# Patient Record
Sex: FEMALE | Race: WHITE | NOT HISPANIC OR LATINO | Employment: PART TIME | URBAN - METROPOLITAN AREA
[De-identification: names, ages, dates, MRNs, and addresses within clinical notes are randomized per-mention and may not be internally consistent; named-entity substitution may affect disease eponyms.]

---

## 2017-01-09 ENCOUNTER — TRANSCRIBE ORDERS (OUTPATIENT)
Dept: LAB | Facility: HOSPITAL | Age: 47
End: 2017-01-09

## 2017-01-09 ENCOUNTER — APPOINTMENT (OUTPATIENT)
Dept: LAB | Facility: HOSPITAL | Age: 47
End: 2017-01-09
Attending: INTERNAL MEDICINE
Payer: COMMERCIAL

## 2017-01-09 DIAGNOSIS — I10 ESSENTIAL HYPERTENSION, MALIGNANT: ICD-10-CM

## 2017-01-09 DIAGNOSIS — E61.1 IRON DEFICIENCY: Primary | ICD-10-CM

## 2017-01-09 DIAGNOSIS — E61.1 IRON DEFICIENCY: ICD-10-CM

## 2017-01-09 LAB
ALBUMIN SERPL BCP-MCNC: 3.4 G/DL (ref 3.5–5)
ALP SERPL-CCNC: 65 U/L (ref 46–116)
ALT SERPL W P-5'-P-CCNC: 20 U/L (ref 12–78)
ANION GAP SERPL CALCULATED.3IONS-SCNC: 10 MMOL/L (ref 4–13)
AST SERPL W P-5'-P-CCNC: 13 U/L (ref 5–45)
BACTERIA UR QL AUTO: ABNORMAL /HPF
BASOPHILS # BLD AUTO: 0.03 THOUSANDS/ΜL (ref 0–0.1)
BASOPHILS NFR BLD AUTO: 0 % (ref 0–1)
BILIRUB SERPL-MCNC: 0.31 MG/DL (ref 0.2–1)
BILIRUB UR QL STRIP: NEGATIVE
BUN SERPL-MCNC: 10 MG/DL (ref 5–25)
CALCIUM SERPL-MCNC: 8.9 MG/DL (ref 8.3–10.1)
CHLORIDE SERPL-SCNC: 103 MMOL/L (ref 100–108)
CHOLEST SERPL-MCNC: 221 MG/DL (ref 50–200)
CLARITY UR: ABNORMAL
CO2 SERPL-SCNC: 26 MMOL/L (ref 21–32)
COLOR UR: YELLOW
CREAT SERPL-MCNC: 0.75 MG/DL (ref 0.6–1.3)
EOSINOPHIL # BLD AUTO: 0.11 THOUSAND/ΜL (ref 0–0.61)
EOSINOPHIL NFR BLD AUTO: 2 % (ref 0–6)
ERYTHROCYTE [DISTWIDTH] IN BLOOD BY AUTOMATED COUNT: 13.3 % (ref 11.6–15.1)
FERRITIN SERPL-MCNC: 17 NG/ML (ref 8–388)
GFR SERPL CREATININE-BSD FRML MDRD: >60 ML/MIN/1.73SQ M
GLUCOSE SERPL-MCNC: 108 MG/DL (ref 65–140)
GLUCOSE UR STRIP-MCNC: NEGATIVE MG/DL
HCT VFR BLD AUTO: 40.4 % (ref 34.8–46.1)
HDLC SERPL-MCNC: 60 MG/DL (ref 40–60)
HGB BLD-MCNC: 13.6 G/DL (ref 11.5–15.4)
HGB UR QL STRIP.AUTO: ABNORMAL
KETONES UR STRIP-MCNC: NEGATIVE MG/DL
LDLC SERPL CALC-MCNC: 143 MG/DL (ref 0–100)
LEUKOCYTE ESTERASE UR QL STRIP: NEGATIVE
LYMPHOCYTES # BLD AUTO: 1.71 THOUSANDS/ΜL (ref 0.6–4.47)
LYMPHOCYTES NFR BLD AUTO: 24 % (ref 14–44)
MCH RBC QN AUTO: 32 PG (ref 26.8–34.3)
MCHC RBC AUTO-ENTMCNC: 33.7 G/DL (ref 31.4–37.4)
MCV RBC AUTO: 95 FL (ref 82–98)
MONOCYTES # BLD AUTO: 0.5 THOUSAND/ΜL (ref 0.17–1.22)
MONOCYTES NFR BLD AUTO: 7 % (ref 4–12)
NEUTROPHILS # BLD AUTO: 4.7 THOUSANDS/ΜL (ref 1.85–7.62)
NEUTS SEG NFR BLD AUTO: 67 % (ref 43–75)
NITRITE UR QL STRIP: NEGATIVE
NON-SQ EPI CELLS URNS QL MICRO: ABNORMAL /HPF
NRBC BLD AUTO-RTO: 0 /100 WBCS
PH UR STRIP.AUTO: 5.5 [PH] (ref 4.5–8)
PLATELET # BLD AUTO: 270 THOUSANDS/UL (ref 149–390)
PMV BLD AUTO: 11.1 FL (ref 8.9–12.7)
POTASSIUM SERPL-SCNC: 3.6 MMOL/L (ref 3.5–5.3)
PROT SERPL-MCNC: 7.4 G/DL (ref 6.4–8.2)
PROT UR STRIP-MCNC: NEGATIVE MG/DL
RBC # BLD AUTO: 4.25 MILLION/UL (ref 3.81–5.12)
RBC #/AREA URNS AUTO: ABNORMAL /HPF
SODIUM SERPL-SCNC: 139 MMOL/L (ref 136–145)
SP GR UR STRIP.AUTO: 1.01 (ref 1–1.03)
T4 FREE SERPL-MCNC: 1.07 NG/DL (ref 0.76–1.46)
TRIGL SERPL-MCNC: 89 MG/DL
TSH SERPL DL<=0.05 MIU/L-ACNC: 3.74 UIU/ML (ref 0.36–3.74)
UROBILINOGEN UR QL STRIP.AUTO: 1 E.U./DL
WBC # BLD AUTO: 7.06 THOUSAND/UL (ref 4.31–10.16)
WBC #/AREA URNS AUTO: ABNORMAL /HPF

## 2017-01-09 PROCEDURE — 80061 LIPID PANEL: CPT

## 2017-01-09 PROCEDURE — 82728 ASSAY OF FERRITIN: CPT

## 2017-01-09 PROCEDURE — 81001 URINALYSIS AUTO W/SCOPE: CPT | Performed by: INTERNAL MEDICINE

## 2017-01-09 PROCEDURE — 85025 COMPLETE CBC W/AUTO DIFF WBC: CPT

## 2017-01-09 PROCEDURE — 84439 ASSAY OF FREE THYROXINE: CPT

## 2017-01-09 PROCEDURE — 36415 COLL VENOUS BLD VENIPUNCTURE: CPT

## 2017-01-09 PROCEDURE — 84443 ASSAY THYROID STIM HORMONE: CPT

## 2017-01-09 PROCEDURE — 80053 COMPREHEN METABOLIC PANEL: CPT

## 2017-04-17 ENCOUNTER — TRANSCRIBE ORDERS (OUTPATIENT)
Dept: LAB | Facility: CLINIC | Age: 47
End: 2017-04-17

## 2017-04-17 ENCOUNTER — APPOINTMENT (OUTPATIENT)
Dept: LAB | Facility: CLINIC | Age: 47
End: 2017-04-17
Payer: COMMERCIAL

## 2017-04-17 DIAGNOSIS — I15.8: Primary | ICD-10-CM

## 2017-04-17 DIAGNOSIS — I15.8: ICD-10-CM

## 2017-04-17 LAB
ALBUMIN SERPL BCP-MCNC: 3.3 G/DL (ref 3.5–5)
ALP SERPL-CCNC: 62 U/L (ref 46–116)
ALT SERPL W P-5'-P-CCNC: 20 U/L (ref 12–78)
ANION GAP SERPL CALCULATED.3IONS-SCNC: 7 MMOL/L (ref 4–13)
AST SERPL W P-5'-P-CCNC: 9 U/L (ref 5–45)
BASOPHILS # BLD AUTO: 0.03 THOUSANDS/ΜL (ref 0–0.1)
BASOPHILS NFR BLD AUTO: 1 % (ref 0–1)
BILIRUB SERPL-MCNC: 0.32 MG/DL (ref 0.2–1)
BUN SERPL-MCNC: 12 MG/DL (ref 5–25)
CALCIUM SERPL-MCNC: 8.8 MG/DL (ref 8.3–10.1)
CHLORIDE SERPL-SCNC: 104 MMOL/L (ref 100–108)
CO2 SERPL-SCNC: 27 MMOL/L (ref 21–32)
CREAT SERPL-MCNC: 0.77 MG/DL (ref 0.6–1.3)
EOSINOPHIL # BLD AUTO: 0.14 THOUSAND/ΜL (ref 0–0.61)
EOSINOPHIL NFR BLD AUTO: 2 % (ref 0–6)
ERYTHROCYTE [DISTWIDTH] IN BLOOD BY AUTOMATED COUNT: 13 % (ref 11.6–15.1)
GFR SERPL CREATININE-BSD FRML MDRD: >60 ML/MIN/1.73SQ M
GLUCOSE SERPL-MCNC: 130 MG/DL (ref 65–140)
HCT VFR BLD AUTO: 39.2 % (ref 34.8–46.1)
HGB BLD-MCNC: 12.9 G/DL (ref 11.5–15.4)
LYMPHOCYTES # BLD AUTO: 1.4 THOUSANDS/ΜL (ref 0.6–4.47)
LYMPHOCYTES NFR BLD AUTO: 23 % (ref 14–44)
MAGNESIUM SERPL-MCNC: 1.9 MG/DL (ref 1.6–2.6)
MCH RBC QN AUTO: 31.1 PG (ref 26.8–34.3)
MCHC RBC AUTO-ENTMCNC: 32.9 G/DL (ref 31.4–37.4)
MCV RBC AUTO: 95 FL (ref 82–98)
MONOCYTES # BLD AUTO: 0.45 THOUSAND/ΜL (ref 0.17–1.22)
MONOCYTES NFR BLD AUTO: 7 % (ref 4–12)
NEUTROPHILS # BLD AUTO: 4.18 THOUSANDS/ΜL (ref 1.85–7.62)
NEUTS SEG NFR BLD AUTO: 67 % (ref 43–75)
NRBC BLD AUTO-RTO: 0 /100 WBCS
PHOSPHATE SERPL-MCNC: 2.7 MG/DL (ref 2.7–4.5)
PLATELET # BLD AUTO: 280 THOUSANDS/UL (ref 149–390)
PMV BLD AUTO: 10.8 FL (ref 8.9–12.7)
POTASSIUM SERPL-SCNC: 3.3 MMOL/L (ref 3.5–5.3)
PROT SERPL-MCNC: 7.2 G/DL (ref 6.4–8.2)
RBC # BLD AUTO: 4.15 MILLION/UL (ref 3.81–5.12)
SODIUM SERPL-SCNC: 138 MMOL/L (ref 136–145)
URATE SERPL-MCNC: 5 MG/DL (ref 2–6.8)
WBC # BLD AUTO: 6.22 THOUSAND/UL (ref 4.31–10.16)

## 2017-04-17 PROCEDURE — 84550 ASSAY OF BLOOD/URIC ACID: CPT

## 2017-04-17 PROCEDURE — 85025 COMPLETE CBC W/AUTO DIFF WBC: CPT

## 2017-04-17 PROCEDURE — 83735 ASSAY OF MAGNESIUM: CPT

## 2017-04-17 PROCEDURE — 84100 ASSAY OF PHOSPHORUS: CPT

## 2017-04-17 PROCEDURE — 80053 COMPREHEN METABOLIC PANEL: CPT

## 2017-04-17 PROCEDURE — 36415 COLL VENOUS BLD VENIPUNCTURE: CPT

## 2017-08-04 ENCOUNTER — TRANSCRIBE ORDERS (OUTPATIENT)
Dept: LAB | Facility: HOSPITAL | Age: 47
End: 2017-08-04

## 2017-08-04 ENCOUNTER — APPOINTMENT (OUTPATIENT)
Dept: LAB | Facility: HOSPITAL | Age: 47
End: 2017-08-04
Payer: COMMERCIAL

## 2017-08-04 DIAGNOSIS — Z00.8 HEALTH EXAMINATION IN POPULATION SURVEYS: Primary | ICD-10-CM

## 2017-08-04 DIAGNOSIS — Z00.8 HEALTH EXAMINATION IN POPULATION SURVEYS: ICD-10-CM

## 2017-08-04 LAB
CHOLEST SERPL-MCNC: 202 MG/DL (ref 50–200)
EST. AVERAGE GLUCOSE BLD GHB EST-MCNC: 117 MG/DL
HBA1C MFR BLD: 5.7 % (ref 4.2–6.3)
HDLC SERPL-MCNC: 56 MG/DL (ref 40–60)
LDLC SERPL CALC-MCNC: 131 MG/DL (ref 0–100)
TRIGL SERPL-MCNC: 73 MG/DL

## 2017-08-04 PROCEDURE — 36415 COLL VENOUS BLD VENIPUNCTURE: CPT

## 2017-08-04 PROCEDURE — 83036 HEMOGLOBIN GLYCOSYLATED A1C: CPT

## 2017-08-04 PROCEDURE — 80061 LIPID PANEL: CPT

## 2017-08-08 ENCOUNTER — LAB REQUISITION (OUTPATIENT)
Dept: LAB | Facility: HOSPITAL | Age: 47
End: 2017-08-08
Payer: COMMERCIAL

## 2017-08-08 ENCOUNTER — ALLSCRIPTS OFFICE VISIT (OUTPATIENT)
Dept: OTHER | Facility: OTHER | Age: 47
End: 2017-08-08

## 2017-08-08 DIAGNOSIS — Z12.31 ENCOUNTER FOR SCREENING MAMMOGRAM FOR MALIGNANT NEOPLASM OF BREAST: ICD-10-CM

## 2017-08-08 DIAGNOSIS — Z01.419 ENCOUNTER FOR GYNECOLOGICAL EXAMINATION WITHOUT ABNORMAL FINDING: ICD-10-CM

## 2017-08-08 PROCEDURE — G0145 SCR C/V CYTO,THINLAYER,RESCR: HCPCS | Performed by: OBSTETRICS & GYNECOLOGY

## 2017-08-08 PROCEDURE — 87624 HPV HI-RISK TYP POOLED RSLT: CPT | Performed by: OBSTETRICS & GYNECOLOGY

## 2017-08-14 LAB — HPV RRNA GENITAL QL NAA+PROBE: NORMAL

## 2017-08-15 LAB
LAB AP GYN PRIMARY INTERPRETATION: NORMAL
LAB AP LMP: NORMAL
Lab: NORMAL

## 2018-01-14 VITALS
WEIGHT: 161 LBS | SYSTOLIC BLOOD PRESSURE: 142 MMHG | HEIGHT: 63 IN | DIASTOLIC BLOOD PRESSURE: 84 MMHG | BODY MASS INDEX: 28.53 KG/M2

## 2018-01-18 NOTE — PROGRESS NOTES
Chief Complaint    1  Ear Pain  c/o left ear pain ,radiates in left side of face  recent sinus/cold      History of Present Illness  HPI: Last week patient had upper extremity infection symptoms of congestion, cough, runny nose  Patient's been using over-the-counter medications and Flonase with some relief  He should is feeling better, but has more left ear pain and pressure and has some mild congestion  She denies any nasal discharge sputum  Patient did have low-grade fevers last week, but none this week  Hospital Based Practices Required Assessment:   Pain Assessment   the patient states they have pain  (on a scale of 0 to 10, the patient rates the pain at 5 )   Abuse And Domestic Violence Screen    Yes, the patient is safe at home  The patient states no one is hurting them  Depression And Suicide Screen  No, the patient has not had thoughts of hurting themself  No, the patient has not felt depressed in the past 7 days  Referral made to     [facility]  Ear Pain: ROSEMARY Industrcordelia 80 presents with complaints of ear pain  Associated symptoms include ear pressure and nasal congestion, but no otalgia, no ear plugging, no ear drainage, no decreased hearing, no auricular pain, no ear sores, no ear pruritus, no fever, no cough, no sore throat, no swollen glands, no facial pain, no dental pain, no headache, no tinnitus, no vertigo, no loss of balance, no nausea and no temporomandibular joint pain  Review of Systems    Constitutional: as noted in HPI    ENT: as noted in HPI  Respiratory: as noted in HPI  Active Problems    1  Left knee pain (719 46) (M25 562)    Current Meds   1  Advil CAPS Recorded   2  Fioricet -40 MG Oral Capsule Recorded   3  HydroCHLOROthiazide 25 MG Oral Tablet Recorded   4  Inderal LA 80 MG Oral Capsule Extended Release 24 Hour Recorded    Allergies    1  Amoxil   2   Ceclor    Vitals  Signs    Systolic: 162  Diastolic: 88  Heart Rate: 69  Respiration: 18  Temperature: 98 1 F, Temporal  Pain Scale: 5  LMP: [id number]  O2 Saturation: 98  Height: 5 ft 3 in  Weight: 159 lb   BMI Calculated: 28 17  BSA Calculated: 1 75    Physical Exam    Constitutional   General appearance: No acute distress, well appearing and well nourished  Eyes   Conjunctiva and lids: No swelling, erythema or discharge  Pupils and irises: Equal, round and reactive to light  Ears, Nose, Mouth, and Throat   External inspection of ears and nose: Normal   Mild bilateral nasal erythema with no congestion, no discharge  Otoscopic examination: Tympanic membranes translucent with normal light reflex  Canals patent without erythema  Clear fluid bilaterally in the middle ear  Mild bilateral tonsillar erythema with no exudate  No tonsillar soft tissue swelling  Lymphatic   Palpation of lymph nodes in neck: Abnormal   bilateral anterior cervical node enlargement, but no posterior cervical node enlargement, no submandibular node enlargement and no supraclavicular node enlargement  Psychiatric   Orientation to person, place, and time: Normal     Mood and affect: Normal        Assessment    1  URTI (acute upper respiratory infection) (465 9) (J06 9)    Plan  URTI (acute upper respiratory infection)    · Drink plenty of fluids ; Status:Complete;   Done: 25VSS0669   · Resume activity to your tolerance ; Status:Complete;   Done: 22BFF5090   · Use a cool mist humidifier in the room ; Status:Complete;   Done: [id number]    Discussion/Summary    Continue Flonase  Follow-up with your primary care physician if symptoms are worsening or no improvement the next 5-7 days        Signatures   Electronically signed by : PUJA Gonsales; Nov 4 2016  3:31PM EST                       (Author)    Electronically signed by : Bebe Lindsay DO; Nov 4 2016  4:53PM EST                       (Co-author)

## 2018-01-30 ENCOUNTER — OFFICE VISIT (OUTPATIENT)
Dept: URGENT CARE | Age: 48
End: 2018-01-30
Payer: COMMERCIAL

## 2018-01-30 VITALS
DIASTOLIC BLOOD PRESSURE: 100 MMHG | HEIGHT: 63 IN | BODY MASS INDEX: 28.7 KG/M2 | RESPIRATION RATE: 20 BRPM | HEART RATE: 87 BPM | OXYGEN SATURATION: 97 % | WEIGHT: 162 LBS | SYSTOLIC BLOOD PRESSURE: 142 MMHG | TEMPERATURE: 98.7 F

## 2018-01-30 DIAGNOSIS — J02.9 SORE THROAT: ICD-10-CM

## 2018-01-30 DIAGNOSIS — J06.9 UPPER RESPIRATORY TRACT INFECTION, UNSPECIFIED TYPE: Primary | ICD-10-CM

## 2018-01-30 LAB — S PYO AG THROAT QL: NEGATIVE

## 2018-01-30 PROCEDURE — S9088 SERVICES PROVIDED IN URGENT: HCPCS | Performed by: FAMILY MEDICINE

## 2018-01-30 PROCEDURE — 99213 OFFICE O/P EST LOW 20 MIN: CPT | Performed by: FAMILY MEDICINE

## 2018-01-30 PROCEDURE — 87430 STREP A AG IA: CPT | Performed by: FAMILY MEDICINE

## 2018-01-30 RX ORDER — BUTALBITAL, ACETAMINOPHEN AND CAFFEINE 300; 40; 50 MG/1; MG/1; MG/1
CAPSULE ORAL
COMMUNITY
End: 2021-07-14 | Stop reason: SDUPTHER

## 2018-01-30 RX ORDER — HYDROCHLOROTHIAZIDE 25 MG/1
TABLET ORAL
COMMUNITY
End: 2021-07-14 | Stop reason: SDUPTHER

## 2018-01-30 RX ORDER — PROPRANOLOL HYDROCHLORIDE 80 MG/1
CAPSULE, EXTENDED RELEASE ORAL
COMMUNITY
End: 2021-07-14 | Stop reason: SDUPTHER

## 2018-01-30 RX ORDER — AZITHROMYCIN 250 MG/1
TABLET, FILM COATED ORAL
Qty: 6 TABLET | Refills: 0 | Status: SHIPPED | OUTPATIENT
Start: 2018-01-30 | End: 2018-02-03

## 2018-01-30 RX ORDER — OMEGA-3 FATTY ACIDS/FISH OIL 300-1000MG
CAPSULE ORAL AS NEEDED
COMMUNITY

## 2018-01-31 NOTE — PROGRESS NOTES
Assessment/Plan:      Diagnoses and all orders for this visit:    Sore throat  -     POCT rapid strepA    Other orders  -     Ibuprofen (ADVIL) 200 MG CAPS; Take by mouth  -     Butalbital-APAP-Caffeine (FIORICET) -40 MG CAPS; Take by mouth  -     hydrochlorothiazide (HYDRODIURIL) 25 mg tablet; Take by mouth  -     propranolol (INDERAL LA) 80 mg 24 hr capsule; Take by mouth          Subjective:     Patient ID: Odilia Vides is a 50 y o  female  Patient is here for evaluation nasal congestion runny nose with thick yellow sputum  Patient is also having a cough and sore throat  She did have a fever last 2 days  She denies any ear pain, dizziness, shortness of breath  Review of Systems   Constitutional: Positive for fever  Negative for chills  HENT: Positive for congestion, postnasal drip, rhinorrhea and sore throat  Negative for sinus pressure  Eyes: Negative  Respiratory: Positive for cough  Cardiovascular: Negative  Objective:     Physical Exam   Constitutional: She appears well-developed and well-nourished  HENT:   Head: Normocephalic and atraumatic  Right Ear: External ear normal    Left Ear: External ear normal    Bilateral tonsillar erythema with no soft tissue swelling  No exudate  Bilateral nasal congestion and erythema with no discharge  Eyes: Conjunctivae and EOM are normal  Pupils are equal, round, and reactive to light  Right eye exhibits no discharge  Left eye exhibits no discharge  Neck: Normal range of motion  Neck supple  Cardiovascular: Normal rate, regular rhythm and normal heart sounds  Pulmonary/Chest: Effort normal and breath sounds normal    Lymphadenopathy:     She has cervical adenopathy  Nursing note and vitals reviewed

## 2018-03-26 ENCOUNTER — TELEPHONE (OUTPATIENT)
Dept: OBGYN CLINIC | Facility: CLINIC | Age: 48
End: 2018-03-26

## 2018-03-26 DIAGNOSIS — B37.3 YEAST INFECTION OF THE VAGINA: Primary | ICD-10-CM

## 2018-03-26 RX ORDER — FLUCONAZOLE 150 MG/1
150 TABLET ORAL ONCE
Qty: 1 TABLET | Refills: 1 | Status: SHIPPED | OUTPATIENT
Start: 2018-03-26 | End: 2018-03-26

## 2018-03-26 NOTE — TELEPHONE ENCOUNTER
Pt complaining of symptoms of yeast infection, tried monistat last month, would like script for diflucan    [facility] [address]

## 2018-03-26 NOTE — TELEPHONE ENCOUNTER
Pt having sx of yeast inf - had prev used monistat 1 last month - prefers to take diflucan (prev effective) - has had recent increased frequency with increased sweating in vag area (3 times past 6 months) - will be having screening lab testing thru pcp    [facility] ([facility])

## 2018-03-27 ENCOUNTER — TELEPHONE (OUTPATIENT)
Dept: OBGYN CLINIC | Facility: CLINIC | Age: 48
End: 2018-03-27

## 2018-04-23 ENCOUNTER — TRANSCRIBE ORDERS (OUTPATIENT)
Dept: LAB | Facility: CLINIC | Age: 48
End: 2018-04-23

## 2018-04-23 ENCOUNTER — APPOINTMENT (OUTPATIENT)
Dept: LAB | Facility: CLINIC | Age: 48
End: 2018-04-23
Payer: COMMERCIAL

## 2018-04-23 DIAGNOSIS — R35.0 URINARY FREQUENCY: ICD-10-CM

## 2018-04-23 DIAGNOSIS — D64.9 ANEMIA, UNSPECIFIED TYPE: ICD-10-CM

## 2018-04-23 DIAGNOSIS — E78.2 MIXED HYPERLIPIDEMIA: ICD-10-CM

## 2018-04-23 DIAGNOSIS — I10 ESSENTIAL HYPERTENSION, MALIGNANT: Primary | ICD-10-CM

## 2018-04-23 DIAGNOSIS — E03.9 ACQUIRED HYPOTHYROIDISM: ICD-10-CM

## 2018-04-23 DIAGNOSIS — I10 ESSENTIAL HYPERTENSION, MALIGNANT: ICD-10-CM

## 2018-04-23 LAB
ALBUMIN SERPL BCP-MCNC: 3.5 G/DL (ref 3.5–5)
ALP SERPL-CCNC: 57 U/L (ref 46–116)
ALT SERPL W P-5'-P-CCNC: 22 U/L (ref 12–78)
ANION GAP SERPL CALCULATED.3IONS-SCNC: 5 MMOL/L (ref 4–13)
AST SERPL W P-5'-P-CCNC: 17 U/L (ref 5–45)
BACTERIA UR QL AUTO: ABNORMAL /HPF
BASOPHILS # BLD AUTO: 0.04 THOUSANDS/ΜL (ref 0–0.1)
BASOPHILS NFR BLD AUTO: 1 % (ref 0–1)
BILIRUB SERPL-MCNC: 0.34 MG/DL (ref 0.2–1)
BILIRUB UR QL STRIP: NEGATIVE
BUN SERPL-MCNC: 12 MG/DL (ref 5–25)
CALCIUM SERPL-MCNC: 8.8 MG/DL (ref 8.3–10.1)
CHLORIDE SERPL-SCNC: 101 MMOL/L (ref 100–108)
CHOLEST SERPL-MCNC: 187 MG/DL (ref 50–200)
CLARITY UR: ABNORMAL
CO2 SERPL-SCNC: 28 MMOL/L (ref 21–32)
COLOR UR: YELLOW
CREAT SERPL-MCNC: 0.67 MG/DL (ref 0.6–1.3)
EOSINOPHIL # BLD AUTO: 0.2 THOUSAND/ΜL (ref 0–0.61)
EOSINOPHIL NFR BLD AUTO: 3 % (ref 0–6)
ERYTHROCYTE [DISTWIDTH] IN BLOOD BY AUTOMATED COUNT: 13.4 % (ref 11.6–15.1)
GFR SERPL CREATININE-BSD FRML MDRD: 104 ML/MIN/1.73SQ M
GLUCOSE P FAST SERPL-MCNC: 119 MG/DL (ref 65–99)
GLUCOSE UR STRIP-MCNC: NEGATIVE MG/DL
HCT VFR BLD AUTO: 39.4 % (ref 34.8–46.1)
HDLC SERPL-MCNC: 56 MG/DL (ref 40–60)
HGB BLD-MCNC: 13.2 G/DL (ref 11.5–15.4)
HGB UR QL STRIP.AUTO: ABNORMAL
HYALINE CASTS #/AREA URNS LPF: ABNORMAL /LPF
KETONES UR STRIP-MCNC: NEGATIVE MG/DL
LDLC SERPL CALC-MCNC: 113 MG/DL (ref 0–100)
LDLC SERPL DIRECT ASSAY-MCNC: 116 MG/DL (ref 0–100)
LEUKOCYTE ESTERASE UR QL STRIP: NEGATIVE
LYMPHOCYTES # BLD AUTO: 1.89 THOUSANDS/ΜL (ref 0.6–4.47)
LYMPHOCYTES NFR BLD AUTO: 27 % (ref 14–44)
MCH RBC QN AUTO: 31.9 PG (ref 26.8–34.3)
MCHC RBC AUTO-ENTMCNC: 33.5 G/DL (ref 31.4–37.4)
MCV RBC AUTO: 95 FL (ref 82–98)
MONOCYTES # BLD AUTO: 0.57 THOUSAND/ΜL (ref 0.17–1.22)
MONOCYTES NFR BLD AUTO: 8 % (ref 4–12)
NEUTROPHILS # BLD AUTO: 4.22 THOUSANDS/ΜL (ref 1.85–7.62)
NEUTS SEG NFR BLD AUTO: 61 % (ref 43–75)
NITRITE UR QL STRIP: NEGATIVE
NON-SQ EPI CELLS URNS QL MICRO: ABNORMAL /HPF
NONHDLC SERPL-MCNC: 131 MG/DL
NRBC BLD AUTO-RTO: 0 /100 WBCS
PH UR STRIP.AUTO: 6 [PH] (ref 4.5–8)
PLATELET # BLD AUTO: 288 THOUSANDS/UL (ref 149–390)
PMV BLD AUTO: 10.9 FL (ref 8.9–12.7)
POTASSIUM SERPL-SCNC: 3.5 MMOL/L (ref 3.5–5.3)
PROT SERPL-MCNC: 7.6 G/DL (ref 6.4–8.2)
PROT UR STRIP-MCNC: ABNORMAL MG/DL
RBC # BLD AUTO: 4.14 MILLION/UL (ref 3.81–5.12)
RBC #/AREA URNS AUTO: ABNORMAL /HPF
SODIUM SERPL-SCNC: 134 MMOL/L (ref 136–145)
SP GR UR STRIP.AUTO: 1.02 (ref 1–1.03)
T4 FREE SERPL-MCNC: 1.09 NG/DL (ref 0.76–1.46)
TRIGL SERPL-MCNC: 92 MG/DL
TSH SERPL DL<=0.05 MIU/L-ACNC: 3.28 UIU/ML (ref 0.36–3.74)
UROBILINOGEN UR QL STRIP.AUTO: 0.2 E.U./DL
WBC # BLD AUTO: 6.94 THOUSAND/UL (ref 4.31–10.16)
WBC #/AREA URNS AUTO: ABNORMAL /HPF

## 2018-04-23 PROCEDURE — 80053 COMPREHEN METABOLIC PANEL: CPT

## 2018-04-23 PROCEDURE — 83721 ASSAY OF BLOOD LIPOPROTEIN: CPT

## 2018-04-23 PROCEDURE — 80061 LIPID PANEL: CPT

## 2018-04-23 PROCEDURE — 85025 COMPLETE CBC W/AUTO DIFF WBC: CPT

## 2018-04-23 PROCEDURE — 84443 ASSAY THYROID STIM HORMONE: CPT

## 2018-04-23 PROCEDURE — 81001 URINALYSIS AUTO W/SCOPE: CPT

## 2018-04-23 PROCEDURE — 84439 ASSAY OF FREE THYROXINE: CPT

## 2018-04-23 PROCEDURE — 83036 HEMOGLOBIN GLYCOSYLATED A1C: CPT

## 2018-04-23 PROCEDURE — 36415 COLL VENOUS BLD VENIPUNCTURE: CPT

## 2018-04-24 LAB
EST. AVERAGE GLUCOSE BLD GHB EST-MCNC: 108 MG/DL
HBA1C MFR BLD: 5.4 % (ref 4.2–6.3)

## 2018-04-30 ENCOUNTER — OFFICE VISIT (OUTPATIENT)
Dept: URGENT CARE | Age: 48
End: 2018-04-30
Payer: COMMERCIAL

## 2018-04-30 VITALS
HEART RATE: 81 BPM | OXYGEN SATURATION: 98 % | BODY MASS INDEX: 28.7 KG/M2 | WEIGHT: 162 LBS | SYSTOLIC BLOOD PRESSURE: 146 MMHG | TEMPERATURE: 99.7 F | HEIGHT: 63 IN | DIASTOLIC BLOOD PRESSURE: 79 MMHG | RESPIRATION RATE: 18 BRPM

## 2018-04-30 DIAGNOSIS — J06.9 ACUTE URI: Primary | ICD-10-CM

## 2018-04-30 PROCEDURE — S9088 SERVICES PROVIDED IN URGENT: HCPCS | Performed by: FAMILY MEDICINE

## 2018-04-30 PROCEDURE — 99213 OFFICE O/P EST LOW 20 MIN: CPT | Performed by: FAMILY MEDICINE

## 2018-04-30 RX ORDER — FLUTICASONE PROPIONATE 50 MCG
1 SPRAY, SUSPENSION (ML) NASAL DAILY
Qty: 16 G | Refills: 0 | Status: SHIPPED | OUTPATIENT
Start: 2018-04-30

## 2018-04-30 RX ORDER — GUAIFENESIN 600 MG
1200 TABLET, EXTENDED RELEASE 12 HR ORAL EVERY 12 HOURS SCHEDULED
Qty: 8 TABLET | Refills: 0 | Status: SHIPPED | OUTPATIENT
Start: 2018-04-30 | End: 2021-07-26 | Stop reason: ALTCHOICE

## 2018-04-30 RX ORDER — AZITHROMYCIN 250 MG/1
TABLET, FILM COATED ORAL
Qty: 6 TABLET | Refills: 0 | Status: SHIPPED | OUTPATIENT
Start: 2018-04-30 | End: 2018-05-04

## 2018-04-30 NOTE — LETTER
April 30, 2018     Patient: Cecilia Corbin   YOB: 1970   Date of Visit: 4/30/2018       To Whom It May Concern: It is my medical opinion that Cecilia Corbin may return to work on 5/2/2018  If you have any questions or concerns, please don't hesitate to call           Sincerely,        Jacklyn Yanes PA-C    CC: No Recipients

## 2018-04-30 NOTE — PATIENT INSTRUCTIONS
Take antibiotics, flonase, and mucinex as directed  Drink plenty of fluids  Follow up with family practice this week  Go to ER if new or worsening symptoms occur  Acute Bronchitis   WHAT YOU NEED TO KNOW:   Acute bronchitis is swelling and irritation in the air passages of your lungs  This irritation may cause you to cough or have other breathing problems  Acute bronchitis often starts because of another illness, such as a cold or the flu  The illness spreads from your nose and throat to your windpipe and airways  Bronchitis is often called a chest cold  Acute bronchitis lasts about 3 to 6 weeks and is usually not a serious illness  Your cough can last for several weeks  DISCHARGE INSTRUCTIONS:   Return to the emergency department if:   · You cough up blood  · Your lips or fingernails turn blue  · You feel like you are not getting enough air when you breathe  Contact your healthcare provider if:   · You have a fever  · Your breathing problems do not go away or get worse  · Your cough does not get better within 4 weeks  · You have questions or concerns about your condition or care  Self-care:   · Get more rest   Rest helps your body to heal  Slowly start to do more each day  Rest when you feel it is needed  · Avoid irritants in the air  Avoid chemicals, fumes, and dust  Wear a face mask if you must work around dust or fumes  Stay inside on days when air pollution levels are high  If you have allergies, stay inside when pollen counts are high  Do not use aerosol products, such as spray-on deodorant, bug spray, and hair spray  · Do not smoke or be around others who smoke  Nicotine and other chemicals in cigarettes and cigars damages the cilia that move mucus out of your lungs  Ask your healthcare provider for information if you currently smoke and need help to quit  E-cigarettes or smokeless tobacco still contain nicotine  Talk to your healthcare provider before you use these products  · Drink liquids as directed  Liquids help keep your air passages moist and help you cough up mucus  You may need to drink more liquids when you have acute bronchitis  Ask how much liquid to drink each day and which liquids are best for you  · Use a humidifier or vaporizer  Use a cool mist humidifier or a vaporizer to increase air moisture in your home  This may make it easier for you to breathe and help decrease your cough  Decrease risk for acute bronchitis:   · Get the vaccinations you need  Ask your healthcare provider if you should get vaccinated against the flu or pneumonia  · Prevent the spread of germs  You can decrease your risk of acute bronchitis and other illnesses by doing the following:     [facility] your hands often with soap and water  Carry germ-killing hand lotion or gel with you  You can use the lotion or gel to clean your hands when soap and water are not available  ¨ Do not touch your eyes, nose, or mouth unless you have washed your hands first     ¨ Always cover your mouth when you cough to prevent the spread of germs  It is best to cough into a tissue or your shirt sleeve instead of into your hand  Ask those around you cover their mouths when they cough  ¨ Try to avoid people who have a cold or the flu  If you are sick, stay away from others as much as possible  Medicines: Your healthcare provider may  give you any of the following:  · Ibuprofen or acetaminophen  are medicines that help lower your fever  They are available without a doctor's order  Ask your healthcare provider which medicine is right for you  Ask how much to take and how often to take it  Follow directions  These medicines can cause stomach bleeding if not taken correctly  Ibuprofen can cause kidney damage  Do not take ibuprofen if you have kidney disease, an ulcer, or allergies to aspirin  Acetaminophen can cause liver damage  Do not take more than 4,000 milligrams in 24 hours       · Decongestants  help loosen mucus in your lungs and make it easier to cough up  This can help you breathe easier  · Cough suppressants  decrease your urge to cough  If your cough produces mucus, do not take a cough suppressant unless your healthcare provider tells you to  Your healthcare provider may suggest that you take a cough suppressant at night so you can rest     · Inhalers  may be given  Your healthcare provider may give you one or more inhalers to help you breathe easier and cough less  An inhaler gives your medicine to open your airways  Ask your healthcare provider to show you how to use your inhaler correctly  · Take your medicine as directed  Contact your healthcare provider if you think your medicine is not helping or if you have side effects  Tell him of her if you are allergic to any medicine  Keep a list of the medicines, vitamins, and herbs you take  Include the amounts, and when and why you take them  Bring the list or the pill bottles to follow-up visits  Carry your medicine list with you in case of an emergency  Follow up with your healthcare provider as directed:  Write down questions you have so you will remember to ask them during your follow-up visits  © 2017 [employer]  Information is for End User's use only and may not be sold, redistributed or otherwise used for commercial purposes  All illustrations and images included in CareNotes® are the copyrighted property of [employer]  or Hussain Urbano  The above information is an  only  It is not intended as medical advice for individual conditions or treatments  Talk to your doctor, nurse or pharmacist before following any medical regimen to see if it is safe and effective for you

## 2018-04-30 NOTE — PROGRESS NOTES
[facility]        NAME: Mihaela Rogers is a 50 y o  female  : 1970    MRN: [id number]  DATE: 2018  TIME: 4:16 PM    Assessment and Plan   Acute URI [J06 9]  1  Acute URI  azithromycin (ZITHROMAX) 250 mg tablet    fluticasone (FLONASE) 50 mcg/act nasal spray    guaiFENesin (MUCINEX) 600 mg 12 hr tablet         Patient Instructions       Take antibiotics, flonase, and mucinex as directed  Drink plenty of fluids  Follow up with family practice this week  Go to ER if new or worsening symptoms occur  Chief Complaint     Chief Complaint   Patient presents with    Cold Like Symptoms     Since Thurs fever and body aches-came on quickly  Now congested and coughing         History of Present Illness       Patient presents with 5 days of sinus pressure, PND, rhinorrhea, yellow productive cough, myalgias, and intermittent fevers  Pt states that she has only been taking motrin for fever relief  She took claritin today with no relief  Pt is eating and drinking normally  No SOB  No NVD  No abdominal pain  No dizziness  Review of Systems   Review of Systems   Constitutional: Positive for chills and fever  Negative for diaphoresis and fatigue  HENT: Positive for congestion, postnasal drip, rhinorrhea, sinus pain and sinus pressure  Negative for ear pain, sore throat and voice change  Eyes: Negative  Respiratory: Positive for cough  Negative for chest tightness, shortness of breath and wheezing  Cardiovascular: Negative for chest pain and palpitations  Gastrointestinal: Negative for abdominal pain, diarrhea, nausea and vomiting  Endocrine: Negative  Genitourinary: Negative for dysuria  Musculoskeletal: Negative for back pain, myalgias and neck pain  Skin: Negative for pallor and rash  Allergic/Immunologic: Negative  Neurological: Negative for dizziness, syncope and headaches  Hematological: Negative  Psychiatric/Behavioral: Negative            Current Medications       Current Outpatient Prescriptions:     Butalbital-APAP-Caffeine (FIORICET) -40 MG CAPS, Take by mouth, Disp: , Rfl:     hydrochlorothiazide (HYDRODIURIL) 25 mg tablet, Take by mouth, Disp: , Rfl:     Ibuprofen (ADVIL) 200 MG CAPS, Take by mouth, Disp: , Rfl:     propranolol (INDERAL LA) 80 mg 24 hr capsule, Take by mouth, Disp: , Rfl:     azithromycin (ZITHROMAX) 250 mg tablet, Take 2 tablets today then 1 tablet daily x 4 days, Disp: 6 tablet, Rfl: 0    fluticasone (FLONASE) 50 mcg/act nasal spray, 1 spray into each nostril daily, Disp: 16 g, Rfl: 0    guaiFENesin (MUCINEX) 600 mg 12 hr tablet, Take 2 tablets (1,200 mg total) by mouth every 12 (twelve) hours, Disp: 8 tablet, Rfl: 0    Current Allergies     Allergies as of 04/30/2018 - Reviewed 04/30/2018   Allergen Reaction Noted    Amoxicillin  08/26/2015    Cefaclor  08/26/2015            The following portions of the patient's history were reviewed and updated as appropriate: allergies, current medications, past family history, past medical history, past social history, past surgical history and problem list      Past Medical History:   Diagnosis Date    Hypertension        History reviewed  No pertinent surgical history  Family History   Problem Relation Age of Onset    No Known Problems Mother     Heart disease Father          Medications have been verified  Objective   /79   Pulse 81   Temp 99 7 °F (37 6 °C) (Temporal)   Resp 18   Ht 5' 3" (1 6 m)   Wt 73 5 kg (162 lb)   SpO2 98%   BMI 28 70 kg/m²        Physical Exam     Physical Exam   Constitutional: She is oriented to person, place, and time  She appears well-developed and well-nourished  No distress  HENT:   Head: Normocephalic and atraumatic  Right Ear: External ear normal    Left Ear: External ear normal    Mouth/Throat: No oropharyngeal exudate  PND, erythematous posterior pharynx, no exudates, clear nasal dc b/l with swollen turbinates  Eyes: Conjunctivae are normal  Right eye exhibits no discharge  Left eye exhibits no discharge  Neck: Normal range of motion  Neck supple  Cardiovascular: Normal rate, regular rhythm, normal heart sounds and intact distal pulses  Pulmonary/Chest: Effort normal and breath sounds normal  No respiratory distress  She has no wheezes  She has no rales  Lymphadenopathy:     She has no cervical adenopathy  Neurological: She is alert and oriented to person, place, and time  Skin: Skin is warm  No rash noted  She is not diaphoretic  Nursing note and vitals reviewed  Due to length of symptoms I will start pt on abx  Pt agrees to f/u with PCP if symptoms do not improve

## 2018-12-10 ENCOUNTER — ANNUAL EXAM (OUTPATIENT)
Dept: OBGYN CLINIC | Facility: CLINIC | Age: 48
End: 2018-12-10
Payer: COMMERCIAL

## 2018-12-10 VITALS
SYSTOLIC BLOOD PRESSURE: 142 MMHG | HEIGHT: 63 IN | WEIGHT: 166.8 LBS | BODY MASS INDEX: 29.55 KG/M2 | DIASTOLIC BLOOD PRESSURE: 90 MMHG

## 2018-12-10 DIAGNOSIS — Z01.419 ENCOUNTER FOR ANNUAL ROUTINE GYNECOLOGICAL EXAMINATION: Primary | ICD-10-CM

## 2018-12-10 DIAGNOSIS — Z12.39 BREAST CANCER SCREENING: ICD-10-CM

## 2018-12-10 PROCEDURE — 99396 PREV VISIT EST AGE 40-64: CPT | Performed by: OBSTETRICS & GYNECOLOGY

## 2018-12-10 PROCEDURE — 87624 HPV HI-RISK TYP POOLED RSLT: CPT | Performed by: OBSTETRICS & GYNECOLOGY

## 2018-12-10 PROCEDURE — G0145 SCR C/V CYTO,THINLAYER,RESCR: HCPCS | Performed by: OBSTETRICS & GYNECOLOGY

## 2018-12-10 NOTE — PROGRESS NOTES
Assessment/Plan:    Pap done as well as annual   Encouraged self-breast examination as well as calcium supplementation  Recommend baseline mammogram   Discussed colon cancer screening, baseline colonoscopy  She continues to follow-up with her family physician and her nephrologist on an annual basis  Reviewed madelin menopausal symptoms  She will return to office in 1 year or p r n  No problem-specific Assessment & Plan notes found for this encounter  Diagnoses and all orders for this visit:    Encounter for annual routine gynecological examination    Breast cancer screening  -     Mammo screening bilateral w 3d & cad; Future          Subjective:      Patient ID: Jose Daniel Carrion is a 46 y o  female  HPI     This is a 49-year-old female  ( x2) presents for her annual gyn exam   She offers no complaints today  She states her menstrual cycles are very regular every 25-28 days lasting 3-5 days with no breakthrough bleeding  She denies any changes in bowel or bladder function  She has been in a monogamous relationship with her  for over 21 years  Her method of contraception is vasectomy  She does see a neurologist on an annual basis as 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13  She has been on a diuretic since that time  She has never had a screening colonoscopy or mammogram   She does state that her 66-year-old daughter is pregnant unexpectedly with a due date of 2019  She will stay with her until delivery and then she plans out [place] with her boyfriend  The following portions of the patient's history were reviewed and updated as appropriate: allergies, current medications, past family history, past medical history, past social history, past surgical history and problem list     Review of Systems   Constitutional: Negative for fatigue, fever and unexpected weight change     Respiratory: Negative for cough, chest tightness, shortness of breath and wheezing  Cardiovascular: Negative  Negative for chest pain and palpitations  Gastrointestinal: Negative  Negative for abdominal distention, abdominal pain, blood in stool, constipation, diarrhea, nausea and vomiting  Genitourinary: Negative  Negative for difficulty urinating, dyspareunia, dysuria, flank pain, frequency, genital sores, hematuria, pelvic pain, urgency, vaginal bleeding, vaginal discharge and vaginal pain  Skin: Negative for rash  Objective:      /90   Ht 5' 3" (1 6 m)   Wt 75 7 kg (166 lb 12 8 oz)   LMP 11/19/2018 (Exact Date)   Breastfeeding? No   BMI 29 55 kg/m²          Physical Exam   Constitutional: She appears well-developed and well-nourished  Cardiovascular: Normal rate and regular rhythm  Pulmonary/Chest: Effort normal and breath sounds normal  Right breast exhibits no inverted nipple, no mass, no nipple discharge, no skin change and no tenderness  Left breast exhibits no inverted nipple, no mass, no nipple discharge, no skin change and no tenderness  Abdominal: Soft  Bowel sounds are normal  She exhibits no distension  There is no tenderness  There is no rebound and no guarding  Genitourinary: Vagina normal and uterus normal  There is no lesion on the right labia  There is no lesion on the left labia  Cervix exhibits no discharge and no friability  Right adnexum displays no mass, no tenderness and no fullness  Left adnexum displays no mass, no tenderness and no fullness  No vaginal discharge found

## 2018-12-11 LAB
HPV HR 12 DNA CVX QL NAA+PROBE: NEGATIVE
HPV16 DNA CVX QL NAA+PROBE: NEGATIVE
HPV18 DNA CVX QL NAA+PROBE: NEGATIVE

## 2018-12-13 LAB
LAB AP GYN PRIMARY INTERPRETATION: NORMAL
LAB AP LMP: NORMAL
Lab: NORMAL

## 2019-08-28 ENCOUNTER — TRANSCRIBE ORDERS (OUTPATIENT)
Dept: ADMINISTRATIVE | Age: 49
End: 2019-08-28

## 2019-08-28 ENCOUNTER — APPOINTMENT (OUTPATIENT)
Dept: LAB | Age: 49
End: 2019-08-28

## 2019-08-28 DIAGNOSIS — Z01.84 IMMUNITY STATUS TESTING: Primary | ICD-10-CM

## 2019-08-28 DIAGNOSIS — Z01.84 IMMUNITY STATUS TESTING: ICD-10-CM

## 2019-08-28 PROCEDURE — 36415 COLL VENOUS BLD VENIPUNCTURE: CPT

## 2019-08-28 PROCEDURE — 86787 VARICELLA-ZOSTER ANTIBODY: CPT

## 2019-08-28 PROCEDURE — 86765 RUBEOLA ANTIBODY: CPT

## 2019-08-29 LAB
MEV IGG SER QL: NORMAL
VZV IGG SER IA-ACNC: NORMAL

## 2019-09-19 ENCOUNTER — APPOINTMENT (OUTPATIENT)
Dept: LAB | Facility: CLINIC | Age: 49
End: 2019-09-19
Payer: COMMERCIAL

## 2019-09-19 ENCOUNTER — TRANSCRIBE ORDERS (OUTPATIENT)
Dept: LAB | Facility: CLINIC | Age: 49
End: 2019-09-19

## 2019-09-19 DIAGNOSIS — R35.0 URINARY FREQUENCY: ICD-10-CM

## 2019-09-19 DIAGNOSIS — I51.9 MYXEDEMA HEART DISEASE: ICD-10-CM

## 2019-09-19 DIAGNOSIS — I10 ESSENTIAL HYPERTENSION, MALIGNANT: Primary | ICD-10-CM

## 2019-09-19 DIAGNOSIS — I10 ESSENTIAL HYPERTENSION, MALIGNANT: ICD-10-CM

## 2019-09-19 DIAGNOSIS — D64.9 ANEMIA, UNSPECIFIED TYPE: ICD-10-CM

## 2019-09-19 DIAGNOSIS — E03.9 MYXEDEMA HEART DISEASE: ICD-10-CM

## 2019-09-19 DIAGNOSIS — E78.2 MIXED HYPERLIPIDEMIA: ICD-10-CM

## 2019-09-19 LAB
ALBUMIN SERPL BCP-MCNC: 3.6 G/DL (ref 3.5–5)
ALP SERPL-CCNC: 62 U/L (ref 46–116)
ALT SERPL W P-5'-P-CCNC: 27 U/L (ref 12–78)
AMORPH URATE CRY URNS QL MICRO: ABNORMAL /HPF
ANION GAP SERPL CALCULATED.3IONS-SCNC: 7 MMOL/L (ref 4–13)
AST SERPL W P-5'-P-CCNC: 17 U/L (ref 5–45)
BACTERIA UR QL AUTO: ABNORMAL /HPF
BASOPHILS # BLD AUTO: 0.05 THOUSANDS/ΜL (ref 0–0.1)
BASOPHILS NFR BLD AUTO: 1 % (ref 0–1)
BILIRUB SERPL-MCNC: 0.46 MG/DL (ref 0.2–1)
BILIRUB UR QL STRIP: NEGATIVE
BUN SERPL-MCNC: 12 MG/DL (ref 5–25)
CALCIUM SERPL-MCNC: 9.2 MG/DL (ref 8.3–10.1)
CHLORIDE SERPL-SCNC: 102 MMOL/L (ref 100–108)
CHOLEST SERPL-MCNC: 196 MG/DL (ref 50–200)
CLARITY UR: ABNORMAL
CO2 SERPL-SCNC: 27 MMOL/L (ref 21–32)
COLOR UR: ABNORMAL
CREAT SERPL-MCNC: 0.8 MG/DL (ref 0.6–1.3)
EOSINOPHIL # BLD AUTO: 0.07 THOUSAND/ΜL (ref 0–0.61)
EOSINOPHIL NFR BLD AUTO: 1 % (ref 0–6)
ERYTHROCYTE [DISTWIDTH] IN BLOOD BY AUTOMATED COUNT: 13.2 % (ref 11.6–15.1)
GFR SERPL CREATININE-BSD FRML MDRD: 86 ML/MIN/1.73SQ M
GLUCOSE P FAST SERPL-MCNC: 115 MG/DL (ref 65–99)
GLUCOSE UR STRIP-MCNC: NEGATIVE MG/DL
HCT VFR BLD AUTO: 39.7 % (ref 34.8–46.1)
HDLC SERPL-MCNC: 52 MG/DL (ref 40–60)
HGB BLD-MCNC: 13 G/DL (ref 11.5–15.4)
HGB UR QL STRIP.AUTO: ABNORMAL
IMM GRANULOCYTES # BLD AUTO: 0.02 THOUSAND/UL (ref 0–0.2)
IMM GRANULOCYTES NFR BLD AUTO: 0 % (ref 0–2)
KETONES UR STRIP-MCNC: ABNORMAL MG/DL
LDLC SERPL CALC-MCNC: 125 MG/DL (ref 0–100)
LEUKOCYTE ESTERASE UR QL STRIP: NEGATIVE
LYMPHOCYTES # BLD AUTO: 1.34 THOUSANDS/ΜL (ref 0.6–4.47)
LYMPHOCYTES NFR BLD AUTO: 22 % (ref 14–44)
MCH RBC QN AUTO: 31.7 PG (ref 26.8–34.3)
MCHC RBC AUTO-ENTMCNC: 32.7 G/DL (ref 31.4–37.4)
MCV RBC AUTO: 97 FL (ref 82–98)
MONOCYTES # BLD AUTO: 0.59 THOUSAND/ΜL (ref 0.17–1.22)
MONOCYTES NFR BLD AUTO: 10 % (ref 4–12)
NEUTROPHILS # BLD AUTO: 3.98 THOUSANDS/ΜL (ref 1.85–7.62)
NEUTS SEG NFR BLD AUTO: 66 % (ref 43–75)
NITRITE UR QL STRIP: NEGATIVE
NON-SQ EPI CELLS URNS QL MICRO: ABNORMAL /HPF
NRBC BLD AUTO-RTO: 0 /100 WBCS
PH UR STRIP.AUTO: 6 [PH]
PLATELET # BLD AUTO: 249 THOUSANDS/UL (ref 149–390)
PMV BLD AUTO: 10.5 FL (ref 8.9–12.7)
POTASSIUM SERPL-SCNC: 3.1 MMOL/L (ref 3.5–5.3)
PROT SERPL-MCNC: 7.8 G/DL (ref 6.4–8.2)
PROT UR STRIP-MCNC: NEGATIVE MG/DL
RBC # BLD AUTO: 4.1 MILLION/UL (ref 3.81–5.12)
RBC #/AREA URNS AUTO: ABNORMAL /HPF
SODIUM SERPL-SCNC: 136 MMOL/L (ref 136–145)
SP GR UR STRIP.AUTO: 1.03 (ref 1–1.03)
TRIGL SERPL-MCNC: 94 MG/DL
TSH SERPL DL<=0.05 MIU/L-ACNC: 2.94 UIU/ML (ref 0.36–3.74)
UROBILINOGEN UR QL STRIP.AUTO: 0.2 E.U./DL
WBC # BLD AUTO: 6.05 THOUSAND/UL (ref 4.31–10.16)
WBC #/AREA URNS AUTO: ABNORMAL /HPF

## 2019-09-19 PROCEDURE — 80061 LIPID PANEL: CPT

## 2019-09-19 PROCEDURE — 36415 COLL VENOUS BLD VENIPUNCTURE: CPT

## 2019-09-19 PROCEDURE — 81001 URINALYSIS AUTO W/SCOPE: CPT

## 2019-09-19 PROCEDURE — 84443 ASSAY THYROID STIM HORMONE: CPT

## 2019-09-19 PROCEDURE — 85025 COMPLETE CBC W/AUTO DIFF WBC: CPT

## 2019-09-19 PROCEDURE — 80053 COMPREHEN METABOLIC PANEL: CPT

## 2020-10-22 ENCOUNTER — LAB (OUTPATIENT)
Dept: LAB | Facility: CLINIC | Age: 50
End: 2020-10-22
Payer: COMMERCIAL

## 2020-10-22 ENCOUNTER — TRANSCRIBE ORDERS (OUTPATIENT)
Dept: LAB | Facility: CLINIC | Age: 50
End: 2020-10-22

## 2020-10-22 DIAGNOSIS — E78.5 HYPERLIPIDEMIA, UNSPECIFIED HYPERLIPIDEMIA TYPE: ICD-10-CM

## 2020-10-22 DIAGNOSIS — I10 ESSENTIAL HYPERTENSION, MALIGNANT: ICD-10-CM

## 2020-10-22 DIAGNOSIS — I51.9 MYXEDEMA HEART DISEASE: ICD-10-CM

## 2020-10-22 DIAGNOSIS — R35.0 URINARY FREQUENCY: ICD-10-CM

## 2020-10-22 DIAGNOSIS — D64.9 ANEMIA, UNSPECIFIED TYPE: Primary | ICD-10-CM

## 2020-10-22 DIAGNOSIS — E03.9 MYXEDEMA HEART DISEASE: ICD-10-CM

## 2020-10-22 DIAGNOSIS — E11.69 TYPE 2 DIABETES MELLITUS WITH OTHER SPECIFIED COMPLICATION, UNSPECIFIED WHETHER LONG TERM INSULIN USE (HCC): ICD-10-CM

## 2020-10-22 DIAGNOSIS — D64.9 ANEMIA, UNSPECIFIED TYPE: ICD-10-CM

## 2020-10-22 LAB
ALBUMIN SERPL BCP-MCNC: 3.5 G/DL (ref 3.5–5)
ALP SERPL-CCNC: 63 U/L (ref 46–116)
ALT SERPL W P-5'-P-CCNC: 26 U/L (ref 12–78)
ANION GAP SERPL CALCULATED.3IONS-SCNC: 7 MMOL/L (ref 4–13)
AST SERPL W P-5'-P-CCNC: 15 U/L (ref 5–45)
BACTERIA UR QL AUTO: ABNORMAL /HPF
BASOPHILS # BLD AUTO: 0.03 THOUSANDS/ΜL (ref 0–0.1)
BASOPHILS NFR BLD AUTO: 1 % (ref 0–1)
BILIRUB SERPL-MCNC: 0.43 MG/DL (ref 0.2–1)
BILIRUB UR QL STRIP: NEGATIVE
BUN SERPL-MCNC: 11 MG/DL (ref 5–25)
CALCIUM SERPL-MCNC: 9.3 MG/DL (ref 8.3–10.1)
CHLORIDE SERPL-SCNC: 107 MMOL/L (ref 100–108)
CHOLEST SERPL-MCNC: 191 MG/DL (ref 50–200)
CLARITY UR: ABNORMAL
CO2 SERPL-SCNC: 25 MMOL/L (ref 21–32)
COLOR UR: ABNORMAL
CREAT SERPL-MCNC: 0.83 MG/DL (ref 0.6–1.3)
EOSINOPHIL # BLD AUTO: 0.11 THOUSAND/ΜL (ref 0–0.61)
EOSINOPHIL NFR BLD AUTO: 2 % (ref 0–6)
ERYTHROCYTE [DISTWIDTH] IN BLOOD BY AUTOMATED COUNT: 13.4 % (ref 11.6–15.1)
EST. AVERAGE GLUCOSE BLD GHB EST-MCNC: 111 MG/DL
GFR SERPL CREATININE-BSD FRML MDRD: 81 ML/MIN/1.73SQ M
GLUCOSE P FAST SERPL-MCNC: 105 MG/DL (ref 65–99)
GLUCOSE UR STRIP-MCNC: NEGATIVE MG/DL
HBA1C MFR BLD: 5.5 %
HCT VFR BLD AUTO: 41.1 % (ref 34.8–46.1)
HDLC SERPL-MCNC: 53 MG/DL
HGB BLD-MCNC: 12.8 G/DL (ref 11.5–15.4)
HGB UR QL STRIP.AUTO: ABNORMAL
IMM GRANULOCYTES # BLD AUTO: 0.02 THOUSAND/UL (ref 0–0.2)
IMM GRANULOCYTES NFR BLD AUTO: 0 % (ref 0–2)
KETONES UR STRIP-MCNC: NEGATIVE MG/DL
LDLC SERPL CALC-MCNC: 121 MG/DL (ref 0–100)
LEUKOCYTE ESTERASE UR QL STRIP: ABNORMAL
LYMPHOCYTES # BLD AUTO: 1.39 THOUSANDS/ΜL (ref 0.6–4.47)
LYMPHOCYTES NFR BLD AUTO: 21 % (ref 14–44)
MCH RBC QN AUTO: 30.8 PG (ref 26.8–34.3)
MCHC RBC AUTO-ENTMCNC: 31.1 G/DL (ref 31.4–37.4)
MCV RBC AUTO: 99 FL (ref 82–98)
MONOCYTES # BLD AUTO: 0.57 THOUSAND/ΜL (ref 0.17–1.22)
MONOCYTES NFR BLD AUTO: 9 % (ref 4–12)
NEUTROPHILS # BLD AUTO: 4.51 THOUSANDS/ΜL (ref 1.85–7.62)
NEUTS SEG NFR BLD AUTO: 67 % (ref 43–75)
NITRITE UR QL STRIP: NEGATIVE
NON-SQ EPI CELLS URNS QL MICRO: ABNORMAL /HPF
NONHDLC SERPL-MCNC: 138 MG/DL
NRBC BLD AUTO-RTO: 0 /100 WBCS
PH UR STRIP.AUTO: 6 [PH]
PLATELET # BLD AUTO: 254 THOUSANDS/UL (ref 149–390)
PMV BLD AUTO: 11.3 FL (ref 8.9–12.7)
POTASSIUM SERPL-SCNC: 3.8 MMOL/L (ref 3.5–5.3)
PROT SERPL-MCNC: 7.6 G/DL (ref 6.4–8.2)
PROT UR STRIP-MCNC: ABNORMAL MG/DL
RBC # BLD AUTO: 4.16 MILLION/UL (ref 3.81–5.12)
RBC #/AREA URNS AUTO: ABNORMAL /HPF
SODIUM SERPL-SCNC: 139 MMOL/L (ref 136–145)
SP GR UR STRIP.AUTO: 1.02 (ref 1–1.03)
T4 FREE SERPL-MCNC: 0.98 NG/DL (ref 0.76–1.46)
TRIGL SERPL-MCNC: 84 MG/DL
TSH SERPL DL<=0.05 MIU/L-ACNC: 2.64 UIU/ML (ref 0.36–3.74)
UROBILINOGEN UR QL STRIP.AUTO: 0.2 E.U./DL
WBC # BLD AUTO: 6.63 THOUSAND/UL (ref 4.31–10.16)
WBC #/AREA URNS AUTO: ABNORMAL /HPF

## 2020-10-22 PROCEDURE — 80053 COMPREHEN METABOLIC PANEL: CPT

## 2020-10-22 PROCEDURE — 80061 LIPID PANEL: CPT

## 2020-10-22 PROCEDURE — 84443 ASSAY THYROID STIM HORMONE: CPT

## 2020-10-22 PROCEDURE — 84439 ASSAY OF FREE THYROXINE: CPT

## 2020-10-22 PROCEDURE — 83036 HEMOGLOBIN GLYCOSYLATED A1C: CPT

## 2020-10-22 PROCEDURE — 36415 COLL VENOUS BLD VENIPUNCTURE: CPT

## 2020-10-22 PROCEDURE — 85025 COMPLETE CBC W/AUTO DIFF WBC: CPT

## 2020-10-22 PROCEDURE — 81001 URINALYSIS AUTO W/SCOPE: CPT

## 2020-10-28 ENCOUNTER — TELEPHONE (OUTPATIENT)
Dept: UROLOGY | Facility: MEDICAL CENTER | Age: 50
End: 2020-10-28

## 2020-12-28 ENCOUNTER — TELEPHONE (OUTPATIENT)
Dept: OBGYN CLINIC | Facility: CLINIC | Age: 50
End: 2020-12-28

## 2020-12-30 ENCOUNTER — IMMUNIZATIONS (OUTPATIENT)
Dept: FAMILY MEDICINE CLINIC | Facility: HOSPITAL | Age: 50
End: 2020-12-30
Payer: COMMERCIAL

## 2020-12-30 DIAGNOSIS — Z23 ENCOUNTER FOR IMMUNIZATION: ICD-10-CM

## 2020-12-30 PROCEDURE — 91301 SARS-COV-2 / COVID-19 MRNA VACCINE (MODERNA) 100 MCG: CPT

## 2020-12-30 PROCEDURE — 0011A SARS-COV-2 / COVID-19 MRNA VACCINE (MODERNA) 100 MCG: CPT

## 2021-01-27 ENCOUNTER — IMMUNIZATIONS (OUTPATIENT)
Dept: FAMILY MEDICINE CLINIC | Facility: HOSPITAL | Age: 51
End: 2021-01-27

## 2021-01-27 DIAGNOSIS — Z23 ENCOUNTER FOR IMMUNIZATION: Primary | ICD-10-CM

## 2021-01-27 PROCEDURE — 0012A SARS-COV-2 / COVID-19 MRNA VACCINE (MODERNA) 100 MCG: CPT

## 2021-01-27 PROCEDURE — 91301 SARS-COV-2 / COVID-19 MRNA VACCINE (MODERNA) 100 MCG: CPT

## 2021-05-04 ENCOUNTER — ANNUAL EXAM (OUTPATIENT)
Dept: OBGYN CLINIC | Facility: CLINIC | Age: 51
End: 2021-05-04
Payer: COMMERCIAL

## 2021-05-04 VITALS
WEIGHT: 173 LBS | SYSTOLIC BLOOD PRESSURE: 122 MMHG | BODY MASS INDEX: 31.83 KG/M2 | HEIGHT: 62 IN | DIASTOLIC BLOOD PRESSURE: 76 MMHG

## 2021-05-04 DIAGNOSIS — Z12.39 ENCOUNTER FOR SCREENING FOR MALIGNANT NEOPLASM OF BREAST, UNSPECIFIED SCREENING MODALITY: ICD-10-CM

## 2021-05-04 DIAGNOSIS — Z01.419 ENCOUNTER FOR ANNUAL ROUTINE GYNECOLOGICAL EXAMINATION: Primary | ICD-10-CM

## 2021-05-04 DIAGNOSIS — Z12.11 COLON CANCER SCREENING: ICD-10-CM

## 2021-05-04 PROCEDURE — G0145 SCR C/V CYTO,THINLAYER,RESCR: HCPCS | Performed by: OBSTETRICS & GYNECOLOGY

## 2021-05-04 PROCEDURE — 87624 HPV HI-RISK TYP POOLED RSLT: CPT | Performed by: OBSTETRICS & GYNECOLOGY

## 2021-05-04 PROCEDURE — 99396 PREV VISIT EST AGE 40-64: CPT | Performed by: OBSTETRICS & GYNECOLOGY

## 2021-05-04 RX ORDER — MULTIVITAMIN
1 TABLET ORAL DAILY
COMMUNITY

## 2021-05-04 NOTE — PROGRESS NOTES
Assessment/Plan:    Pap smear done as well as annual   Encouraged self-breast examination as well as calcium supplementation  Continue annual mammogram   Recommend screening colonoscopy  Reviewed madelin menopausal symptoms  She will keep a menstrual diary, call if less than 21 day cycle  Names provided for primary care physicians  Return to office in 1 year or p r n  No problem-specific Assessment & Plan notes found for this encounter  Diagnoses and all orders for this visit:    Encounter for annual routine gynecological examination    Encounter for screening for malignant neoplasm of breast, unspecified screening modality  -     Mammo screening bilateral w 3d & cad; Future    Colon cancer screening  -     Ambulatory referral to Gastroenterology; Future    Other orders  -     Multiple Vitamin (multivitamin) tablet; Take 1 tablet by mouth daily          Subjective:      Patient ID: Liborio David is a 44 y o  female  HPI       This is a pleasant 64-year-old female  ( x2, age 23, 23 ) presents for annual gyn exam   Last gyn exam was over 2 and half years ago  Her menstrual cycles are approximately every 21-25 days lasting 4-7 days with no breakthrough bleeding  She does get hot flashes and night sweats  She has been in a monogamous relationship with her  for over 25 years  Method of contraception has been vasectomy  She has been under a lot of stress last couple years  Her daughter and 3year-old son has been living with them since delivery  Father the baby is supportive and will see him on weekends  She does feel that she has neglected her care with screening tests including colonoscopy, mammogram, primary care physician  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13  She is not seen her specialist in a couple years  Patient has never had a screening colonoscopy      The following portions of the patient's history were reviewed and updated as appropriate: allergies, current medications, past family history, past medical history, past social history, past surgical history and problem list     Review of Systems   Constitutional: Negative for fatigue, fever and unexpected weight change  Respiratory: Negative for cough, chest tightness, shortness of breath and wheezing  Cardiovascular: Negative  Negative for chest pain and palpitations  Gastrointestinal: Negative  Negative for abdominal distention, abdominal pain, blood in stool, constipation, diarrhea, nausea and vomiting  Genitourinary: Negative  Negative for difficulty urinating, dyspareunia, dysuria, flank pain, frequency, genital sores, hematuria, pelvic pain, urgency, vaginal bleeding, vaginal discharge and vaginal pain  Skin: Negative for rash  Objective:      /76   Ht 5' 2" (1 575 m)   Wt 78 5 kg (173 lb)   LMP 04/21/2021   BMI 31 64 kg/m²          Physical Exam  Constitutional:       Appearance: Normal appearance  She is well-developed  Cardiovascular:      Rate and Rhythm: Normal rate and regular rhythm  Pulmonary:      Effort: Pulmonary effort is normal       Breath sounds: Normal breath sounds  Chest:      Breasts:         Right: No inverted nipple, mass, nipple discharge, skin change or tenderness  Left: No inverted nipple, mass, nipple discharge, skin change or tenderness  Abdominal:      General: Bowel sounds are normal  There is no distension  Palpations: Abdomen is soft  Tenderness: There is no abdominal tenderness  There is no guarding or rebound  Genitourinary:     Labia:         Right: No rash, tenderness or lesion  Left: No rash, tenderness or lesion  Vagina: Normal  No signs of injury  No vaginal discharge, tenderness or lesions  Cervix: No cervical motion tenderness, discharge, friability, lesion, erythema or cervical bleeding  Uterus: Not enlarged and not tender  Adnexa:         Right: No mass, tenderness or fullness  Left: No mass, tenderness or fullness  Comments: External genitalia is within normal limits  The vagina is well estrogenized  Cervix is parous  She does have mild prolapse  Rectovaginal exam is confirmatory  Skin:     General: Skin is warm and dry  Neurological:      Mental Status: She is alert and oriented to person, place, and time

## 2021-05-12 LAB
LAB AP GYN PRIMARY INTERPRETATION: NORMAL
LAB AP LMP: NORMAL
Lab: NORMAL

## 2021-06-24 ENCOUNTER — OFFICE VISIT (OUTPATIENT)
Dept: NEPHROLOGY | Facility: CLINIC | Age: 51
End: 2021-06-24
Payer: COMMERCIAL

## 2021-06-24 VITALS
WEIGHT: 172.2 LBS | DIASTOLIC BLOOD PRESSURE: 78 MMHG | RESPIRATION RATE: 18 BRPM | HEIGHT: 62 IN | HEART RATE: 80 BPM | SYSTOLIC BLOOD PRESSURE: 138 MMHG | BODY MASS INDEX: 31.69 KG/M2

## 2021-06-24 DIAGNOSIS — I15.1 PSEUDOHYPERALDOSTERONISM: Primary | ICD-10-CM

## 2021-06-24 DIAGNOSIS — N25.89 PSEUDOHYPOALDOSTERONISM: ICD-10-CM

## 2021-06-24 LAB
SL AMB  POCT GLUCOSE, UA: NEGATIVE
SL AMB LEUKOCYTE ESTERASE,UA: NEGATIVE
SL AMB POCT BILIRUBIN,UA: NEGATIVE
SL AMB POCT BLOOD,UA: NEGATIVE
SL AMB POCT CLARITY,UA: CLEAR
SL AMB POCT COLOR,UA: YELLOW
SL AMB POCT KETONES,UA: NEGATIVE
SL AMB POCT NITRITE,UA: NEGATIVE
SL AMB POCT PH,UA: 5
SL AMB POCT SPECIFIC GRAVITY,UA: 1
SL AMB POCT URINE PROTEIN: NEGATIVE
SL AMB POCT UROBILINOGEN: 0.2

## 2021-06-24 PROCEDURE — 99244 OFF/OP CNSLTJ NEW/EST MOD 40: CPT | Performed by: INTERNAL MEDICINE

## 2021-06-24 PROCEDURE — 81002 URINALYSIS NONAUTO W/O SCOPE: CPT | Performed by: INTERNAL MEDICINE

## 2021-06-24 RX ORDER — FEXOFENADINE HYDROCHLORIDE 60 MG/1
60 TABLET, FILM COATED ORAL AS NEEDED
COMMUNITY

## 2021-06-24 NOTE — PATIENT INSTRUCTIONS
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

## 2021-06-24 NOTE — PROGRESS NOTES
Consultation - Nephrology   Renae Coates 44 y o  female MRN: [id number]  Unit/Bed#:  Encounter: [id number]      Assessment/Plan     Assessment / Plan:    1  Pseudo hypoaldosteronism    This is my 1st visit with the patient she states she had a history of this diagnosed at a young age when she presented with hypertension and hyperkalemia  She is here to establish care is her nephrologist was no longer seeing her  She states that her blood pressures been well controlled on her propranolol on HCTZ  Her last electrolytes in October of last year showed normal potassium and normal bicarbonate with normal renal function  At this point it is a genetic condition but it seems to have resolved itself clinically  I am just going to repeat a BMP I have asked her to monitor blood pressure and if it would be significantly elevated to give me a call we can adjust medications  BMP and I will contact with results  Monitor blood pressure  No changes in medications      History of Present Illness   Physician Requesting Consult: No att  providers found  Reason for Consult / Principal Problem:  Pseudo hypoaldosteronism  Hx and PE limited by:   HPI: Renae Coates is a 44y o  year old female who presents for her 1st visit with me  Apparently she developed hypertension at a young age and was followed by pediatric nephrologist and then a local nephrologist and 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  She is here to establish care with Nephrology monitor blood pressure and electrolytes  History obtained from chart review and the patient  Consults    Review of Systems   Constitutional: Negative for appetite change, chills, fatigue and fever  HENT: Negative  Eyes: Negative  Respiratory: Negative  Negative for cough, chest tightness, shortness of breath and wheezing  Cardiovascular: Negative  Negative for chest pain, palpitations and leg swelling  Gastrointestinal: Negative  Negative for abdominal distention, abdominal pain, diarrhea, nausea and vomiting  Genitourinary: Negative for difficulty urinating, dysuria, flank pain and hematuria  Neurological: Negative for dizziness, syncope, light-headedness and headaches  Psychiatric/Behavioral: Negative for agitation, behavioral problems, confusion and decreased concentration  Historical Information   Patient Active Problem List   Diagnosis    Upper respiratory tract infection    Pseudohypoaldosteronism     Past Medical History:   Diagnosis Date    Hypertension      History reviewed  No pertinent surgical history  Social History   Social History     Substance and Sexual Activity   Alcohol Use Yes    Comment: social   No history of diabetes, stroke, lung disease, liver disease, kidney disease, or heart disease  Social History     Substance and Sexual Activity   Drug Use No     Social History     Tobacco Use   Smoking Status Never Smoker   Smokeless Tobacco Never Used     Family History   Problem Relation Age of Onset    No Known Problems Mother     Heart disease Father        Meds/Allergies   current meds:   No current facility-administered medications for this visit  Allergies   Allergen Reactions    Amoxicillin     Cefaclor        Objective   [unfilled]  Body mass index is 31 5 kg/m²  Invasive Devices:        PHYSICAL EXAM:  /78 (BP Location: Right arm, Patient Position: Sitting, Cuff Size: Standard)   Pulse 80   Resp 18   Ht 5' 2" (1 575 m)   Wt 78 1 kg (172 lb 3 2 oz)   BMI 31 50 kg/m²     Physical Exam  Constitutional:       General: She is not in acute distress  Appearance: Normal appearance  She is not ill-appearing, toxic-appearing or diaphoretic  HENT:      Head: Normocephalic and atraumatic  Nose:      Comments: Wearing mask     Mouth/Throat:      Comments: Wearing mask  Eyes:      General: No scleral icterus  Extraocular Movements: Extraocular movements intact     Cardiovascular: Rate and Rhythm: Normal rate and regular rhythm  Heart sounds: No friction rub  No gallop  Comments: No edema  Pulmonary:      Effort: Pulmonary effort is normal  No respiratory distress  Breath sounds: Normal breath sounds  No wheezing, rhonchi or rales  Abdominal:      General: Bowel sounds are normal  There is no distension  Palpations: Abdomen is soft  Tenderness: There is no abdominal tenderness  There is no rebound  Musculoskeletal:      Cervical back: Normal range of motion and neck supple  Neurological:      General: No focal deficit present  Mental Status: She is alert and oriented to person, place, and time  Mental status is at baseline  Psychiatric:         Mood and Affect: Mood normal          Behavior: Behavior normal          Thought Content:  Thought content normal          Judgment: Judgment normal            Current Weight: Weight - Scale: 78 1 kg (172 lb 3 2 oz)  First Weight: Weight - Scale: 78 1 kg (172 lb 3 2 oz)    Lab Results:              Invalid input(s): LABGLOM        Invalid input(s): LABALBU

## 2021-07-14 DIAGNOSIS — I10 HYPERTENSION, ESSENTIAL: ICD-10-CM

## 2021-07-14 DIAGNOSIS — G43.909 MIGRAINE WITHOUT STATUS MIGRAINOSUS, NOT INTRACTABLE, UNSPECIFIED MIGRAINE TYPE: Primary | ICD-10-CM

## 2021-07-14 NOTE — TELEPHONE ENCOUNTER
LOV 10/27/2020  NOV 07/26/2021    AGREED TO DO 30 DAYS SO SHE DOES NOT RUN OUT OF MEDS UNTIL NEXT OV WHICH SHE JUST MADE TODAY

## 2021-07-15 RX ORDER — PROPRANOLOL HYDROCHLORIDE 80 MG/1
80 CAPSULE, EXTENDED RELEASE ORAL DAILY
Qty: 30 CAPSULE | Refills: 0 | Status: SHIPPED | OUTPATIENT
Start: 2021-07-15 | End: 2021-07-26 | Stop reason: SDUPTHER

## 2021-07-15 RX ORDER — BUTALBITAL, ACETAMINOPHEN AND CAFFEINE 300; 40; 50 MG/1; MG/1; MG/1
CAPSULE ORAL
Qty: 30 CAPSULE | Refills: 0 | Status: SHIPPED | OUTPATIENT
Start: 2021-07-15 | End: 2021-11-10 | Stop reason: SDUPTHER

## 2021-07-15 RX ORDER — HYDROCHLOROTHIAZIDE 25 MG/1
25 TABLET ORAL DAILY
Qty: 30 TABLET | Refills: 0 | Status: SHIPPED | OUTPATIENT
Start: 2021-07-15 | End: 2021-07-26 | Stop reason: SDUPTHER

## 2021-07-26 ENCOUNTER — TELEMEDICINE (OUTPATIENT)
Dept: INTERNAL MEDICINE CLINIC | Facility: CLINIC | Age: 51
End: 2021-07-26
Payer: COMMERCIAL

## 2021-07-26 VITALS
HEART RATE: 79 BPM | SYSTOLIC BLOOD PRESSURE: 129 MMHG | WEIGHT: 172 LBS | HEIGHT: 62 IN | BODY MASS INDEX: 31.65 KG/M2 | TEMPERATURE: 99.3 F | DIASTOLIC BLOOD PRESSURE: 77 MMHG

## 2021-07-26 DIAGNOSIS — E55.9 VITAMIN D DEFICIENCY: ICD-10-CM

## 2021-07-26 DIAGNOSIS — I10 HYPERTENSION, ESSENTIAL: ICD-10-CM

## 2021-07-26 DIAGNOSIS — R73.01 IMPAIRED FASTING BLOOD SUGAR: ICD-10-CM

## 2021-07-26 DIAGNOSIS — R09.81 CONGESTION OF NASAL SINUS: Primary | ICD-10-CM

## 2021-07-26 DIAGNOSIS — E78.2 MIXED HYPERLIPIDEMIA: ICD-10-CM

## 2021-07-26 DIAGNOSIS — I10 ESSENTIAL HYPERTENSION: ICD-10-CM

## 2021-07-26 DIAGNOSIS — R53.83 FATIGUE, UNSPECIFIED TYPE: ICD-10-CM

## 2021-07-26 PROBLEM — G43.009 MIGRAINE WITHOUT AURA AND WITHOUT STATUS MIGRAINOSUS, NOT INTRACTABLE: Status: ACTIVE | Noted: 2021-07-26

## 2021-07-26 PROCEDURE — 87635 SARS-COV-2 COVID-19 AMP PRB: CPT | Performed by: NURSE PRACTITIONER

## 2021-07-26 PROCEDURE — 99443 PR PHYS/QHP TELEPHONE EVALUATION 21-30 MIN: CPT | Performed by: NURSE PRACTITIONER

## 2021-07-26 RX ORDER — AZITHROMYCIN 250 MG/1
TABLET, FILM COATED ORAL
Qty: 6 TABLET | Refills: 0 | Status: SHIPPED | OUTPATIENT
Start: 2021-07-26 | End: 2021-07-31

## 2021-07-26 RX ORDER — HYDROCHLOROTHIAZIDE 25 MG/1
25 TABLET ORAL DAILY
Qty: 90 TABLET | Refills: 0 | Status: SHIPPED | OUTPATIENT
Start: 2021-07-26 | End: 2021-11-10 | Stop reason: SDUPTHER

## 2021-07-26 RX ORDER — PROPRANOLOL HYDROCHLORIDE 80 MG/1
80 CAPSULE, EXTENDED RELEASE ORAL DAILY
Qty: 90 CAPSULE | Refills: 0 | Status: SHIPPED | OUTPATIENT
Start: 2021-07-26 | End: 2021-11-10 | Stop reason: SDUPTHER

## 2021-07-26 NOTE — PROGRESS NOTES
Virtual Brief Visit    Verification of patient location:    Patient is located in the following state in which I hold an active license [place]      Assessment/Plan:    Problem List Items Addressed This Visit        Cardiovascular and Mediastinum    Essential hypertension     Keeps track of BP at home  Continue current medications  Overdue for labs will get within the next week         Relevant Medications    hydrochlorothiazide (HYDRODIURIL) 25 mg tablet    propranolol (Inderal LA) 80 mg 24 hr capsule      Other Visit Diagnoses     Congestion of nasal sinus    -  Primary    Relevant Medications    azithromycin (Zithromax) 250 mg tablet    Other Relevant Orders    Novel Coronavirus (COVID-19), PCR [facility] Collected at [facility] or [facility]    Hypertension, essential        Relevant Medications    hydrochlorothiazide (HYDRODIURIL) 25 mg tablet    propranolol (Inderal LA) 80 mg 24 hr capsule    Other Relevant Orders    Comprehensive metabolic panel    Urinalysis with microscopic    Fatigue, unspecified type        Relevant Orders    CBC and differential    TSH, 3rd generation    Vitamin D deficiency        Relevant Orders    Vitamin D 25 hydroxy    Impaired fasting blood sugar        Relevant Orders    Hemoglobin A1C    Mixed hyperlipidemia        Relevant Orders    Lipid Panel with Direct LDL reflex                Reason for visit is   Chief Complaint   Patient presents with    Follow-up     overdue fu & med refill    Cold Like Symptoms     called off of work low grade fever, nasal congestion since Sat        Encounter provider ROBERTO Martinez    Provider located at [place] [address]  [address] [place] [id number]    Recent Visits  No visits were found meeting these conditions    Showing recent visits within past 7 days and meeting all other requirements  Today's Visits  Date Type Provider Dept   07/26/21 Telemedicine Savi Nava [address] today's visits and meeting all other requirements  Future Appointments  No visits were found meeting these conditions  Showing future appointments within next 150 days and meeting all other requirements       After connecting through phone and patient was informed that this is not a secure, HIPAA-compliant platform  She agrees to proceed  , the patient was identified by name and date of birth  Walker Roberts was informed that this is a telemedicine visit and that the visit is being conducted through phone and patient was informed that this is not a secure, HIPAA-compliant platform  She agrees to proceed  My office door was closed  No one else was in the room  She acknowledged consent and understanding of privacy and security of the platform  The patient has agreed to participate and understands she can discontinue the visit at any time  Patient is aware this is a billable service  Edel Pinto is a 44 y o  female was scheduled for a regular physical however developed upper respiratory symptoms over the weekend that have gotten progressively worse  She has fever, chills, nasal congestion  She did receive the covid vaccine however 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  She has clear nasal secretions however her cough is yielding yellow sputum  She is overdue for labs, she was unable to go this weekend because she was sick  Labs were placed in the computer and she was instructed to get them when she recovered so we could review same  She states that she keeps track of her BP at home and these have remained controlled  She is overdue for colonoscopy and mammogram/gyn  She has mammogram scheduled      She has a h/o  Migraines however these have remained stable      HPI     Past Medical History:   Diagnosis Date    Hematuria     Hyperkalemia     Hypertension     Proteinuria        History reviewed  No pertinent surgical history  Current Outpatient Medications   Medication Sig Dispense Refill    Butalbital-APAP-Caffeine (Fioricet) -40 MG CAPS 1 CAPSULE AT ONSET OF HEADACHE AND THEN MAY REPEAT AFTER 30 MINS IF NO IMPROVEMENT, NO MORE THAN 2 CAPSULES A DAY 30 capsule 0    fexofenadine (ALLEGRA) 60 MG tablet Take 60 mg by mouth as needed      fluticasone (FLONASE) 50 mcg/act nasal spray 1 spray into each nostril daily (Patient taking differently: 1 spray into each nostril daily as needed ) 16 g 0    hydrochlorothiazide (HYDRODIURIL) 25 mg tablet Take 1 tablet (25 mg total) by mouth daily 90 tablet 0    Ibuprofen (ADVIL) 200 MG CAPS Take by mouth as needed       Multiple Vitamin (multivitamin) tablet Take 1 tablet by mouth daily      Probiotic Product (PROBIOTIC DAILY PO) Take by mouth daily      propranolol (Inderal LA) 80 mg 24 hr capsule Take 1 capsule (80 mg total) by mouth daily 90 capsule 0    azithromycin (Zithromax) 250 mg tablet Take 2 tablets (500 mg total) by mouth daily for 1 day, THEN 1 tablet (250 mg total) daily for 4 days  6 tablet 0     No current facility-administered medications for this visit  Allergies   Allergen Reactions    Amoxicillin     Cefaclor        Review of Systems    Vitals:    07/26/21 1444   BP: 129/77   BP Location: Left arm   Patient Position: Sitting   Cuff Size: Adult   Pulse: 79   Temp: 99 3 °F (37 4 °C)   TempSrc: Oral   Weight: 78 kg (172 lb)   Height: 5' 2" (1 575 m)         I spent 30 minutes directly with the patient during this visit    VIRTUAL VISIT DISCLAIMER      Michael Eduardo verbally agrees to participate in [employer]   Pt is aware that [employer] could be limited without vital signs or the ability to perform a full hands-on physical exam  Nunu Landa understands she or the provider may request at any time to terminate the video visit and request the patient to seek care or treatment in person

## 2021-07-26 NOTE — ASSESSMENT & PLAN NOTE
Check for COVID  Low grade fever/chills  Congestion/headache  Works in healthcare  Requesting testing prior to return to work  zpack take as directed

## 2021-07-26 NOTE — ASSESSMENT & PLAN NOTE
Keeps track of BP at home  Continue current medications  Overdue for labs will get within the next week

## 2021-07-27 ENCOUNTER — TELEPHONE (OUTPATIENT)
Dept: OTHER | Facility: HOSPITAL | Age: 51
End: 2021-07-27

## 2021-07-27 PROBLEM — U07.1 COVID-19: Status: ACTIVE | Noted: 2021-07-27

## 2021-07-27 NOTE — TELEPHONE ENCOUNTER
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

## 2021-07-27 NOTE — ASSESSMENT & PLAN NOTE
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

## 2021-08-03 ENCOUNTER — TELEMEDICINE (OUTPATIENT)
Dept: INTERNAL MEDICINE CLINIC | Age: 51
End: 2021-08-03
Payer: COMMERCIAL

## 2021-08-03 VITALS
SYSTOLIC BLOOD PRESSURE: 125 MMHG | HEIGHT: 62 IN | DIASTOLIC BLOOD PRESSURE: 76 MMHG | BODY MASS INDEX: 31.28 KG/M2 | TEMPERATURE: 98 F | HEART RATE: 74 BPM | WEIGHT: 170 LBS

## 2021-08-03 DIAGNOSIS — Z12.11 ENCOUNTER FOR SCREENING FOR MALIGNANT NEOPLASM OF COLON: ICD-10-CM

## 2021-08-03 DIAGNOSIS — U07.1 COVID-19 VIRUS INFECTION: ICD-10-CM

## 2021-08-03 DIAGNOSIS — Z12.11 ENCOUNTER FOR SCREENING COLONOSCOPY: Primary | ICD-10-CM

## 2021-08-03 PROCEDURE — 99212 OFFICE O/P EST SF 10 MIN: CPT | Performed by: INTERNAL MEDICINE

## 2021-08-03 NOTE — PROGRESS NOTES
COVID-19 Outpatient Progress Note    Assessment/Plan:    COVID-19 infection   - symptoms are improving  - patient developed her symptoms about 9 days ago, she has improved greatly and has been off analgesics for over 24 hours  -per [address] guidelines, she will be cleared to discontinue isolation and may return to work by tomorrow  - we will give her a return to work note as requested   - she was counseled to continue with her multivitamins  -she should call the office if she develops any more symptoms and go to the emergency department if she develops worsening shortness of breath, chest pain, persistent fever of 102 and an inability to tolerate oral intake  Colon cancer screen  -will refer patient to Gastroenterology for colonoscopy    Problem List Items Addressed This Visit        Other    COVID-19 virus infection    BMI 31 0-31 9,adult      Other Visit Diagnoses     Encounter for screening colonoscopy    -  Primary    Relevant Orders    Ambulatory referral for colonoscopy    Encounter for screening for malignant neoplasm of colon        Relevant Orders    Ambulatory referral for colonoscopy         BMI Counseling: Body mass index is 31 09 kg/m²  The BMI is above normal  Nutrition recommendations include limiting drinks that contain sugar and reducing intake of cholesterol  Exercise recommendations include moderate physical activity 150 minutes/week  No pharmacotherapy was ordered  Patient referred to PCP due to patient being overweight  Disposition:      Patient was diagnosed on July 26th, 2021 and symptoms started about 9 days ago  By tomorrow she would have completed 10 days  Her symptoms were mild and have improved and she has been free of fever for more than 24 hours off analgesics  She may discontinue isolation tomorrow and may return to work  I have spent 15 minutes directly with the patient   Greater than 50% of this time was spent in counseling/coordination of care regarding: diagnostic results, prognosis and instructions for management  Was diagnosed with covid 7/26/21  Symptoms started with low grade temp on 7/25/21  Verification of patient location:    Patient is located in the following state in which I hold an active license [place]    Encounter provider Sp Bryant DO    Provider located at [phone]    Recent Visits  No visits were found meeting these conditions  Showing recent visits within past 7 days and meeting all other requirements  Today's Visits  Date Type Provider Dept   08/03/21 Telemedicine Sp Bryant DO [facility]   Showing today's visits and meeting all other requirements  Future Appointments  No visits were found meeting these conditions  Showing future appointments within next 150 days and meeting all other requirements     This virtual check-in was done via [employer] and patient was informed that this is a secure, HIPAA-compliant platform  She agrees to proceed  Patient agrees to participate in a virtual check in via telephone or video visit instead of presenting to the office to address urgent/immediate medical needs  Patient is aware this is a billable service  After connecting through [facility], the patient was identified by name and date of birth  Michael Eduardo was informed that this was a telemedicine visit and that the exam was being conducted confidentially over secure lines  My office door was closed  No one else was in the room  Michael Eduardo acknowledged consent and understanding of privacy and security of the telemedicine visit  I informed the patient that I have reviewed her record in Epic and presented the opportunity for her to ask any questions regarding the visit today  The patient agreed to participate      Subjective:   Michael Eduardo is a 44 y o  female who has been screened for COVID-19  Symptom change since last report: improving  Patient's symptoms include nasal congestion (started on 7/24/21 and fever started the next day, still mildly, lost her sense of smell but it is improving), cough (mild and 2/2 to postnasal drip, with yellow phlegm, mostly in the am ) and headache (on and off but improved from last week)  Patient denies fever (resolved), chills, fatigue, rhinorrhea, sore throat, shortness of breath, chest tightness, abdominal pain, nausea, vomiting, diarrhea and myalgias  Date of symptom onset: 7/25/2021  Date of positive COVID-19 PCR: 7/26/2021    Patient presents for follow-up visit regarding her COVID-19 infection  She was diagnosed with COVID-19 infection on July 26th, 2021 and states that her symptoms started the day before, on July 25th, 2021  Her symptoms have been improving since then and she would like  a return to work note  She states that she still has some nasal congestion, but her sense of smell is improving  She still has occasional headaches on and off as well as a mild intermittent cough, productive of yellow phlegm and usually in the mornings, secondary to her postnasal drip  Her fever has resolved  Of note, she states that hurts children had similar symptoms but were much sicker  Of note, patient took the [facility] vaccine and finished her 2nd dose in January, about 7 months ago  Lab Results   Component Value Date    SARSCOV2 Positive (A) 07/26/2021     Past Medical History:   Diagnosis Date    Hematuria     Hyperkalemia     Hypertension     Proteinuria      History reviewed  No pertinent surgical history    Current Outpatient Medications   Medication Sig Dispense Refill    Butalbital-APAP-Caffeine (Fioricet) -40 MG CAPS 1 CAPSULE AT ONSET OF HEADACHE AND THEN MAY REPEAT AFTER 30 MINS IF NO IMPROVEMENT, NO MORE THAN 2 CAPSULES A DAY 30 capsule 0    fexofenadine (ALLEGRA) 60 MG tablet Take 60 mg by mouth as needed      fluticasone (FLONASE) 50 mcg/act nasal spray 1 spray into each nostril daily (Patient taking differently: 1 spray into each nostril daily as needed ) 16 g 0    hydrochlorothiazide (HYDRODIURIL) 25 mg tablet Take 1 tablet (25 mg total) by mouth daily 90 tablet 0    Ibuprofen (ADVIL) 200 MG CAPS Take by mouth as needed       Multiple Vitamin (multivitamin) tablet Take 1 tablet by mouth daily      Probiotic Product (PROBIOTIC DAILY PO) Take by mouth daily      propranolol (Inderal LA) 80 mg 24 hr capsule Take 1 capsule (80 mg total) by mouth daily 90 capsule 0     No current facility-administered medications for this visit  Allergies   Allergen Reactions    Amoxicillin     Cefaclor        Review of Systems   Constitutional: Negative for activity change, chills, fatigue, fever (resolved) and unexpected weight change  HENT: Positive for congestion (started on 7/24/21 and fever started the next day, still mildly, lost her sense of smell but it is improving), postnasal drip (has a hx of seasonal allergies, uses allegra and flonase prn) and sinus pressure  Negative for ear pain, rhinorrhea and sore throat  Eyes: Negative for pain  Respiratory: Positive for cough (mild and 2/2 to postnasal drip, with yellow phlegm, mostly in the am )  Negative for choking, chest tightness, shortness of breath and wheezing  Cardiovascular: Negative for chest pain, palpitations and leg swelling  Gastrointestinal: Negative for abdominal pain, constipation, diarrhea, nausea and vomiting  Genitourinary: Negative for dysuria and hematuria  Musculoskeletal: Negative for arthralgias, back pain, gait problem, joint swelling, myalgias and neck stiffness  Skin: Negative for pallor and rash  Neurological: Positive for headaches (on and off but improved from last week)  Negative for dizziness (feels foggy), tremors, seizures, syncope and light-headedness  Hematological: Negative for adenopathy     Psychiatric/Behavioral: Negative for behavioral problems  Objective:    Vitals:    08/03/21 0951   BP: 125/76   BP Location: Left arm   Patient Position: Sitting   Cuff Size: Standard   Pulse: 74   Temp: 98 °F (36 7 °C)   TempSrc: Oral   Weight: 77 1 kg (170 lb)   Height: 5' 2" (1 575 m)       Physical Exam  Vitals and nursing note reviewed  Constitutional:       General: She is not in acute distress  Appearance: She is well-developed  HENT:      Head: Normocephalic and atraumatic  Eyes:      General: No scleral icterus  Right eye: No discharge  Left eye: No discharge  Conjunctiva/sclera: Conjunctivae normal       Pupils: Pupils are equal, round, and reactive to light  Neck:      Thyroid: No thyromegaly  Vascular: No JVD  Pulmonary:      Effort: Pulmonary effort is normal  No respiratory distress ( no conversational dyspnea)  Abdominal:      Tenderness: There is no abdominal tenderness  Musculoskeletal:      Cervical back: Neck supple  Neurological:      Mental Status: She is alert and oriented to person, place, and time  Motor: No abnormal muscle tone  Deep Tendon Reflexes: Reflexes are normal and symmetric  Psychiatric:         Behavior: Behavior normal          VIRTUAL VISIT DISCLAIMER    Terrell Woods verbally agrees to participate in [employer]  Pt is aware that [employer] could be limited without vital signs or the ability to perform a full hands-on physical exam  Nunu Macias understands she or the provider may request at any time to terminate the video visit and request the patient to seek care or treatment in person

## 2021-08-03 NOTE — LETTER
August 3, 2021     Patient: Hadley Samson   YOB: 1970   Date of Visit: 8/3/2021       To Whom it May Concern:    Hadley Samson is under my professional care  She was seen in my office on 8/3/2021  She may return to work on  August 4th, 2021  If you have any questions or concerns, please don't hesitate to call           Sincerely,          Elvie Ordoñez DO        CC: No Recipients

## 2021-08-05 ENCOUNTER — TELEPHONE (OUTPATIENT)
Dept: INTERNAL MEDICINE CLINIC | Facility: CLINIC | Age: 51
End: 2021-08-05

## 2021-08-05 ENCOUNTER — TELEPHONE (OUTPATIENT)
Dept: INTERNAL MEDICINE CLINIC | Age: 51
End: 2021-08-05

## 2021-08-05 DIAGNOSIS — U07.1 COVID-19 VIRUS INFECTION: Primary | ICD-10-CM

## 2021-08-05 DIAGNOSIS — J06.9 UPPER RESPIRATORY TRACT INFECTION, UNSPECIFIED TYPE: ICD-10-CM

## 2021-08-05 NOTE — LETTER
August 5, 2021     Patient: Gavin Garcia   YOB: 1970   Date of Visit: 8/5/2021       To Whom it May Concern:    Gavin Garcia is under my professional care  She was seen in my office via telemedicine visit on  August 3rd, 2021  She may return to work on  August 9th, 2021  If you have any questions or concerns, please don't hesitate to call           Sincerely,          Elvie Ordoñez DO        CC: No Recipients

## 2021-08-05 NOTE — TELEPHONE ENCOUNTER
I called and discussed with patient  Her symptoms are now worse and she is due to return to work tomorrow  I counseled her to stay home over this weekend and see if she feels better  I will give her a new return to work note for Monday, August 9th, 2021  I counseled her that she may come into the office to be evaluated if her symptoms continue to worsen  In the meantime however, I encouraged her to take Tylenol for aches and pains( rather than Motrin , since Tylenol is better for COVID infection), Flonase nasal spray and to continue with  the  azithromycin that she has already started taking  She was encouraged to call the office if she does not feel better by Monday

## 2021-08-05 NOTE — TELEPHONE ENCOUNTER
Patient is calling to state that she is starting with the sinus infection now since yesterday and started taking the Zpak that Susana Lozano gave her last week but told her not to take it due to her being Postitive for the [place]  Flonase twice a day     Patient is also taking motrin for the sinus pain  Is there anything else we can give her or have her take OTC for this problem      Please call her back mobile phone      Pharmacy is [facility] in [facility]

## 2021-11-10 ENCOUNTER — TELEPHONE (OUTPATIENT)
Dept: NEPHROLOGY | Facility: CLINIC | Age: 51
End: 2021-11-10

## 2021-11-10 ENCOUNTER — APPOINTMENT (OUTPATIENT)
Dept: LAB | Facility: CLINIC | Age: 51
End: 2021-11-10
Payer: COMMERCIAL

## 2021-11-10 DIAGNOSIS — E78.2 MIXED HYPERLIPIDEMIA: ICD-10-CM

## 2021-11-10 DIAGNOSIS — R53.83 FATIGUE, UNSPECIFIED TYPE: ICD-10-CM

## 2021-11-10 DIAGNOSIS — I15.1 PSEUDOHYPERALDOSTERONISM: ICD-10-CM

## 2021-11-10 DIAGNOSIS — I10 HYPERTENSION, ESSENTIAL: ICD-10-CM

## 2021-11-10 DIAGNOSIS — G43.909 MIGRAINE WITHOUT STATUS MIGRAINOSUS, NOT INTRACTABLE, UNSPECIFIED MIGRAINE TYPE: ICD-10-CM

## 2021-11-10 DIAGNOSIS — R73.01 IMPAIRED FASTING BLOOD SUGAR: ICD-10-CM

## 2021-11-10 DIAGNOSIS — E55.9 VITAMIN D DEFICIENCY: ICD-10-CM

## 2021-11-10 LAB
25(OH)D3 SERPL-MCNC: 25.9 NG/ML (ref 30–100)
ALBUMIN SERPL BCP-MCNC: 3.4 G/DL (ref 3.5–5)
ALP SERPL-CCNC: 64 U/L (ref 46–116)
ALT SERPL W P-5'-P-CCNC: 35 U/L (ref 12–78)
ANION GAP SERPL CALCULATED.3IONS-SCNC: 8 MMOL/L (ref 4–13)
AST SERPL W P-5'-P-CCNC: 21 U/L (ref 5–45)
BASOPHILS # BLD AUTO: 0.05 THOUSANDS/ΜL (ref 0–0.1)
BASOPHILS NFR BLD AUTO: 1 % (ref 0–1)
BILIRUB SERPL-MCNC: 0.48 MG/DL (ref 0.2–1)
BUN SERPL-MCNC: 11 MG/DL (ref 5–25)
CALCIUM ALBUM COR SERPL-MCNC: 9.9 MG/DL (ref 8.3–10.1)
CALCIUM SERPL-MCNC: 9.4 MG/DL (ref 8.3–10.1)
CHLORIDE SERPL-SCNC: 103 MMOL/L (ref 100–108)
CHOLEST SERPL-MCNC: 186 MG/DL (ref 50–200)
CO2 SERPL-SCNC: 23 MMOL/L (ref 21–32)
CREAT SERPL-MCNC: 0.75 MG/DL (ref 0.6–1.3)
EOSINOPHIL # BLD AUTO: 0.11 THOUSAND/ΜL (ref 0–0.61)
EOSINOPHIL NFR BLD AUTO: 2 % (ref 0–6)
ERYTHROCYTE [DISTWIDTH] IN BLOOD BY AUTOMATED COUNT: 13 % (ref 11.6–15.1)
EST. AVERAGE GLUCOSE BLD GHB EST-MCNC: 114 MG/DL
GFR SERPL CREATININE-BSD FRML MDRD: 91 ML/MIN/1.73SQ M
GLUCOSE P FAST SERPL-MCNC: 117 MG/DL (ref 65–99)
HBA1C MFR BLD: 5.6 %
HCT VFR BLD AUTO: 39.7 % (ref 34.8–46.1)
HDLC SERPL-MCNC: 49 MG/DL
HGB BLD-MCNC: 13.2 G/DL (ref 11.5–15.4)
IMM GRANULOCYTES # BLD AUTO: 0.01 THOUSAND/UL (ref 0–0.2)
IMM GRANULOCYTES NFR BLD AUTO: 0 % (ref 0–2)
LDLC SERPL CALC-MCNC: 113 MG/DL (ref 0–100)
LYMPHOCYTES # BLD AUTO: 1.41 THOUSANDS/ΜL (ref 0.6–4.47)
LYMPHOCYTES NFR BLD AUTO: 21 % (ref 14–44)
MCH RBC QN AUTO: 31.4 PG (ref 26.8–34.3)
MCHC RBC AUTO-ENTMCNC: 33.2 G/DL (ref 31.4–37.4)
MCV RBC AUTO: 94 FL (ref 82–98)
MONOCYTES # BLD AUTO: 0.58 THOUSAND/ΜL (ref 0.17–1.22)
MONOCYTES NFR BLD AUTO: 9 % (ref 4–12)
NEUTROPHILS # BLD AUTO: 4.64 THOUSANDS/ΜL (ref 1.85–7.62)
NEUTS SEG NFR BLD AUTO: 67 % (ref 43–75)
NRBC BLD AUTO-RTO: 0 /100 WBCS
PLATELET # BLD AUTO: 294 THOUSANDS/UL (ref 149–390)
PMV BLD AUTO: 10.2 FL (ref 8.9–12.7)
POTASSIUM SERPL-SCNC: 3.3 MMOL/L (ref 3.5–5.3)
PROT SERPL-MCNC: 7.6 G/DL (ref 6.4–8.2)
RBC # BLD AUTO: 4.21 MILLION/UL (ref 3.81–5.12)
SODIUM SERPL-SCNC: 134 MMOL/L (ref 136–145)
TRIGL SERPL-MCNC: 122 MG/DL
TSH SERPL DL<=0.05 MIU/L-ACNC: 2.95 UIU/ML (ref 0.36–3.74)
WBC # BLD AUTO: 6.8 THOUSAND/UL (ref 4.31–10.16)

## 2021-11-10 PROCEDURE — 80053 COMPREHEN METABOLIC PANEL: CPT

## 2021-11-10 PROCEDURE — 36415 COLL VENOUS BLD VENIPUNCTURE: CPT

## 2021-11-10 PROCEDURE — 85025 COMPLETE CBC W/AUTO DIFF WBC: CPT

## 2021-11-10 PROCEDURE — 84443 ASSAY THYROID STIM HORMONE: CPT

## 2021-11-10 PROCEDURE — 83036 HEMOGLOBIN GLYCOSYLATED A1C: CPT

## 2021-11-10 PROCEDURE — 82306 VITAMIN D 25 HYDROXY: CPT

## 2021-11-10 PROCEDURE — 80061 LIPID PANEL: CPT

## 2021-11-11 ENCOUNTER — OFFICE VISIT (OUTPATIENT)
Dept: INTERNAL MEDICINE CLINIC | Facility: CLINIC | Age: 51
End: 2021-11-11
Payer: COMMERCIAL

## 2021-11-11 VITALS
TEMPERATURE: 97.7 F | HEIGHT: 62 IN | DIASTOLIC BLOOD PRESSURE: 76 MMHG | HEART RATE: 80 BPM | OXYGEN SATURATION: 98 % | SYSTOLIC BLOOD PRESSURE: 132 MMHG | WEIGHT: 171.8 LBS | BODY MASS INDEX: 31.62 KG/M2

## 2021-11-11 DIAGNOSIS — E78.2 MIXED HYPERLIPIDEMIA: ICD-10-CM

## 2021-11-11 DIAGNOSIS — R73.01 IMPAIRED FASTING GLUCOSE: ICD-10-CM

## 2021-11-11 DIAGNOSIS — I10 ESSENTIAL HYPERTENSION: ICD-10-CM

## 2021-11-11 DIAGNOSIS — J06.9 ACUTE URI: ICD-10-CM

## 2021-11-11 DIAGNOSIS — E03.4 HYPOTHYROIDISM DUE TO ACQUIRED ATROPHY OF THYROID: ICD-10-CM

## 2021-11-11 DIAGNOSIS — Z12.12 ENCOUNTER FOR COLORECTAL CANCER SCREENING: Primary | ICD-10-CM

## 2021-11-11 DIAGNOSIS — E55.9 VITAMIN D DEFICIENCY: ICD-10-CM

## 2021-11-11 DIAGNOSIS — Z12.11 ENCOUNTER FOR COLORECTAL CANCER SCREENING: Primary | ICD-10-CM

## 2021-11-11 DIAGNOSIS — Z11.59 NEED FOR HEPATITIS C SCREENING TEST: ICD-10-CM

## 2021-11-11 PROBLEM — U07.1 COVID-19 VIRUS INFECTION: Status: RESOLVED | Noted: 2021-07-27 | Resolved: 2021-11-11

## 2021-11-11 PROBLEM — E87.6 HYPOKALEMIA: Status: ACTIVE | Noted: 2021-11-11

## 2021-11-11 PROCEDURE — 99214 OFFICE O/P EST MOD 30 MIN: CPT | Performed by: NURSE PRACTITIONER

## 2021-11-11 RX ORDER — HYDROCHLOROTHIAZIDE 25 MG/1
25 TABLET ORAL DAILY
Qty: 90 TABLET | Refills: 1 | Status: SHIPPED | OUTPATIENT
Start: 2021-11-11 | End: 2022-05-02 | Stop reason: SDUPTHER

## 2021-11-11 RX ORDER — BUTALBITAL, ACETAMINOPHEN AND CAFFEINE 300; 40; 50 MG/1; MG/1; MG/1
CAPSULE ORAL
Qty: 30 CAPSULE | Refills: 0 | Status: SHIPPED | OUTPATIENT
Start: 2021-11-11 | End: 2022-02-17 | Stop reason: SDUPTHER

## 2021-11-11 RX ORDER — PROPRANOLOL HYDROCHLORIDE 80 MG/1
80 CAPSULE, EXTENDED RELEASE ORAL DAILY
Qty: 90 CAPSULE | Refills: 1 | Status: SHIPPED | OUTPATIENT
Start: 2021-11-11 | End: 2022-05-02 | Stop reason: SDUPTHER

## 2022-01-26 ENCOUNTER — IMMUNIZATIONS (OUTPATIENT)
Dept: FAMILY MEDICINE CLINIC | Facility: HOSPITAL | Age: 52
End: 2022-01-26

## 2022-01-26 DIAGNOSIS — Z23 ENCOUNTER FOR IMMUNIZATION: Primary | ICD-10-CM

## 2022-01-26 PROCEDURE — 91306 COVID-19 MODERNA VACC 0.25 ML BOOSTER: CPT

## 2022-01-26 PROCEDURE — 0064A COVID-19 MODERNA VACC 0.25 ML BOOSTER: CPT

## 2022-02-17 DIAGNOSIS — G43.909 MIGRAINE WITHOUT STATUS MIGRAINOSUS, NOT INTRACTABLE, UNSPECIFIED MIGRAINE TYPE: ICD-10-CM

## 2022-02-17 RX ORDER — BUTALBITAL, ACETAMINOPHEN AND CAFFEINE 300; 40; 50 MG/1; MG/1; MG/1
CAPSULE ORAL
Qty: 30 CAPSULE | Refills: 0 | Status: SHIPPED | OUTPATIENT
Start: 2022-02-17 | End: 2022-05-02 | Stop reason: SDUPTHER

## 2022-04-02 ENCOUNTER — OFFICE VISIT (OUTPATIENT)
Dept: URGENT CARE | Age: 52
End: 2022-04-02
Payer: COMMERCIAL

## 2022-04-02 VITALS — RESPIRATION RATE: 18 BRPM | HEART RATE: 110 BPM | OXYGEN SATURATION: 97 % | TEMPERATURE: 97.2 F

## 2022-04-02 DIAGNOSIS — J01.90 ACUTE BACTERIAL SINUSITIS: Primary | ICD-10-CM

## 2022-04-02 DIAGNOSIS — B96.89 ACUTE BACTERIAL SINUSITIS: Primary | ICD-10-CM

## 2022-04-02 PROCEDURE — G0382 LEV 3 HOSP TYPE B ED VISIT: HCPCS | Performed by: STUDENT IN AN ORGANIZED HEALTH CARE EDUCATION/TRAINING PROGRAM

## 2022-04-02 PROCEDURE — S9083 URGENT CARE CENTER GLOBAL: HCPCS | Performed by: STUDENT IN AN ORGANIZED HEALTH CARE EDUCATION/TRAINING PROGRAM

## 2022-04-02 RX ORDER — AZITHROMYCIN 250 MG/1
TABLET, FILM COATED ORAL
Qty: 6 TABLET | Refills: 0 | Status: SHIPPED | OUTPATIENT
Start: 2022-04-02 | End: 2022-04-06

## 2022-04-02 NOTE — LETTER
April 2, 2022     Patient: Dunia Santos   YOB: 1970   Date of Visit: 4/2/2022       To Whom It May Concern: It is my medical opinion that Dunia Santos be excused from work on 04/02/2022 and 04/04/2022    If you have any questions or concerns, please don't hesitate to call           Sincerely,        Mary Mota MD    CC: No Recipients

## 2022-04-02 NOTE — PROGRESS NOTES
[facility]        NAME: Campbell Moise is a 46 y o  female  : 1970    MRN: [id number]  DATE: 2022  TIME: 12:09 PM    Assessment and Plan   Acute bacterial sinusitis [J01 90, B96 89]  1  Acute bacterial sinusitis  azithromycin (ZITHROMAX) 250 mg tablet         Patient Instructions       Follow up with PCP in 3-5 days  Proceed to  ER if symptoms worsen  Chief Complaint     Chief Complaint   Patient presents with    Headache     sinus pressure , x last      Sore Throat    Fatigue         History of Present Illness       HPI   Patient presents complaining of increased sinus pain pressure congestion sore throat fatigue and body aches ongoing for the past week or so  Patient does not have any fevers or chills, but she states that she does have sick contacts      Review of Systems   Review of Systems  Per hpi     Current Medications       Current Outpatient Medications:     azithromycin (ZITHROMAX) 250 mg tablet, Take 2 tablets today then 1 tablet daily x 4 days, Disp: 6 tablet, Rfl: 0    Butalbital-APAP-Caffeine (Fioricet) -40 MG CAPS, 1 CAPSULE AT ONSET OF HEADACHE AND THEN MAY REPEAT AFTER 30 MINS IF NO IMPROVEMENT, NO MORE THAN 2 CAPSULES A DAY, Disp: 30 capsule, Rfl: 0    fexofenadine (ALLEGRA) 60 MG tablet, Take 60 mg by mouth as needed, Disp: , Rfl:     fluticasone (FLONASE) 50 mcg/act nasal spray, 1 spray into each nostril daily (Patient taking differently: 1 spray into each nostril daily as needed ), Disp: 16 g, Rfl: 0    hydrochlorothiazide (HYDRODIURIL) 25 mg tablet, Take 1 tablet (25 mg total) by mouth daily, Disp: 90 tablet, Rfl: 1    Ibuprofen (ADVIL) 200 MG CAPS, Take by mouth as needed , Disp: , Rfl:     Multiple Vitamin (multivitamin) tablet, Take 1 tablet by mouth daily, Disp: , Rfl:     Probiotic Product (PROBIOTIC DAILY PO), Take by mouth daily, Disp: , Rfl:     propranolol (Inderal LA) 80 mg 24 hr capsule, Take 1 capsule (80 mg total) by mouth daily, Disp: 90 capsule, Rfl: 1    Current Allergies     Allergies as of 04/02/2022 - Reviewed 04/02/2022   Allergen Reaction Noted    Amoxicillin  08/26/2015    Cefaclor  08/26/2015            The following portions of the patient's history were reviewed and updated as appropriate: allergies, current medications, past family history, past medical history, past social history, past surgical history and problem list      Past Medical History:   Diagnosis Date    COVID-19 virus infection 7/27/2021    Hematuria     Hyperkalemia     Hypertension     Proteinuria        No past surgical history on file  Family History   Problem Relation Age of Onset    No Known Problems Mother     Heart disease Father          Medications have been verified  Objective   Pulse (!) 110   Temp (!) 97 2 °F (36 2 °C)   Resp 18   SpO2 97%   No LMP recorded  Physical Exam     Physical Exam  Constitutional:       General: She is not in acute distress  Appearance: Normal appearance  HENT:      Head: Normocephalic  Nose: No congestion or rhinorrhea  Mouth/Throat:      Mouth: Mucous membranes are moist       Pharynx: Posterior oropharyngeal erythema present  No oropharyngeal exudate  Comments: Max sinus tender b/l  Eyes:      General:         Right eye: No discharge  Left eye: No discharge  Conjunctiva/sclera: Conjunctivae normal    Cardiovascular:      Rate and Rhythm: Normal rate and regular rhythm  Pulses: Normal pulses  Pulmonary:      Effort: Pulmonary effort is normal  No respiratory distress  Abdominal:      General: Abdomen is flat  There is no distension  Palpations: Abdomen is soft  Tenderness: There is no abdominal tenderness  Musculoskeletal:      Cervical back: Neck supple  Skin:     General: Skin is warm  Capillary Refill: Capillary refill takes less than 2 seconds     Neurological:      Mental Status: She is alert and oriented to person, place, and time

## 2022-05-02 DIAGNOSIS — G43.909 MIGRAINE WITHOUT STATUS MIGRAINOSUS, NOT INTRACTABLE, UNSPECIFIED MIGRAINE TYPE: ICD-10-CM

## 2022-05-02 DIAGNOSIS — I10 HYPERTENSION, ESSENTIAL: ICD-10-CM

## 2022-05-02 RX ORDER — PROPRANOLOL HYDROCHLORIDE 80 MG/1
80 CAPSULE, EXTENDED RELEASE ORAL DAILY
Qty: 90 CAPSULE | Refills: 1 | Status: SHIPPED | OUTPATIENT
Start: 2022-05-02 | End: 2022-07-31

## 2022-05-02 RX ORDER — HYDROCHLOROTHIAZIDE 25 MG/1
25 TABLET ORAL DAILY
Qty: 90 TABLET | Refills: 1 | Status: SHIPPED | OUTPATIENT
Start: 2022-05-02 | End: 2022-07-31

## 2022-05-04 ENCOUNTER — TELEPHONE (OUTPATIENT)
Dept: NEPHROLOGY | Facility: CLINIC | Age: 52
End: 2022-05-04

## 2022-05-06 NOTE — TELEPHONE ENCOUNTER
Providers, please address since Dr Zuleima Mandel is not in the office      NOV 11/11/2022  LAST ASSESSED 11/10/2021

## 2022-05-09 RX ORDER — BUTALBITAL, ACETAMINOPHEN AND CAFFEINE 300; 40; 50 MG/1; MG/1; MG/1
CAPSULE ORAL
Qty: 30 CAPSULE | Refills: 0 | Status: SHIPPED | OUTPATIENT
Start: 2022-05-09

## 2022-05-23 ENCOUNTER — TELEPHONE (OUTPATIENT)
Dept: NEPHROLOGY | Facility: CLINIC | Age: 52
End: 2022-05-23

## 2022-05-23 NOTE — TELEPHONE ENCOUNTER
Spoke with Patient and schedule appointment for 08/31 with Dr Lauren Meade in the [facility] location

## 2022-08-24 ENCOUNTER — TELEPHONE (OUTPATIENT)
Dept: NEPHROLOGY | Facility: CLINIC | Age: 52
End: 2022-08-24

## 2022-08-24 DIAGNOSIS — I10 ESSENTIAL HYPERTENSION: Primary | ICD-10-CM

## 2022-08-24 DIAGNOSIS — E87.6 HYPOKALEMIA: ICD-10-CM

## 2022-08-24 NOTE — TELEPHONE ENCOUNTER
----- Message from Gloria Park MD sent at 8/23/2022  7:46 PM EDT -----  BMP  ----- Message -----  From: Sharad Howard  Sent: 8/23/2022   4:18 PM EDT  To: Gloria Park MD    Any labs prior to 8/31 appt?

## 2022-09-05 DIAGNOSIS — G43.909 MIGRAINE WITHOUT STATUS MIGRAINOSUS, NOT INTRACTABLE, UNSPECIFIED MIGRAINE TYPE: ICD-10-CM

## 2022-09-05 RX ORDER — BUTALBITAL, ACETAMINOPHEN AND CAFFEINE 300; 40; 50 MG/1; MG/1; MG/1
CAPSULE ORAL
Qty: 30 CAPSULE | Refills: 0 | Status: CANCELLED | OUTPATIENT
Start: 2022-09-05

## 2022-09-06 DIAGNOSIS — G43.909 MIGRAINE WITHOUT STATUS MIGRAINOSUS, NOT INTRACTABLE, UNSPECIFIED MIGRAINE TYPE: ICD-10-CM

## 2022-09-06 RX ORDER — BUTALBITAL, ACETAMINOPHEN AND CAFFEINE 300; 40; 50 MG/1; MG/1; MG/1
CAPSULE ORAL
Qty: 30 CAPSULE | Refills: 0 | Status: CANCELLED | OUTPATIENT
Start: 2022-09-06

## 2022-09-07 ENCOUNTER — PATIENT MESSAGE (OUTPATIENT)
Dept: INTERNAL MEDICINE CLINIC | Age: 52
End: 2022-09-07

## 2022-09-07 DIAGNOSIS — G43.909 MIGRAINE WITHOUT STATUS MIGRAINOSUS, NOT INTRACTABLE, UNSPECIFIED MIGRAINE TYPE: ICD-10-CM

## 2022-09-08 RX ORDER — BUTALBITAL, ACETAMINOPHEN AND CAFFEINE 300; 40; 50 MG/1; MG/1; MG/1
CAPSULE ORAL
Qty: 30 CAPSULE | Refills: 0 | Status: SHIPPED | OUTPATIENT
Start: 2022-09-08

## 2022-10-14 DIAGNOSIS — I10 HYPERTENSION, ESSENTIAL: ICD-10-CM

## 2022-10-14 RX ORDER — PROPRANOLOL HYDROCHLORIDE 80 MG/1
80 CAPSULE, EXTENDED RELEASE ORAL DAILY
Qty: 90 CAPSULE | Refills: 1 | Status: SHIPPED | OUTPATIENT
Start: 2022-10-14 | End: 2023-01-12

## 2022-10-14 RX ORDER — HYDROCHLOROTHIAZIDE 25 MG/1
25 TABLET ORAL DAILY
Qty: 90 TABLET | Refills: 1 | Status: SHIPPED | OUTPATIENT
Start: 2022-10-14 | End: 2023-01-12

## 2022-11-08 ENCOUNTER — APPOINTMENT (OUTPATIENT)
Dept: LAB | Facility: CLINIC | Age: 52
End: 2022-11-08

## 2022-11-08 DIAGNOSIS — E78.2 MIXED HYPERLIPIDEMIA: ICD-10-CM

## 2022-11-08 DIAGNOSIS — E55.9 VITAMIN D DEFICIENCY: ICD-10-CM

## 2022-11-08 DIAGNOSIS — Z11.59 NEED FOR HEPATITIS C SCREENING TEST: ICD-10-CM

## 2022-11-08 DIAGNOSIS — R73.01 IMPAIRED FASTING GLUCOSE: ICD-10-CM

## 2022-11-08 DIAGNOSIS — E03.4 HYPOTHYROIDISM DUE TO ACQUIRED ATROPHY OF THYROID: ICD-10-CM

## 2022-11-08 DIAGNOSIS — I10 ESSENTIAL HYPERTENSION: ICD-10-CM

## 2022-11-08 DIAGNOSIS — E87.6 HYPOKALEMIA: ICD-10-CM

## 2022-11-08 LAB
25(OH)D3 SERPL-MCNC: 29.6 NG/ML (ref 30–100)
ALBUMIN SERPL BCP-MCNC: 3.3 G/DL (ref 3.5–5)
ALP SERPL-CCNC: 56 U/L (ref 46–116)
ALT SERPL W P-5'-P-CCNC: 33 U/L (ref 12–78)
AMORPH URATE CRY URNS QL MICRO: ABNORMAL
ANION GAP SERPL CALCULATED.3IONS-SCNC: 8 MMOL/L (ref 4–13)
AST SERPL W P-5'-P-CCNC: 23 U/L (ref 5–45)
BACTERIA UR QL AUTO: ABNORMAL /HPF
BASOPHILS # BLD AUTO: 0.05 THOUSANDS/ÂΜL (ref 0–0.1)
BASOPHILS NFR BLD AUTO: 1 % (ref 0–1)
BILIRUB SERPL-MCNC: 0.43 MG/DL (ref 0.2–1)
BILIRUB UR QL STRIP: NEGATIVE
BUN SERPL-MCNC: 10 MG/DL (ref 5–25)
CALCIUM ALBUM COR SERPL-MCNC: 9.6 MG/DL (ref 8.3–10.1)
CALCIUM SERPL-MCNC: 9 MG/DL (ref 8.3–10.1)
CHLORIDE SERPL-SCNC: 102 MMOL/L (ref 96–108)
CHOLEST SERPL-MCNC: 201 MG/DL
CLARITY UR: ABNORMAL
CO2 SERPL-SCNC: 25 MMOL/L (ref 21–32)
COLOR UR: COLORLESS
CREAT SERPL-MCNC: 0.75 MG/DL (ref 0.6–1.3)
EOSINOPHIL # BLD AUTO: 0.2 THOUSAND/ÂΜL (ref 0–0.61)
EOSINOPHIL NFR BLD AUTO: 3 % (ref 0–6)
ERYTHROCYTE [DISTWIDTH] IN BLOOD BY AUTOMATED COUNT: 13.2 % (ref 11.6–15.1)
EST. AVERAGE GLUCOSE BLD GHB EST-MCNC: 123 MG/DL
GFR SERPL CREATININE-BSD FRML MDRD: 90 ML/MIN/1.73SQ M
GLUCOSE P FAST SERPL-MCNC: 122 MG/DL (ref 65–99)
GLUCOSE UR STRIP-MCNC: NEGATIVE MG/DL
HBA1C MFR BLD: 5.9 %
HCT VFR BLD AUTO: 39.9 % (ref 34.8–46.1)
HCV AB SER QL: NORMAL
HDLC SERPL-MCNC: 51 MG/DL
HGB BLD-MCNC: 12.7 G/DL (ref 11.5–15.4)
HGB UR QL STRIP.AUTO: ABNORMAL
IMM GRANULOCYTES # BLD AUTO: 0.03 THOUSAND/UL (ref 0–0.2)
IMM GRANULOCYTES NFR BLD AUTO: 0 % (ref 0–2)
KETONES UR STRIP-MCNC: NEGATIVE MG/DL
LDLC SERPL CALC-MCNC: 130 MG/DL (ref 0–100)
LEUKOCYTE ESTERASE UR QL STRIP: NEGATIVE
LYMPHOCYTES # BLD AUTO: 1.62 THOUSANDS/ÂΜL (ref 0.6–4.47)
LYMPHOCYTES NFR BLD AUTO: 24 % (ref 14–44)
MCH RBC QN AUTO: 30.5 PG (ref 26.8–34.3)
MCHC RBC AUTO-ENTMCNC: 31.8 G/DL (ref 31.4–37.4)
MCV RBC AUTO: 96 FL (ref 82–98)
MONOCYTES # BLD AUTO: 0.67 THOUSAND/ÂΜL (ref 0.17–1.22)
MONOCYTES NFR BLD AUTO: 10 % (ref 4–12)
NEUTROPHILS # BLD AUTO: 4.16 THOUSANDS/ÂΜL (ref 1.85–7.62)
NEUTS SEG NFR BLD AUTO: 62 % (ref 43–75)
NITRITE UR QL STRIP: NEGATIVE
NON-SQ EPI CELLS URNS QL MICRO: ABNORMAL /HPF
NRBC BLD AUTO-RTO: 0 /100 WBCS
PH UR STRIP.AUTO: 6.5 [PH]
PLATELET # BLD AUTO: 265 THOUSANDS/UL (ref 149–390)
PMV BLD AUTO: 10.5 FL (ref 8.9–12.7)
POTASSIUM SERPL-SCNC: 3.5 MMOL/L (ref 3.5–5.3)
PROT SERPL-MCNC: 7.7 G/DL (ref 6.4–8.4)
PROT UR STRIP-MCNC: NEGATIVE MG/DL
RBC # BLD AUTO: 4.17 MILLION/UL (ref 3.81–5.12)
RBC #/AREA URNS AUTO: ABNORMAL /HPF
SODIUM SERPL-SCNC: 135 MMOL/L (ref 135–147)
SP GR UR STRIP.AUTO: 1 (ref 1–1.03)
TRIGL SERPL-MCNC: 102 MG/DL
TSH SERPL DL<=0.05 MIU/L-ACNC: 2.75 UIU/ML (ref 0.45–4.5)
UROBILINOGEN UR STRIP-ACNC: <2 MG/DL
WBC # BLD AUTO: 6.73 THOUSAND/UL (ref 4.31–10.16)
WBC #/AREA URNS AUTO: ABNORMAL /HPF

## 2022-11-11 ENCOUNTER — OFFICE VISIT (OUTPATIENT)
Dept: INTERNAL MEDICINE CLINIC | Age: 52
End: 2022-11-11

## 2022-11-11 VITALS
TEMPERATURE: 98.3 F | HEIGHT: 62 IN | WEIGHT: 173.4 LBS | OXYGEN SATURATION: 98 % | BODY MASS INDEX: 31.91 KG/M2 | DIASTOLIC BLOOD PRESSURE: 88 MMHG | HEART RATE: 86 BPM | SYSTOLIC BLOOD PRESSURE: 142 MMHG

## 2022-11-11 DIAGNOSIS — Z12.31 ENCOUNTER FOR SCREENING MAMMOGRAM FOR MALIGNANT NEOPLASM OF BREAST: ICD-10-CM

## 2022-11-11 DIAGNOSIS — Z12.11 ENCOUNTER FOR SCREENING FOR MALIGNANT NEOPLASM OF COLON: ICD-10-CM

## 2022-11-11 DIAGNOSIS — E55.9 VITAMIN D INSUFFICIENCY: ICD-10-CM

## 2022-11-11 DIAGNOSIS — J06.9 VIRAL URI: ICD-10-CM

## 2022-11-11 DIAGNOSIS — Z00.00 ANNUAL PHYSICAL EXAM: Primary | ICD-10-CM

## 2022-11-11 DIAGNOSIS — F41.9 ANXIETY: ICD-10-CM

## 2022-11-11 RX ORDER — HYDROXYZINE HYDROCHLORIDE 25 MG/1
25 TABLET, FILM COATED ORAL 3 TIMES DAILY PRN
Qty: 90 TABLET | Refills: 0 | Status: SHIPPED | OUTPATIENT
Start: 2022-11-11

## 2022-11-11 NOTE — PATIENT INSTRUCTIONS
Wellness Visit for Adults   AMBULATORY CARE:   A wellness visit  is when you see your healthcare provider to get screened for health problems  Your healthcare provider will also give you advice on how to stay healthy  Write down your questions so you remember to ask them  Ask your healthcare provider how often you should have a wellness visit  What happens at a wellness visit:  Your healthcare provider will ask about your health, and your family history of health problems  This includes high blood pressure, heart disease, and cancer  He or she will ask if you have symptoms that concern you, if you smoke, and about your mood  You may also be asked about your intake of medicines, supplements, food, and alcohol  Any of the following may be done: Your weight  will be checked  Your height may also be checked so your body mass index (BMI) can be calculated  Your BMI shows if you are at a healthy weight  Your blood pressure  and heart rate will be checked  Your temperature may also be checked  Blood and urine tests  may be done  Blood tests may be done to check your cholesterol levels  Abnormal cholesterol levels increase your risk for heart disease and stroke  You may also need a blood or urine test to check for diabetes if you are at increased risk  Urine tests may be done to look for signs of an infection or kidney disease  A physical exam  includes checking your heartbeat and lungs with a stethoscope  Your healthcare provider may also check your skin to look for sun damage  Screening tests  may be recommended  A screening test is done to check for diseases that may not cause symptoms  The screening tests you may need depend on your age, gender, family history, and lifestyle habits  For example, colorectal screening may be recommended if you are 48years old or older  Screening tests you need if you are a woman:   A Pap smear  is used to screen for cervical cancer   Pap smears are usually done every 3 to 5 years depending on your age  You may need them more often if you have had abnormal Pap smear test results in the past  Ask your healthcare provider how often you should have a Pap smear  A mammogram  is an x-ray of your breasts to screen for breast cancer  Experts recommend mammograms every 2 years starting at age 48 years  You may need a mammogram at age 52 years or younger if you have an increased risk for breast cancer  Talk to your healthcare provider about when you should start having mammograms and how often you need them  Vaccines you may need:   Get an influenza vaccine  every year  The influenza vaccine protects you from the flu  Several types of viruses cause the flu  The viruses change over time, so new vaccines are made each year  Get a tetanus-diphtheria (Td) booster vaccine  every 10 years  This vaccine protects you against tetanus and diphtheria  Tetanus is a severe infection that may cause painful muscle spasms and lockjaw  Diphtheria is a severe bacterial infection that causes a thick covering in the back of your mouth and throat  Get a human papillomavirus (HPV) vaccine  if you are female and aged 23 to 32 or male 23 to 24 and never received it  This vaccine protects you from HPV infection  HPV is the most common infection spread by sexual contact  HPV may also cause vaginal, penile, and anal cancers  Get a pneumococcal vaccine  if you are aged 72 years or older  The pneumococcal vaccine is an injection given to protect you from pneumococcal disease  Pneumococcal disease is an infection caused by pneumococcal bacteria  The infection may cause pneumonia, meningitis, or an ear infection  Get a shingles vaccine  if you are 60 or older, even if you have had shingles before  The shingles vaccine is an injection to protect you from the varicella-zoster virus  This is the same virus that causes chickenpox   Shingles is a painful rash that develops in people who had chickenpox or have been exposed to the virus  How to eat healthy:  My Plate is a model for planning healthy meals  It shows the types and amounts of foods that should go on your plate  Fruits and vegetables make up about half of your plate, and grains and protein make up the other half  A serving of dairy is included on the side of your plate  The amount of calories and serving sizes you need depends on your age, gender, weight, and height  Examples of healthy foods are listed below:  Eat a variety of vegetables  such as dark green, red, and orange vegetables  You can also include canned vegetables low in sodium (salt) and frozen vegetables without added butter or sauces  Eat a variety of fresh fruits , canned fruit in 100% juice, frozen fruit, and dried fruit  Include whole grains  At least half of the grains you eat should be whole grains  Examples include whole-wheat bread, wheat pasta, brown rice, and whole-grain cereals such as oatmeal     Eat a variety of protein foods such as seafood (fish and shellfish), lean meat, and poultry without skin (turkey and chicken)  Examples of lean meats include pork leg, shoulder, or tenderloin, and beef round, sirloin, tenderloin, and extra lean ground beef  Other protein foods include eggs and egg substitutes, beans, peas, soy products, nuts, and seeds  Choose low-fat dairy products such as skim or 1% milk or low-fat yogurt, cheese, and cottage cheese  Limit unhealthy fats  such as butter, hard margarine, and shortening  Exercise:  Exercise at least 30 minutes per day on most days of the week  Some examples of exercise include walking, biking, dancing, and swimming  You can also fit in more physical activity by taking the stairs instead of the elevator or parking farther away from stores  Include muscle strengthening activities 2 days each week  Regular exercise provides many health benefits   It helps you manage your weight, and decreases your risk for type 2 diabetes, heart disease, stroke, and high blood pressure  Exercise can also help improve your mood  Ask your healthcare provider about the best exercise plan for you  General health and safety guidelines:   Do not smoke  Nicotine and other chemicals in cigarettes and cigars can cause lung damage  Ask your healthcare provider for information if you currently smoke and need help to quit  E-cigarettes or smokeless tobacco still contain nicotine  Talk to your healthcare provider before you use these products  Limit alcohol  A drink of alcohol is 12 ounces of beer, 5 ounces of wine, or 1½ ounces of liquor  Lose weight, if needed  Being overweight increases your risk of certain health conditions  These include heart disease, high blood pressure, type 2 diabetes, and certain types of cancer  Protect your skin  Do not sunbathe or use tanning beds  Use sunscreen with a SPF 15 or higher  Apply sunscreen at least 15 minutes before you go outside  Reapply sunscreen every 2 hours  Wear protective clothing, hats, and sunglasses when you are outside  Drive safely  Always wear your seatbelt  Make sure everyone in your car wears a seatbelt  A seatbelt can save your life if you are in an accident  Do not use your cell phone when you are driving  This could distract you and cause an accident  Pull over if you need to make a call or send a text message  Practice safe sex  Use latex condoms if are sexually active and have more than one partner  Your healthcare provider may recommend screening tests for sexually transmitted infections (STIs)  Wear helmets, lifejackets, and protective gear  Always wear a helmet when you ride a bike or motorcycle, go skiing, or play sports that could cause a head injury  Wear protective equipment when you play sports  Wear a lifejacket when you are on a boat or doing water sports      © Copyright [employer] 2022 Information is for End User's use only and may not be sold, redistributed or otherwise used for commercial purposes  All illustrations and images included in CareNotes® are the copyrighted property of [employer]  or Radha Malhotra  The above information is an  only  It is not intended as medical advice for individual conditions or treatments  Talk to your doctor, nurse or pharmacist before following any medical regimen to see if it is safe and effective for you

## 2022-11-11 NOTE — PROGRESS NOTES
Assessment/Plan:      Vitamin D insufficiency  - vit d level checked on 11/08/22 was mildly low at 29 6, up from 25 9 on 11/01/21  - pt only takes a multivitamin and was counseled to take an extra vitamin D supplement    Anxiety  - worsened with recent life stresses  - pt is not interested in a daily medication or a medication with a potential for dependence  - we will trial her on prn hydroxyzine  - follow up in 1-2 months    Viral URTI  - improving  -we will continue with symptomatic management  -patient was counseled to call the office if her symptoms do not resolve in 7-10 days or if they worsen and we will prescribe antibiotics for her as this would be an indication of a bacterial superinfection  Hyperlipidemia  - lipid panel done on November 8th, 2022 showed a total cholesterol of 201, up from 186 on November 10th, 2021, triglyceride of 102, down from 122, HDL of 51, up from 49 and LDL of 130, up from 113 on November 10th, 2021   -calculated 10 year ASCVD risk is 3 2% but patient has a significant family history of coronary artery disease   -she was counseled on the importance of eating a heart healthy diet and on exercising  Diagnoses and all orders for this visit:    Annual physical exam    Encounter for screening mammogram for malignant neoplasm of breast  -     Mammo screening bilateral w 3d & cad; Future    Encounter for screening for malignant neoplasm of colon  -     Ambulatory Referral to Colorectal Surgery; Future    Anxiety  -     hydrOXYzine HCL (ATARAX) 25 mg tablet; Take 1 tablet (25 mg total) by mouth 3 (three) times a day as needed for anxiety    Viral URI    Vitamin D insufficiency    BMI 31 0-31 9,adult          Subjective:      Patient ID: Rachelle Powers is a 45 y o  female  HPI  Today, patient c/o of anxiety for the past 6 months since her life stresses have been too much with her mom's health and her daughter and grandson lived with her   Mom had a heart attack and does not take her meds and hides them  She feels overwhelmed sometimes  Last panic attack was 25 years ago  Feels depressed but it is not overwhelming  She states that she started having a scratchy throat 6 days ago with chest congestion with some burning and took a pepcid and it helped  Feels better today  No hx of contact  She admits to occasional diaphoresis, occasional headaches, nasal congestion, rhinorrhea, sneezing, cough productive of whitish to thick yellow phlegm, shortness of breath on exertion, occasional chest tightness, anxiety, dysphoric mood and occasional arthralgias but denies fever, chills, earache, nausea, vomiting abdominal pain, diarrhea, constipation  The following portions of the patient's history were reviewed and updated as appropriate:   She  has a past medical history of Anxiety (11/11/2022), COVID-19 virus infection (7/27/2021), Hematuria, Hyperkalemia, Hypertension, and Proteinuria  She   Patient Active Problem List    Diagnosis Date Noted   • Viral URI 11/11/2022   • Encounter for screening for malignant neoplasm of colon 11/11/2022   • Annual physical exam 11/11/2022   • Encounter for screening mammogram for malignant neoplasm of breast 11/11/2022   • Vitamin D insufficiency 11/11/2022   • Anxiety 11/11/2022   • Hypokalemia 11/11/2021   • BMI 31 0-31 9,adult 08/03/2021   • Essential hypertension 07/26/2021   • Migraine without aura and without status migrainosus, not intractable 07/26/2021   • 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     She  has no past surgical history on file  Her family history includes Heart disease in her father; No Known Problems in her mother  She  reports that she quit smoking about 29 years ago  Her smoking use included cigarettes  She started smoking about 36 years ago  She has a 1 25 pack-year smoking history  She has never used smokeless tobacco  She reports current alcohol use  She reports that she does not use drugs    Current Outpatient Medications   Medication Sig Dispense Refill   • Butalbital-APAP-Caffeine (Fioricet) -40 MG CAPS 1 CAPSULE AT ONSET OF HEADACHE AND THEN MAY REPEAT AFTER 30 MINS IF NO IMPROVEMENT, NO MORE THAN 2 CAPSULES A DAY 30 capsule 0   • fexofenadine (ALLEGRA) 60 MG tablet Take 60 mg by mouth as needed     • fluticasone (FLONASE) 50 mcg/act nasal spray 1 spray into each nostril daily (Patient taking differently: 1 spray into each nostril daily as needed) 16 g 0   • hydrochlorothiazide (HYDRODIURIL) 25 mg tablet Take 1 tablet (25 mg total) by mouth daily 90 tablet 1   • hydrOXYzine HCL (ATARAX) 25 mg tablet Take 1 tablet (25 mg total) by mouth 3 (three) times a day as needed for anxiety 90 tablet 0   • Ibuprofen 200 MG CAPS Take by mouth as needed      • Multiple Vitamin (multivitamin) tablet Take 1 tablet by mouth daily     • Probiotic Product (PROBIOTIC DAILY PO) Take by mouth daily     • propranolol (Inderal LA) 80 mg 24 hr capsule Take 1 capsule (80 mg total) by mouth daily 90 capsule 1     No current facility-administered medications for this visit       Current Outpatient Medications on File Prior to Visit   Medication Sig   • Butalbital-APAP-Caffeine (Fioricet) -40 MG CAPS 1 CAPSULE AT ONSET OF HEADACHE AND THEN MAY REPEAT AFTER 30 MINS IF NO IMPROVEMENT, NO MORE THAN 2 CAPSULES A DAY   • fexofenadine (ALLEGRA) 60 MG tablet Take 60 mg by mouth as needed   • fluticasone (FLONASE) 50 mcg/act nasal spray 1 spray into each nostril daily (Patient taking differently: 1 spray into each nostril daily as needed)   • hydrochlorothiazide (HYDRODIURIL) 25 mg tablet Take 1 tablet (25 mg total) by mouth daily   • Ibuprofen 200 MG CAPS Take by mouth as needed    • Multiple Vitamin (multivitamin) tablet Take 1 tablet by mouth daily   • Probiotic Product (PROBIOTIC DAILY PO) Take by mouth daily   • propranolol (Inderal LA) 80 mg 24 hr capsule Take 1 capsule (80 mg total) by mouth daily     No current facility-administered medications on file prior to visit      She is allergic to amoxicillin and cefaclor       Review of Systems   Constitutional: Positive for diaphoresis  Negative for activity change, chills, fatigue, fever and unexpected weight change  HENT: Positive for postnasal drip and rhinorrhea  Negative for ear pain, sinus pressure and sore throat  Eyes: Negative for pain  Respiratory: Positive for cough and shortness of breath  Negative for choking, chest tightness and wheezing  Cardiovascular: Negative for chest pain, palpitations and leg swelling  Gastrointestinal: Negative for abdominal pain, constipation, diarrhea, nausea and vomiting  Genitourinary: Negative for dysuria and hematuria  Musculoskeletal: Positive for arthralgias  Negative for back pain, gait problem, joint swelling, myalgias and neck stiffness  Skin: Negative for pallor and rash  Neurological: Negative for dizziness, tremors, seizures, syncope, light-headedness and headaches  Hematological: Negative for adenopathy  Psychiatric/Behavioral: Positive for dysphoric mood  Negative for behavioral problems  The patient is nervous/anxious  Objective:      /88 (BP Location: Left arm, Patient Position: Sitting, Cuff Size: Standard)   Pulse 86   Temp 98 3 °F (36 8 °C) (Temporal)   Ht 5' 2 32" (1 583 m)   Wt 78 7 kg (173 lb 6 4 oz)   SpO2 98%   BMI 31 39 kg/m²          Physical Exam  Constitutional:       General: She is not in acute distress  Appearance: She is well-developed  She is not diaphoretic  HENT:      Head: Normocephalic and atraumatic  Right Ear: External ear normal  Tympanic membrane is injected  Left Ear: External ear normal  Tympanic membrane is injected  Nose: Septal deviation, mucosal edema and congestion present  No rhinorrhea  Mouth/Throat:      Mouth: Mucous membranes are dry  Pharynx: Posterior oropharyngeal erythema present  No oropharyngeal exudate  Eyes:      General: No scleral icterus  Right eye: No discharge  Left eye: No discharge  Conjunctiva/sclera: Conjunctivae normal       Pupils: Pupils are equal, round, and reactive to light  Neck:      Thyroid: No thyromegaly  Vascular: No JVD  Trachea: No tracheal deviation  Cardiovascular:      Rate and Rhythm: Normal rate and regular rhythm  Heart sounds: Normal heart sounds  No murmur heard  No friction rub  No gallop  Pulmonary:      Effort: Pulmonary effort is normal  No respiratory distress  Breath sounds: Normal breath sounds  No wheezing or rales  Chest:      Chest wall: No tenderness  Abdominal:      General: Bowel sounds are normal  There is no distension  Palpations: Abdomen is soft  There is no mass  Tenderness: There is no abdominal tenderness  There is no guarding or rebound  Musculoskeletal:         General: No tenderness or deformity  Normal range of motion  Cervical back: Normal range of motion and neck supple  Lymphadenopathy:      Cervical: No cervical adenopathy  Skin:     General: Skin is warm and dry  Coloration: Skin is not pale  Findings: No erythema or rash  Neurological:      Mental Status: She is alert and oriented to person, place, and time  Cranial Nerves: No cranial nerve deficit  Motor: No abnormal muscle tone  Coordination: Coordination normal       Deep Tendon Reflexes: Reflexes are normal and symmetric  Psychiatric:         Mood and Affect: Mood is anxious and depressed           Behavior: Behavior normal            Appointment on 11/08/2022   Component Date Value Ref Range Status   • Hepatitis C Ab 11/08/2022 Non-reactive  Non-reactive Final   • WBC 11/08/2022 6 73  4 31 - 10 16 Thousand/uL Final   • RBC 11/08/2022 4 17  3 81 - 5 12 Million/uL Final   • Hemoglobin 11/08/2022 12 7  11 5 - 15 4 g/dL Final   • Hematocrit 11/08/2022 39 9  34 8 - 46 1 % Final   • MCV 11/08/2022 96  82 - 98 fL Final   • MCH 11/08/2022 30 5  26 8 - 34 3 pg Final   • MCHC 11/08/2022 31 8  31 4 - 37 4 g/dL Final   • RDW 11/08/2022 13 2  11 6 - 15 1 % Final   • MPV 11/08/2022 10 5  8 9 - 12 7 fL Final   • Platelets 47/76/9256 265  149 - 390 Thousands/uL Final   • nRBC 11/08/2022 0  /100 WBCs Final   • Neutrophils Relative 11/08/2022 62  43 - 75 % Final   • Immat GRANS % 11/08/2022 0  0 - 2 % Final   • Lymphocytes Relative 11/08/2022 24  14 - 44 % Final   • Monocytes Relative 11/08/2022 10  4 - 12 % Final   • Eosinophils Relative 11/08/2022 3  0 - 6 % Final   • Basophils Relative 11/08/2022 1  0 - 1 % Final   • Neutrophils Absolute 11/08/2022 4 16  1 85 - 7 62 Thousands/µL Final   • Immature Grans Absolute 11/08/2022 0 03  0 00 - 0 20 Thousand/uL Final   • Lymphocytes Absolute 11/08/2022 1 62  0 60 - 4 47 Thousands/µL Final   • Monocytes Absolute 11/08/2022 0 67  0 17 - 1 22 Thousand/µL Final   • Eosinophils Absolute 11/08/2022 0 20  0 00 - 0 61 Thousand/µL Final   • Basophils Absolute 11/08/2022 0 05  0 00 - 0 10 Thousands/µL Final   • Sodium 11/08/2022 135  135 - 147 mmol/L Final   • Potassium 11/08/2022 3 5  3 5 - 5 3 mmol/L Final   • Chloride 11/08/2022 102  96 - 108 mmol/L Final   • CO2 11/08/2022 25  21 - 32 mmol/L Final   • ANION GAP 11/08/2022 8  4 - 13 mmol/L Final   • BUN 11/08/2022 10  5 - 25 mg/dL Final   • Creatinine 11/08/2022 0 75  0 60 - 1 30 mg/dL Final    Standardized to IDMS reference method   • Glucose, Fasting 11/08/2022 122 (A) 65 - 99 mg/dL Final    Specimen collection should occur prior to Sulfasalazine administration due to the potential for falsely depressed results  Specimen collection should occur prior to Sulfapyridine administration due to the potential for falsely elevated results     • Calcium 11/08/2022 9 0  8 3 - 10 1 mg/dL Final   • Corrected Calcium 11/08/2022 9 6  8 3 - 10 1 mg/dL Final   • AST 11/08/2022 23  5 - 45 U/L Final    Specimen collection should occur prior to Sulfasalazine administration due to the potential for falsely depressed results  • ALT 11/08/2022 33  12 - 78 U/L Final    Specimen collection should occur prior to Sulfasalazine and/or Sulfapyridine administration due to the potential for falsely depressed results  • Alkaline Phosphatase 11/08/2022 56  46 - 116 U/L Final   • Total Protein 11/08/2022 7 7  6 4 - 8 4 g/dL Final   • Albumin 11/08/2022 3 3 (A) 3 5 - 5 0 g/dL Final   • Total Bilirubin 11/08/2022 0 43  0 20 - 1 00 mg/dL Final    Use of this assay is not recommended for patients undergoing treatment with eltrombopag due to the potential for falsely elevated results  • eGFR 11/08/2022 90  ml/min/1 73sq m Final   • Cholesterol 11/08/2022 201 (A) See Comment mg/dL Final    Cholesterol:         Pediatric <18 Years        Desirable          <170 mg/dL      Borderline High    170-199 mg/dL      High               >=200 mg/dL        Adult >=18 Years            Desirable         <200 mg/dL      Borderline High   200-239 mg/dL      High              >239 mg/dL     • Triglycerides 11/08/2022 102  See Comment mg/dL Final    Triglyceride:     0-9Y            <75mg/dL     10Y-17Y         <90 mg/dL       >=18Y     Normal          <150 mg/dL     Borderline High 150-199 mg/dL     High            200-499 mg/dL        Very High       >499 mg/dL    Specimen collection should occur prior to N-Acetylcysteine or Metamizole administration due to the potential for falsely depressed results  • HDL, Direct 11/08/2022 51  >=50 mg/dL Final   • LDL Calculated 11/08/2022 130 (A) 0 - 100 mg/dL Final    LDL Cholesterol:     Optimal           <100 mg/dl     Near Optimal      100-129 mg/dl     Above Optimal       Borderline High 130-159 mg/dl       High            160-189 mg/dl       Very High       >189 mg/dl         This screening LDL is a calculated result  It does not have the accuracy of the Direct Measured LDL in the monitoring of patients with hyperlipidemia and/or statin therapy     Direct Measure LDL (URT549) must be ordered separately in these patients  • Hemoglobin A1C 11/08/2022 5 9 (A) Normal 3 8-5 6%; PreDiabetic 5 7-6 4%; Diabetic >=6 5%; Glycemic control for adults with diabetes <7 0% % Final   • EAG 11/08/2022 123  mg/dl Final   • TSH 3RD GENERATON 11/08/2022 2 750  0 450 - 4 500 uIU/mL Final    The recommended reference ranges for TSH during pregnancy are as follows:   First trimester 0 1 to 2 5 uIU/mL   Second trimester  0 2 to 3 0 uIU/mL   Third trimester 0 3 to 3 0 uIU/m    Note: Normal ranges may not apply to patients who are transgender, non-binary, or whose legal sex, sex at birth, and gender identity differ  Adult TSH (3rd generation) reference range follows the recommended guidelines of the [employer], January, 2020     • Vit D, 25-Hydroxy 11/08/2022 29 6 (A) 30 0 - 100 0 ng/mL Final

## 2022-11-11 NOTE — PROGRESS NOTES
Assessment/Plan:    Annual physical examination  - History and physical examination done  - Pt was counseled to eat a heart healthy diet, to drink at least 2 L of water daily, to take a daily multivitamin and to exercise for at least 30 minutes of cardio exercise daily, for at least 5 days a week  - CBC, CMP, TSH and lipid panel were done on 11/08/22 and all results were reviewed with patient and all questions answered  - she is up to date with the covid vaccine x 3 but not the flu shot or the tdap  - she is up to date with her pap smear but not the mammogram or the colonoscopy  We will refer her to colorectal sx for a colonoscopy as requested and order a mammogram for her   - follow up in 1-2 months      The 10-year ASCVD risk score (Asuncion Yanes et al , 2013) is: 3 2%    Values used to calculate the score:      Age: 45 years      Sex: Female      Is Non- : No      Diabetic: No      Tobacco smoker: No      Systolic Blood Pressure: 588 mmHg      Is BP treated: Yes      HDL Cholesterol: 51 mg/dL      Total Cholesterol: 201 mg/dL       Diagnoses and all orders for this visit:    Annual physical exam    Encounter for screening mammogram for malignant neoplasm of breast  -     Mammo screening bilateral w 3d & cad; Future    Encounter for screening for malignant neoplasm of colon  -     Ambulatory Referral to Colorectal Surgery; Future    Anxiety  -     hydrOXYzine HCL (ATARAX) 25 mg tablet; Take 1 tablet (25 mg total) by mouth 3 (three) times a day as needed for anxiety    Viral URI    Vitamin D insufficiency    BMI 31 0-31 9,adult        BMI Counseling: Body mass index is 31 39 kg/m²  The BMI is above normal  Nutrition recommendations include consuming healthier snacks, limiting drinks that contain sugar, reducing intake of saturated and trans fat and reducing intake of cholesterol  Exercise recommendations include moderate physical activity 150 minutes/week  No pharmacotherapy was ordered  Patient referred to PCP  Rationale for BMI follow-up plan is due to patient being overweight or obese  Depression Screening and Follow-up Plan: Patient was screened for depression during today's encounter  They screened negative with a PHQ-2 score of 0  Subjective:      Patient ID: Aleksandr Jones is a 45 y o  female  HPI    Patient presents for an annual physical exam     Last annual physical exam- a year ago    Past medical history- migraine, htn, hypokalemia, pseudohypoaldosteronism, nephrolithiasis, seasonal Allergieds     Past surgical history- nephrostomy tube placement, wisdom teeth extraction    Medications- see list    Allergies- see list - amoxicillin - hives and cefaclor sob and hives    Diet- a mixture of balanced diet and junk food, drinks about 2- 2 5 l daily of water    Exercise- 1-2 miles of walking a day and stretching     Alcohol use- 2-3 beers a week    Caffeine and soda use- 2 cups of coffee daily and occasionally 2-3 diet cokes a week    Nicotine use- quit smoking almost 30 years ago and smoked for about 4 years, less than a pack a day    Recreational drug use- none    Work- [employer]    Sexual history, STD history and HIV testing- monogamous with , std hx - never, hiv testing - has a 25year old     Gynecological history/Prostate health/testicular health history- LMp - 10/20/22, last pap smear - May 2021, last mammogram - 14 years ago and she is scheduled     Colonoscopy- has never had a colonoscopy but is being referred     Immunization history- up to date with the covid shot x 3, but not the tdap and will get the flu shot     Dental visit- twice a year    Vision- trifocal - myopia and presbyopia    Family history-  htn - dad  Dm 1 - brother   MI - parents  cva - dad   Skin ca - mom     Today, patient c/o of anxiety for the past 6 months since her life stresses have been too much with her mom's health and her daughter and grandson lived with her   Mom had a heart attack and does not take her meds and hides  She feels overwhelmed sometimes  Last panic attack was 25 years ago  Feels depressed but it is not overwhelming  She states that she started having a scratchy throat 6 days ago with chest congestion with some burning and took a pepcid and it helped  Feels better today  No hx of contact  She admits to occasional diaphoresis, occasional headaches, nasal congestion, rhinorrhea, sneezing, cough productive of whitish to thick yellow phlegm, shortness of breath on exertion, occasional chest tightness, anxiety, dysphoric mood and occasional arthralgias but denies fever, chills, earache, nausea, vomiting abdominal pain, diarrhea, constipation  The following portions of the patient's history were reviewed and updated as appropriate:   She  has a past medical history of Anxiety (11/11/2022), COVID-19 virus infection (7/27/2021), Hematuria, Hyperkalemia, Hypertension, and Proteinuria  She   Patient Active Problem List    Diagnosis Date Noted   • Viral URI 11/11/2022   • Encounter for screening for malignant neoplasm of colon 11/11/2022   • Annual physical exam 11/11/2022   • Encounter for screening mammogram for malignant neoplasm of breast 11/11/2022   • Vitamin D insufficiency 11/11/2022   • Anxiety 11/11/2022   • Hypokalemia 11/11/2021   • BMI 31 0-31 9,adult 08/03/2021   • Essential hypertension 07/26/2021   • Migraine without aura and without status migrainosus, not intractable 07/26/2021   • 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     She  has no past surgical history on file  Her family history includes Heart disease in her father; No Known Problems in her mother  She  reports that she quit smoking about 29 years ago  Her smoking use included cigarettes  She started smoking about 36 years ago  She has a 1 25 pack-year smoking history  She has never used smokeless tobacco  She reports current alcohol use  She reports that she does not use drugs    Current Outpatient Medications   Medication Sig Dispense Refill   • Butalbital-APAP-Caffeine (Fioricet) -40 MG CAPS 1 CAPSULE AT ONSET OF HEADACHE AND THEN MAY REPEAT AFTER 30 MINS IF NO IMPROVEMENT, NO MORE THAN 2 CAPSULES A DAY 30 capsule 0   • fexofenadine (ALLEGRA) 60 MG tablet Take 60 mg by mouth as needed     • fluticasone (FLONASE) 50 mcg/act nasal spray 1 spray into each nostril daily (Patient taking differently: 1 spray into each nostril daily as needed) 16 g 0   • hydrochlorothiazide (HYDRODIURIL) 25 mg tablet Take 1 tablet (25 mg total) by mouth daily 90 tablet 1   • hydrOXYzine HCL (ATARAX) 25 mg tablet Take 1 tablet (25 mg total) by mouth 3 (three) times a day as needed for anxiety 90 tablet 0   • Ibuprofen 200 MG CAPS Take by mouth as needed      • Multiple Vitamin (multivitamin) tablet Take 1 tablet by mouth daily     • Probiotic Product (PROBIOTIC DAILY PO) Take by mouth daily     • propranolol (Inderal LA) 80 mg 24 hr capsule Take 1 capsule (80 mg total) by mouth daily 90 capsule 1     No current facility-administered medications for this visit       Current Outpatient Medications on File Prior to Visit   Medication Sig   • Butalbital-APAP-Caffeine (Fioricet) -40 MG CAPS 1 CAPSULE AT ONSET OF HEADACHE AND THEN MAY REPEAT AFTER 30 MINS IF NO IMPROVEMENT, NO MORE THAN 2 CAPSULES A DAY   • fexofenadine (ALLEGRA) 60 MG tablet Take 60 mg by mouth as needed   • fluticasone (FLONASE) 50 mcg/act nasal spray 1 spray into each nostril daily (Patient taking differently: 1 spray into each nostril daily as needed)   • hydrochlorothiazide (HYDRODIURIL) 25 mg tablet Take 1 tablet (25 mg total) by mouth daily   • Ibuprofen 200 MG CAPS Take by mouth as needed    • Multiple Vitamin (multivitamin) tablet Take 1 tablet by mouth daily   • Probiotic Product (PROBIOTIC DAILY PO) Take by mouth daily   • propranolol (Inderal LA) 80 mg 24 hr capsule Take 1 capsule (80 mg total) by mouth daily     No current facility-administered medications on file prior to visit  She is allergic to amoxicillin and cefaclor       Review of Systems   Constitutional: Positive for diaphoresis (occasionally )  Negative for activity change, chills, fatigue, fever and unexpected weight change  HENT: Positive for congestion, rhinorrhea and sneezing  Negative for ear pain, postnasal drip, sinus pressure and sore throat (resolved)  Eyes: Negative for pain  Respiratory: Positive for cough (with clear to thick yellow phlegm), chest tightness (on coughing ) and shortness of breath (mild - took mucinex and it helped)  Negative for choking and wheezing  Cardiovascular: Negative for chest pain, palpitations and leg swelling  Gastrointestinal: Negative for abdominal pain (resolved), constipation, diarrhea (2 watery bm and now resolved), nausea and vomiting  Genitourinary: Negative for dysuria and hematuria  Musculoskeletal: Positive for arthralgias (elbows and shoulders for the past couple of weeks )  Negative for back pain, gait problem, joint swelling, myalgias and neck stiffness  Skin: Negative for pallor and rash  Neurological: Positive for headaches (occasionally )  Negative for dizziness, tremors, seizures, syncope and light-headedness  Hematological: Negative for adenopathy  Psychiatric/Behavioral: Positive for dysphoric mood (2/2 to moms stresses)  Negative for behavioral problems and sleep disturbance (only around her periods)  The patient is nervous/anxious  Objective:      /88 (BP Location: Left arm, Patient Position: Sitting, Cuff Size: Standard)   Pulse 86   Temp 98 3 °F (36 8 °C) (Temporal)   Ht 5' 2 32" (1 583 m)   Wt 78 7 kg (173 lb 6 4 oz)   SpO2 98%   BMI 31 39 kg/m²          Physical Exam  Constitutional:       General: She is not in acute distress  Appearance: She is well-developed  She is not diaphoretic  HENT:      Head: Normocephalic and atraumatic        Right Ear: External ear normal  Tympanic membrane is injected  Left Ear: External ear normal  Tympanic membrane is injected  Nose: Septal deviation (to the left ), mucosal edema and congestion present  Mouth/Throat:      Pharynx: Posterior oropharyngeal erythema (mild oropharyngeal erythema with dry mucous membranes and Mallampati class 1 oropharynx) present  No oropharyngeal exudate  Eyes:      General: No scleral icterus  Right eye: No discharge  Left eye: No discharge  Conjunctiva/sclera: Conjunctivae normal       Pupils: Pupils are equal, round, and reactive to light  Neck:      Thyroid: No thyromegaly  Vascular: No JVD  Trachea: No tracheal deviation  Cardiovascular:      Rate and Rhythm: Normal rate and regular rhythm  Heart sounds: Normal heart sounds  No murmur heard  No friction rub  No gallop  Pulmonary:      Effort: Pulmonary effort is normal  No respiratory distress  Breath sounds: Normal breath sounds  No wheezing or rales  Chest:      Chest wall: No tenderness  Abdominal:      General: Bowel sounds are normal  There is no distension  Palpations: Abdomen is soft  There is no mass  Tenderness: There is no abdominal tenderness  There is no guarding or rebound  Musculoskeletal:         General: No tenderness or deformity  Normal range of motion  Cervical back: Normal range of motion and neck supple  Lymphadenopathy:      Cervical: No cervical adenopathy  Skin:     General: Skin is warm and dry  Coloration: Skin is not pale  Findings: No erythema or rash  Neurological:      Mental Status: She is alert and oriented to person, place, and time  Cranial Nerves: No cranial nerve deficit  Motor: No abnormal muscle tone  Coordination: Coordination normal       Deep Tendon Reflexes: Reflexes are normal and symmetric        Comments: Cranial nerves 2-12 are intact bilaterally  Muscle strength is 5/5 in all extremities  Sensation is intact in bilateral face and extremities  Rapid alternating movement and finger-to-nose pointing test intact   Deep tendon reflexes are 2+ bilaterally  Gait is intact       Psychiatric:         Behavior: Behavior normal            Appointment on 11/08/2022   Component Date Value Ref Range Status   • Hepatitis C Ab 11/08/2022 Non-reactive  Non-reactive Final   • WBC 11/08/2022 6 73  4 31 - 10 16 Thousand/uL Final   • RBC 11/08/2022 4 17  3 81 - 5 12 Million/uL Final   • Hemoglobin 11/08/2022 12 7  11 5 - 15 4 g/dL Final   • Hematocrit 11/08/2022 39 9  34 8 - 46 1 % Final   • MCV 11/08/2022 96  82 - 98 fL Final   • MCH 11/08/2022 30 5  26 8 - 34 3 pg Final   • MCHC 11/08/2022 31 8  31 4 - 37 4 g/dL Final   • RDW 11/08/2022 13 2  11 6 - 15 1 % Final   • MPV 11/08/2022 10 5  8 9 - 12 7 fL Final   • Platelets 81/14/9425 265  149 - 390 Thousands/uL Final   • nRBC 11/08/2022 0  /100 WBCs Final   • Neutrophils Relative 11/08/2022 62  43 - 75 % Final   • Immat GRANS % 11/08/2022 0  0 - 2 % Final   • Lymphocytes Relative 11/08/2022 24  14 - 44 % Final   • Monocytes Relative 11/08/2022 10  4 - 12 % Final   • Eosinophils Relative 11/08/2022 3  0 - 6 % Final   • Basophils Relative 11/08/2022 1  0 - 1 % Final   • Neutrophils Absolute 11/08/2022 4 16  1 85 - 7 62 Thousands/µL Final   • Immature Grans Absolute 11/08/2022 0 03  0 00 - 0 20 Thousand/uL Final   • Lymphocytes Absolute 11/08/2022 1 62  0 60 - 4 47 Thousands/µL Final   • Monocytes Absolute 11/08/2022 0 67  0 17 - 1 22 Thousand/µL Final   • Eosinophils Absolute 11/08/2022 0 20  0 00 - 0 61 Thousand/µL Final   • Basophils Absolute 11/08/2022 0 05  0 00 - 0 10 Thousands/µL Final   • Sodium 11/08/2022 135  135 - 147 mmol/L Final   • Potassium 11/08/2022 3 5  3 5 - 5 3 mmol/L Final   • Chloride 11/08/2022 102  96 - 108 mmol/L Final   • CO2 11/08/2022 25  21 - 32 mmol/L Final   • ANION GAP 11/08/2022 8  4 - 13 mmol/L Final   • BUN 11/08/2022 10  5 - 25 mg/dL Final   • Creatinine 11/08/2022 0 75  0 60 - 1 30 mg/dL Final    Standardized to IDMS reference method   • Glucose, Fasting 11/08/2022 122 (A) 65 - 99 mg/dL Final    Specimen collection should occur prior to Sulfasalazine administration due to the potential for falsely depressed results  Specimen collection should occur prior to Sulfapyridine administration due to the potential for falsely elevated results  • Calcium 11/08/2022 9 0  8 3 - 10 1 mg/dL Final   • Corrected Calcium 11/08/2022 9 6  8 3 - 10 1 mg/dL Final   • AST 11/08/2022 23  5 - 45 U/L Final    Specimen collection should occur prior to Sulfasalazine administration due to the potential for falsely depressed results  • ALT 11/08/2022 33  12 - 78 U/L Final    Specimen collection should occur prior to Sulfasalazine and/or Sulfapyridine administration due to the potential for falsely depressed results  • Alkaline Phosphatase 11/08/2022 56  46 - 116 U/L Final   • Total Protein 11/08/2022 7 7  6 4 - 8 4 g/dL Final   • Albumin 11/08/2022 3 3 (A) 3 5 - 5 0 g/dL Final   • Total Bilirubin 11/08/2022 0 43  0 20 - 1 00 mg/dL Final    Use of this assay is not recommended for patients undergoing treatment with eltrombopag due to the potential for falsely elevated results     • eGFR 11/08/2022 90  ml/min/1 73sq m Final   • Cholesterol 11/08/2022 201 (A) See Comment mg/dL Final    Cholesterol:         Pediatric <18 Years        Desirable          <170 mg/dL      Borderline High    170-199 mg/dL      High               >=200 mg/dL        Adult >=18 Years            Desirable         <200 mg/dL      Borderline High   200-239 mg/dL      High              >239 mg/dL     • Triglycerides 11/08/2022 102  See Comment mg/dL Final    Triglyceride:     0-9Y            <75mg/dL     10Y-17Y         <90 mg/dL       >=18Y     Normal          <150 mg/dL     Borderline High 150-199 mg/dL     High            200-499 mg/dL        Very High       >499 mg/dL    Specimen collection should occur prior to N-Acetylcysteine or Metamizole administration due to the potential for falsely depressed results  • HDL, Direct 11/08/2022 51  >=50 mg/dL Final   • LDL Calculated 11/08/2022 130 (A) 0 - 100 mg/dL Final    LDL Cholesterol:     Optimal           <100 mg/dl     Near Optimal      100-129 mg/dl     Above Optimal       Borderline High 130-159 mg/dl       High            160-189 mg/dl       Very High       >189 mg/dl         This screening LDL is a calculated result  It does not have the accuracy of the Direct Measured LDL in the monitoring of patients with hyperlipidemia and/or statin therapy  Direct Measure LDL (THO198) must be ordered separately in these patients  • Hemoglobin A1C 11/08/2022 5 9 (A) Normal 3 8-5 6%; PreDiabetic 5 7-6 4%; Diabetic >=6 5%; Glycemic control for adults with diabetes <7 0% % Final   • EAG 11/08/2022 123  mg/dl Final   • TSH 3RD GENERATON 11/08/2022 2 750  0 450 - 4 500 uIU/mL Final    The recommended reference ranges for TSH during pregnancy are as follows:   First trimester 0 1 to 2 5 uIU/mL   Second trimester  0 2 to 3 0 uIU/mL   Third trimester 0 3 to 3 0 uIU/m    Note: Normal ranges may not apply to patients who are transgender, non-binary, or whose legal sex, sex at birth, and gender identity differ  Adult TSH (3rd generation) reference range follows the recommended guidelines of the [employer], January, 2020     • Vit D, 25-Hydroxy 11/08/2022 29 6 (A) 30 0 - 100 0 ng/mL Final

## 2022-11-17 ENCOUNTER — OFFICE VISIT (OUTPATIENT)
Dept: NEPHROLOGY | Facility: CLINIC | Age: 52
End: 2022-11-17

## 2022-11-17 VITALS
WEIGHT: 173 LBS | DIASTOLIC BLOOD PRESSURE: 84 MMHG | BODY MASS INDEX: 31.83 KG/M2 | HEIGHT: 62 IN | SYSTOLIC BLOOD PRESSURE: 148 MMHG | HEART RATE: 70 BPM

## 2022-11-17 DIAGNOSIS — N25.89 PSEUDOHYPOALDOSTERONISM: Primary | ICD-10-CM

## 2022-11-17 DIAGNOSIS — I10 HYPERTENSION, UNSPECIFIED TYPE: ICD-10-CM

## 2022-11-17 PROBLEM — E87.6 HYPOKALEMIA: Status: RESOLVED | Noted: 2021-11-11 | Resolved: 2022-11-17

## 2022-11-17 RX ORDER — ACETAMINOPHEN 325 MG/1
650 TABLET ORAL EVERY 6 HOURS PRN
COMMUNITY

## 2022-11-17 NOTE — PATIENT INSTRUCTIONS
You are here for your yearly follow-up  Your electrolytes and kidney function are normal your on treatment with the hydrochlorothiazide as you know  Blood pressure was a little bit high today but you are having a lot of stress and other issues in her life but it was not that bad  Your going to monitor periodically at home to make sure it is in the 140s or lower  Otherwise you look great and it is good to hear you have not had any significant changes in her health status  Please call me if you have any questions or concerns before next visit

## 2022-11-17 NOTE — PROGRESS NOTES
NEPHROLOGY PROGRESS NOTE    Pato Rosas 45 y o  female MRN: [id number]  Unit/Bed#:  Encounter: [id number]  Reason for Consult:  Hypertension and pseudo hypoaldosteronism    Patient is here for her follow-up I have not seen her in a little bit over a year  The patient says that she has had a lot of stressful things going on at home she helps take care of her mother but overall her health has been good except for some migraine headaches  No hospitalizations reported  ASSESSMENT/PLAN:  1  Pseudo hypoaldosteronism    I have met the patient she came to me with this diagnosis it generally presents with hypertension hyperkalemia  She is on treatment with a thiazide diuretic in her latest electrolytes are normal and renal function is normal as well  It is a secondary cause of hypertension and her hypertension is borderline presently have just asked her to monitor little bit make sure it is 391 systolic or lower most of the time and she can call me if it is higher we can titrate some medications  She will continue on her current medications and follow-up in a year  She has known about this condition for many years she has been stable will just monitor labs prior to her visit and she does see other physicians periodically  SUBJECTIVE:  Review of Systems   Constitutional: Negative for chills, fever, malaise/fatigue and night sweats  HENT: Negative  Eyes: Negative  Cardiovascular: Negative for chest pain, dyspnea on exertion, leg swelling and orthopnea  Respiratory: Negative  Negative for cough, shortness of breath, sputum production and wheezing  Gastrointestinal: Negative for abdominal pain, diarrhea, nausea and vomiting  Genitourinary: Negative for dysuria, flank pain, hematuria and incomplete emptying  Neurological: Negative for dizziness, focal weakness and weakness  Occasional migraine headaches     Psychiatric/Behavioral: Negative for altered mental status, hallucinations and hypervigilance  The patient is not nervous/anxious  The patient feels she has been under lot of stress and has been down at times  OBJECTIVE:  Current Weight:    Tj@[employer] com:     not currently breastfeeding  , There is no height or weight on file to calculate BMI  [unfilled]    Physical Exam: There were no vitals taken for this visit  Physical Exam  Constitutional:       General: She is not in acute distress  Appearance: She is not toxic-appearing or diaphoretic  HENT:      Head: Normocephalic and atraumatic  Nose: Nose normal       Mouth/Throat:      Mouth: Mucous membranes are moist    Eyes:      General: No scleral icterus  Extraocular Movements: Extraocular movements intact  Cardiovascular:      Rate and Rhythm: Normal rate and regular rhythm  Heart sounds: No friction rub  No gallop  Comments: No edema  Pulmonary:      Effort: Pulmonary effort is normal  No respiratory distress  Breath sounds: No wheezing, rhonchi or rales  Abdominal:      General: Bowel sounds are normal  There is no distension  Palpations: Abdomen is soft  Tenderness: There is no abdominal tenderness  There is no rebound  Musculoskeletal:      Cervical back: Normal range of motion and neck supple  Neurological:      General: No focal deficit present  Mental Status: She is alert and oriented to person, place, and time  Mental status is at baseline  Psychiatric:         Mood and Affect: Mood normal          Behavior: Behavior normal          Thought Content:  Thought content normal          Judgment: Judgment normal          Medications:    Current Outpatient Medications:   •  Butalbital-APAP-Caffeine (Fioricet) -40 MG CAPS, 1 CAPSULE AT ONSET OF HEADACHE AND THEN MAY REPEAT AFTER 30 MINS IF NO IMPROVEMENT, NO MORE THAN 2 CAPSULES A DAY, Disp: 30 capsule, Rfl: 0  •  fexofenadine (ALLEGRA) 60 MG tablet, Take 60 mg by mouth as needed, Disp: , Rfl: •  fluticasone (FLONASE) 50 mcg/act nasal spray, 1 spray into each nostril daily (Patient taking differently: 1 spray into each nostril daily as needed), Disp: 16 g, Rfl: 0  •  hydrochlorothiazide (HYDRODIURIL) 25 mg tablet, Take 1 tablet (25 mg total) by mouth daily, Disp: 90 tablet, Rfl: 1  •  hydrOXYzine HCL (ATARAX) 25 mg tablet, Take 1 tablet (25 mg total) by mouth 3 (three) times a day as needed for anxiety, Disp: 90 tablet, Rfl: 0  •  Ibuprofen 200 MG CAPS, Take by mouth as needed , Disp: , Rfl:   •  Multiple Vitamin (multivitamin) tablet, Take 1 tablet by mouth daily, Disp: , Rfl:   •  Probiotic Product (PROBIOTIC DAILY PO), Take by mouth daily, Disp: , Rfl:   •  propranolol (Inderal LA) 80 mg 24 hr capsule, Take 1 capsule (80 mg total) by mouth daily, Disp: 90 capsule, Rfl: 1    Laboratory Results:  Lab Results   Component Value Date    WBC 6 73 11/08/2022    HGB 12 7 11/08/2022    HCT 39 9 11/08/2022    MCV 96 11/08/2022     11/08/2022     Lab Results   Component Value Date    SODIUM 135 11/08/2022    K 3 5 11/08/2022     11/08/2022    CO2 25 11/08/2022    BUN 10 11/08/2022    CREATININE 0 75 11/08/2022    GLUC 130 04/17/2017    CALCIUM 9 0 11/08/2022     Lab Results   Component Value Date    CALCIUM 9 0 11/08/2022    PHOS 2 7 04/17/2017     No results found for: LABPROT

## 2022-11-17 NOTE — LETTER
November 17, 2022     Ashlee Calvin DO  [address]    Patient: Alis Craig   YOB: 1970   Date of Visit: 11/17/2022       Dear Dr Nicole Zhang: Thank you for referring Alis Craig to me for evaluation  Below are my notes for this consultation  If you have questions, please do not hesitate to call me  I look forward to following your patient along with you  Sincerely,        Amy Moon MD        CC: No Recipients  Amy Moon MD  11/17/2022  9:05 AM  Sign when Signing Visit  NEPHROLOGY PROGRESS NOTE    Alis Craig 45 y o  female MRN: [id number]  Unit/Bed#:  Encounter: [id number]  Reason for Consult:  Hypertension and pseudo hypoaldosteronism    Patient is here for her follow-up I have not seen her in a little bit over a year  The patient says that she has had a lot of stressful things going on at home she helps take care of her mother but overall her health has been good except for some migraine headaches  No hospitalizations reported  ASSESSMENT/PLAN:  1  Pseudo hypoaldosteronism    I have met the patient she came to me with this diagnosis it generally presents with hypertension hyperkalemia  She is on treatment with a thiazide diuretic in her latest electrolytes are normal and renal function is normal as well  It is a secondary cause of hypertension and her hypertension is borderline presently have just asked her to monitor little bit make sure it is 474 systolic or lower most of the time and she can call me if it is higher we can titrate some medications  She will continue on her current medications and follow-up in a year  She has known about this condition for many years she has been stable will just monitor labs prior to her visit and she does see other physicians periodically  SUBJECTIVE:  Review of Systems   Constitutional: Negative for chills, fever, malaise/fatigue and night sweats  HENT: Negative      Eyes: Negative  Cardiovascular: Negative for chest pain, dyspnea on exertion, leg swelling and orthopnea  Respiratory: Negative  Negative for cough, shortness of breath, sputum production and wheezing  Gastrointestinal: Negative for abdominal pain, diarrhea, nausea and vomiting  Genitourinary: Negative for dysuria, flank pain, hematuria and incomplete emptying  Neurological: Negative for dizziness, focal weakness and weakness  Occasional migraine headaches  Psychiatric/Behavioral: Negative for altered mental status, hallucinations and hypervigilance  The patient is not nervous/anxious  The patient feels she has been under lot of stress and has been down at times  OBJECTIVE:  Current Weight:    [email or web address] com:     not currently breastfeeding  , There is no height or weight on file to calculate BMI  @[facility]@    Physical Exam: There were no vitals taken for this visit  Physical Exam  Constitutional:       General: She is not in acute distress  Appearance: She is not toxic-appearing or diaphoretic  HENT:      Head: Normocephalic and atraumatic  Nose: Nose normal       Mouth/Throat:      Mouth: Mucous membranes are moist    Eyes:      General: No scleral icterus  Extraocular Movements: Extraocular movements intact  Cardiovascular:      Rate and Rhythm: Normal rate and regular rhythm  Heart sounds: No friction rub  No gallop  Comments: No edema  Pulmonary:      Effort: Pulmonary effort is normal  No respiratory distress  Breath sounds: No wheezing, rhonchi or rales  Abdominal:      General: Bowel sounds are normal  There is no distension  Palpations: Abdomen is soft  Tenderness: There is no abdominal tenderness  There is no rebound  Musculoskeletal:      Cervical back: Normal range of motion and neck supple  Neurological:      General: No focal deficit present        Mental Status: She is alert and oriented to person, place, and time  Mental status is at baseline  Psychiatric:         Mood and Affect: Mood normal          Behavior: Behavior normal          Thought Content:  Thought content normal          Judgment: Judgment normal          Medications:    Current Outpatient Medications:   •  Butalbital-APAP-Caffeine (Fioricet) -40 MG CAPS, 1 CAPSULE AT ONSET OF HEADACHE AND THEN MAY REPEAT AFTER 30 MINS IF NO IMPROVEMENT, NO MORE THAN 2 CAPSULES A DAY, Disp: 30 capsule, Rfl: 0  •  fexofenadine (ALLEGRA) 60 MG tablet, Take 60 mg by mouth as needed, Disp: , Rfl:   •  fluticasone (FLONASE) 50 mcg/act nasal spray, 1 spray into each nostril daily (Patient taking differently: 1 spray into each nostril daily as needed), Disp: 16 g, Rfl: 0  •  hydrochlorothiazide (HYDRODIURIL) 25 mg tablet, Take 1 tablet (25 mg total) by mouth daily, Disp: 90 tablet, Rfl: 1  •  hydrOXYzine HCL (ATARAX) 25 mg tablet, Take 1 tablet (25 mg total) by mouth 3 (three) times a day as needed for anxiety, Disp: 90 tablet, Rfl: 0  •  Ibuprofen 200 MG CAPS, Take by mouth as needed , Disp: , Rfl:   •  Multiple Vitamin (multivitamin) tablet, Take 1 tablet by mouth daily, Disp: , Rfl:   •  Probiotic Product (PROBIOTIC DAILY PO), Take by mouth daily, Disp: , Rfl:   •  propranolol (Inderal LA) 80 mg 24 hr capsule, Take 1 capsule (80 mg total) by mouth daily, Disp: 90 capsule, Rfl: 1    Laboratory Results:  Lab Results   Component Value Date    WBC 6 73 11/08/2022    HGB 12 7 11/08/2022    HCT 39 9 11/08/2022    MCV 96 11/08/2022     11/08/2022     Lab Results   Component Value Date    SODIUM 135 11/08/2022    K 3 5 11/08/2022     11/08/2022    CO2 25 11/08/2022    BUN 10 11/08/2022    CREATININE 0 75 11/08/2022    GLUC 130 04/17/2017    CALCIUM 9 0 11/08/2022     Lab Results   Component Value Date    CALCIUM 9 0 11/08/2022    PHOS 2 7 04/17/2017     No results found for: LABPROT

## 2022-11-21 ENCOUNTER — PATIENT MESSAGE (OUTPATIENT)
Dept: INTERNAL MEDICINE CLINIC | Age: 52
End: 2022-11-21

## 2022-11-21 DIAGNOSIS — G43.909 MIGRAINE WITHOUT STATUS MIGRAINOSUS, NOT INTRACTABLE, UNSPECIFIED MIGRAINE TYPE: ICD-10-CM

## 2022-11-22 RX ORDER — BUTALBITAL, ACETAMINOPHEN AND CAFFEINE 300; 40; 50 MG/1; MG/1; MG/1
CAPSULE ORAL
Qty: 30 CAPSULE | Refills: 0 | Status: SHIPPED | OUTPATIENT
Start: 2022-11-22

## 2022-12-23 RX ORDER — BUTALBITAL, ACETAMINOPHEN AND CAFFEINE 300; 40; 50 MG/1; MG/1; MG/1
CAPSULE ORAL
Qty: 30 CAPSULE | Refills: 0 | Status: SHIPPED | OUTPATIENT
Start: 2022-12-23

## 2023-01-08 DIAGNOSIS — R11.0 NAUSEA: Primary | ICD-10-CM

## 2023-01-08 RX ORDER — ONDANSETRON 4 MG/1
4 TABLET, ORALLY DISINTEGRATING ORAL EVERY 6 HOURS PRN
Status: SHIPPED | OUTPATIENT
Start: 2023-01-08

## 2023-01-08 RX ORDER — ONDANSETRON 4 MG/1
4 TABLET, ORALLY DISINTEGRATING ORAL EVERY 6 HOURS PRN
Qty: 8 TABLET | Refills: 0 | Status: SHIPPED | OUTPATIENT
Start: 2023-01-08

## 2023-01-10 PROBLEM — J06.9 VIRAL URI: Status: RESOLVED | Noted: 2022-11-11 | Resolved: 2023-01-10

## 2023-01-10 PROBLEM — Z12.11 ENCOUNTER FOR SCREENING FOR MALIGNANT NEOPLASM OF COLON: Status: RESOLVED | Noted: 2022-11-11 | Resolved: 2023-01-10

## 2023-02-07 ENCOUNTER — TELEPHONE (OUTPATIENT)
Age: 53
End: 2023-02-07

## 2023-02-18 NOTE — TELEPHONE ENCOUNTER
NP, no prior fc, BMI 31     Attempted to contact patient to schedule colonoscopy ,no answer  Left v/m requesting call back  Waiting on response from patient

## 2023-02-20 ENCOUNTER — OFFICE VISIT (OUTPATIENT)
Dept: URGENT CARE | Age: 53
End: 2023-02-20

## 2023-02-20 VITALS
OXYGEN SATURATION: 99 % | DIASTOLIC BLOOD PRESSURE: 110 MMHG | RESPIRATION RATE: 16 BRPM | TEMPERATURE: 97.1 F | HEART RATE: 80 BPM | SYSTOLIC BLOOD PRESSURE: 168 MMHG

## 2023-02-20 DIAGNOSIS — J01.40 ACUTE PANSINUSITIS, RECURRENCE NOT SPECIFIED: Primary | ICD-10-CM

## 2023-02-20 RX ORDER — AZITHROMYCIN 250 MG/1
TABLET, FILM COATED ORAL
Qty: 6 TABLET | Refills: 0 | Status: SHIPPED | OUTPATIENT
Start: 2023-02-20 | End: 2023-02-24

## 2023-02-20 NOTE — LETTER
February 20, 2023     Patient: Killian Márquez   YOB: 1970   Date of Visit: 2/20/2023       To Whom it May Concern:    Killian Márquez was seen in my clinic on 2/20/2023  She may return to work on 02/22/2023  If you have any questions or concerns, please don't hesitate to call           Sincerely,          ROBERTO Cross        CC: Lazarus Round

## 2023-02-20 NOTE — PROGRESS NOTES
[facility]        NAME: Joselyn Lennox is a 45 y o  female  : 1970    MRN: [id number]  DATE: 2023  TIME: 10:11 AM    Assessment and Plan   Acute pansinusitis, recurrence not specified [J01 40]  1  Acute pansinusitis, recurrence not specified  azithromycin (ZITHROMAX) 250 mg tablet            Patient Instructions     Take med as prescribed  Cont with nasal spray and advil or tylenol; Follow up with PCP in 3-5 days  Proceed to  ER if symptoms worsen  Chief Complaint     Chief Complaint   Patient presents with   • Sinusitis     For 1 week, sore throat, PND, hoarse voice, nasal congestion, sinus pressure, bilateral ear pressure,          History of Present Illness       HPI   Reports sinus pain and congestion, with post nasal drip and ear pressure, x 8 days  Used some nasal spray, and advil and tylenol  No relief  Review of Systems   Review of Systems   Constitutional: Negative for fever  HENT: Positive for congestion, postnasal drip, rhinorrhea, sinus pressure, sinus pain and sore throat  Negative for ear discharge, ear pain, facial swelling and hearing loss  Respiratory: Negative for cough  Cardiovascular: Negative for chest pain  Gastrointestinal: Negative for diarrhea and vomiting  Neurological: Negative for light-headedness           Current Medications       Current Outpatient Medications:   •  acetaminophen (TYLENOL) 325 mg tablet, Take 650 mg by mouth every 6 (six) hours as needed for mild pain, Disp: , Rfl:   •  azithromycin (ZITHROMAX) 250 mg tablet, Take 2 tablets today then 1 tablet daily x 4 days, Disp: 6 tablet, Rfl: 0  •  Butalbital-APAP-Caffeine (Fioricet) -40 MG CAPS, 1 CAPSULE AT ONSET OF HEADACHE AND THEN MAY REPEAT AFTER 30 MINS IF NO IMPROVEMENT, NO MORE THAN 2 CAPSULES A DAY, Disp: 30 capsule, Rfl: 0  •  Butalbital-APAP-Caffeine (Fioricet) -40 MG CAPS, 1 CAPSULE AT ONSET OF HEADACHE AND THEN MAY REPEAT AFTER 30 MINS IF NO IMPROVEMENT, NO MORE THAN 2 CAPSULES A DAY, Disp: 30 capsule, Rfl: 0  •  fexofenadine (ALLEGRA) 60 MG tablet, Take 60 mg by mouth as needed, Disp: , Rfl:   •  hydrOXYzine HCL (ATARAX) 25 mg tablet, Take 1 tablet (25 mg total) by mouth 3 (three) times a day as needed for anxiety, Disp: 90 tablet, Rfl: 0  •  Ibuprofen 200 MG CAPS, Take by mouth as needed , Disp: , Rfl:   •  Multiple Vitamin (multivitamin) tablet, Take 1 tablet by mouth daily, Disp: , Rfl:   •  ondansetron (ZOFRAN-ODT) 4 mg disintegrating tablet, Take 1 tablet (4 mg total) by mouth every 6 (six) hours as needed for nausea or vomiting, Disp: 8 tablet, Rfl: 0  •  Probiotic Product (PROBIOTIC DAILY PO), Take by mouth daily, Disp: , Rfl:   •  fluticasone (FLONASE) 50 mcg/act nasal spray, 1 spray into each nostril daily (Patient taking differently: 1 spray into each nostril daily as needed), Disp: 16 g, Rfl: 0  •  hydrochlorothiazide (HYDRODIURIL) 25 mg tablet, Take 1 tablet (25 mg total) by mouth daily, Disp: 90 tablet, Rfl: 1  •  propranolol (Inderal LA) 80 mg 24 hr capsule, Take 1 capsule (80 mg total) by mouth daily, Disp: 90 capsule, Rfl: 1    Current Facility-Administered Medications:   •  ondansetron (ZOFRAN-ODT) dispersible tablet 4 mg, 4 mg, Oral, Q6H PRN, Ricarda Chapa MD    Current Allergies     Allergies as of 02/20/2023 - Reviewed 02/20/2023   Allergen Reaction Noted   • Amoxicillin  08/26/2015   • Cefaclor  08/26/2015            The following portions of the patient's history were reviewed and updated as appropriate: allergies, current medications, past family history, past medical history, past social history, past surgical history and problem list      Past Medical History:   Diagnosis Date   • Anxiety 11/11/2022   • COVID-19 virus infection 7/27/2021   • Hematuria    • Hyperkalemia    • Hypertension    • Proteinuria        No past surgical history on file      Family History   Problem Relation Age of Onset   • No Known Problems Mother • Heart disease Father          Medications have been verified  Objective   BP (!) 168/110   Pulse 80   Temp (!) 97 1 °F (36 2 °C)   Resp 16   SpO2 99%   No LMP recorded  Physical Exam     Physical Exam  Constitutional:       Appearance: She is not ill-appearing or diaphoretic  HENT:      Head:      Comments: Tenderness with palpation of the sinuses, moderate severity     Right Ear: Tympanic membrane normal       Left Ear: Tympanic membrane normal       Nose: Rhinorrhea present  Mouth/Throat:      Mouth: Mucous membranes are moist       Pharynx: No posterior oropharyngeal erythema  Comments: Post nasal drip  Cardiovascular:      Rate and Rhythm: Regular rhythm  Heart sounds: Normal heart sounds  Pulmonary:      Effort: Pulmonary effort is normal       Breath sounds: Normal breath sounds  No wheezing  Lymphadenopathy:      Cervical: No cervical adenopathy

## 2023-03-01 ENCOUNTER — OFFICE VISIT (OUTPATIENT)
Dept: INTERNAL MEDICINE CLINIC | Age: 53
End: 2023-03-01

## 2023-03-01 VITALS
OXYGEN SATURATION: 98 % | BODY MASS INDEX: 32.02 KG/M2 | DIASTOLIC BLOOD PRESSURE: 64 MMHG | SYSTOLIC BLOOD PRESSURE: 128 MMHG | HEIGHT: 62 IN | WEIGHT: 174 LBS | TEMPERATURE: 98.8 F | HEART RATE: 89 BPM

## 2023-03-01 DIAGNOSIS — G43.909 MIGRAINE WITHOUT STATUS MIGRAINOSUS, NOT INTRACTABLE, UNSPECIFIED MIGRAINE TYPE: ICD-10-CM

## 2023-03-01 DIAGNOSIS — Z12.11 SCREENING FOR MALIGNANT NEOPLASM OF COLON: ICD-10-CM

## 2023-03-01 DIAGNOSIS — G43.009 MIGRAINE WITHOUT AURA AND WITHOUT STATUS MIGRAINOSUS, NOT INTRACTABLE: ICD-10-CM

## 2023-03-01 DIAGNOSIS — G47.09 OTHER INSOMNIA: ICD-10-CM

## 2023-03-01 DIAGNOSIS — F41.9 ANXIETY: Primary | ICD-10-CM

## 2023-03-01 RX ORDER — BUTALBITAL, ACETAMINOPHEN AND CAFFEINE 300; 40; 50 MG/1; MG/1; MG/1
CAPSULE ORAL
Qty: 30 CAPSULE | Refills: 0 | Status: SHIPPED | OUTPATIENT
Start: 2023-03-01

## 2023-03-01 RX ORDER — BUSPIRONE HYDROCHLORIDE 5 MG/1
5 TABLET ORAL 2 TIMES DAILY
Qty: 60 TABLET | Refills: 1 | Status: SHIPPED | OUTPATIENT
Start: 2023-03-01

## 2023-03-01 NOTE — PROGRESS NOTES
Assessment/Plan:    Anxiety  -We will start patient on buspirone 5 mg twice daily since patient states that she only wants a small dose of the medication   -Patient was counseled that if this does not work in about 2 weeks, she should send a message to me and we will increase the dose to 7 5 mg twice daily  -She was counseled to continue to use as needed hydroxyzine    Insomnia  -Improving with hydroxyzine  -Continue with as needed hydroxyzine    Migraine headaches  -Improving on Fioricet  -We will refill Fioricet as requested  -The [place] PDMP website was queried and did not show misuse  -Controlled substance agreement was signed today  -Follow-up in 3 months    Colon cancer screening  -We will refer patient to gastroenterology for colonoscopy       Diagnoses and all orders for this visit:    Anxiety  -     busPIRone (BUSPAR) 5 mg tablet; Take 1 tablet (5 mg total) by mouth 2 (two) times a day    Migraine without aura and without status migrainosus, not intractable    Screening for malignant neoplasm of colon  -     Ambulatory referral to Gastroenterology; Future    Migraine without status migrainosus, not intractable, unspecified migraine type  -     Butalbital-APAP-Caffeine (Fioricet) -40 MG CAPS; 1 CAPSULE AT ONSET OF HEADACHE AND THEN MAY REPEAT AFTER 30 MINS IF NO IMPROVEMENT, NO MORE THAN 2 CAPSULES A DAY    Other insomnia    BMI 31 0-31 9,adult        BMI Counseling: Body mass index is 31 5 kg/m²  The BMI is above normal  Nutrition recommendations include consuming healthier snacks, limiting drinks that contain sugar, reducing intake of saturated and trans fat and reducing intake of cholesterol  Exercise recommendations include moderate physical activity 150 minutes/week  No pharmacotherapy was ordered  Patient referred to PCP  Rationale for BMI follow-up plan is due to patient being overweight or obese       Depression Screening and Follow-up Plan: Patient was screened for depression during today's encounter  They screened negative with a PHQ-2 score of 2  Subjective:      Patient ID: Pascale Perera is a 45 y o  female  HPI  Patient presents for follow-up visit regarding her anxiety disorder  She states that she was sick 2 weeks ago and went to urgent care and got some antibiotics and feels better  For the anxiety, she states that she tried the hydroxyzine and it makes her doopy and she only takes it for her sleep sometimes which helps  Anxiety is more during her menstrual periods and she wonders if it has anything to do with her hormones  She also admits that she is under a great deal of stress at home and at her job also  She feels like she needs something daily for her anxiety   She states that things have been more difficulty and she has the stress of her mother who had more compression fractures and her daughter had a child 4 years ago and they have been under more stress  She feels more sad and down when she gets her periods for just a few days and then it resolves  She denies persistent depression  She will see her obgyn this month and will discuss with her  She still gets her periods every month   Mom had menopause at age 36 but had a difficult life and had menarche at age 23  She had her own menarche at age 13  Has not had a panic attack in years  Headaches were bad between Jan and Feb and is better   Has not had a headache in a couple of weeks   Periods have been a trigger for her migraines in the past   Gets migraines 2-4 times a month    The following portions of the patient's history were reviewed and updated as appropriate:   She  has a past medical history of Allergic, Anxiety (11/11/2022), COVID-19 virus infection (07/27/2021), GERD (gastroesophageal reflux disease), Headache(784 0), Hematuria, Hyperkalemia, Hypertension, Kidney stone (1985), and Proteinuria    She   Patient Active Problem List    Diagnosis Date Noted   • Other insomnia 03/01/2023   • Hypertension 11/17/2022   • Annual physical exam 11/11/2022   • Screening for malignant neoplasm of colon 11/11/2022   • Vitamin D insufficiency 11/11/2022   • Anxiety 11/11/2022   • BMI 31 0-31 9,adult 08/03/2021   • Migraine without aura and without status migrainosus, not intractable 07/26/2021   • 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     She  has no past surgical history on file  Her family history includes Anxiety disorder in her mother; Depression in her daughter and mother; Diabetes in her brother and mother; Heart disease in her father and mother; Hypertension in her father; Stroke in her father  She  reports that she quit smoking about 30 years ago  Her smoking use included cigarettes  She started smoking about 37 years ago  She has a 1 50 pack-year smoking history  She has never used smokeless tobacco  She reports current alcohol use of about 3 0 standard drinks per week  She reports that she does not use drugs    Current Outpatient Medications   Medication Sig Dispense Refill   • acetaminophen (TYLENOL) 325 mg tablet Take 650 mg by mouth every 6 (six) hours as needed for mild pain     • busPIRone (BUSPAR) 5 mg tablet Take 1 tablet (5 mg total) by mouth 2 (two) times a day 60 tablet 1   • Butalbital-APAP-Caffeine (Fioricet) -40 MG CAPS 1 CAPSULE AT ONSET OF HEADACHE AND THEN MAY REPEAT AFTER 30 MINS IF NO IMPROVEMENT, NO MORE THAN 2 CAPSULES A DAY 30 capsule 0   • fexofenadine (ALLEGRA) 60 MG tablet Take 60 mg by mouth as needed     • fluticasone (FLONASE) 50 mcg/act nasal spray 1 spray into each nostril daily (Patient taking differently: 1 spray into each nostril daily as needed) 16 g 0   • hydrochlorothiazide (HYDRODIURIL) 25 mg tablet Take 1 tablet (25 mg total) by mouth daily 90 tablet 1   • hydrOXYzine HCL (ATARAX) 25 mg tablet Take 1 tablet (25 mg total) by mouth 3 (three) times a day as needed for anxiety 90 tablet 0   • Ibuprofen 200 MG CAPS Take by mouth as needed      • Multiple Vitamin (multivitamin) tablet Take 1 tablet by mouth daily     • ondansetron (ZOFRAN-ODT) 4 mg disintegrating tablet Take 1 tablet (4 mg total) by mouth every 6 (six) hours as needed for nausea or vomiting 8 tablet 0   • Probiotic Product (PROBIOTIC DAILY PO) Take by mouth daily     • propranolol (Inderal LA) 80 mg 24 hr capsule Take 1 capsule (80 mg total) by mouth daily 90 capsule 1     Current Facility-Administered Medications   Medication Dose Route Frequency Provider Last Rate Last Admin   • ondansetron (ZOFRAN-ODT) dispersible tablet 4 mg  4 mg Oral Q6H PRN Walda Bloch, MD         Current Outpatient Medications on File Prior to Visit   Medication Sig   • acetaminophen (TYLENOL) 325 mg tablet Take 650 mg by mouth every 6 (six) hours as needed for mild pain   • fexofenadine (ALLEGRA) 60 MG tablet Take 60 mg by mouth as needed   • fluticasone (FLONASE) 50 mcg/act nasal spray 1 spray into each nostril daily (Patient taking differently: 1 spray into each nostril daily as needed)   • hydrochlorothiazide (HYDRODIURIL) 25 mg tablet Take 1 tablet (25 mg total) by mouth daily   • hydrOXYzine HCL (ATARAX) 25 mg tablet Take 1 tablet (25 mg total) by mouth 3 (three) times a day as needed for anxiety   • Ibuprofen 200 MG CAPS Take by mouth as needed    • Multiple Vitamin (multivitamin) tablet Take 1 tablet by mouth daily   • ondansetron (ZOFRAN-ODT) 4 mg disintegrating tablet Take 1 tablet (4 mg total) by mouth every 6 (six) hours as needed for nausea or vomiting   • Probiotic Product (PROBIOTIC DAILY PO) Take by mouth daily   • propranolol (Inderal LA) 80 mg 24 hr capsule Take 1 capsule (80 mg total) by mouth daily   • [DISCONTINUED] Butalbital-APAP-Caffeine (Fioricet) -40 MG CAPS 1 CAPSULE AT ONSET OF HEADACHE AND THEN MAY REPEAT AFTER 30 MINS IF NO IMPROVEMENT, NO MORE THAN 2 CAPSULES A DAY   • [DISCONTINUED] Butalbital-APAP-Caffeine (Fioricet) -40 MG CAPS 1 CAPSULE AT ONSET OF HEADACHE AND THEN MAY REPEAT AFTER 30 MINS IF NO IMPROVEMENT, NO MORE THAN 2 CAPSULES A DAY (Patient not taking: Reported on 3/1/2023)     Current Facility-Administered Medications on File Prior to Visit   Medication   • ondansetron (ZOFRAN-ODT) dispersible tablet 4 mg     She is allergic to amoxicillin and cefaclor       Review of Systems   Constitutional: Positive for diaphoresis  Negative for activity change, chills, fatigue, fever and unexpected weight change  HENT: Negative for ear pain, postnasal drip, rhinorrhea, sinus pressure and sore throat  Eyes: Negative for pain  Respiratory: Negative for cough, choking, chest tightness, shortness of breath and wheezing  Cardiovascular: Negative for chest pain, palpitations and leg swelling  Gastrointestinal: Negative for abdominal pain, constipation, diarrhea, nausea and vomiting  Genitourinary: Positive for menstrual problem (irregular menstraul flow)  Negative for dysuria and hematuria  Musculoskeletal: Positive for arthralgias (on and off, tiffanie R shoulder)  Negative for back pain, gait problem, joint swelling, myalgias and neck stiffness  Skin: Negative for pallor and rash  Neurological: Positive for headaches (occasional migraine headaches )  Negative for dizziness, tremors, seizures, syncope and light-headedness  Hematological: Negative for adenopathy  Psychiatric/Behavioral: Positive for sleep disturbance (occasionally )  Negative for behavioral problems  The patient is nervous/anxious  Objective:      /64 (BP Location: Left arm, Patient Position: Sitting, Cuff Size: Standard)   Pulse 89   Temp 98 8 °F (37 1 °C) (Temporal)   Ht 5' 2 32" (1 583 m)   Wt 78 9 kg (174 lb)   SpO2 98%   BMI 31 50 kg/m²          Physical Exam  Constitutional:       General: She is not in acute distress  Appearance: She is well-developed  She is not diaphoretic  HENT:      Head: Normocephalic and atraumatic        Right Ear: External ear normal       Left Ear: External ear normal  Nose: Nose normal       Mouth/Throat:      Mouth: Mucous membranes are dry  Pharynx: Posterior oropharyngeal erythema (Dry mucous membranes with mild oropharyngeal erythema) present  No oropharyngeal exudate  Eyes:      General: No scleral icterus  Right eye: No discharge  Left eye: No discharge  Conjunctiva/sclera: Conjunctivae normal       Pupils: Pupils are equal, round, and reactive to light  Neck:      Thyroid: No thyromegaly  Vascular: No JVD  Trachea: No tracheal deviation  Cardiovascular:      Rate and Rhythm: Normal rate and regular rhythm  Heart sounds: Normal heart sounds  No murmur heard  No friction rub  No gallop  Pulmonary:      Effort: Pulmonary effort is normal  No respiratory distress  Breath sounds: Normal breath sounds  No wheezing or rales  Chest:      Chest wall: No tenderness  Abdominal:      General: Bowel sounds are normal  There is no distension  Palpations: Abdomen is soft  There is no mass  Tenderness: There is no abdominal tenderness  There is no guarding or rebound  Musculoskeletal:         General: No tenderness or deformity  Normal range of motion  Cervical back: Normal range of motion and neck supple  Lymphadenopathy:      Cervical: No cervical adenopathy  Skin:     General: Skin is warm and dry  Coloration: Skin is not pale  Findings: No erythema or rash  Neurological:      Mental Status: She is alert and oriented to person, place, and time  Cranial Nerves: No cranial nerve deficit  Motor: No abnormal muscle tone  Coordination: Coordination normal       Deep Tendon Reflexes: Reflexes are normal and symmetric  Psychiatric:         Mood and Affect: Mood is anxious (Anxious affect)           Behavior: Behavior normal            Appointment on 11/08/2022   Component Date Value Ref Range Status   • Hepatitis C Ab 11/08/2022 Non-reactive  Non-reactive Final   • WBC 11/08/2022 6 73 4 31 - 10 16 Thousand/uL Final   • RBC 11/08/2022 4 17  3 81 - 5 12 Million/uL Final   • Hemoglobin 11/08/2022 12 7  11 5 - 15 4 g/dL Final   • Hematocrit 11/08/2022 39 9  34 8 - 46 1 % Final   • MCV 11/08/2022 96  82 - 98 fL Final   • MCH 11/08/2022 30 5  26 8 - 34 3 pg Final   • MCHC 11/08/2022 31 8  31 4 - 37 4 g/dL Final   • RDW 11/08/2022 13 2  11 6 - 15 1 % Final   • MPV 11/08/2022 10 5  8 9 - 12 7 fL Final   • Platelets 11/31/2242 265  149 - 390 Thousands/uL Final   • nRBC 11/08/2022 0  /100 WBCs Final   • Neutrophils Relative 11/08/2022 62  43 - 75 % Final   • Immat GRANS % 11/08/2022 0  0 - 2 % Final   • Lymphocytes Relative 11/08/2022 24  14 - 44 % Final   • Monocytes Relative 11/08/2022 10  4 - 12 % Final   • Eosinophils Relative 11/08/2022 3  0 - 6 % Final   • Basophils Relative 11/08/2022 1  0 - 1 % Final   • Neutrophils Absolute 11/08/2022 4 16  1 85 - 7 62 Thousands/µL Final   • Immature Grans Absolute 11/08/2022 0 03  0 00 - 0 20 Thousand/uL Final   • Lymphocytes Absolute 11/08/2022 1 62  0 60 - 4 47 Thousands/µL Final   • Monocytes Absolute 11/08/2022 0 67  0 17 - 1 22 Thousand/µL Final   • Eosinophils Absolute 11/08/2022 0 20  0 00 - 0 61 Thousand/µL Final   • Basophils Absolute 11/08/2022 0 05  0 00 - 0 10 Thousands/µL Final   • Sodium 11/08/2022 135  135 - 147 mmol/L Final   • Potassium 11/08/2022 3 5  3 5 - 5 3 mmol/L Final   • Chloride 11/08/2022 102  96 - 108 mmol/L Final   • CO2 11/08/2022 25  21 - 32 mmol/L Final   • ANION GAP 11/08/2022 8  4 - 13 mmol/L Final   • BUN 11/08/2022 10  5 - 25 mg/dL Final   • Creatinine 11/08/2022 0 75  0 60 - 1 30 mg/dL Final    Standardized to IDMS reference method   • Glucose, Fasting 11/08/2022 122 (H)  65 - 99 mg/dL Final    Specimen collection should occur prior to Sulfasalazine administration due to the potential for falsely depressed results   Specimen collection should occur prior to Sulfapyridine administration due to the potential for falsely elevated results  • Calcium 11/08/2022 9 0  8 3 - 10 1 mg/dL Final   • Corrected Calcium 11/08/2022 9 6  8 3 - 10 1 mg/dL Final   • AST 11/08/2022 23  5 - 45 U/L Final    Specimen collection should occur prior to Sulfasalazine administration due to the potential for falsely depressed results  • ALT 11/08/2022 33  12 - 78 U/L Final    Specimen collection should occur prior to Sulfasalazine and/or Sulfapyridine administration due to the potential for falsely depressed results  • Alkaline Phosphatase 11/08/2022 56  46 - 116 U/L Final   • Total Protein 11/08/2022 7 7  6 4 - 8 4 g/dL Final   • Albumin 11/08/2022 3 3 (L)  3 5 - 5 0 g/dL Final   • Total Bilirubin 11/08/2022 0 43  0 20 - 1 00 mg/dL Final    Use of this assay is not recommended for patients undergoing treatment with eltrombopag due to the potential for falsely elevated results  • eGFR 11/08/2022 90  ml/min/1 73sq m Final   • Cholesterol 11/08/2022 201 (H)  See Comment mg/dL Final    Cholesterol:         Pediatric <18 Years        Desirable          <170 mg/dL      Borderline High    170-199 mg/dL      High               >=200 mg/dL        Adult >=18 Years            Desirable         <200 mg/dL      Borderline High   200-239 mg/dL      High              >239 mg/dL     • Triglycerides 11/08/2022 102  See Comment mg/dL Final    Triglyceride:     0-9Y            <75mg/dL     10Y-17Y         <90 mg/dL       >=18Y     Normal          <150 mg/dL     Borderline High 150-199 mg/dL     High            200-499 mg/dL        Very High       >499 mg/dL    Specimen collection should occur prior to N-Acetylcysteine or Metamizole administration due to the potential for falsely depressed results     • HDL, Direct 11/08/2022 51  >=50 mg/dL Final   • LDL Calculated 11/08/2022 130 (H)  0 - 100 mg/dL Final    LDL Cholesterol:     Optimal           <100 mg/dl     Near Optimal      100-129 mg/dl     Above Optimal       Borderline High 130-159 mg/dl       High            996-816 mg/dl       Very High       >189 mg/dl         This screening LDL is a calculated result  It does not have the accuracy of the Direct Measured LDL in the monitoring of patients with hyperlipidemia and/or statin therapy  Direct Measure LDL (GOG051) must be ordered separately in these patients  • Hemoglobin A1C 11/08/2022 5 9 (H)  Normal 3 8-5 6%; PreDiabetic 5 7-6 4%; Diabetic >=6 5%; Glycemic control for adults with diabetes <7 0% % Final   • EAG 11/08/2022 123  mg/dl Final   • TSH 3RD GENERATON 11/08/2022 2 750  0 450 - 4 500 uIU/mL Final    The recommended reference ranges for TSH during pregnancy are as follows:   First trimester 0 1 to 2 5 uIU/mL   Second trimester  0 2 to 3 0 uIU/mL   Third trimester 0 3 to 3 0 uIU/m    Note: Normal ranges may not apply to patients who are transgender, non-binary, or whose legal sex, sex at birth, and gender identity differ  Adult TSH (3rd generation) reference range follows the recommended guidelines of the [employer], January, 2020     • Vit D, 25-Hydroxy 11/08/2022 29 6 (L)  30 0 - 100 0 ng/mL Final

## 2023-03-07 DIAGNOSIS — I10 HYPERTENSION, ESSENTIAL: ICD-10-CM

## 2023-03-07 RX ORDER — HYDROCHLOROTHIAZIDE 25 MG/1
25 TABLET ORAL DAILY
Qty: 90 TABLET | Refills: 1 | Status: SHIPPED | OUTPATIENT
Start: 2023-03-07 | End: 2023-09-03

## 2023-03-21 ENCOUNTER — ANNUAL EXAM (OUTPATIENT)
Dept: OBGYN CLINIC | Facility: CLINIC | Age: 53
End: 2023-03-21

## 2023-03-21 VITALS
HEIGHT: 62 IN | BODY MASS INDEX: 32.13 KG/M2 | SYSTOLIC BLOOD PRESSURE: 132 MMHG | DIASTOLIC BLOOD PRESSURE: 90 MMHG | WEIGHT: 174.6 LBS

## 2023-03-21 DIAGNOSIS — N89.8 VAGINAL DRYNESS: ICD-10-CM

## 2023-03-21 DIAGNOSIS — R23.2 HOT FLASHES: ICD-10-CM

## 2023-03-21 DIAGNOSIS — N92.6 IRREGULAR MENSES: ICD-10-CM

## 2023-03-21 DIAGNOSIS — Z01.419 ENCOUNTER FOR ANNUAL ROUTINE GYNECOLOGICAL EXAMINATION: Primary | ICD-10-CM

## 2023-03-21 DIAGNOSIS — Z12.39 ENCOUNTER FOR SCREENING FOR MALIGNANT NEOPLASM OF BREAST, UNSPECIFIED SCREENING MODALITY: ICD-10-CM

## 2023-03-21 DIAGNOSIS — F41.9 ANXIETY: ICD-10-CM

## 2023-03-21 RX ORDER — CLINDAMYCIN HYDROCHLORIDE 150 MG/1
CAPSULE ORAL
COMMUNITY
Start: 2023-03-10

## 2023-03-21 NOTE — PROGRESS NOTES
Assessment/Plan:  Pap done as well as annual   Encourage self breast examination as well as calcium supplementation  Continue annual mammogram   Reviewed colon cancer screening, in process of scheduling baseline colonoscopy  Will obtain pelvic ultrasound  Reviewed perimenopausal symptoms  Also discussed over-the-counter agents for symptomatic vaginal atrophy  She will continue to follow-up with primary care as scheduled  I will notify her of the above results via telephone  Provided above normal, return to office in 1 year  No problem-specific Assessment & Plan notes found for this encounter  Diagnoses and all orders for this visit:    Encounter for annual routine gynecological examination    Encounter for screening for malignant neoplasm of breast, unspecified screening modality  -     Mammo screening bilateral w 3d & cad; Future    Irregular menses  -     US pelvis complete w transvaginal; Future    Anxiety    Vaginal dryness    Hot flashes    Other orders  -     clindamycin (CLEOCIN) 150 mg capsule; TAKE 2 CAPSULES BY MOUTH IMMEDIATELY THEN TAKE 1 CAPSULE BY MOUTH FOUR TIMES PER DAY UNTIL FINISHED (Patient not taking: Reported on 3/21/2023)          Subjective:      Patient ID: Lindsey Kim is a 45 y o  female  HPI     This is a pleasant 68-year-old female P2 ( x2, age 20, 19) presents for her GYN exam   Last GYN exam was approximately 2 years ago  Her menstrual cycles have been irregular, sometimes every 23 to 28 days lasting 5 to 7 days  She has had hot flashes usually prior to menses  She denies any changes in bowel or bladder function  She has been in a monogamous relationship with her  for over 27 years  Method of contraception is vasectomy  She has noticed vaginal dryness with intercourse  Patient's never had a screening colonoscopy  She is in the process of scheduling    She is also scheduled for mammogram     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 on an annual basis  She is also had increase in anxiety and was recently put on BuSpar by primary care  She has been under a lot of stress  She is relocated her son down to [place] for new job opportunity  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  Her mother does live alone  The following portions of the patient's history were reviewed and updated as appropriate: allergies, current medications, past family history, past medical history, past social history, past surgical history and problem list     Review of Systems   Constitutional: Negative for fatigue, fever and unexpected weight change  Respiratory: Negative for cough, chest tightness, shortness of breath and wheezing  Cardiovascular: Negative  Negative for chest pain and palpitations  Gastrointestinal: Negative  Negative for abdominal distention, abdominal pain, blood in stool, constipation, diarrhea, nausea and vomiting  Genitourinary: Negative  Negative for difficulty urinating, dyspareunia, dysuria, flank pain, frequency, genital sores, hematuria, pelvic pain, urgency, vaginal bleeding, vaginal discharge and vaginal pain  Skin: Negative for rash  Objective:      /90   Ht 5' 2 32" (1 583 m)   Wt 79 2 kg (174 lb 9 6 oz)   LMP 03/14/2023 (Exact Date)   BMI 31 61 kg/m²          Physical Exam  Constitutional:       Appearance: Normal appearance  She is well-developed  Cardiovascular:      Rate and Rhythm: Normal rate and regular rhythm  Pulmonary:      Effort: Pulmonary effort is normal       Breath sounds: Normal breath sounds  Chest:   Breasts:     Right: No inverted nipple, mass, nipple discharge, skin change or tenderness  Left: No inverted nipple, mass, nipple discharge, skin change or tenderness  Abdominal:      General: Bowel sounds are normal  There is no distension  Palpations: Abdomen is soft  Tenderness:  There is no abdominal tenderness  There is no guarding or rebound  Genitourinary:     Labia:         Right: No rash, tenderness or lesion  Left: No rash, tenderness or lesion  Vagina: Normal  No signs of injury  No vaginal discharge or tenderness  Cervix: No cervical motion tenderness, discharge, friability, lesion, erythema or cervical bleeding  Uterus: Not enlarged and not tender  Adnexa:         Right: No mass, tenderness or fullness  Left: No mass, tenderness or fullness  Neurological:      Mental Status: She is alert and oriented to person, place, and time     Psychiatric:         Behavior: Behavior normal

## 2023-03-28 LAB
LAB AP GYN PRIMARY INTERPRETATION: NORMAL
LAB AP LMP: NORMAL
Lab: NORMAL

## 2023-03-30 ENCOUNTER — HOSPITAL ENCOUNTER (OUTPATIENT)
Dept: RADIOLOGY | Age: 53
Discharge: HOME | End: 2023-03-30

## 2023-03-30 DIAGNOSIS — N92.6 IRREGULAR MENSES: ICD-10-CM

## 2023-04-30 PROBLEM — Z12.11 SCREENING FOR MALIGNANT NEOPLASM OF COLON: Status: RESOLVED | Noted: 2022-11-11 | Resolved: 2023-04-30

## 2023-05-09 DIAGNOSIS — I10 HYPERTENSION, ESSENTIAL: ICD-10-CM

## 2023-05-10 RX ORDER — PROPRANOLOL HYDROCHLORIDE 80 MG/1
80 CAPSULE, EXTENDED RELEASE ORAL DAILY
Qty: 90 CAPSULE | Refills: 1 | Status: SHIPPED | OUTPATIENT
Start: 2023-05-10 | End: 2023-08-08

## 2023-05-11 ENCOUNTER — OFFICE VISIT (OUTPATIENT)
Dept: URGENT CARE | Facility: CLINIC | Age: 53
End: 2023-05-11

## 2023-05-11 VITALS
RESPIRATION RATE: 18 BRPM | DIASTOLIC BLOOD PRESSURE: 90 MMHG | TEMPERATURE: 98.9 F | HEART RATE: 102 BPM | SYSTOLIC BLOOD PRESSURE: 142 MMHG | OXYGEN SATURATION: 98 %

## 2023-05-11 DIAGNOSIS — R31.9 URINARY TRACT INFECTION WITH HEMATURIA, SITE UNSPECIFIED: Primary | ICD-10-CM

## 2023-05-11 DIAGNOSIS — N39.0 URINARY TRACT INFECTION WITH HEMATURIA, SITE UNSPECIFIED: Primary | ICD-10-CM

## 2023-05-11 LAB
SL AMB  POCT GLUCOSE, UA: NEGATIVE
SL AMB LEUKOCYTE ESTERASE,UA: ABNORMAL
SL AMB POCT BILIRUBIN,UA: NEGATIVE
SL AMB POCT BLOOD,UA: ABNORMAL
SL AMB POCT CLARITY,UA: CLEAR
SL AMB POCT COLOR,UA: YELLOW
SL AMB POCT KETONES,UA: NEGATIVE
SL AMB POCT NITRITE,UA: NEGATIVE
SL AMB POCT PH,UA: 6
SL AMB POCT SPECIFIC GRAVITY,UA: 1
SL AMB POCT URINE PROTEIN: 30
SL AMB POCT UROBILINOGEN: 0.2

## 2023-05-11 RX ORDER — NITROFURANTOIN 25; 75 MG/1; MG/1
100 CAPSULE ORAL 2 TIMES DAILY
Qty: 14 CAPSULE | Refills: 0 | Status: SHIPPED | OUTPATIENT
Start: 2023-05-11 | End: 2023-05-18

## 2023-05-11 NOTE — PATIENT INSTRUCTIONS
Patient was educated on UTI  Patient was educated on antibiotics  Patient was told to eat on antibiotics  Patient was told if symptoms persist follow up with PCP    Urinary Tract Infection in Women   WHAT YOU NEED TO KNOW:   A urinary tract infection (UTI) is caused by bacteria that get inside your urinary tract  Your urinary tract includes your kidneys, ureters, bladder, and urethra  A UTI is more common in your lower urinary tract, which includes your bladder and urethra  DISCHARGE INSTRUCTIONS:   Return to the emergency department if:   You are urinating very little or not at all  You have a high fever with shaking chills  You have side or back pain that gets worse  Call your doctor if:   You have a fever  You do not feel better after 2 days of taking antibiotics  You have new symptoms, such as blood or pus in your urine  You are vomiting  You have questions or concerns about your condition or care  Medicines:   Antibiotics  treat a bacterial infection  If you have UTIs often (called recurrent UTIs), you may be given antibiotics to take regularly  You will be given directions for when and how to use antibiotics  The goal is to prevent UTIs but not cause antibiotic resistance by using antibiotics too often  Medicines  may be given to decrease pain and burning when you urinate  They will also help decrease the feeling that you need to urinate often  These medicines may make your urine orange or red  Take your medicine as directed  Contact your healthcare provider if you think your medicine is not helping or if you have side effects  Tell your provider if you are allergic to any medicine  Keep a list of the medicines, vitamins, and herbs you take  Include the amounts, and when and why you take them  Bring the list or the pill bottles to follow-up visits  Carry your medicine list with you in case of an emergency      Follow up with your doctor as directed:  Write down your questions so you remember to ask them during your visits  Prevent another UTI:   Empty your bladder often  Urinate and empty your bladder as soon as you feel the need  Do not hold your urine for long periods of time  Wipe from front to back after you urinate or have a bowel movement  This will help prevent germs from getting into your urinary tract through your urethra  Drink liquids as directed  Ask how much liquid to drink each day and which liquids are best for you  You may need to drink more liquids than usual to help flush out the bacteria  Do not drink alcohol, caffeine, or citrus juices  These can irritate your bladder and increase your symptoms  Your healthcare provider may recommend cranberry juice to help prevent a UTI  Urinate before and after you have sex  This can help flush out bacteria passed during sex  Do not douche or use feminine deodorants  These can change the chemical balance in your vagina  Change sanitary pads or tampons often  This will help prevent germs from getting into your urinary tract  Talk to your healthcare provider about your birth control method  You may need to change your method if it is increasing your risk for UTIs  Wear cotton underwear and clothes that are loose  Tight pants and nylon underwear can trap moisture and cause bacteria to grow  Vaginal estrogen may be recommended  This medicine helps prevent UTIs in women who have gone through menopause or are in madelin-menopause  Do pelvic muscle exercises often  Pelvic muscle exercises may help you start and stop urinating  Strong pelvic muscles may help you empty your bladder easier  Squeeze these muscles tightly for 5 seconds like you are trying to hold back urine  Then relax for 5 seconds  Gradually work up to squeezing for 10 seconds  Do 3 sets of 15 repetitions a day, or as directed      © Copyright [employer] 2022 Information is for End User's use only and may not be sold, redistributed or otherwise used for commercial purposes  The above information is an  only  It is not intended as medical advice for individual conditions or treatments  Talk to your doctor, nurse or pharmacist before following any medical regimen to see if it is safe and effective for you

## 2023-05-11 NOTE — PROGRESS NOTES
[facility]        NAME: Maria Saunders is a 45 y o  female  : 1970    MRN: [id number]  DATE: May 11, 2023  TIME: 10:48 AM    Assessment and Plan   Urinary tract infection with hematuria, site unspecified [N39 0, R31 9]  1  Urinary tract infection with hematuria, site unspecified  POCT urine dip    Urine culture    nitrofurantoin (MACROBID) 100 mg capsule        Urinalysis trace leukocytes, protein and blood  Will send for culture  Patient Instructions     Patient was educated on UTI  Patient was educated on antibiotics  Patient was told to eat on antibiotics  Patient was told if symptoms persist follow up with PCP  Chief Complaint     Chief Complaint   Patient presents with   • Possible UTI     Pt reports urinary frequency with onset last night  Denies any other symptoms  History of Present Illness       Patient is here today complaining of urinary frequency and urgency for 1 day  Patient reports history of one prior UTI  Admits history of prior kidney stone 20 years ago  Patient is allergic to amoxicillin and cefaclor  Patient currently has her period  Review of Systems   Review of Systems   Constitutional: Negative  Respiratory: Negative  Cardiovascular: Negative  Genitourinary: Positive for frequency and urgency  Psychiatric/Behavioral: Negative            Current Medications       Current Outpatient Medications:   •  acetaminophen (TYLENOL) 325 mg tablet, Take 650 mg by mouth every 6 (six) hours as needed for mild pain, Disp: , Rfl:   •  busPIRone (BUSPAR) 5 mg tablet, Take 1 tablet (5 mg total) by mouth 2 (two) times a day, Disp: 60 tablet, Rfl: 1  •  Butalbital-APAP-Caffeine (Fioricet) -40 MG CAPS, 1 CAPSULE AT ONSET OF HEADACHE AND THEN MAY REPEAT AFTER 30 MINS IF NO IMPROVEMENT, NO MORE THAN 2 CAPSULES A DAY, Disp: 30 capsule, Rfl: 0  •  fexofenadine (ALLEGRA) 60 MG tablet, Take 60 mg by mouth as needed, Disp: , Rfl:   •  fluticasone (FLONASE) 50 mcg/act nasal spray, 1 spray into each nostril daily (Patient taking differently: 1 spray into each nostril daily as needed), Disp: 16 g, Rfl: 0  •  hydrochlorothiazide (HYDRODIURIL) 25 mg tablet, Take 1 tablet (25 mg total) by mouth daily, Disp: 90 tablet, Rfl: 1  •  Ibuprofen 200 MG CAPS, Take by mouth as needed , Disp: , Rfl:   •  Multiple Vitamin (multivitamin) tablet, Take 1 tablet by mouth daily, Disp: , Rfl:   •  nitrofurantoin (MACROBID) 100 mg capsule, Take 1 capsule (100 mg total) by mouth 2 (two) times a day for 7 days, Disp: 14 capsule, Rfl: 0  •  Probiotic Product (PROBIOTIC DAILY PO), Take by mouth daily, Disp: , Rfl:   •  propranolol (Inderal LA) 80 mg 24 hr capsule, Take 1 capsule (80 mg total) by mouth daily, Disp: 90 capsule, Rfl: 1  •  clindamycin (CLEOCIN) 150 mg capsule, TAKE 2 CAPSULES BY MOUTH IMMEDIATELY THEN TAKE 1 CAPSULE BY MOUTH FOUR TIMES PER DAY UNTIL FINISHED (Patient not taking: Reported on 3/21/2023), Disp: , Rfl:   •  hydrOXYzine HCL (ATARAX) 25 mg tablet, Take 1 tablet (25 mg total) by mouth 3 (three) times a day as needed for anxiety (Patient not taking: Reported on 5/11/2023), Disp: 90 tablet, Rfl: 0  •  ondansetron (ZOFRAN-ODT) 4 mg disintegrating tablet, Take 1 tablet (4 mg total) by mouth every 6 (six) hours as needed for nausea or vomiting (Patient not taking: Reported on 3/21/2023), Disp: 8 tablet, Rfl: 0    Current Facility-Administered Medications:   •  ondansetron (ZOFRAN-ODT) dispersible tablet 4 mg, 4 mg, Oral, Q6H PRN, Adrienne Rojo MD    Current Allergies     Allergies as of 05/11/2023 - Reviewed 05/11/2023   Allergen Reaction Noted   • Amoxicillin  08/26/2015   • Cefaclor  08/26/2015            The following portions of the patient's history were reviewed and updated as appropriate: allergies, current medications, past family history, past medical history, past social history, past surgical history and problem list      Past Medical History:   Diagnosis Date   • Allergic Seasonal/ 2 antibiotics   • Anxiety 11/11/2022   • COVID-19 virus infection 07/27/2021   • GERD (gastroesophageal reflux disease)    • Headache(784 0)    • Hematuria    • Hyperkalemia    • Hypertension    • Kidney stone 1985   • Proteinuria        No past surgical history on file  Family History   Problem Relation Age of Onset   • Depression Mother    • Heart disease Mother    • Diabetes Mother    • Anxiety disorder Mother    • Heart disease Father    • Hypertension Father    • Stroke Father    • Depression Daughter    • Diabetes Brother          Medications have been verified  Objective   /90   Pulse 102   Temp 98 9 °F (37 2 °C)   Resp 18   SpO2 98%   No LMP recorded  Physical Exam     Physical Exam  Vitals and nursing note reviewed  Constitutional:       Appearance: Normal appearance  HENT:      Head: Normocephalic  Cardiovascular:      Rate and Rhythm: Normal rate and regular rhythm  Heart sounds: Normal heart sounds  Pulmonary:      Breath sounds: Normal breath sounds  No wheezing  Abdominal:      General: Bowel sounds are normal       Palpations: Abdomen is soft  Tenderness: There is no abdominal tenderness  There is no right CVA tenderness or left CVA tenderness  Comments: Lower abdomen tenderness   Neurological:      Mental Status: She is alert and oriented to person, place, and time     Psychiatric:         Mood and Affect: Mood normal          Behavior: Behavior normal

## 2023-05-12 LAB — BACTERIA UR CULT: NORMAL

## 2023-05-15 ENCOUNTER — TELEPHONE (OUTPATIENT)
Dept: URGENT CARE | Facility: CLINIC | Age: 53
End: 2023-05-15

## 2023-06-05 ENCOUNTER — PATIENT MESSAGE (OUTPATIENT)
Dept: INTERNAL MEDICINE CLINIC | Age: 53
End: 2023-06-05

## 2023-06-05 DIAGNOSIS — F41.9 ANXIETY: ICD-10-CM

## 2023-06-05 RX ORDER — BUSPIRONE HYDROCHLORIDE 5 MG/1
5 TABLET ORAL 2 TIMES DAILY
Qty: 60 TABLET | Refills: 1 | Status: SHIPPED | OUTPATIENT
Start: 2023-06-05

## 2023-06-19 ENCOUNTER — OFFICE VISIT (OUTPATIENT)
Dept: INTERNAL MEDICINE CLINIC | Age: 53
End: 2023-06-19
Payer: COMMERCIAL

## 2023-06-19 VITALS
OXYGEN SATURATION: 98 % | SYSTOLIC BLOOD PRESSURE: 130 MMHG | DIASTOLIC BLOOD PRESSURE: 64 MMHG | BODY MASS INDEX: 32.02 KG/M2 | HEART RATE: 75 BPM | WEIGHT: 174 LBS | TEMPERATURE: 99.1 F | HEIGHT: 62 IN

## 2023-06-19 DIAGNOSIS — F41.9 ANXIETY: Primary | ICD-10-CM

## 2023-06-19 DIAGNOSIS — G47.09 OTHER INSOMNIA: ICD-10-CM

## 2023-06-19 DIAGNOSIS — G43.009 MIGRAINE WITHOUT AURA AND WITHOUT STATUS MIGRAINOSUS, NOT INTRACTABLE: ICD-10-CM

## 2023-06-19 DIAGNOSIS — I10 PRIMARY HYPERTENSION: ICD-10-CM

## 2023-06-19 DIAGNOSIS — J30.2 SEASONAL ALLERGIES: ICD-10-CM

## 2023-06-19 DIAGNOSIS — Z00.00 ANNUAL PHYSICAL EXAM: ICD-10-CM

## 2023-06-19 PROCEDURE — 99214 OFFICE O/P EST MOD 30 MIN: CPT | Performed by: INTERNAL MEDICINE

## 2023-06-19 NOTE — PROGRESS NOTES
Assessment/Plan:    Essential hypertension  -Fairly well controlled  -Continue with propranolol and hydrochlorothiazide as well as a low-salt diet and exercise    Anxiety disorder  -Stable on 2 5 mg of buspirone twice daily and with worsened symptoms around her menstrual period    -Patient was counseled to take 2 5 mg of buspirone if it works for her especially since she feels dizzy on 5 mg of buspirone on days that she does not eat much of breakfast     Migraine without aura  -Well-controlled  -Continue with as needed Fioricet  -She was encouraged to keep well-hydrated    Insomnia  -Fairly well controlled  -Continue with as needed hydroxyzine    Seasonal allergies  -Well-controlled  -Continue with as needed fexofenadine    Need for annual physical  -Patient will be due for physical in about 5 months  -We will order a CBC, CMP, TSH, lipid panel  -Follow-up in 5 months for an annual physical       Diagnoses and all orders for this visit:    Anxiety    Migraine without aura and without status migrainosus, not intractable    Other insomnia    Primary hypertension    Annual physical exam  -     CBC and differential; Future  -     TSH, 3rd generation with Free T4 reflex; Future  -     Comprehensive metabolic panel; Future  -     Lipid panel; Future    Seasonal allergies    BMI 31 0-31 9,adult             Subjective:      Patient ID: Dorothy Pisano is a 45 y o  female  HPI  Patient presents for a follow-up visit regarding her migraine headaches, anxiety, insomnia, essential hypertension  She states that she has been taking her medications as prescribed  She still takes the buspirone 5 mg and she feels dizzy when she takes it in the am   She feels like the 2 5 mg works for her but she takes the 5 mg twice daily    She states that sometimes she eats a bowl of kashi cereal and sometimes she takes less of a meal during breakfast   She feels like the medication makes her more dizzy when she eats less of a meal at breakfast   Anxiety is worse during her periods   Everything bothers her more during her peroids - in the perimenstrual periiod  She has dsymenorrhea  Sleeping okay   She states that she has not been walking as she used to cos her schedule changed and that she usually feels better when she is walking  Feels stressed cos she is taking her care of her mom during the week and her mom can make things stressful for her  Her last migriane was last week and she normally gets them about 2 x a month and usually around her peroids  Recently had her tooth pulled   She has not been drinking enough water in the past week  She drinks about 24 oz of coffee daily   No iced tea and occasional diet coke   Allergies have not been bad this year   Only takes allegra when she needs it     The following portions of the patient's history were reviewed and updated as appropriate:   She  has a past medical history of Allergic, Anxiety (11/11/2022), COVID-19 virus infection (07/27/2021), GERD (gastroesophageal reflux disease), Headache(784 0), Hematuria, Hyperkalemia, Hypertension, Kidney stone (1985), and Proteinuria  She   Patient Active Problem List    Diagnosis Date Noted   • Seasonal allergies 06/19/2023   • Other insomnia 03/01/2023   • Hypertension 11/17/2022   • Annual physical exam 11/11/2022   • Vitamin D insufficiency 11/11/2022   • Anxiety 11/11/2022   • BMI 31 0-31 9,adult 08/03/2021   • Migraine without aura and without status migrainosus, not intractable 07/26/2021   • 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     She  has no past surgical history on file  Her family history includes Anxiety disorder in her mother; Depression in her daughter and mother; Diabetes in her brother and mother; Heart disease in her father and mother; Hypertension in her father; Stroke in her father  She  reports that she quit smoking about 30 years ago  Her smoking use included cigarettes  She started smoking about 37 years ago   She has a 1 75 pack-year smoking history  She has never used smokeless tobacco  She reports current alcohol use of about 3 0 standard drinks of alcohol per week  She reports that she does not use drugs    Current Outpatient Medications   Medication Sig Dispense Refill   • acetaminophen (TYLENOL) 325 mg tablet Take 650 mg by mouth every 6 (six) hours as needed for mild pain     • busPIRone (BUSPAR) 5 mg tablet Take 1 tablet (5 mg total) by mouth 2 (two) times a day 60 tablet 1   • Butalbital-APAP-Caffeine (Fioricet) -40 MG CAPS 1 CAPSULE AT ONSET OF HEADACHE AND THEN MAY REPEAT AFTER 30 MINS IF NO IMPROVEMENT, NO MORE THAN 2 CAPSULES A DAY 30 capsule 0   • fexofenadine (ALLEGRA) 60 MG tablet Take 60 mg by mouth as needed     • fluticasone (FLONASE) 50 mcg/act nasal spray 1 spray into each nostril daily (Patient taking differently: 1 spray into each nostril daily as needed) 16 g 0   • hydrochlorothiazide (HYDRODIURIL) 25 mg tablet Take 1 tablet (25 mg total) by mouth daily 90 tablet 1   • Ibuprofen 200 MG CAPS Take by mouth as needed      • Multiple Vitamin (multivitamin) tablet Take 1 tablet by mouth daily     • Probiotic Product (PROBIOTIC DAILY PO) Take by mouth daily     • propranolol (Inderal LA) 80 mg 24 hr capsule Take 1 capsule (80 mg total) by mouth daily 90 capsule 1   • hydrOXYzine HCL (ATARAX) 25 mg tablet Take 1 tablet (25 mg total) by mouth 3 (three) times a day as needed for anxiety (Patient not taking: Reported on 5/11/2023) 90 tablet 0   • ondansetron (ZOFRAN-ODT) 4 mg disintegrating tablet Take 1 tablet (4 mg total) by mouth every 6 (six) hours as needed for nausea or vomiting (Patient not taking: Reported on 3/21/2023) 8 tablet 0     Current Facility-Administered Medications   Medication Dose Route Frequency Provider Last Rate Last Admin   • ondansetron (ZOFRAN-ODT) dispersible tablet 4 mg  4 mg Oral Q6H PRN Victoria Buenrostro MD         Current Outpatient Medications on File Prior to Visit   Medication Sig • acetaminophen (TYLENOL) 325 mg tablet Take 650 mg by mouth every 6 (six) hours as needed for mild pain   • busPIRone (BUSPAR) 5 mg tablet Take 1 tablet (5 mg total) by mouth 2 (two) times a day   • Butalbital-APAP-Caffeine (Fioricet) -40 MG CAPS 1 CAPSULE AT ONSET OF HEADACHE AND THEN MAY REPEAT AFTER 30 MINS IF NO IMPROVEMENT, NO MORE THAN 2 CAPSULES A DAY   • fexofenadine (ALLEGRA) 60 MG tablet Take 60 mg by mouth as needed   • fluticasone (FLONASE) 50 mcg/act nasal spray 1 spray into each nostril daily (Patient taking differently: 1 spray into each nostril daily as needed)   • hydrochlorothiazide (HYDRODIURIL) 25 mg tablet Take 1 tablet (25 mg total) by mouth daily   • Ibuprofen 200 MG CAPS Take by mouth as needed    • Multiple Vitamin (multivitamin) tablet Take 1 tablet by mouth daily   • Probiotic Product (PROBIOTIC DAILY PO) Take by mouth daily   • propranolol (Inderal LA) 80 mg 24 hr capsule Take 1 capsule (80 mg total) by mouth daily   • hydrOXYzine HCL (ATARAX) 25 mg tablet Take 1 tablet (25 mg total) by mouth 3 (three) times a day as needed for anxiety (Patient not taking: Reported on 5/11/2023)   • ondansetron (ZOFRAN-ODT) 4 mg disintegrating tablet Take 1 tablet (4 mg total) by mouth every 6 (six) hours as needed for nausea or vomiting (Patient not taking: Reported on 3/21/2023)   • [DISCONTINUED] clindamycin (CLEOCIN) 150 mg capsule TAKE 2 CAPSULES BY MOUTH IMMEDIATELY THEN TAKE 1 CAPSULE BY MOUTH FOUR TIMES PER DAY UNTIL FINISHED (Patient not taking: Reported on 3/21/2023)     Current Facility-Administered Medications on File Prior to Visit   Medication   • ondansetron (ZOFRAN-ODT) dispersible tablet 4 mg     She is allergic to amoxicillin and cefaclor       Review of Systems   Constitutional: Negative for activity change, chills, fatigue, fever and unexpected weight change  HENT: Negative for ear pain, postnasal drip, rhinorrhea, sinus pressure and sore throat  Eyes: Negative for pain  "  Respiratory: Negative for cough, choking, chest tightness, shortness of breath and wheezing  Cardiovascular: Negative for chest pain, palpitations and leg swelling  Gastrointestinal: Negative for abdominal pain, constipation, diarrhea, nausea and vomiting  Genitourinary: Positive for menstrual problem  Negative for dysuria and hematuria  Musculoskeletal: Negative for arthralgias, back pain, gait problem, joint swelling, myalgias and neck stiffness  Skin: Negative for pallor and rash  Neurological: Positive for dizziness (occasional dizziness )  Negative for tremors, seizures, syncope, light-headedness and headaches  Hematological: Negative for adenopathy  Psychiatric/Behavioral: Positive for sleep disturbance  Negative for behavioral problems  The patient is nervous/anxious  Objective:      /64 (BP Location: Left arm, Patient Position: Sitting, Cuff Size: Standard)   Pulse 75   Temp 99 1 °F (37 3 °C) (Temporal)   Ht 5' 2 32\" (1 583 m)   Wt 78 9 kg (174 lb)   SpO2 98%   BMI 31 50 kg/m²          Physical Exam  Constitutional:       General: She is not in acute distress  Appearance: She is well-developed  She is not diaphoretic  HENT:      Head: Normocephalic and atraumatic  Right Ear: External ear normal       Left Ear: External ear normal       Nose: Nose normal       Mouth/Throat:      Mouth: Mucous membranes are dry  Pharynx: Posterior oropharyngeal erythema present  No oropharyngeal exudate  Eyes:      General: No scleral icterus  Right eye: No discharge  Left eye: No discharge  Conjunctiva/sclera: Conjunctivae normal       Pupils: Pupils are equal, round, and reactive to light  Neck:      Thyroid: No thyromegaly  Vascular: No JVD  Trachea: No tracheal deviation  Cardiovascular:      Rate and Rhythm: Normal rate and regular rhythm  Heart sounds: Normal heart sounds  No murmur heard  No friction rub  No gallop   " Pulmonary:      Effort: Pulmonary effort is normal  No respiratory distress  Breath sounds: Normal breath sounds  No wheezing or rales  Chest:      Chest wall: No tenderness  Abdominal:      General: Bowel sounds are normal  There is no distension  Palpations: Abdomen is soft  There is no mass  Tenderness: There is no abdominal tenderness  There is no guarding or rebound  Musculoskeletal:         General: No tenderness or deformity  Normal range of motion  Cervical back: Normal range of motion and neck supple  Lymphadenopathy:      Cervical: No cervical adenopathy  Skin:     General: Skin is warm and dry  Coloration: Skin is not pale  Findings: No erythema or rash  Neurological:      Mental Status: She is alert and oriented to person, place, and time  Cranial Nerves: No cranial nerve deficit  Motor: No abnormal muscle tone  Coordination: Coordination normal       Deep Tendon Reflexes: Reflexes are normal and symmetric  Psychiatric:         Mood and Affect: Mood is anxious (Anxious affect)           Behavior: Behavior normal            Office Visit on 05/11/2023   Component Date Value Ref Range Status   • LEUKOCYTE ESTERASE,UA 05/11/2023 trace   Final   • NITRITE,UA 05/11/2023 negative   Final   • SL AMB POCT UROBILINOGEN 05/11/2023 0 2   Final   • POCT URINE PROTEIN 05/11/2023 30   Final   •  PH,UA 05/11/2023 6   Final   • BLOOD,UA 05/11/2023 large   Final   • SPECIFIC GRAVITY,UA 05/11/2023 1 005   Final   • KETONES,UA 05/11/2023 negative   Final   • BILIRUBIN,UA 05/11/2023 negative   Final   • GLUCOSE, UA 05/11/2023 negative   Final   •  COLOR,UA 05/11/2023 yellow   Final   • CLARITY,UA 05/11/2023 clear   Final   • Urine Culture 05/11/2023 60,000-69,000 cfu/ml   Final    Mixed Contaminants X4   Annual Exam on 03/21/2023   Component Date Value Ref Range Status   • Case Report 03/21/2023    Final                    Value:Gynecologic Cytology Report Case: [id number]                                  Authorizing Provider:  Mirna Lucas DO       Collected:           03/21/2023 8988              Ordering Location:     [facility]      Received:            03/21/2023 7108              First Screen:          Aleksandra Morocho, CT                                                       Specimen:    LIQUID-BASED PAP, SCREENING, Cervix                                                       • Primary Interpretation 03/21/2023 Negative for intraepithelial lesion or malignancy   Final   • Specimen Adequacy 03/21/2023 Satisfactory for evaluation  Endocervical/transformation zone component present  Final   • Additional Information 03/21/2023    Final                    Value: This result contains rich text formatting which cannot be displayed here  • LMP 03/21/2023 3/14/2023   Final   • HPV Other HR 03/21/2023 Negative  Negative Final    HPV types: 40,00,01,65,78,76,43,08,32,66,17 and 68 DNA are undetectable or below the pre-set threshold     • HPV16 03/21/2023 Negative  Negative Final   • HPV18 03/21/2023 Negative  Negative Final   Appointment on 11/08/2022   Component Date Value Ref Range Status   • Hepatitis C Ab 11/08/2022 Non-reactive  Non-reactive Final   • WBC 11/08/2022 6 73  4 31 - 10 16 Thousand/uL Final   • RBC 11/08/2022 4 17  3 81 - 5 12 Million/uL Final   • Hemoglobin 11/08/2022 12 7  11 5 - 15 4 g/dL Final   • Hematocrit 11/08/2022 39 9  34 8 - 46 1 % Final   • MCV 11/08/2022 96  82 - 98 fL Final   • MCH 11/08/2022 30 5  26 8 - 34 3 pg Final   • MCHC 11/08/2022 31 8  31 4 - 37 4 g/dL Final   • RDW 11/08/2022 13 2  11 6 - 15 1 % Final   • MPV 11/08/2022 10 5  8 9 - 12 7 fL Final   • Platelets 88/19/4135 265  149 - 390 Thousands/uL Final   • nRBC 11/08/2022 0  /100 WBCs Final   • Neutrophils Relative 11/08/2022 62  43 - 75 % Final   • Immat GRANS % 11/08/2022 0  0 - 2 % Final   • Lymphocytes Relative 11/08/2022 24  14 - 44 % Final   • Monocytes Relative 11/08/2022 10  4 - 12 % Final   • Eosinophils Relative 11/08/2022 3  0 - 6 % Final   • Basophils Relative 11/08/2022 1  0 - 1 % Final   • Neutrophils Absolute 11/08/2022 4 16  1 85 - 7 62 Thousands/µL Final   • Immature Grans Absolute 11/08/2022 0 03  0 00 - 0 20 Thousand/uL Final   • Lymphocytes Absolute 11/08/2022 1 62  0 60 - 4 47 Thousands/µL Final   • Monocytes Absolute 11/08/2022 0 67  0 17 - 1 22 Thousand/µL Final   • Eosinophils Absolute 11/08/2022 0 20  0 00 - 0 61 Thousand/µL Final   • Basophils Absolute 11/08/2022 0 05  0 00 - 0 10 Thousands/µL Final   • Sodium 11/08/2022 135  135 - 147 mmol/L Final   • Potassium 11/08/2022 3 5  3 5 - 5 3 mmol/L Final   • Chloride 11/08/2022 102  96 - 108 mmol/L Final   • CO2 11/08/2022 25  21 - 32 mmol/L Final   • ANION GAP 11/08/2022 8  4 - 13 mmol/L Final   • BUN 11/08/2022 10  5 - 25 mg/dL Final   • Creatinine 11/08/2022 0 75  0 60 - 1 30 mg/dL Final    Standardized to IDMS reference method   • Glucose, Fasting 11/08/2022 122 (H)  65 - 99 mg/dL Final    Specimen collection should occur prior to Sulfasalazine administration due to the potential for falsely depressed results  Specimen collection should occur prior to Sulfapyridine administration due to the potential for falsely elevated results  • Calcium 11/08/2022 9 0  8 3 - 10 1 mg/dL Final   • Corrected Calcium 11/08/2022 9 6  8 3 - 10 1 mg/dL Final   • AST 11/08/2022 23  5 - 45 U/L Final    Specimen collection should occur prior to Sulfasalazine administration due to the potential for falsely depressed results  • ALT 11/08/2022 33  12 - 78 U/L Final    Specimen collection should occur prior to Sulfasalazine and/or Sulfapyridine administration due to the potential for falsely depressed results      • Alkaline Phosphatase 11/08/2022 56  46 - 116 U/L Final   • Total Protein 11/08/2022 7 7  6 4 - 8 4 g/dL Final   • Albumin 11/08/2022 3 3 (L)  3 5 - 5 0 g/dL Final   • Total Bilirubin 11/08/2022 0  43  0 20 - 1 00 mg/dL Final    Use of this assay is not recommended for patients undergoing treatment with eltrombopag due to the potential for falsely elevated results  • eGFR 11/08/2022 90  ml/min/1 73sq m Final   • Cholesterol 11/08/2022 201 (H)  See Comment mg/dL Final    Cholesterol:         Pediatric <18 Years        Desirable          <170 mg/dL      Borderline High    170-199 mg/dL      High               >=200 mg/dL        Adult >=18 Years            Desirable         <200 mg/dL      Borderline High   200-239 mg/dL      High              >239 mg/dL     • Triglycerides 11/08/2022 102  See Comment mg/dL Final    Triglyceride:     0-9Y            <75mg/dL     10Y-17Y         <90 mg/dL       >=18Y     Normal          <150 mg/dL     Borderline High 150-199 mg/dL     High            200-499 mg/dL        Very High       >499 mg/dL    Specimen collection should occur prior to N-Acetylcysteine or Metamizole administration due to the potential for falsely depressed results  • HDL, Direct 11/08/2022 51  >=50 mg/dL Final   • LDL Calculated 11/08/2022 130 (H)  0 - 100 mg/dL Final    LDL Cholesterol:     Optimal           <100 mg/dl     Near Optimal      100-129 mg/dl     Above Optimal       Borderline High 130-159 mg/dl       High            160-189 mg/dl       Very High       >189 mg/dl         This screening LDL is a calculated result  It does not have the accuracy of the Direct Measured LDL in the monitoring of patients with hyperlipidemia and/or statin therapy  Direct Measure LDL (GZQ367) must be ordered separately in these patients  • Hemoglobin A1C 11/08/2022 5 9 (H)  Normal 3 8-5 6%; PreDiabetic 5 7-6 4%;  Diabetic >=6 5%; Glycemic control for adults with diabetes <7 0% % Final   • EAG 11/08/2022 123  mg/dl Final   • TSH 3RD GENERATON 11/08/2022 2 750  0 450 - 4 500 uIU/mL Final    The recommended reference ranges for TSH during pregnancy are as follows:   First trimester 0 1 to 2 5 uIU/mL   Second trimester  0 2 to 3 0 uIU/mL   Third trimester 0 3 to 3 0 uIU/m    Note: Normal ranges may not apply to patients who are transgender, non-binary, or whose legal sex, sex at birth, and gender identity differ  Adult TSH (3rd generation) reference range follows the recommended guidelines of the [employer], January, 2020     • Vit D, 25-Hydroxy 11/08/2022 29 6 (L)  30 0 - 100 0 ng/mL Final

## 2023-06-20 ENCOUNTER — TELEPHONE (OUTPATIENT)
Dept: NEPHROLOGY | Facility: CLINIC | Age: 53
End: 2023-06-20

## 2023-07-03 DIAGNOSIS — G43.909 MIGRAINE WITHOUT STATUS MIGRAINOSUS, NOT INTRACTABLE, UNSPECIFIED MIGRAINE TYPE: ICD-10-CM

## 2023-07-03 RX ORDER — BUTALBITAL, ACETAMINOPHEN AND CAFFEINE 300; 40; 50 MG/1; MG/1; MG/1
CAPSULE ORAL
Qty: 30 CAPSULE | Refills: 0 | Status: SHIPPED | OUTPATIENT
Start: 2023-07-03

## 2023-07-19 ENCOUNTER — TELEPHONE (OUTPATIENT)
Dept: NEPHROLOGY | Facility: CLINIC | Age: 53
End: 2023-07-19

## 2023-07-25 ENCOUNTER — PREP FOR PROCEDURE (OUTPATIENT)
Age: 53
End: 2023-07-25

## 2023-08-06 ENCOUNTER — OFFICE VISIT (OUTPATIENT)
Dept: URGENT CARE | Facility: CLINIC | Age: 53
End: 2023-08-06
Payer: COMMERCIAL

## 2023-08-06 VITALS
SYSTOLIC BLOOD PRESSURE: 140 MMHG | HEART RATE: 87 BPM | TEMPERATURE: 97.6 F | OXYGEN SATURATION: 99 % | DIASTOLIC BLOOD PRESSURE: 92 MMHG | RESPIRATION RATE: 15 BRPM

## 2023-08-06 DIAGNOSIS — J01.10 ACUTE NON-RECURRENT FRONTAL SINUSITIS: Primary | ICD-10-CM

## 2023-08-06 PROCEDURE — 99213 OFFICE O/P EST LOW 20 MIN: CPT | Performed by: NURSE PRACTITIONER

## 2023-08-06 RX ORDER — AZITHROMYCIN 250 MG/1
TABLET, FILM COATED ORAL
Qty: 6 TABLET | Refills: 0 | Status: SHIPPED | OUTPATIENT
Start: 2023-08-06 | End: 2023-08-10

## 2023-08-06 NOTE — PROGRESS NOTES
[facility]        NAME: Alayna Felder is a 45 y.o. female  : 1970    MRN: [id number]  DATE: 2023  TIME: 11:34 AM    Assessment and Plan   Acute non-recurrent frontal sinusitis [J01.10]  1. Acute non-recurrent frontal sinusitis  azithromycin (ZITHROMAX) 250 mg tablet        Acute symptomatic associated with sinus pressure pain nasal congestion postnasal drainage x2 weeks not responding to conservative over-the-counter treatments. Does have penicillin allergy. Denies shortness of breath chest pain. Will recommend start Z-Jose educated on side effects proper use of medication follow-up with PCP with no improvement or worsening of symptoms    Patient Instructions       Follow up with PCP in 3-5 days. Proceed to  ER if symptoms worsen. Chief Complaint     Chief Complaint   Patient presents with   • Sinusitis     Pt reports congestion, PND, sore throat on and off for a few weeks. History of Present Illness       Patient is a 38-year-old female arrives with complaints of nasal congestion sinus pressure pain postnasal drainage and bilateral eye pressure x2 weeks not responding to conservative over-the-counter treatments. Denies shortness of breath chest pain       Review of Systems   Review of Systems   Constitutional: Positive for fatigue. Negative for activity change, appetite change, chills and fever. HENT: Positive for congestion, postnasal drip, sinus pressure, sinus pain and sore throat. Negative for rhinorrhea and sneezing. Respiratory: Negative for cough, chest tightness, shortness of breath and wheezing. Cardiovascular: Negative for chest pain and palpitations. Gastrointestinal: Negative for abdominal pain, constipation, diarrhea, nausea and vomiting. Musculoskeletal: Negative for arthralgias and myalgias. Skin: Negative for color change, pallor and rash. Neurological: Positive for headaches. Negative for dizziness, weakness and light-headedness. Hematological: Negative for adenopathy. Psychiatric/Behavioral: Negative for agitation and confusion.          Current Medications       Current Outpatient Medications:   •  azithromycin (ZITHROMAX) 250 mg tablet, Take 2 tablets today then 1 tablet daily x 4 days, Disp: 6 tablet, Rfl: 0  •  acetaminophen (TYLENOL) 325 mg tablet, Take 650 mg by mouth every 6 (six) hours as needed for mild pain, Disp: , Rfl:   •  busPIRone (BUSPAR) 5 mg tablet, Take 1 tablet (5 mg total) by mouth 2 (two) times a day, Disp: 60 tablet, Rfl: 1  •  Butalbital-APAP-Caffeine (Fioricet) -40 MG CAPS, 1 CAPSULE AT ONSET OF HEADACHE AND THEN MAY REPEAT AFTER 30 MINS IF NO IMPROVEMENT, NO MORE THAN 2 CAPSULES A DAY, Disp: 30 capsule, Rfl: 0  •  fexofenadine (ALLEGRA) 60 MG tablet, Take 60 mg by mouth as needed, Disp: , Rfl:   •  fluticasone (FLONASE) 50 mcg/act nasal spray, 1 spray into each nostril daily (Patient taking differently: 1 spray into each nostril daily as needed), Disp: 16 g, Rfl: 0  •  hydrochlorothiazide (HYDRODIURIL) 25 mg tablet, Take 1 tablet (25 mg total) by mouth daily, Disp: 90 tablet, Rfl: 1  •  hydrOXYzine HCL (ATARAX) 25 mg tablet, Take 1 tablet (25 mg total) by mouth 3 (three) times a day as needed for anxiety (Patient not taking: Reported on 5/11/2023), Disp: 90 tablet, Rfl: 0  •  Ibuprofen 200 MG CAPS, Take by mouth as needed , Disp: , Rfl:   •  Multiple Vitamin (multivitamin) tablet, Take 1 tablet by mouth daily, Disp: , Rfl:   •  ondansetron (ZOFRAN-ODT) 4 mg disintegrating tablet, Take 1 tablet (4 mg total) by mouth every 6 (six) hours as needed for nausea or vomiting (Patient not taking: Reported on 3/21/2023), Disp: 8 tablet, Rfl: 0  •  Probiotic Product (PROBIOTIC DAILY PO), Take by mouth daily, Disp: , Rfl:   •  propranolol (Inderal LA) 80 mg 24 hr capsule, Take 1 capsule (80 mg total) by mouth daily, Disp: 90 capsule, Rfl: 1    Current Facility-Administered Medications:   •  ondansetron (ZOFRAN-ODT) dispersible tablet 4 mg, 4 mg, Oral, Q6H PRN, Abbey Huber MD    Current Allergies     Allergies as of 08/06/2023 - Reviewed 08/06/2023   Allergen Reaction Noted   • Amoxicillin  08/26/2015   • Cefaclor  08/26/2015            The following portions of the patient's history were reviewed and updated as appropriate: allergies, current medications, past family history, past medical history, past social history, past surgical history and problem list.     Past Medical History:   Diagnosis Date   • Allergic     Seasonal/ 2 antibiotics   • Anxiety 11/11/2022   • COVID-19 virus infection 07/27/2021   • GERD (gastroesophageal reflux disease)    • Headache(784.0)    • Hematuria    • Hyperkalemia    • Hypertension    • Kidney stone 1985   • Proteinuria        History reviewed. No pertinent surgical history. Family History   Problem Relation Age of Onset   • Depression Mother    • Heart disease Mother    • Diabetes Mother    • Anxiety disorder Mother    • Heart disease Father    • Hypertension Father    • Stroke Father    • Depression Daughter    • Diabetes Brother          Medications have been verified. Objective   /92   Pulse 87   Temp 97.6 °F (36.4 °C)   Resp 15   SpO2 99%   No LMP recorded. Physical Exam     Physical Exam  Vitals and nursing note reviewed. Constitutional:       General: She is not in acute distress. Appearance: Normal appearance. She is not ill-appearing or diaphoretic. HENT:      Head: Normocephalic and atraumatic. Right Ear: Tympanic membrane, ear canal and external ear normal. There is no impacted cerumen. Left Ear: Tympanic membrane, ear canal and external ear normal. There is no impacted cerumen. Nose: Congestion present. No rhinorrhea. Right Sinus: Frontal sinus tenderness present. Left Sinus: Frontal sinus tenderness present. Mouth/Throat:      Pharynx: Posterior oropharyngeal erythema present.    Eyes:      General: No scleral icterus. Right eye: No discharge. Left eye: No discharge. Conjunctiva/sclera: Conjunctivae normal.   Cardiovascular:      Rate and Rhythm: Normal rate and regular rhythm. Pulmonary:      Effort: Pulmonary effort is normal. No respiratory distress. Breath sounds: Normal breath sounds. No stridor. No wheezing, rhonchi or rales. Musculoskeletal:         General: Normal range of motion. Cervical back: Normal range of motion. Lymphadenopathy:      Cervical: Cervical adenopathy present. Skin:     Coloration: Skin is not jaundiced or pale. Findings: No bruising, erythema or rash. Neurological:      General: No focal deficit present. Mental Status: She is alert and oriented to person, place, and time. Psychiatric:         Mood and Affect: Mood normal.         Behavior: Behavior normal.         Thought Content:  Thought content normal.         Judgment: Judgment normal.

## 2023-09-22 DIAGNOSIS — I10 HYPERTENSION, ESSENTIAL: ICD-10-CM

## 2023-09-22 RX ORDER — HYDROCHLOROTHIAZIDE 25 MG/1
25 TABLET ORAL DAILY
Qty: 90 TABLET | Refills: 0 | Status: SHIPPED | OUTPATIENT
Start: 2023-09-22 | End: 2024-03-20

## 2023-10-03 ENCOUNTER — HOSPITAL ENCOUNTER (OUTPATIENT)
Dept: RADIOLOGY | Facility: IMAGING CENTER | Age: 53
Discharge: HOME | End: 2023-10-03
Payer: COMMERCIAL

## 2023-10-03 VITALS — BODY MASS INDEX: 32.01 KG/M2 | WEIGHT: 173.94 LBS | HEIGHT: 62 IN

## 2023-10-03 DIAGNOSIS — Z12.39 ENCOUNTER FOR SCREENING FOR MALIGNANT NEOPLASM OF BREAST, UNSPECIFIED SCREENING MODALITY: ICD-10-CM

## 2023-10-03 PROCEDURE — 77063 BREAST TOMOSYNTHESIS BI: CPT

## 2023-10-03 PROCEDURE — 77067 SCR MAMMO BI INCL CAD: CPT

## 2023-10-04 DIAGNOSIS — G43.909 MIGRAINE WITHOUT STATUS MIGRAINOSUS, NOT INTRACTABLE, UNSPECIFIED MIGRAINE TYPE: ICD-10-CM

## 2023-10-04 DIAGNOSIS — F41.9 ANXIETY: ICD-10-CM

## 2023-10-04 RX ORDER — BUSPIRONE HYDROCHLORIDE 5 MG/1
5 TABLET ORAL 2 TIMES DAILY
Qty: 60 TABLET | Refills: 1 | Status: SHIPPED | OUTPATIENT
Start: 2023-10-04

## 2023-10-06 RX ORDER — BUTALBITAL, ACETAMINOPHEN AND CAFFEINE 300; 40; 50 MG/1; MG/1; MG/1
CAPSULE ORAL
Qty: 30 CAPSULE | Refills: 0 | Status: SHIPPED | OUTPATIENT
Start: 2023-10-06

## 2023-10-10 ENCOUNTER — TELEPHONE (OUTPATIENT)
Dept: OBGYN CLINIC | Facility: CLINIC | Age: 53
End: 2023-10-10

## 2023-10-10 NOTE — TELEPHONE ENCOUNTER
----- Message from Elvis Galloway DO sent at 10/9/2023  7:35 PM EDT -----  dPlease call the patient regarding her abnormal result. Inform needs bilateral diagnostic mammogram and breast ultrasound for further evaluation.

## 2023-10-10 NOTE — TELEPHONE ENCOUNTER
Lm patient's as re: screening mammogram results & recommended follow up & to recall office if any questions - patient is scheduled for diagnostic bilat mammogram & bilat breast US on 12/5/2023.

## 2023-10-13 ENCOUNTER — OFFICE VISIT (OUTPATIENT)
Dept: URGENT CARE | Facility: CLINIC | Age: 53
End: 2023-10-13
Payer: COMMERCIAL

## 2023-10-13 ENCOUNTER — APPOINTMENT (OUTPATIENT)
Dept: RADIOLOGY | Facility: CLINIC | Age: 53
End: 2023-10-13
Payer: COMMERCIAL

## 2023-10-13 VITALS
DIASTOLIC BLOOD PRESSURE: 87 MMHG | SYSTOLIC BLOOD PRESSURE: 172 MMHG | OXYGEN SATURATION: 99 % | TEMPERATURE: 98.1 F | HEART RATE: 81 BPM

## 2023-10-13 DIAGNOSIS — M79.674 PAIN IN RIGHT TOE(S): ICD-10-CM

## 2023-10-13 DIAGNOSIS — S92.504A CLOSED NONDISPLACED FRACTURE OF PHALANX OF LESSER TOE OF RIGHT FOOT, UNSPECIFIED PHALANX, INITIAL ENCOUNTER: Primary | ICD-10-CM

## 2023-10-13 PROCEDURE — 99213 OFFICE O/P EST LOW 20 MIN: CPT

## 2023-10-13 PROCEDURE — 73630 X-RAY EXAM OF FOOT: CPT

## 2023-10-13 NOTE — H&P (VIEW-ONLY)
[facility]        NAME: Bhavya Grullon is a 52 y.o. female  : 1970    MRN: [id number]  DATE: 2023  TIME: 9:06 AM    Assessment and Plan   Closed nondisplaced fracture of phalanx of lesser toe of right foot, unspecified phalanx, initial encounter [S92.504A]  1. Closed nondisplaced fracture of phalanx of lesser toe of right foot, unspecified phalanx, initial encounter  Ambulatory Referral to Podiatry      2. Pain in right toe(s)  XR foot 3+ vw right        - Preliminary reading of R foot xrays show FXR of 5th proximal phalanx   - Placed in boot  - Podiatry f/u    Patient Instructions   The final xray result will appear in your mychart; use the boot until you get the xray result, if the final xray shows a fracture, keep the boot on until you follow-up with orthopedics, if the xray shows no fracture, you can use the boot as needed; take off every 1-2 hours and can take it off to sleep and shower if there is no fracture. Ice as needed. Acetaminophen and/or ibuprofen for pain and inflammation. Follow-up with podiatry. PCP follow-up in 3-5 days  Proceed to the ER if symptoms worsen. Chief Complaint     Chief Complaint   Patient presents with    Toe Injury     Patient states that right pinky toe  bent back last night. Recliner foot rest pushed back on foot. Pain level 7/10. Took tylenol with no relieve this morning. Area is brused         History of Present Illness       53 y/o F presents for traumatic R foot/toe pain x last night. Pt admits the foldable aspect of the reclining chair hit her little toe, and bent it back. Admits to feeling immediate pain. Pain rated a 7/10 with ambulation. Took 600 mg IBU and applied ice once. Woke up this AM with bruising between 4th and 5th digit. Review of Systems   Review of Systems   Musculoskeletal:  Positive for gait problem. R little toe pain   Skin:  Positive for color change.          Current Medications       Current Outpatient Medications:     acetaminophen (TYLENOL) 325 mg tablet, Take 650 mg by mouth every 6 (six) hours as needed for mild pain, Disp: , Rfl:     busPIRone (BUSPAR) 5 mg tablet, Take 1 tablet (5 mg total) by mouth 2 (two) times a day, Disp: 60 tablet, Rfl: 1    Butalbital-APAP-Caffeine (Fioricet) -40 MG CAPS, 1 CAPSULE AT ONSET OF HEADACHE AND THEN MAY REPEAT AFTER 30 MINS IF NO IMPROVEMENT, NO MORE THAN 2 CAPSULES A DAY, Disp: 30 capsule, Rfl: 0    fexofenadine (ALLEGRA) 60 MG tablet, Take 60 mg by mouth as needed, Disp: , Rfl:     fluticasone (FLONASE) 50 mcg/act nasal spray, 1 spray into each nostril daily, Disp: 16 g, Rfl: 0    hydrochlorothiazide (HYDRODIURIL) 25 mg tablet, Take 1 tablet (25 mg total) by mouth daily, Disp: 90 tablet, Rfl: 0    Ibuprofen 200 MG CAPS, Take by mouth as needed , Disp: , Rfl:     Multiple Vitamin (multivitamin) tablet, Take 1 tablet by mouth daily, Disp: , Rfl:     Probiotic Product (PROBIOTIC DAILY PO), Take by mouth daily, Disp: , Rfl:     hydrOXYzine HCL (ATARAX) 25 mg tablet, Take 1 tablet (25 mg total) by mouth 3 (three) times a day as needed for anxiety (Patient not taking: Reported on 5/11/2023), Disp: 90 tablet, Rfl: 0    ondansetron (ZOFRAN-ODT) 4 mg disintegrating tablet, Take 1 tablet (4 mg total) by mouth every 6 (six) hours as needed for nausea or vomiting (Patient not taking: Reported on 3/21/2023), Disp: 8 tablet, Rfl: 0    propranolol (Inderal LA) 80 mg 24 hr capsule, Take 1 capsule (80 mg total) by mouth daily, Disp: 90 capsule, Rfl: 1    Current Facility-Administered Medications:     ondansetron (ZOFRAN-ODT) dispersible tablet 4 mg, 4 mg, Oral, Q6H PRN, Dewayne Harman MD    Current Allergies     Allergies as of 10/13/2023 - Reviewed 10/13/2023   Allergen Reaction Noted    Amoxicillin  08/26/2015    Cefaclor  08/26/2015            The following portions of the patient's history were reviewed and updated as appropriate: allergies, current medications, past family history, past medical history, past social history, past surgical history and problem list.     Past Medical History:   Diagnosis Date    Allergic     Seasonal/ 2 antibiotics    Anxiety 11/11/2022    COVID-19 virus infection 07/27/2021    GERD (gastroesophageal reflux disease)     Headache(784.0)     Hematuria     Hyperkalemia     Hypertension     Kidney stone 1985    Proteinuria        History reviewed. No pertinent surgical history. Family History   Problem Relation Age of Onset    Depression Mother     Heart disease Mother     Diabetes Mother     Anxiety disorder Mother     Skin cancer Mother     Heart disease Father     Hypertension Father     Stroke Father     Depression Daughter     No Known Problems Maternal Grandmother     No Known Problems Maternal Grandfather     No Known Problems Paternal Grandmother     No Known Problems Paternal Grandfather     Diabetes Brother     No Known Problems Brother     No Known Problems Maternal Aunt     No Known Problems Paternal Aunt          Medications have been verified. Objective   BP (!) 172/87   Pulse 81   Temp 98.1 °F (36.7 °C)   LMP 09/26/2023 (Exact Date)   SpO2 99%   Patient's last menstrual period was 09/26/2023 (exact date). Physical Exam     Physical Exam  Vitals and nursing note reviewed. Constitutional:       General: She is not in acute distress. Appearance: She is not toxic-appearing. HENT:      Head: Normocephalic and atraumatic. Eyes:      Conjunctiva/sclera: Conjunctivae normal.   Pulmonary:      Effort: Pulmonary effort is normal.   Musculoskeletal:      Comments: DP pulse 2+  TTP of R 5th toe  ROM WNL; with discomfort   Gait normal; but admits she is forcing herself to walk normal    Skin:     Comments: Ecchymosis between R digits 4 and 5   Neurological:      Mental Status: She is alert.    Psychiatric:         Mood and Affect: Mood normal.         Behavior: Behavior normal.

## 2023-10-13 NOTE — PROGRESS NOTES
[facility]        NAME: Apryl Davies is a 52 y.o. female  : 1970    MRN: [id number]  DATE: 2023  TIME: 9:06 AM    Assessment and Plan   Closed nondisplaced fracture of phalanx of lesser toe of right foot, unspecified phalanx, initial encounter [S92.504A]  1. Closed nondisplaced fracture of phalanx of lesser toe of right foot, unspecified phalanx, initial encounter  Ambulatory Referral to Podiatry      2. Pain in right toe(s)  XR foot 3+ vw right        - Preliminary reading of R foot xrays show FXR of 5th proximal phalanx   - Placed in boot  - Podiatry f/u    Patient Instructions   The final xray result will appear in your mychart; use the boot until you get the xray result, if the final xray shows a fracture, keep the boot on until you follow-up with orthopedics, if the xray shows no fracture, you can use the boot as needed; take off every 1-2 hours and can take it off to sleep and shower if there is no fracture. Ice as needed. Acetaminophen and/or ibuprofen for pain and inflammation. Follow-up with podiatry. PCP follow-up in 3-5 days  Proceed to the ER if symptoms worsen. Chief Complaint     Chief Complaint   Patient presents with    Toe Injury     Patient states that right pinky toe  bent back last night. Recliner foot rest pushed back on foot. Pain level 7/10. Took tylenol with no relieve this morning. Area is brused         History of Present Illness       51 y/o F presents for traumatic R foot/toe pain x last night. Pt admits the foldable aspect of the reclining chair hit her little toe, and bent it back. Admits to feeling immediate pain. Pain rated a 7/10 with ambulation. Took 600 mg IBU and applied ice once. Woke up this AM with bruising between 4th and 5th digit. Review of Systems   Review of Systems   Musculoskeletal:  Positive for gait problem. R little toe pain   Skin:  Positive for color change.          Current Medications       Current Outpatient Medications:     acetaminophen (TYLENOL) 325 mg tablet, Take 650 mg by mouth every 6 (six) hours as needed for mild pain, Disp: , Rfl:     busPIRone (BUSPAR) 5 mg tablet, Take 1 tablet (5 mg total) by mouth 2 (two) times a day, Disp: 60 tablet, Rfl: 1    Butalbital-APAP-Caffeine (Fioricet) -40 MG CAPS, 1 CAPSULE AT ONSET OF HEADACHE AND THEN MAY REPEAT AFTER 30 MINS IF NO IMPROVEMENT, NO MORE THAN 2 CAPSULES A DAY, Disp: 30 capsule, Rfl: 0    fexofenadine (ALLEGRA) 60 MG tablet, Take 60 mg by mouth as needed, Disp: , Rfl:     fluticasone (FLONASE) 50 mcg/act nasal spray, 1 spray into each nostril daily, Disp: 16 g, Rfl: 0    hydrochlorothiazide (HYDRODIURIL) 25 mg tablet, Take 1 tablet (25 mg total) by mouth daily, Disp: 90 tablet, Rfl: 0    Ibuprofen 200 MG CAPS, Take by mouth as needed , Disp: , Rfl:     Multiple Vitamin (multivitamin) tablet, Take 1 tablet by mouth daily, Disp: , Rfl:     Probiotic Product (PROBIOTIC DAILY PO), Take by mouth daily, Disp: , Rfl:     hydrOXYzine HCL (ATARAX) 25 mg tablet, Take 1 tablet (25 mg total) by mouth 3 (three) times a day as needed for anxiety (Patient not taking: Reported on 5/11/2023), Disp: 90 tablet, Rfl: 0    ondansetron (ZOFRAN-ODT) 4 mg disintegrating tablet, Take 1 tablet (4 mg total) by mouth every 6 (six) hours as needed for nausea or vomiting (Patient not taking: Reported on 3/21/2023), Disp: 8 tablet, Rfl: 0    propranolol (Inderal LA) 80 mg 24 hr capsule, Take 1 capsule (80 mg total) by mouth daily, Disp: 90 capsule, Rfl: 1    Current Facility-Administered Medications:     ondansetron (ZOFRAN-ODT) dispersible tablet 4 mg, 4 mg, Oral, Q6H PRN, Dk Dolan MD    Current Allergies     Allergies as of 10/13/2023 - Reviewed 10/13/2023   Allergen Reaction Noted    Amoxicillin  08/26/2015    Cefaclor  08/26/2015            The following portions of the patient's history were reviewed and updated as appropriate: allergies, current medications, past family history, past medical history, past social history, past surgical history and problem list.     Past Medical History:   Diagnosis Date    Allergic     Seasonal/ 2 antibiotics    Anxiety 11/11/2022    COVID-19 virus infection 07/27/2021    GERD (gastroesophageal reflux disease)     Headache(784.0)     Hematuria     Hyperkalemia     Hypertension     Kidney stone 1985    Proteinuria        History reviewed. No pertinent surgical history. Family History   Problem Relation Age of Onset    Depression Mother     Heart disease Mother     Diabetes Mother     Anxiety disorder Mother     Skin cancer Mother     Heart disease Father     Hypertension Father     Stroke Father     Depression Daughter     No Known Problems Maternal Grandmother     No Known Problems Maternal Grandfather     No Known Problems Paternal Grandmother     No Known Problems Paternal Grandfather     Diabetes Brother     No Known Problems Brother     No Known Problems Maternal Aunt     No Known Problems Paternal Aunt          Medications have been verified. Objective   BP (!) 172/87   Pulse 81   Temp 98.1 °F (36.7 °C)   LMP 09/26/2023 (Exact Date)   SpO2 99%   Patient's last menstrual period was 09/26/2023 (exact date). Physical Exam     Physical Exam  Vitals and nursing note reviewed. Constitutional:       General: She is not in acute distress. Appearance: She is not toxic-appearing. HENT:      Head: Normocephalic and atraumatic. Eyes:      Conjunctiva/sclera: Conjunctivae normal.   Pulmonary:      Effort: Pulmonary effort is normal.   Musculoskeletal:      Comments: DP pulse 2+  TTP of R 5th toe  ROM WNL; with discomfort   Gait normal; but admits she is forcing herself to walk normal    Skin:     Comments: Ecchymosis between R digits 4 and 5   Neurological:      Mental Status: She is alert.    Psychiatric:         Mood and Affect: Mood normal.         Behavior: Behavior normal.

## 2023-10-20 ENCOUNTER — HOSPITAL ENCOUNTER (OUTPATIENT)
Dept: GASTROENTEROLOGY | Facility: HOSPITAL | Age: 53
Setting detail: OUTPATIENT SURGERY
End: 2023-10-20
Attending: COLON & RECTAL SURGERY
Payer: COMMERCIAL

## 2023-10-20 ENCOUNTER — ANESTHESIA (OUTPATIENT)
Dept: GASTROENTEROLOGY | Facility: HOSPITAL | Age: 53
End: 2023-10-20

## 2023-10-20 ENCOUNTER — ANESTHESIA EVENT (OUTPATIENT)
Dept: GASTROENTEROLOGY | Facility: HOSPITAL | Age: 53
End: 2023-10-20

## 2023-10-20 VITALS
RESPIRATION RATE: 18 BRPM | SYSTOLIC BLOOD PRESSURE: 120 MMHG | TEMPERATURE: 97.8 F | HEART RATE: 81 BPM | DIASTOLIC BLOOD PRESSURE: 61 MMHG | OXYGEN SATURATION: 98 %

## 2023-10-20 DIAGNOSIS — Z12.11 ENCOUNTER FOR SCREENING COLONOSCOPY: ICD-10-CM

## 2023-10-20 PROCEDURE — 88305 TISSUE EXAM BY PATHOLOGIST: CPT | Performed by: PATHOLOGY

## 2023-10-20 PROCEDURE — 45380 COLONOSCOPY AND BIOPSY: CPT | Performed by: COLON & RECTAL SURGERY

## 2023-10-20 PROCEDURE — 45385 COLONOSCOPY W/LESION REMOVAL: CPT | Performed by: COLON & RECTAL SURGERY

## 2023-10-20 RX ORDER — PROPOFOL 10 MG/ML
INJECTION, EMULSION INTRAVENOUS AS NEEDED
Status: DISCONTINUED | OUTPATIENT
Start: 2023-10-20 | End: 2023-10-20

## 2023-10-20 RX ORDER — DIPHENHYDRAMINE HYDROCHLORIDE 50 MG/ML
INJECTION INTRAMUSCULAR; INTRAVENOUS AS NEEDED
Status: DISCONTINUED | OUTPATIENT
Start: 2023-10-20 | End: 2023-10-20

## 2023-10-20 RX ORDER — SODIUM CHLORIDE 9 MG/ML
INJECTION, SOLUTION INTRAVENOUS CONTINUOUS PRN
Status: DISCONTINUED | OUTPATIENT
Start: 2023-10-20 | End: 2023-10-20

## 2023-10-20 RX ADMIN — PROPOFOL 100 MG: 10 INJECTION, EMULSION INTRAVENOUS at 09:29

## 2023-10-20 RX ADMIN — SODIUM CHLORIDE: 9 INJECTION, SOLUTION INTRAVENOUS at 09:27

## 2023-10-20 RX ADMIN — PROPOFOL 50 MG: 10 INJECTION, EMULSION INTRAVENOUS at 09:31

## 2023-10-20 RX ADMIN — PROPOFOL 150 MCG/KG/MIN: 10 INJECTION, EMULSION INTRAVENOUS at 09:30

## 2023-10-20 RX ADMIN — DIPHENHYDRAMINE HYDROCHLORIDE 25 MG: 50 INJECTION, SOLUTION INTRAMUSCULAR; INTRAVENOUS at 09:35

## 2023-10-20 NOTE — ANESTHESIA PREPROCEDURE EVALUATION
Procedure:  COLONOSCOPY    Relevant Problems   CARDIO   (+) Hypertension   (+) Migraine without aura and without status migrainosus, not intractable      NEURO/PSYCH   (+) Anxiety   (+) Migraine without aura and without status migrainosus, not intractable        Physical Exam    Airway    Mallampati score: I  TM Distance: >3 FB  Neck ROM: full     Dental   No notable dental hx     Cardiovascular  Rhythm: regular, Cardiovascular exam normal    Pulmonary  Pulmonary exam normal     Other Findings        Anesthesia Plan  ASA Score- 2     Anesthesia Type- IV sedation with anesthesia with ASA Monitors. Additional Monitors:     Airway Plan:            Plan Factors-Exercise tolerance (METS): >4 METS. Chart reviewed. EKG reviewed. Imaging results reviewed. Existing labs reviewed. Patient summary reviewed. Patient is not a current smoker. Patient instructed to abstain from smoking on day of procedure. Patient did not smoke on day of surgery. Obstructive sleep apnea risk education given perioperatively. There is medical exclusion for perioperative obstructive sleep apnea risk education. Induction- intravenous. Postoperative Plan-     Informed Consent- Anesthetic plan and risks discussed with patient. I personally reviewed this patient with the CRNA. Discussed and agreed on the Anesthesia Plan with the CRNA. John Wood

## 2023-10-20 NOTE — INTERVAL H&P NOTE
Antelmo Felder presents for first screening colonoscopy, family history, mother with colon polyps. Screening colonoscopy,discussed in a face-to-face, personal, informed consent process, the benefits, alternatives, risks including not limited to bleeding, missed lesion, perforation requiring emergent surgery discussed/understood. H&P reviewed. After examining the patient I find no changes in the patients condition since the H&P had been written.     Vitals:    10/20/23 0809   BP: 146/88   Pulse: 86   Resp: 16   Temp: 98 °F (36.7 °C)   SpO2: 99%

## 2023-10-20 NOTE — ANESTHESIA POSTPROCEDURE EVALUATION
Post-Op Assessment Note    CV Status:  Stable  Pain Score: 0    Pain management: adequate     Mental Status:  Alert and awake   Hydration Status:  Euvolemic   PONV Controlled:  Controlled   Airway Patency:  Patent   Two or more mitigation strategies used for obstructive sleep apnea   Post Op Vitals Reviewed: Yes      Staff: CRNA         No notable events documented.     /76 (10/20/23 0954)    Temp 97.8 °F (36.6 °C) (10/20/23 0954)    Pulse 87 (10/20/23 0954)   Resp 18 (10/20/23 0954)    SpO2 98 % (10/20/23 0954)

## 2023-10-23 PROCEDURE — 88305 TISSUE EXAM BY PATHOLOGIST: CPT | Performed by: PATHOLOGY

## 2023-10-24 ENCOUNTER — HOSPITAL ENCOUNTER (OUTPATIENT)
Dept: MAMMOGRAPHY | Facility: CLINIC | Age: 53
Discharge: HOME | End: 2023-10-24
Payer: COMMERCIAL

## 2023-10-24 ENCOUNTER — HOSPITAL ENCOUNTER (OUTPATIENT)
Dept: ULTRASOUND IMAGING | Facility: CLINIC | Age: 53
Discharge: HOME | End: 2023-10-24
Payer: COMMERCIAL

## 2023-10-24 DIAGNOSIS — R92.8 ABNORMAL SCREENING MAMMOGRAM: ICD-10-CM

## 2023-10-24 PROCEDURE — G0279 TOMOSYNTHESIS, MAMMO: HCPCS

## 2023-10-24 PROCEDURE — 77066 DX MAMMO INCL CAD BI: CPT

## 2023-10-24 PROCEDURE — 76642 ULTRASOUND BREAST LIMITED: CPT

## 2023-10-24 NOTE — PROGRESS NOTES
Met with patient and Dr. Mora Nunez regarding recommendation for;    ___X__ RIGHT ______LEFT      __X___Ultrasound guided  x1 ___X___Stereotactic breast biopsy x1      __X___Verbalized understanding.       Blood thinners:  No: __X___ Yes: ______ What:                 Biopsy teaching sheet given:  Yes: ___X___ No: ________    Pt given contact information and adv to call with any questions/needs    Patient advised to arrive at [phone] for a 1230 appointment

## 2023-11-06 DIAGNOSIS — I10 HYPERTENSION, ESSENTIAL: ICD-10-CM

## 2023-11-06 RX ORDER — PROPRANOLOL HYDROCHLORIDE 80 MG/1
80 CAPSULE, EXTENDED RELEASE ORAL DAILY
Qty: 90 CAPSULE | Refills: 1 | Status: SHIPPED | OUTPATIENT
Start: 2023-11-06 | End: 2024-05-04

## 2023-11-21 ENCOUNTER — TELEPHONE (OUTPATIENT)
Dept: NEPHROLOGY | Facility: CLINIC | Age: 53
End: 2023-11-21

## 2023-11-21 DIAGNOSIS — I10 PRIMARY HYPERTENSION: Primary | ICD-10-CM

## 2023-11-21 DIAGNOSIS — E55.9 VITAMIN D INSUFFICIENCY: ICD-10-CM

## 2023-11-21 NOTE — TELEPHONE ENCOUNTER
Called patient and left a voicemail asking to please have blood work drawn before the follow up appointment

## 2023-11-22 ENCOUNTER — TELEPHONE (OUTPATIENT)
Dept: NEPHROLOGY | Facility: CLINIC | Age: 53
End: 2023-11-22

## 2023-11-22 NOTE — TELEPHONE ENCOUNTER
Pt called office to cxl appointment, pt unable to make appointment and no available appointments until March is released, pt will be added to wait list for reschedule and pt will have labs done once results come in if pt can be advised.

## 2023-11-29 ENCOUNTER — HOSPITAL ENCOUNTER (OUTPATIENT)
Dept: MAMMOGRAPHY | Facility: CLINIC | Age: 53
Discharge: HOME | End: 2023-11-29

## 2023-12-13 DIAGNOSIS — I10 HYPERTENSION, ESSENTIAL: ICD-10-CM

## 2023-12-13 RX ORDER — HYDROCHLOROTHIAZIDE 25 MG/1
25 TABLET ORAL DAILY
Qty: 90 TABLET | Refills: 1 | Status: SHIPPED | OUTPATIENT
Start: 2023-12-13 | End: 2024-06-10

## 2024-01-08 ENCOUNTER — HOSPITAL ENCOUNTER (OUTPATIENT)
Dept: MAMMOGRAPHY | Facility: CLINIC | Age: 54
Discharge: HOME | End: 2024-01-08
Payer: COMMERCIAL

## 2024-01-08 ENCOUNTER — HOSPITAL ENCOUNTER (OUTPATIENT)
Dept: ULTRASOUND IMAGING | Facility: CLINIC | Age: 54
Discharge: HOME | End: 2024-01-08
Payer: COMMERCIAL

## 2024-01-08 VITALS — SYSTOLIC BLOOD PRESSURE: 150 MMHG | DIASTOLIC BLOOD PRESSURE: 84 MMHG | HEART RATE: 78 BPM

## 2024-01-08 DIAGNOSIS — R92.8 ABNORMAL SCREENING MAMMOGRAM: ICD-10-CM

## 2024-01-08 PROCEDURE — 19083 BX BREAST 1ST LESION US IMAG: CPT

## 2024-01-08 PROCEDURE — 88342 IMHCHEM/IMCYTCHM 1ST ANTB: CPT | Performed by: PATHOLOGY

## 2024-01-08 PROCEDURE — 88341 IMHCHEM/IMCYTCHM EA ADD ANTB: CPT | Performed by: PATHOLOGY

## 2024-01-08 PROCEDURE — 19081 BX BREAST 1ST LESION STRTCTC: CPT

## 2024-01-08 PROCEDURE — 88305 TISSUE EXAM BY PATHOLOGIST: CPT | Performed by: PATHOLOGY

## 2024-01-08 PROCEDURE — 88360 TUMOR IMMUNOHISTOCHEM/MANUAL: CPT | Performed by: PATHOLOGY

## 2024-01-08 PROCEDURE — A4648 IMPLANTABLE TISSUE MARKER: HCPCS

## 2024-01-08 RX ORDER — LIDOCAINE HYDROCHLORIDE 10 MG/ML
5 INJECTION, SOLUTION EPIDURAL; INFILTRATION; INTRACAUDAL; PERINEURAL ONCE
OUTPATIENT
Start: 2024-01-08

## 2024-01-08 RX ORDER — LIDOCAINE HYDROCHLORIDE 10 MG/ML
5 INJECTION, SOLUTION EPIDURAL; INFILTRATION; INTRACAUDAL; PERINEURAL ONCE
Status: COMPLETED | OUTPATIENT
Start: 2024-01-08 | End: 2024-01-08

## 2024-01-08 RX ORDER — LIDOCAINE HYDROCHLORIDE AND EPINEPHRINE BITARTRATE 20; .01 MG/ML; MG/ML
10 INJECTION, SOLUTION SUBCUTANEOUS ONCE
Status: COMPLETED | OUTPATIENT
Start: 2024-01-08 | End: 2024-01-08

## 2024-01-08 RX ADMIN — LIDOCAINE HYDROCHLORIDE 5 ML: 10 INJECTION, SOLUTION EPIDURAL; INFILTRATION; INTRACAUDAL; PERINEURAL at 14:36

## 2024-01-08 RX ADMIN — LIDOCAINE HYDROCHLORIDE AND EPINEPHRINE 10 ML: 20; 10 INJECTION, SOLUTION INFILTRATION; PERINEURAL at 13:43

## 2024-01-08 RX ADMIN — LIDOCAINE HYDROCHLORIDE 5 ML: 10 INJECTION, SOLUTION EPIDURAL; INFILTRATION; INTRACAUDAL; PERINEURAL at 13:43

## 2024-01-08 NOTE — PROGRESS NOTES
Ice pack given:    __x___yes ____    Discharge instructions given to patient:    __x___yes _____no    Discharged via:    __x___ambulatory    _____wheelchair    _____stretcher    Stable on discharge:    ___x__yes ____no    Band aids clean,dry and intact.

## 2024-01-08 NOTE — PROGRESS NOTES
Procedure type:    __x___ultrasound guided _____stereotactic    Breast:    _____Left ___x__Right    Location: Right breast 300 7cmfn    Needle:12G    # of passes: 4    Clip:Hydromark Butterfly    Performed by: Dr. Roberts    Pressure held for 5 minutes by: Yajaira Hess RN    Steri Strips:    __x___yes _____no    Band aid:    ___x__yes_____no    Tape and guaze:    _____yes __x___no    Tolerated procedure:    __x___yes _____no

## 2024-01-08 NOTE — PROGRESS NOTES
Procedure type:    _____ultrasound guided ___x__stereotactic    Breast:    _____Left __x___Right    Location: Right Breast 1200    Needle: 8G    # of passes: 6 (1 with calcs and 5 no calcs)    Clip: Mini Barbell    Performed by:Dr. Roberts    Pressure held for 5 minutes by: Yajaira Hess RN    Steri Strips:    __x___yes _____no    Band aid:    ___x__yes_____no    Tape and guaze:    _____yes ____x_no    Tolerated procedure:    __x___yes _____no

## 2024-01-08 NOTE — PROGRESS NOTES
Patient arrived via:    __x___ambulatory    _____wheelchair    _____stretcher      Domestic violence screen    _____x_negative______positive    Breast Implants:    _______yes _____x___no

## 2024-01-10 ENCOUNTER — TELEPHONE (OUTPATIENT)
Dept: MAMMOGRAPHY | Facility: CLINIC | Age: 54
End: 2024-01-10

## 2024-01-10 PROCEDURE — 88342 IMHCHEM/IMCYTCHM 1ST ANTB: CPT | Performed by: PATHOLOGY

## 2024-01-10 PROCEDURE — 88305 TISSUE EXAM BY PATHOLOGIST: CPT | Performed by: PATHOLOGY

## 2024-01-10 PROCEDURE — 88341 IMHCHEM/IMCYTCHM EA ADD ANTB: CPT | Performed by: PATHOLOGY

## 2024-01-10 NOTE — TELEPHONE ENCOUNTER
Call placed to patient after pt given biopsy results from radiologist, questions answered, adv next step is to set patient up with surgeon, options discussed and patient wanted to have appt made with Dr. Dinorah Mcqueen, Hope line notified and will reach out with appointment.   Offered support.  Journal mailed.

## 2024-01-10 NOTE — TELEPHONE ENCOUNTER
[facility] Newly Diagnosed Genetics Checklist    Please route all intake forms to 'oncology genetics breast' pool in epic.  This includes patients that decline testing.    Patient is under 60 at the time of diagnosis discuss genetics (script below)    Script for Genetics:  Approximately 5-10% of cancer can have an underlying genetic cause.  Genetic testing is recommended for women diagnosed with breast cancer under the age of 60.  Your breast surgeon recommends that you have genetic testing to help determine your surgical plan.  Our genetics team will call you in 24-48 hours to discuss this test and schedule a blood draw.  The Genetics team will be able to address your questions.          Outcome:    Patient is under 60: Yes    Referral Outcome: Patient is not interested in genetic testing

## 2024-01-10 NOTE — TELEPHONE ENCOUNTER
Epic email was sent to Dr. Falguni Linda     I just wanted to inform you that the above patient was given her positive breast biopsy results today by Dr. Samira Roberts.  The patient was referred to  Breast surgeon  Dr. Dinorah Mcqueen.  Hope line notified and will reach out with appointment    If you have any questions or if I can be of any further assistance please let me know.    Thank you,  Isatu Chavez, JRARODN, RN  Breast Nurse Navigator

## 2024-01-11 ENCOUNTER — DOCUMENTATION (OUTPATIENT)
Dept: HEMATOLOGY ONCOLOGY | Facility: CLINIC | Age: 54
End: 2024-01-11

## 2024-01-11 ENCOUNTER — PATIENT OUTREACH (OUTPATIENT)
Dept: HEMATOLOGY ONCOLOGY | Facility: CLINIC | Age: 54
End: 2024-01-11

## 2024-01-11 DIAGNOSIS — Z17.0 MALIGNANT NEOPLASM OF RIGHT BREAST IN FEMALE, ESTROGEN RECEPTOR POSITIVE, UNSPECIFIED SITE OF BREAST: Primary | ICD-10-CM

## 2024-01-11 DIAGNOSIS — C50.911 MALIGNANT NEOPLASM OF RIGHT BREAST IN FEMALE, ESTROGEN RECEPTOR POSITIVE, UNSPECIFIED SITE OF BREAST: Primary | ICD-10-CM

## 2024-01-11 NOTE — PROGRESS NOTES
Intake received/ Chart reviewed for services completed outside of [facility]    Pathology completed: Yes, completed on 1-8-2024    Imaging completed: Mammo screening done on 10-. Diagnostic US & Diagnostic Mammogram done on 10-    All records needed are in patients chart. No records retrieval needed at this time.

## 2024-01-11 NOTE — PROGRESS NOTES
Per Dr Mcqueen MRI of breast ordered and scheduled. Soonest appt.available is for 01/23/24 at 2:15 om in [place]/

## 2024-01-11 NOTE — PROGRESS NOTES
Breast Oncology Nurse Navigator    Called patient for initial outreach from nurse navigator.  Left voicemail message with contact information.  Requested a call back.  Referral placed for oncology social worker.

## 2024-01-12 ENCOUNTER — PATIENT OUTREACH (OUTPATIENT)
Dept: HEMATOLOGY ONCOLOGY | Facility: CLINIC | Age: 54
End: 2024-01-12

## 2024-01-12 ENCOUNTER — PATIENT OUTREACH (OUTPATIENT)
Dept: CASE MANAGEMENT | Facility: HOSPITAL | Age: 54
End: 2024-01-12

## 2024-01-12 DIAGNOSIS — C50.911 MALIGNANT NEOPLASM OF RIGHT BREAST IN FEMALE, ESTROGEN RECEPTOR POSITIVE, UNSPECIFIED SITE OF BREAST: Primary | ICD-10-CM

## 2024-01-12 DIAGNOSIS — Z17.0 MALIGNANT NEOPLASM OF RIGHT BREAST IN FEMALE, ESTROGEN RECEPTOR POSITIVE, UNSPECIFIED SITE OF BREAST: Primary | ICD-10-CM

## 2024-01-12 NOTE — PROGRESS NOTES
Breast Oncology Nurse Navigator    Called patient for initial outreach from nurse navigator.  Left voicemail message with contact information.  Requested a call back.  Patient returned my 2 minutes later  Introduced myself and my role.      Confirmed upcoming appointment with More Skelton, working on rescheduling MRI appointment. Patient was unaware of MRI explained reason for it and was able to reschedule it for 01/15 at 6:30pm    Patient lives with   Referral placed to oncology social worker.  Referral placed to Genetics    Cancer family history includes: N/A    Discussed the Cancer Support Community and some of the programs and services offered    Patient has my contact information and knows she can reach out with questions.  General assessment completed.

## 2024-01-15 ENCOUNTER — APPOINTMENT (OUTPATIENT)
Dept: LAB | Age: 54
End: 2024-01-15
Payer: COMMERCIAL

## 2024-01-15 ENCOUNTER — HOSPITAL ENCOUNTER (OUTPATIENT)
Dept: RADIOLOGY | Facility: HOSPITAL | Age: 54
Discharge: HOME | End: 2024-01-15
Payer: COMMERCIAL

## 2024-01-15 DIAGNOSIS — C50.911 MALIGNANT NEOPLASM OF RIGHT BREAST IN FEMALE, ESTROGEN RECEPTOR POSITIVE, UNSPECIFIED SITE OF BREAST: ICD-10-CM

## 2024-01-15 DIAGNOSIS — E55.9 VITAMIN D INSUFFICIENCY: ICD-10-CM

## 2024-01-15 DIAGNOSIS — Z00.00 ANNUAL PHYSICAL EXAM: ICD-10-CM

## 2024-01-15 DIAGNOSIS — I10 PRIMARY HYPERTENSION: ICD-10-CM

## 2024-01-15 DIAGNOSIS — Z17.0 MALIGNANT NEOPLASM OF RIGHT BREAST IN FEMALE, ESTROGEN RECEPTOR POSITIVE, UNSPECIFIED SITE OF BREAST: ICD-10-CM

## 2024-01-15 LAB
ALBUMIN SERPL BCP-MCNC: 4.1 G/DL (ref 3.5–5)
ALP SERPL-CCNC: 46 U/L (ref 34–104)
ALT SERPL W P-5'-P-CCNC: 22 U/L (ref 7–52)
ANION GAP SERPL CALCULATED.3IONS-SCNC: 9 MMOL/L
AST SERPL W P-5'-P-CCNC: 19 U/L (ref 13–39)
BASOPHILS # BLD AUTO: 0.07 THOUSANDS/ÂΜL (ref 0–0.1)
BASOPHILS NFR BLD AUTO: 1 % (ref 0–1)
BILIRUB SERPL-MCNC: 0.49 MG/DL (ref 0.2–1)
BUN SERPL-MCNC: 15 MG/DL (ref 5–25)
CALCIUM SERPL-MCNC: 9.8 MG/DL (ref 8.4–10.2)
CHLORIDE SERPL-SCNC: 100 MMOL/L (ref 96–108)
CHOLEST SERPL-MCNC: 203 MG/DL
CO2 SERPL-SCNC: 28 MMOL/L (ref 21–32)
CREAT SERPL-MCNC: 0.69 MG/DL (ref 0.6–1.3)
EOSINOPHIL # BLD AUTO: 0.1 THOUSAND/ÂΜL (ref 0–0.61)
EOSINOPHIL NFR BLD AUTO: 1 % (ref 0–6)
ERYTHROCYTE [DISTWIDTH] IN BLOOD BY AUTOMATED COUNT: 13.4 % (ref 11.6–15.1)
GFR SERPL CREATININE-BSD FRML MDRD: 98 ML/MIN/1.73SQ M
GLUCOSE P FAST SERPL-MCNC: 127 MG/DL (ref 65–99)
HCT VFR BLD AUTO: 42.8 % (ref 34.8–46.1)
HDLC SERPL-MCNC: 53 MG/DL
HGB BLD-MCNC: 14.1 G/DL (ref 11.5–15.4)
IMM GRANULOCYTES # BLD AUTO: 0.02 THOUSAND/UL (ref 0–0.2)
IMM GRANULOCYTES NFR BLD AUTO: 0 % (ref 0–2)
LDLC SERPL CALC-MCNC: 123 MG/DL (ref 0–100)
LYMPHOCYTES # BLD AUTO: 1.7 THOUSANDS/ÂΜL (ref 0.6–4.47)
LYMPHOCYTES NFR BLD AUTO: 24 % (ref 14–44)
MCH RBC QN AUTO: 30.5 PG (ref 26.8–34.3)
MCHC RBC AUTO-ENTMCNC: 32.9 G/DL (ref 31.4–37.4)
MCV RBC AUTO: 92 FL (ref 82–98)
MONOCYTES # BLD AUTO: 0.54 THOUSAND/ÂΜL (ref 0.17–1.22)
MONOCYTES NFR BLD AUTO: 8 % (ref 4–12)
NEUTROPHILS # BLD AUTO: 4.71 THOUSANDS/ÂΜL (ref 1.85–7.62)
NEUTS SEG NFR BLD AUTO: 66 % (ref 43–75)
NONHDLC SERPL-MCNC: 150 MG/DL
NRBC BLD AUTO-RTO: 0 /100 WBCS
PLATELET # BLD AUTO: 322 THOUSANDS/UL (ref 149–390)
PMV BLD AUTO: 10.6 FL (ref 8.9–12.7)
POTASSIUM SERPL-SCNC: 3.9 MMOL/L (ref 3.5–5.3)
PROT SERPL-MCNC: 7.5 G/DL (ref 6.4–8.4)
RBC # BLD AUTO: 4.63 MILLION/UL (ref 3.81–5.12)
SODIUM SERPL-SCNC: 137 MMOL/L (ref 135–147)
TRIGL SERPL-MCNC: 137 MG/DL
TSH SERPL DL<=0.05 MIU/L-ACNC: 2.74 UIU/ML (ref 0.45–4.5)
WBC # BLD AUTO: 7.14 THOUSAND/UL (ref 4.31–10.16)

## 2024-01-15 PROCEDURE — C8908 MRI W/O FOL W/CONT, BREAST,: HCPCS

## 2024-01-15 PROCEDURE — 80061 LIPID PANEL: CPT

## 2024-01-15 PROCEDURE — 80053 COMPREHEN METABOLIC PANEL: CPT

## 2024-01-15 PROCEDURE — 84443 ASSAY THYROID STIM HORMONE: CPT

## 2024-01-15 PROCEDURE — 36415 COLL VENOUS BLD VENIPUNCTURE: CPT

## 2024-01-15 PROCEDURE — G1004 CDSM NDSC: HCPCS

## 2024-01-15 PROCEDURE — C8937 CAD BREAST MRI: HCPCS

## 2024-01-15 PROCEDURE — A9585 GADOBUTROL INJECTION: HCPCS | Performed by: SURGERY

## 2024-01-15 PROCEDURE — 85025 COMPLETE CBC W/AUTO DIFF WBC: CPT

## 2024-01-15 RX ORDER — GADOBUTROL 604.72 MG/ML
8 INJECTION INTRAVENOUS
Status: COMPLETED | OUTPATIENT
Start: 2024-01-15 | End: 2024-01-15

## 2024-01-15 RX ADMIN — GADOBUTROL 8 ML: 604.72 INJECTION INTRAVENOUS at 18:55

## 2024-01-15 NOTE — PROGRESS NOTES
I received a in basket message from NANCY Martinez asking to assist patient with rescheduling her breast MRI.   Patient was scheduled for 1- in [place].    Patient has been rescheduled for 1- @ 6:30pm in [place]    Contacted patient and I Introduced myself and my role.  I provided patient the new date for her breast mri including location and instructions . Patient had no questions or concerns at this time. I provided my contact information in case any should arise.

## 2024-01-18 ENCOUNTER — CLINICAL SUPPORT (OUTPATIENT)
Dept: GENETICS | Facility: CLINIC | Age: 54
End: 2024-01-18
Payer: COMMERCIAL

## 2024-01-18 ENCOUNTER — CONSULT (OUTPATIENT)
Dept: SURGICAL ONCOLOGY | Facility: CLINIC | Age: 54
End: 2024-01-18
Payer: COMMERCIAL

## 2024-01-18 VITALS
HEIGHT: 62 IN | DIASTOLIC BLOOD PRESSURE: 98 MMHG | OXYGEN SATURATION: 99 % | WEIGHT: 175.6 LBS | TEMPERATURE: 98 F | HEART RATE: 81 BPM | SYSTOLIC BLOOD PRESSURE: 160 MMHG | BODY MASS INDEX: 32.31 KG/M2

## 2024-01-18 DIAGNOSIS — G43.909 MIGRAINE WITHOUT STATUS MIGRAINOSUS, NOT INTRACTABLE, UNSPECIFIED MIGRAINE TYPE: ICD-10-CM

## 2024-01-18 DIAGNOSIS — C50.211 MALIGNANT NEOPLASM OF UPPER-INNER QUADRANT OF RIGHT FEMALE BREAST, UNSPECIFIED ESTROGEN RECEPTOR STATUS (HCC): Primary | ICD-10-CM

## 2024-01-18 DIAGNOSIS — R92.8 ABNORMAL MRI, BREAST: ICD-10-CM

## 2024-01-18 DIAGNOSIS — F41.9 ANXIETY: ICD-10-CM

## 2024-01-18 DIAGNOSIS — N60.91 ATYPICAL DUCTAL HYPERPLASIA OF RIGHT BREAST: ICD-10-CM

## 2024-01-18 PROCEDURE — 99245 OFF/OP CONSLTJ NEW/EST HI 55: CPT | Performed by: SURGERY

## 2024-01-18 PROCEDURE — 36415 COLL VENOUS BLD VENIPUNCTURE: CPT

## 2024-01-18 RX ORDER — BUTALBITAL, ACETAMINOPHEN AND CAFFEINE 300; 40; 50 MG/1; MG/1; MG/1
CAPSULE ORAL
Qty: 30 CAPSULE | Refills: 0 | Status: SHIPPED | OUTPATIENT
Start: 2024-01-18

## 2024-01-18 RX ORDER — BUSPIRONE HYDROCHLORIDE 5 MG/1
5 TABLET ORAL 2 TIMES DAILY
Qty: 60 TABLET | Refills: 0 | Status: SHIPPED | OUTPATIENT
Start: 2024-01-18 | End: 2024-01-26 | Stop reason: SDUPTHER

## 2024-01-18 RX ORDER — BUTALBITAL, ACETAMINOPHEN AND CAFFEINE 300; 40; 50 MG/1; MG/1; MG/1
CAPSULE ORAL
Qty: 30 CAPSULE | Refills: 0 | Status: CANCELLED | OUTPATIENT
Start: 2024-01-18

## 2024-01-18 NOTE — PROGRESS NOTES
Breast Consultation-Surgical Oncology     [address]  [facility]  [address]  [facility] [id number]    Name:  Nunu Tirado  YOB: 1970  MRN:  [id number]    Assessment/Plan   Diagnoses and all orders for this visit:    Malignant neoplasm of upper-inner quadrant of right female breast, unspecified estrogen receptor status (HCC)  -     US BREAST BILATERAL LIMITED (DIAGNOSTIC); Future  -     Mammo stereotactic breast biopsy right (all inc); Future  -     US guided breast biopsy left complete; Future  -     Mammo stereotactic breast biopsy left (all inc); Future  -     US guided breast biopsy right complete; Future    Atypical ductal hyperplasia of right breast    Abnormal MRI, breast  -     CT chest w contrast; Future  -     US BREAST BILATERAL LIMITED (DIAGNOSTIC); Future  -     Mammo stereotactic breast biopsy right (all inc); Future  -     US guided breast biopsy left complete; Future  -     Mammo stereotactic breast biopsy left (all inc); Future  -     US guided breast biopsy right complete; Future      HPI: Nunu Tirado is a 53 y.o. year old female who presents with newly diagnosed carcinoma of the right breast. She also has ADH in the right breast. Breast MRI showed a concern in the left breast as well as upper outer right breast and an indeterminant lesion lateral to the left SAMANTA. She has a remote family history of breast cancer in a paternal second cousin.     Surgical treatment to date consisted of n/a.    Oncology History:    Oncology History   Malignant neoplasm of upper-inner quadrant of right female breast (HCC)   1/8/2024 -  Cancer Staged    Staging form: Breast, AJCC 8th Edition  - Clinical stage from 1/8/2024: cT1b, cN0, cM0, G1, ER: Unknown, NH: Unknown, HER2: Unknown - Signed by Dinorah Mcqueen MD on 1/18/2024  Method of lymph node assessment: Clinical  Histologic grading system: 3 grade system       1/18/2024 Initial  Diagnosis    Malignant neoplasm of upper-inner quadrant of right female breast (HCC)         Pertinent reproductive history:  Age at menarche:    OB History          2    Para        Term                AB        Living   2         SAB        IAB        Ectopic        Multiple        Live Births   2                   Problem List:   Patient Active Problem List   Diagnosis    Pseudohypoaldosteronism    Migraine without aura and without status migrainosus, not intractable    BMI 31.0-31.9,adult    Annual physical exam    Vitamin D insufficiency    Anxiety    Hypertension    Other insomnia    Seasonal allergies    Atypical ductal hyperplasia of right breast    Abnormal MRI, breast    Malignant neoplasm of upper-inner quadrant of right female breast (HCC)     Past Medical History:   Diagnosis Date    Allergic     Seasonal/ 2 antibiotics    Anxiety 2022    COVID-19 virus infection 2021    GERD (gastroesophageal reflux disease)     Headache(784.0)     Hematuria     Hyperkalemia     Hypertension     Kidney stone     Malignant neoplasm of upper-inner quadrant of right female breast (HCC) 2024    Proteinuria     Pseudohypoaldosteronism      Past Surgical History:   Procedure Laterality Date    MAMMO STEREOTACTIC BREAST BIOPSY RIGHT (ALL INC) Right 2024    US GUIDED BREAST BIOPSY RIGHT COMPLETE Right 2024     Family History   Problem Relation Age of Onset    Depression Mother     Heart disease Mother     Diabetes Mother     Anxiety disorder Mother     Skin cancer Mother     Heart disease Father     Hypertension Father     Stroke Father     Depression Daughter     No Known Problems Maternal Grandmother     No Known Problems Maternal Grandfather     No Known Problems Paternal Grandmother     No Known Problems Paternal Grandfather     Diabetes Brother     No Known Problems Brother     No Known Problems Maternal Aunt     No Known Problems Paternal Aunt      Social History      Socioeconomic History    Marital status: /Civil Union     Spouse name: Not on file    Number of children: Not on file    Years of education: Not on file    Highest education level: Not on file   Occupational History    Not on file   Tobacco Use    Smoking status: Former     Current packs/day: 0.00     Average packs/day: 0.3 packs/day for 7.0 years (1.8 ttl pk-yrs)     Types: Cigarettes     Start date:      Quit date: 1993     Years since quittin.0    Smokeless tobacco: Never   Vaping Use    Vaping status: Never Used   Substance and Sexual Activity    Alcohol use: Yes     Alcohol/week: 3.0 standard drinks of alcohol     Types: 3 Cans of beer per week     Comment: social    Drug use: No    Sexual activity: Yes     Partners: Male     Birth control/protection: Male Sterilization   Other Topics Concern    Not on file   Social History Narrative    Not on file     Social Determinants of Health     Financial Resource Strain: Not on file   Food Insecurity: Not on file   Transportation Needs: Not on file   Physical Activity: Not on file   Stress: Not on file   Social Connections: Not on file   Intimate Partner Violence: Not on file   Housing Stability: Not on file     Current Outpatient Medications   Medication Sig Dispense Refill    acetaminophen (TYLENOL) 325 mg tablet Take 650 mg by mouth every 6 (six) hours as needed for mild pain      busPIRone (BUSPAR) 5 mg tablet Take 1 tablet (5 mg total) by mouth 2 (two) times a day 60 tablet 1    Butalbital-APAP-Caffeine (Fioricet) -40 MG CAPS 1 CAPSULE AT ONSET OF HEADACHE AND THEN MAY REPEAT AFTER 30 MINS IF NO IMPROVEMENT, NO MORE THAN 2 CAPSULES A DAY 30 capsule 0    fexofenadine (ALLEGRA) 60 MG tablet Take 60 mg by mouth as needed      fluticasone (FLONASE) 50 mcg/act nasal spray 1 spray into each nostril daily 16 g 0    hydrochlorothiazide (HYDRODIURIL) 25 mg tablet Take 1 tablet (25 mg total) by mouth daily 90 tablet 1    Ibuprofen 200 MG CAPS  Take by mouth as needed       magnesium 30 MG tablet Take 30 mg by mouth 2 (two) times a day      Multiple Vitamin (multivitamin) tablet Take 1 tablet by mouth daily      ondansetron (ZOFRAN-ODT) 4 mg disintegrating tablet Take 1 tablet (4 mg total) by mouth every 6 (six) hours as needed for nausea or vomiting 8 tablet 0    Probiotic Product (PROBIOTIC DAILY PO) Take by mouth daily      propranolol (Inderal LA) 80 mg 24 hr capsule Take 1 capsule (80 mg total) by mouth daily 90 capsule 1    hydrOXYzine HCL (ATARAX) 25 mg tablet Take 1 tablet (25 mg total) by mouth 3 (three) times a day as needed for anxiety (Patient not taking: Reported on 5/11/2023) 90 tablet 0     Current Facility-Administered Medications   Medication Dose Route Frequency Provider Last Rate Last Admin    ondansetron (ZOFRAN-ODT) dispersible tablet 4 mg  4 mg Oral Q6H PRN Kiran Ogden MD         Allergies   Allergen Reactions    Amoxicillin     Cefaclor          The following portions of the patient's history were reviewed and updated as appropriate: allergies, current medications, past family history, past medical history, past social history, past surgical history, and problem list.    Review of Systems:  Review of Systems   Constitutional: Negative.  Negative for appetite change, fever and unexpected weight change.   HENT: Negative.  Negative for trouble swallowing.    Eyes: Negative.    Respiratory: Negative.  Negative for cough and shortness of breath.    Cardiovascular: Negative.  Negative for chest pain.   Gastrointestinal: Negative.  Negative for abdominal pain, nausea and vomiting.   Endocrine: Negative.    Genitourinary: Negative.  Negative for dysuria.   Musculoskeletal:  Positive for arthralgias and back pain. Negative for myalgias.   Skin: Negative.    Allergic/Immunologic: Positive for environmental allergies.   Neurological: Negative.  Negative for headaches.   Hematological: Negative.  Negative for adenopathy. Does not  bruise/bleed easily.   Psychiatric/Behavioral: Negative.         Physical Exam:  Physical Exam  Constitutional:       General: She is not in acute distress.     Appearance: Normal appearance. She is well-developed.   HENT:      Head: Normocephalic and atraumatic.   Cardiovascular:      Heart sounds: Normal heart sounds.   Pulmonary:      Breath sounds: Normal breath sounds.   Chest:   Breasts:     Right: Skin change (biopsy site changes with resolving ecchymosis) present. No swelling, bleeding, inverted nipple, mass, nipple discharge or tenderness.      Left: No swelling, bleeding, inverted nipple, mass, nipple discharge, skin change or tenderness.   Abdominal:      Palpations: Abdomen is soft.   Musculoskeletal:      Right lower leg: No edema.      Left lower leg: No edema.   Lymphadenopathy:      Upper Body:      Right upper body: No supraclavicular, axillary or pectoral adenopathy.      Left upper body: No supraclavicular, axillary or pectoral adenopathy.   Neurological:      Mental Status: She is alert and oriented to person, place, and time.   Psychiatric:         Mood and Affect: Mood normal.         Laboratory:  1/8/2024 stereotactic biopsy right breast 12:00 reveals atypical ductal hyperplasia    1/8/2024 ultrasound-guided core biopsy right breast 3:00    Pathology revealed: invasive ductal carcinoma    Histologic grade: low grade     Angiolymphatic invasion:  absent    Tumor node status:  Negative    Hormone receptor status: Pending      Imaging:  10/3/2023 bilateral 3D screening mammogram shows a left-sided outer asymmetry, right-sided calcifications as well as asymmetry, density is a 2      10/24/2023 bilateral 3D diagnostic mammogram and ultrasound no sonographic findings for the asymmetry seen in the outer left breast, calcifications span approximately 2 cm and a 6 mm mass is noted at 3:00, normal-appearing axillary nodes      1/8/2024 postbiopsy mammogram is concordant, standard clips in place, ADH at  12:00 and carcinoma at 3:00, additional calcifications noted within the right breast specifically upper outer for which additional sampling was recommended, breast MRI is also recommended    1/15/2024 bilateral breast MRI shows a 50 Ellis mass upper outer left breast for which targeted ultrasound and biopsy is indicated, biopsy-proven malignancy at 3:00 measures 6 mm, non-mass enhancement that extends 1 cm posterior to the 12:00 clip as well as anterior to the clip for a total span of 39 mm, 7 mm non-mass enhancement upper outer right breast which is suspicious and possible correlate to the area seen on mammography, second look ultrasound and biopsy recommended, normal-appearing lymph nodes, 5 mm poorly defined area of enhancement just lateral to the left internal mammary artery that is not typical for a lymph node for which CAT scan is recommended        Discussion/Summary: 53-year-old female who presents with abnormal imaging of the bilateral breast.  She initially had a stereotactic biopsy right breast 12:00 revealing atypical duct hyperplasia.  She had an ultrasound-guided core biopsy right breast 3:00 which reveals a low-grade invasive duct carcinoma, receptors are pending.  Her breast MRI shows roughly a 4 cm of enhancement surrounding the 12:00 biopsy clip.  The carcinoma at 3:00 is roughly 6 mm.  There is an additional area of enhancement upper outer right breast for which sampling is required.  She also had abnormal enhancement seen in the left breast upper outer.  There was an asymmetry noted there on mammogram with no sonographic correlate.  In addition she has a poorly defined 5 mm area of enhancement lateral to the left internal mammary artery that is not plastic for lymph node.  CAT scan has been recommended for this finding.  I reviewed all of these concerns with her.  I am recommending second look ultrasound of the bilateral breast with possible biopsies.  I also am recommending the CAT scan to  further evaluate the lesion located adjacent to the internal mammary artery.  We discussed the role of genetic testing.  She is agreeable to proceeding with this.  I reviewed with her the multimodality treatment of breast cancer to include surgery, radiation and medical therapy.  I do not have a disposition for surgery at the present time given the additional workup that is needed.  Her receptors are also pending.  She understands that she will ultimately meet with medical oncology.  If she is able to achieve breast conservation, she will also need to meet with radiation oncology.  All of her questions were answered.  I will make arrangements for all of the additional imaging as noted as well as the genetics consult.  We will call her with these results.  Further recommendations will be made at that time.

## 2024-01-18 NOTE — PROGRESS NOTES
I introduced myself to Nunu who attended the visit with her  (Aidan) and I reviewed the following:    While the majority of cancer occurs by chance, approximately 5-10% of breast cancer has an underlying genetic cause.  Genetic testing is available which can determine if there is an underlying genetic cause to your cancer.  Understanding if there is an underlying genetic cause can:  Provide your surgeon with additional information to help with surgical decisions, treatment decisions and eligibility for clinical trials.    It can determine if you have an increased risk for any additional cancers.  Help family members understand their cancer risk.       We work closely with the [facility] breast surgeons and are reaching out to see if you have interest in genetic testing. The reason we are reaching out at this time is that this result may help your surgeon determine the appropriate type of surgery (i.e. lumpectomy vs mastectomy). This test is not a requirement but can take 5-10 days to complete so we would like to start the process as soon as possible so the results are ready for your appointment with your surgeon.       If you are interested in genetic testing, I can collect your family history and initiate genetic testing for you.        Patient elected to pursue testing     Diagnosis Details:  Invasive Ductal Carcinoma, ADH  Right  Hormone receptors pending    Personal History:  Do you have a personal history of any other cancer? No  If yes type/age of diagnosis:     Family history:   Do you have Ashkenazi Anabaptism ancestry? No  If yes, maternal, paternal, or both?    Do you have any children? Yes  How many sons? 1  How many daughters? 1  Do any of your children have a history of cancer? No  If yes type/age of diagnosis:     Do you have any brothers or sisters? Yes  How many brothers? 1  How many sisters? 0  Are they from the same parents? Yes  If no how maternal/paternal half-siblings:  Do any of your  brothers or sisters have a history of cancer? No  If yes who and the type/age of diagnosis:           Do you have nieces or nephews? Yes  Do any of them have a history of cancer? No  If yes type/age of diagnosis:    Does your mother have a history of cancer? Yes  If yes, cancer type and age of diagnosis: Melanoma age ~75 ([place])  Is your mother still living? Yes  Age/Age of death: 82    Thinking about your mother's family (aunts, uncles, cousins, grandparents) is there anyone with a history of cancer? No  If yes, list relationship, cancer type and age of diagnosis:    Does your father have a history of cancer? No  If yes, cancer type and age of diagnosis:  Is your father still living? No  Age/age of death: 47    Thinking about your father's family (aunts, uncles, cousins, grandparents) is there anyone with a history of cancer? Yes  If yes, list relationship, cancer type and age of diagnosis:   Paternal first cousin once removed - Breast Cancer age ~62 (Stage 0)     Types of Results - Positive, Negative, VUS  Positive Result - May explain personal diagnosis/family history. Can give surgeon information on treatment plan, inform future screening/management or tell a person about other possible risks. Positive results can initiate testing for other family members who may be at risk (children, siblings, etc)  Negative Result - Does not give an explanation. Surgical/treatment plan will be based on clinical presentation and will be part of discussion with surgeon. Negative result cannot be passed down to children, but they are still at elevated risk.  Uncertain Result - Common, but treated like a negative result clinically. 90% are downgraded over time.     [email or web address]'s billing policy   Most insurance plans cover the cost of genetic testing. The out-of-pocket cost varies due to the differences in deductibles, co-payments and co-insurance defined by individual plans but 90% of people pay $100 or less for a genetic test      A blood kit will be mailed to you overnight. Please take the blood kit along with packet of paperwork to any [facility]'s lab to have your blood drawn.    We have genetic counselors available, if you have any additional questions or would like to speak with them we can schedule you a 15 minute appointment.      Plan:  A blood kit was collected and the patient was provided information on genetic testing and the lab's billing policy.     [facility] (8 genes): KENIA, BRCA1, BRCA2, CDH1, CHEK2, PALB2, PTEN, TP53 with reflex to [facility] Cancer Panel+RNA (47 genes): APC, KENIA, AXIN2, BARD1, BMPR1A, BRCA1, BRCA2, BRIP1, CDH1, CDK4, CDKN2A, CHEK2, CTNNA1, DICER1, EPCAM, GREM1, HOXB13, KIT, MEN1, MLH1, MSH2, MSH3, MSH6, MUTYH, NBN, NF1, NTHL1, PALB2, PDGFRA, PMS2, POLD1, POLE, PTEN, RAD50, RAD51C, RAD51D, SDHA, SDHB, SDHC, SDHD, SMAD4, SMARCA4, STK11, TP53, TSC1, TSC2, VHL     Result Call Information:  I confirmed the patient's mobile number on file as the best number to call with results  I confirmed with the patient that we can leave a voicemail on the provided numbers    Initial results will take approximately 5-12 days to return     Additional results may take up to 2-3 weeks to complete once test is started.    Patient does not have any further questions, declined meeting with genetic counselor    When your results are ready, someone from the genetics team will call you, review the results, and contact your breast surgeon. You will be contacted with any type of result- positive, negative, or uncertain.

## 2024-01-19 ENCOUNTER — PATIENT OUTREACH (OUTPATIENT)
Dept: CASE MANAGEMENT | Facility: HOSPITAL | Age: 54
End: 2024-01-19

## 2024-01-19 NOTE — PROGRESS NOTES
OSW completed chart review. Patient had consult with Dr. Mcqueen. Awaiting additional results to determine treatment plan. OSW will follow.

## 2024-01-19 NOTE — PROGRESS NOTES
Spoke  with patient   regarding recommendation for;    ___x__ RIGHT __x____LEFT      __x___Ultrasound guided  ___x___Stereotactic breast biopsy.      __X___Verbalized understanding.      Blood thinners:  No: __x___ Yes: ______ What:                 Biopsy teaching sheet given:  Yes: ___X___ No: ________    Pt given contact information and adv to call with any questions/needs    Patient advised to arrive at ... for a ... appointment

## 2024-01-19 NOTE — TELEPHONE ENCOUNTER
1/18/24 last seen 6/19/23, next appointment 1/26/24, last filled 10/6/23 amt 30/0, checked the PDMP

## 2024-01-22 ENCOUNTER — PATIENT OUTREACH (OUTPATIENT)
Dept: HEMATOLOGY ONCOLOGY | Facility: CLINIC | Age: 54
End: 2024-01-22

## 2024-01-22 NOTE — PROGRESS NOTES
Patient returned call and stated she seen her CT appointment information . Patient had to reschedule CT due to a schedule conflict to 1-. Patient did not have any questions or concerns at this time. I encouraged her to call should any arise.

## 2024-01-22 NOTE — PROGRESS NOTES
[facility] received referral for patient. Patient has consult scheduled with Dr. Mcqueen for 1/18/24. [facility]W will follow for plan.   
[FreeTextEntry1] : Mr Hancock is a 76 year old man presented for follow up of gross hematuria, urinary frequency and urgency. The patient takes tamsulosin 0.4 mg, one half hour after meal twice daily, finasteride 5 mg once daily, and desipramine 10 mg at bedtime. The patient returned to the office 03/20/17 having gross hematuria with blood clot. Dr Holly Bernardo started the patient on Bactrim DS. He eventually went to the ER 03/21/17 where he had a Spann catheter placed. His creatinine in the ER was 1.01. Cystoscopy 03/23/17 found catheter trauma. Prostate protruded modest distance into the bladder. Bladder has a small capacity. Diffuse oozing of blood from bladder neck and trigone. Scraped up mucosa most likely from introduction of telescope. No bladder stones or tumors. Bladder inflammation from catheter. CT urogram was normal and found no etiology of the gross hematuria. 3/1/18: The patient returned and reported he has pain in his rectum area and penis. PSA from 1/24/18 was 0.39 ng/mL. 2/15/18 Creatinine was 1.01 mg/dL, hemoglobin was 14.1 g/dL. He received and abdomen ultrasound which shows a fatty liver, gallstones, and obscured pancreas. He denies any gross hematuria. He noted one day ago his GI physician put banding on his hemorrhoids.  4/10/18: The patient returned and reported he finished his antibiotics from last visit and noted he had another hemorrhoid and put another band by his GI. He noted pain still persists. UA from 3/1/18 shows 8 WBC/HPF. CBC from 3/1/18 showed slight anemia.  5/23/18: The patient returned and reported he has been taking Bactrim for his most recent prostatitis starting one week ago. PSA from 1/22/18 was 0.39 ng/mL. Currently taking Tamsulosin and Finasteride.  8/28/18: The patient returned and reported his urination is satisfactory. Currently taking tamsulosin and finasteride. Wakes up 3-4 times during the night to urinate. Noted he had a recent flare up of dysuria 3 days ago and took 2 days of Cipro and symptom have cleared up.  3/7/19: The patient returned and reported that he had another flare up of prostatitis and took 2 days of Cipro for his symptoms to clear. Complained of lingering dysuria. I reminded the patient that with Cipro, there is a side effect of tendonitis. Denied gross hematuria. Currently taking Trimethoprim, Tamsulosin and Finasteride. PSA from 2/15/19 was 0.04 ng/mL. 4/4/19: The patient returned and reported that he another flare up of prostatitis. Complained of pain when he stands up. His pain is relieved while sitting down. Occasionally has dysuria. Wakes up twice during the night to urinate. Complained of urinary urgency during the night. Takes Tamsulosin BID, Trimethoprim and Finasteride. 5/9/19: The patient returned today for an US. Transrectal US of the prostate findings showed a prostate volume of 23.4 cc, a hypoechoic area at the left apex 1.37 x 1.25 cm in transverse image, unremarkable seminal vesicles, and no rectal masses. The hypoechoic are is suspicious for prostate cancer, but the patient has a low PSA. I recommended that the patient undergo a transperineal biopsy for the hyperechoic region, but due to an anal fissure, the patient would like to wait. Notes that he consulted with a colon surgeon about his anal fissure and is currently taking stool softeners. His urination is adequate. Complains of nocturia. Wakes up 5-6 times during the night to urinate. Complains of pain at the tip of his penis. Takes Tamsulosin BID. UA from 4/11/19 was normal. Culture from 4/11/19 showed no growth. Notes that he took Bactrim and ended up getting a rash on his LEs. 6/10/19: Patient presents today for a follow up. Today he reports that he is scheduled for an anal fissure operation for 6/21/19. It has been advised that he will need 4 weeks to heal following the operation. Regarding his urination he reports that during the day there are no urinary issues. Nocturia is reported and he will have to wake up 5-6x a night to urinate. Finasteride and Tamsulosin are taken as directed.  7/18/19: Patient presents today for a follow up. Patient s/colorectal fistula repair on 6/21/2019. Patient with hypoechoic lesion of the prostate seen on recent TRUS. PSA 0.690 on 5/2109. Patient on Flomax /Proscar with both irritative and obstructive LUTS. Needs to strain to initiate urination. Nocturia times 5- 6. Denies dysuria or hematuria. Patient states that he feels that he has prostatitis based on familiar symptoms. Additionally he no longer takes Lisinopril but now is on Metoprolol 10 mg.  8/7/19: Patient presents today for a follow up. He states that the Bactrim prescribed at his last visit was not providing any relief. He still c/o discomfort when sitting but denies dysuria. He had Ciprofloxacin at home and had taken two tablets over the past two days. He noticed relief and inquired about having the prescription refilled today.  10/10/19: Patient presents today for a follow up. On 9/13/19 a needle biopsy of the prostate was completed under general anesthesia. He states that after the biopsy his chronic prostatitis began to cause discomfort. Ciprofloxacin 500 mg was used as directed and the discomfort is no longer present. Additionally it is reported that his sexual function decreased following the procedure but that it is starting to improve again. The results of the pathology report are to be discussed with him today.  12/31/2019: Patient presents today for a follow up. Pt reports pain on standing. He has an inflamed prostate. When the bladder is fill he also experiences pain and the pain goes away after he voids. Pt took Cipro to 7 days, finishing last week. While he took it, he felt better. Pt reports allergy to sulfur drugs and develops rash. 2/10/20: Patient presents today for a follow up. He had an MRI of the pelvis 1/23/20 which showed no evidence of rectal fistula could be identified. Pt has inflamed prostate Pt reports constipation for which he takes stool softner. He admits to dysuria and hematuria with mild pain at the tip of the penis yesterday. Most recent PSA was 0.24, Testosterone is 390.0.  02/27/2020: Patient presents today for a follow up. At the conclusion of the last visit, Doxycycline 100 mg was prescribed. The patient states he noticed no improvement. Today he reports having nighttime pain in the rectum which is alleviated after urination. Wakes up 4-5 times during the night to urinate. He has satisfactory day time urination though he reports associated strain. Denies chest pain. He currently takes a stool softener for constipation. The patient also complains of lower back pain for which he will get an injection shortly. A urine culture from 2/10/2020 showed no growth. His most recent creatinine level from 7/11/2019 was 1.07.  07/28/2020: Patient presents today for a follow up. Reports pain in the rectum and pain with fullness of the bladder. Pt had a US at [place]. Was given suppository which helped. Noxturia x4-5. On Tamsulosin BID. No burning. Frequency at night. Pt must push and staring. He experiences difficulty with his flow and voiding in general. Wife reports fowl smelling urine.   8/25/2020: Patient presents today for follow up on nocturia. Patient states he has not yet taken Desmopressin as he does not want to take too many medications. Still experiencing pain in rectum and with fullness of bladder. Was given Valium which helped. Last lab work on 7/28/2020 was within normal limits. Osmolality was normal at 294. Patient to start Desmopressin 0.1mg once before bedtime.   09/15/2020: Patient presents today for follow up. Pt was having pain in rectum and finished 5 days of Cipro which helped resolve pain, but causes constipation. Takes Colace for constipation. Urinalysis on 8/25/20 was normal. Still has pain in rectum at night, and uses Metronidazole gel to relieve it. Wakes up every 2 hours at night to urinate. Has some urgency.   11/17/2020: The patient presents today for a follow up. He is not taking desmopressin anymore. His last blood work on 11/6/2020 showed his creatinine was good at 1.0. He has no burning. He has some pain in the rectum and when his bladder is full. He is still taking finasteride 5 mg and trimethoprim 100 mg. At night he gets a lot of pain when his bladder is full and has urinary urgency about every 2 hours. After he urinates, the pain goes away.   03/09/2021: 73 yr  history of BPH, prostatitis , overactive bladder with nocturnal frequency.  Rectal area pains, last visit [facility] November 17, 2020 showing rectal mucosa nodule. To follow GI.  patient said he is taking gabapentin and tamsulosin and finasteride. Pain is worse when bladder is full and less when he voids.  Nocturnal frequency Q2 hr ED since 2019  Patient is seeing  for his rectal pain.    05/24/2021: Patient presents today for follow up. Patient went to a rectal surgeon for his rectal pain and had pelvic MRI 4/29/21 which showed no evidence of perianal fistula. States he continues to have pain in rectum after sitting for a long time and then standing. Patient was instructed to take Gabapentin 400mg TID, but skips afternoon dose as it makes him sleepy. Does have constipation. Has not started Tadalafil. No hx of prostate cancer.   06/15/2021: Patient presents today for a follow up. He reports that last week he had an episode of prostatitis for which he took Cipro for. Currently wakes every 2 hours during the night to urinate. Has tried desipramine and desmopressin in the past which did not improve his symptoms. Daytime urination remains stable. Stream is strong. Pt states he went to the cardiologist today and an EKG was done which was all normal. Currently takes iron supplements.   09/01/2021: Patient presents today for follow up. Today complains of straining and pushing to urinate. Denies dysuria. Denies infection of penis. Requests renewal of Finasteride and Tamsulosin. Not taking Trospium. Stopped Tadalafil due to side effect.   09/29/2021: Patient presents today for follow up. 9/1/21 UA showed 12 RBC/HPF and trace blood by dipstick. On repeat 9/9/21, he had only 3 RBC/HPF and small blood by dripstick which normal. Urine cytology 9/1/21 negative for high grade urothelial carcinoma. Provided blood work from 9/17/21 done by PCP which showed UA slightly cloudy, 3-5 RBC/HPF. PSA 0.2. Wakes 4x at night to urinate. States he has rectal pain when bladder is full.   02/02/2022: Patient presents today for follow up. Reviewed 12/29/21 lab work which showed MCV low at 75.2, WBC 6600, Hb 12.1, platelets 373512, potassium 4.9, creatinine 0.90 upper limits 1.18, BUN high at 25, AST minimally elevated 39 upper limits 34, alk phos normal 60, HbA1c 6.0, UA dipstick positive small blood, moderate leukocyte esterase, few squamous epithelial cells. Has pain when he sits and feels better when standing or laying down. States he must push to urinate. No pain on urination. Nocturia x3-4. Takes tamsulosin 0.4mg BID. Reports his EKG is normal.   03/14/2022: Patient presents today for follow up. Has been doing well, however does have to push urination. Nocturia x4. Erections are poor but not interested in medications. No dysuria. Stream is slow. Continues finasteride 5mg once daily and tamsulosin 0.4mg BID. Does not take tadalafil every day. Does not have any red itchy skin and has not needed to use clotrimazole-betamethasone cream. Reviewed 2/2/22 lab work. Urine culture grew 10,000-40,000 CFU/mL Enterococcus faecalis and patient was treated with amoxicillin 500mg TID and feels better now. PSA 0.24. Free testosterone low 3.9. Lab work of 2/11/22 showed brain natriuretic peptide normal, procalcitonin undetectable <0.10 ng/mL. Will be having evaluation for anorectal pain, with the proposed procedures being anorectal manometry, electromyography, pudendal nerve terminal motor latency, and endorectal ultrasound. Denies bladder pain. Will be having procedure for varicose veins of RLE.   03/30/2022: Patient presents today for follow up. Urination is good. Continues finasteride 5mg once daily and tamsulosin 0.4mg BID. Did not help with urination. Denies dysuria. Only has urinary urgency at night. Notes he eats spicy foods. No pain where he sits. Energy levels are good. Has tried Cialis in the past which did not help much. Erections had been poor since the biopsy. Reviewed 3/13/22 lab work which was WNL except free testosterone low 4.0, LH elevated 19, dihydrotestosterone low 4.1. Total testosterone normal 250. PSA was 0.20. UA normal. Urine cytology showed clusters of atypical urothelial cells with high nuclear cytoplasmic ratio, nuclear hyperchromasia and irregular nuclear contours in a background of moderate acute inflammation. States he went to rectal surgeon for anorectal pain, and had US which was reportedly normal and found muscles are weak, and was advised muscle dysfunction physical therapy which he is currently attending.   04/08/2022: Patient presents today for a follow up telephone visit for which he gave permission for. The visit was mostly conducted with his wife who was present. The patient has been taking finasteride 5 mg once daily and tamsulosin 0.4 mg BID. I prescribed the patient tadalafil 5 mg once daily at the time of the last visit, however he has not taken it. The pt's wife states today that her  told me during his 3/30/2022 visit he had a problems taking tadalafil which included flatulence and severe muscle aches. Both my personal memory and my note from that day do not recall him ever saying that, however there must have been some sort of miscommunication.   04/28/2022: Patient presents today for a follow up visit. Yesterday, he was having pain, but today he is feeling better. On 4/27/22, his PSA was 0.21 ng/mL, creatinine was 1.04. He also is complaining of rectal pain. He currently is taking Silodosin, one capsule daily, Tamsulosin once daily, Proscar once daily and Cefadroxil. His symptoms have improved with Cefadroxil. Previously he took Cipro, but it did not work satisfactorily. He is not taking Tadalafil. He denies urgency, but is experiencing slight urge incontinence. He reports Gabapentin alleviated his pain.  06/07/2022: 's wife called today and spoke to my nurse. She stated that I told her  to take tamsulosin 0.4 mg 2 capsules BID, however there was a misunderstanding and his wife wanted to speak to me. I informed her that it is tamsulosin 0.4 mg one capsule BID and she understood. His wife reports that he has been having difficulties urinating. Has an appt to see me on 6/30/2022.   07/20/2022:  presents today for a follow up. Patient obtained a CT Urogram w/wo contrast on 7/14/2022 to evaluate microscopic hematuria. CT revealed renal cysts and less than 1 cm low-attenuation lesions which are too small to characterize. No enhancing lesions, or renal stones are identified. On the delayed images, there is poor opacification of the proximal and distal ureter and the right distal ureter. No abnormal soft tissue or gross urothelial lesion is appreciated. Bladder was within normal limits. The anterior aspect of the bladder is not opacified on the delayed imaging. No lymphadenopathy was seen. The patient was pleased with the CT findings. We reviewed his prostate symptoms. Has a long hx of chronic prostatitis that initially responded to cipro but sometimes required bactrim. He currently has no acute problems. Denies pain when sitting for long periods of time. Has some minor urinary urgency and frequency at times but is overall pleased with his urologic state. Continues on finasteride 5 mg once daily, tamsulosin 0.4 mg once daily, tadalafil 5 mg once daily, and silodosin 8 mg once daily. Last PSA 4/27/2022 was 0.21. Creatinine at that time was 1.04.   07/27/2022:  presents today for a telephone visit for which he gave permission for. He wanted to review the results of his most recent urine test results. UA was perfect. Culture was no growth. Cytology consisted of mainly mature squamous epithelial cells, acute inflammation, and rare benign urothelial cells. Cytology was negative for high grade urothelial carcinoma. His wife inquired on why he needs to proceed with a cystoscopy.   03/02/2023: Mr. VIVIENNE HANCOCK presents today for a follow up. The pt continues to work as a . Patient provided a urine specimen on 2/28/2023. UA was normal. Culture was no significant growth. Urine cytology was negative for high grade urothelial carcinoma. Pt denies ever smoking or using tobacco products. Pt recently went to gastroenterologist and was told his stomach was distended. He admits to pushing and straining upon urination. His gastroenterologist put in orders for an US which he has scheduled. Patient continues to take finasteride 5 mg once daily and tamsulosin 0.4 mg BID. No longer is taking tadalafil 5 mg once daily.   05/22/2023: Mr. VIVIENNE HANCOCK was scheduled today for a follow up audio only telephone visit for which he gave permission for. I was unable to reach the pt but did reach his wife, Dr.Almas Benjamin who was located at their home, [address] [place] [address], and I was located in my office in [place], [place]. She informs me that the pt has a lot of pain in the rectum. It hurts when he lays down or when standing. The pain is all the time and not just when he moves the bowels. He has a rectal surgeon but he does not think it is a problem with the rectum. Pt has constipation but wife reports he manages it well.   06/15/2023: Mr. VIVIENNE HANCOCK presents today for a follow up and [facility]. He reports no significant changes in urination. Does c/o back pain. Had a CT at [facility]. Is planning on getting an epidural injection for pain. Pt reports he had lab work done yesterday, however the results are not yet back. He also reports perineal pain when he sits. His gastroenterologist is prescribing suppositories.   06/16/2023: Mr. HANCOCK is a 75 year old male presenting today via telehealth using real time 2 way audio only technology.  The patient,  Mr. HANCOCK, was located in his home at [address] [place] [address] at the time of the visit. The provider, FREDERIC BETH, was located in his office on [place], [place]. Verbal Consent was given by the patient on 06/16/2023 and my scribe served as a witness to the verbal consent. The patient provided blood sample on June 13 at [facility] but they seemed not available. I called to inquire and it was determined that an entry mistake occurred where the lab results was misdated and it was reported as being drawn as March 1st. The  was agreeable to correct the mistake. I called the patient again and explained the mishap to the patient. I explain that his CBC on 06/13/2023 shows that his blood count is low. HGB  is low at 9.8. HCT  low at 34.9. Mean Cell Volume  also low at 69.7. He reports that his diet has not changed recently. He says his last colonoscopy was several years ago.   07/17/2023: Mr. VIVIENNE HANCOCK presents today for a follow up audio only telephone visit for which he gave permission for. The pt was located at home, [address] [place] [address], and I was located in my office in [place], [place]. The patient reports that he had some rectal and perineal pain last night. He admits a lot of constipation and hard stool. Pt has not yet spoken to his PCP or gastroenterologist yet about his recent lab work.   09/11/2023: Mr. VIVIENNE HANCOCK presents today for a follow up. He reports ongoing problems with anal/ rectal pain. Denies any testicular pain. Has spoken to his colorectal surgeon and he feels he was not helped enough. Gabapentin 300 mg helps his pain overall. He has not tried taking it in the morning because it makes him a little tired and he is fearful of driving. Pt had lab work done at his PCP on 7/21/2023. Creatinine was normal. Urine tests were normal. PSA was not done. Pt is on an iron supplement which causes some constipation. Eats a diet rich in vegetables. Patient has a pacemaker. Admits pushing and straining on urination.   Dr. Gnozalez- 12/4/23: patient here for urgent visit  for rectal pain  same as described to dr beth in Sept 2023 constipation better seeing colorectal surgeon- to see in Jan 2024- normal eval by report last week urine done [facility] PCP : ( nov 25) : nomal - took 3d of levaquin psa 0.17 creat 1.0 normal white count 8.0 no luts- good flow, no strain , feels empty after void  here for eval: 1- check urine 2- exam signif for right sided pelvic pain - 3- to f/u with dr beth-consider PT of pelvic floor  12/12/2023: Mr. HANCOCK presents today for a follow up audio only telehealth visit for which they gave permission for. The patient was located at home [address] [place] [address] and I was located in my office in [place], [place]. Patient's 12/4/23 UA showed Proteinuria 30 mg/dL, trace Ketonuria, moderate blood, 19/HPF of WBC, 64/HPF of RBC and mucus present.  Wife reports the patient is experiencing terrible pain. The patient did take took levofloxacin but the abx resulted in no relief. They desired a stronger antibiotic, but I explained to them, if the microscopic hematuria does not clear I believed it to be suppurative to obtain a cystoscopy. Also explained this may be symptoms of bladder cancer, so it is within their best interest to obtain a Cystoscopy.   01/17/2024: Mr. VIVIENNE HANCOCK presents today for an audio visual telehealth visit for which he gave permission for. The patient was located at home at [address] [place] [address] and I was located at my office in [place], [place]. When I called, the patient was in physical therapy and gave permission for me to discuss with his wife by telephone call. She informed me that he is having a lot of pain in his prostate. At the time of his last suspected episode of prostatitis, I prescribed him doxycycline, however she informs me that it did not help him at all. The patient's last MRI of the pelvis was in April 2021. The patient now has a pacemaker. He is currently taking gabapentin.   01/22/2024: Mr. VIVIENNE HANCOCK presents today for a follow up audio only telephone visit for which he gave permission for. The pt was located at home, [address] [place] [address], and I was located in my office in [place], [place]. The patient has not yet obtained the CT I ordered, nor has he supplied a urine specimen to a [facility] lab. He reports that he is feeling okay this week.

## 2024-01-22 NOTE — PROGRESS NOTES
Oncology Care Coordinator attempted to outreach patient to inform her of the CT that has been scheduled .A voicemail was left stating a reason for my call and my contact information was provided . I requested a return call at patient's earliest convenience.

## 2024-01-25 ENCOUNTER — TELEPHONE (OUTPATIENT)
Dept: GENETICS | Facility: CLINIC | Age: 54
End: 2024-01-25

## 2024-01-25 NOTE — TELEPHONE ENCOUNTER
STAT Genetic Test Result    Summary:    I left a message with Nunu to review the result of her STAT genetic test.  I encouraged her to call our office back at [phone] to discuss this result further.      Initial Test: [email or web address] BRCAplus (8 genes): KENIA, BRCA1, BRCA2, CDH1, CHEK2, PALB2, PTEN, TP53     Result: Negative - No Clinically Significant Variants Detected      Assessment:     Negative   A negative result significantly reduces the likelihood that Nunu has a hereditary cancer syndrome related to the high-risk breast cancer genes listed above.      This result does not have surgical implications and surgical options should be discussed with her healthcare provider.        Additional Testing:  The [email or web address] CustomNext Cancer Panel+RNA (47 genes): APC, KENIA, AXIN2, BARD1, BMPR1A, BRCA1, BRCA2, BRIP1, CDH1, CDK4, CDKN2A, CHEK2, CTNNA1, DICER1, EPCAM, GREM1, HOXB13, KIT, MEN1, MLH1, MSH2, MSH3, MSH6, MUTYH, NBN, NF1, NTHL1, PALB2, PDGFRA, PMS2, POLD1, POLE, PTEN, RAD50, RAD51C, RAD51D, SDHA, SDHB, SDHC, SDHD, SMAD4, SMARCA4, STK11, TP53, TSC1, TSC2, VHL test is pending.  We will contact Nunu once results are available.  Additional recommendations for surveillance/medical management will be made upon final genetic test result.         Nunu's breast surgeon Dr. Mcqueen was made aware of this test result.

## 2024-01-26 ENCOUNTER — OFFICE VISIT (OUTPATIENT)
Dept: INTERNAL MEDICINE CLINIC | Age: 54
End: 2024-01-26
Payer: COMMERCIAL

## 2024-01-26 VITALS
TEMPERATURE: 97.9 F | DIASTOLIC BLOOD PRESSURE: 90 MMHG | SYSTOLIC BLOOD PRESSURE: 152 MMHG | BODY MASS INDEX: 32.48 KG/M2 | OXYGEN SATURATION: 99 % | WEIGHT: 176.5 LBS | HEIGHT: 62 IN | HEART RATE: 77 BPM

## 2024-01-26 DIAGNOSIS — J30.2 SEASONAL ALLERGIES: ICD-10-CM

## 2024-01-26 DIAGNOSIS — C50.211 MALIGNANT NEOPLASM OF UPPER-INNER QUADRANT OF RIGHT FEMALE BREAST, UNSPECIFIED ESTROGEN RECEPTOR STATUS (HCC): ICD-10-CM

## 2024-01-26 DIAGNOSIS — G47.09 OTHER INSOMNIA: ICD-10-CM

## 2024-01-26 DIAGNOSIS — I10 PRIMARY HYPERTENSION: ICD-10-CM

## 2024-01-26 DIAGNOSIS — E55.9 VITAMIN D INSUFFICIENCY: ICD-10-CM

## 2024-01-26 DIAGNOSIS — F41.9 ANXIETY: ICD-10-CM

## 2024-01-26 DIAGNOSIS — R73.9 HYPERGLYCEMIA: ICD-10-CM

## 2024-01-26 DIAGNOSIS — G43.009 MIGRAINE WITHOUT AURA AND WITHOUT STATUS MIGRAINOSUS, NOT INTRACTABLE: Primary | ICD-10-CM

## 2024-01-26 DIAGNOSIS — N60.91 ATYPICAL DUCTAL HYPERPLASIA OF RIGHT BREAST: ICD-10-CM

## 2024-01-26 PROCEDURE — 99215 OFFICE O/P EST HI 40 MIN: CPT | Performed by: INTERNAL MEDICINE

## 2024-01-26 RX ORDER — ALPRAZOLAM 0.25 MG/1
0.25 TABLET ORAL
Qty: 30 TABLET | Refills: 0 | Status: SHIPPED | OUTPATIENT
Start: 2024-01-26

## 2024-01-26 RX ORDER — BUSPIRONE HYDROCHLORIDE 5 MG/1
5 TABLET ORAL 2 TIMES DAILY
Qty: 180 TABLET | Refills: 1 | Status: SHIPPED | OUTPATIENT
Start: 2024-01-26

## 2024-01-26 NOTE — PROGRESS NOTES
Assessment/Plan:    Right-sided breast cancer  -Recently diagnosed and currently following up with hematology/oncology and surgical oncology  -We will follow-up with the result of her CT scan of the chest to evaluate the lesion seen in her left upper chest in breast MRI    Primary hypertension  -Not well-controlled, likely worsened by stress and insomnia as well as diet  -She was counseled to monitor her blood pressure in the morning and in the evening and to keep a blood pressure log for 2 weeks and to call the office if her blood pressure is consistently above 130/80 as this would indicate that we need to adjust her medications.  -Continue with current dose of propranolol and hydrochlorothiazide  -She was counseled to try to stick to a low-salt diet    Migraine headaches  -Has been occurring more frequently likely secondary to stress and also her premenstrual period.  -Continue with Fioricet as needed  -She was counseled to keep well-hydrated    Anxiety disorder  -Much worse secondary to recent stresses  -Continue with buspirone 2.5 mg twice daily, which we will refill today.  -We discussed addition of Prozac since her regular dose buspirone makes her too dizzy and she states that she will think about it.  -We will add alprazolam 0.25 mg as needed  -The [facility] website was queried and did not show misuse and controlled substance agreement was signed today    Insomnia  -Currently worsened likely secondary to current stressors  -She discontinued the hydroxyzine because she states that it makes her too sleepy  -She may use low-dose alprazolam for anxiety and sleep    Vitamin D insufficiency  -Stable  -Continue vitamin D supplements    Seasonal allergies  -Currently well-controlled  -Continue with Allegra as needed    Hyperglycemia  -Recent labs done on 1/15/2024 were reviewed and patient has a fasting blood glucose of 127, up from 122 on 11/8/2022  -We will order hemoglobin A1c especially since she has a  family history of diabetes  -We will follow-up with results  -Try to stick to a low-carb, low sugar diet and exercise       Diagnoses and all orders for this visit:    Migraine without aura and without status migrainosus, not intractable    Primary hypertension    Vitamin D insufficiency    Seasonal allergies    Other insomnia    Atypical ductal hyperplasia of right breast    Anxiety  -     busPIRone (BUSPAR) 5 mg tablet; Take 1 tablet (5 mg total) by mouth 2 (two) times a day  -     ALPRAZolam (XANAX) 0.25 mg tablet; Take 1 tablet (0.25 mg total) by mouth daily at bedtime as needed for anxiety or sleep    Malignant neoplasm of upper-inner quadrant of right female breast, unspecified estrogen receptor status (HCC)    Hyperglycemia  -     Hemoglobin A1C; Future          Subjective:      Patient ID: Nunu Tirado is a 53 y.o. female.    HPI    Patient presents for a follow-up visit regarding her recent diagnosis of breast cancer, migraine headaches, essential hypertension, vitamin D insufficiency, seasonal allergies, insomnia, anxiety disorder.  She states that she has been taking her medications as prescribed.  She was recently diagnosed with breast ca and there is a lot going on in her life even beyond that.  She has great support from her  and her best friend.  Her mom recently had surgery.  The MRI of the breast showed a lesion in her left chest that concerns her greatly.  She is scheduled for a CT scan of the chest to review that.  Of note, she had not had a mammogram for about 14 years prior to this mammogram she had  in October 2023.  She states that last week was bad and she is getting better.  She states that she recently started taking a quarter of a pill of her daughters xanax 0.25 mg and it helped   Has been taking her bp meds but her blood pressure has been elevated.  She states that she is worried about the dye for the contrast.  She had the dye about 30 years ago and has never reacted to it  but is still concerned.  Still takes 2.5 mg of buspirone because the 5 mg of buspirone makes her dizzy but the xanax does not make her dizzy.  Dad's cousin was diagnosed with breast ca about 3 years ago   Of note, she has not been on SSRIs or SNRI because she does not want wt gain   Does not smoke or use recreational drugs  No family history of drug abuse hx   She states that she has been checking her blood pressure at home and Bp at home has been in the 130s.  Has been higher at the doctors and she has been getting headaches  Occasionally takes salty chips  Taking vit d supplements - 2000  and mag glycinate   Has had more migraine headaches recently, about 4-5 over the past week  Cold triggers it for her and her periods as well and she is currently not perimenstrual period.  Seasonal allergies are well-controlled, usually acts up in the fall and spring.    The following portions of the patient's history were reviewed and updated as appropriate: She  has a past medical history of Allergic, Anxiety (11/11/2022), COVID-19 virus infection (07/27/2021), GERD (gastroesophageal reflux disease), Headache(784.0), Hematuria, Hyperkalemia, Hypertension, Kidney stone (1985), Malignant neoplasm of upper-inner quadrant of right female breast (HCC) (01/18/2024), Proteinuria, and Pseudohypoaldosteronism.  She   Patient Active Problem List    Diagnosis Date Noted   • Atypical ductal hyperplasia of right breast 01/18/2024   • Abnormal MRI, breast 01/18/2024   • Malignant neoplasm of upper-inner quadrant of right female breast (HCC) 01/18/2024   • Seasonal allergies 06/19/2023   • Other insomnia 03/01/2023   • Hypertension 11/17/2022   • Annual physical exam 11/11/2022   • Vitamin D insufficiency 11/11/2022   • Anxiety 11/11/2022   • BMI 31.0-31.9,adult 08/03/2021   • Migraine without aura and without status migrainosus, not intractable 07/26/2021   • 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     She  has a past surgical history that  includes Mammo stereotactic breast biopsy right (all inc) (Right, 01/08/2024); US guided breast biopsy right complete (Right, 01/08/2024); and Breast biopsy (Right, 01/08/2024).  Her family history includes Anxiety disorder in her mother; Breast cancer (age of onset: 60) in her other; Depression in her daughter and mother; Diabetes in her brother and mother; Heart disease in her father and mother; Hypertension in her father; No Known Problems in her brother, maternal aunt, maternal grandfather, maternal grandmother, paternal aunt, paternal grandfather, and paternal grandmother; Skin cancer in her mother; Stroke in her father.  She  reports that she quit smoking about 31 years ago. Her smoking use included cigarettes. She started smoking about 38 years ago. She has a 1.8 pack-year smoking history. She has never used smokeless tobacco. She reports current alcohol use of about 3.0 standard drinks of alcohol per week. She reports that she does not use drugs.  Current Outpatient Medications   Medication Sig Dispense Refill   • acetaminophen (TYLENOL) 325 mg tablet Take 650 mg by mouth every 6 (six) hours as needed for mild pain     • ALPRAZolam (XANAX) 0.25 mg tablet Take 1 tablet (0.25 mg total) by mouth daily at bedtime as needed for anxiety or sleep 30 tablet 0   • busPIRone (BUSPAR) 5 mg tablet Take 1 tablet (5 mg total) by mouth 2 (two) times a day 180 tablet 1   • Butalbital-APAP-Caffeine (Fioricet) -40 MG CAPS 1 CAPSULE AT ONSET OF HEADACHE AND THEN MAY REPEAT AFTER 30 MINS IF NO IMPROVEMENT, NO MORE THAN 2 CAPSULES A DAY 30 capsule 0   • fexofenadine (ALLEGRA) 60 MG tablet Take 60 mg by mouth as needed     • fluticasone (FLONASE) 50 mcg/act nasal spray 1 spray into each nostril daily 16 g 0   • hydrochlorothiazide (HYDRODIURIL) 25 mg tablet Take 1 tablet (25 mg total) by mouth daily 90 tablet 1   • Ibuprofen 200 MG CAPS Take by mouth as needed      • Magnesium 400 MG CAPS Take 400 mg by mouth in the morning      • Multiple Vitamin (multivitamin) tablet Take 1 tablet by mouth daily     • ondansetron (ZOFRAN-ODT) 4 mg disintegrating tablet Take 1 tablet (4 mg total) by mouth every 6 (six) hours as needed for nausea or vomiting 8 tablet 0   • Probiotic Product (PROBIOTIC DAILY PO) Take by mouth daily     • propranolol (Inderal LA) 80 mg 24 hr capsule Take 1 capsule (80 mg total) by mouth daily 90 capsule 1     Current Facility-Administered Medications   Medication Dose Route Frequency Provider Last Rate Last Admin   • ondansetron (ZOFRAN-ODT) dispersible tablet 4 mg  4 mg Oral Q6H PRN Kiran Ogden MD         Current Outpatient Medications on File Prior to Visit   Medication Sig   • acetaminophen (TYLENOL) 325 mg tablet Take 650 mg by mouth every 6 (six) hours as needed for mild pain   • Butalbital-APAP-Caffeine (Fioricet) -40 MG CAPS 1 CAPSULE AT ONSET OF HEADACHE AND THEN MAY REPEAT AFTER 30 MINS IF NO IMPROVEMENT, NO MORE THAN 2 CAPSULES A DAY   • fexofenadine (ALLEGRA) 60 MG tablet Take 60 mg by mouth as needed   • fluticasone (FLONASE) 50 mcg/act nasal spray 1 spray into each nostril daily   • hydrochlorothiazide (HYDRODIURIL) 25 mg tablet Take 1 tablet (25 mg total) by mouth daily   • Ibuprofen 200 MG CAPS Take by mouth as needed    • Magnesium 400 MG CAPS Take 400 mg by mouth in the morning   • Multiple Vitamin (multivitamin) tablet Take 1 tablet by mouth daily   • ondansetron (ZOFRAN-ODT) 4 mg disintegrating tablet Take 1 tablet (4 mg total) by mouth every 6 (six) hours as needed for nausea or vomiting   • Probiotic Product (PROBIOTIC DAILY PO) Take by mouth daily   • propranolol (Inderal LA) 80 mg 24 hr capsule Take 1 capsule (80 mg total) by mouth daily   • [DISCONTINUED] busPIRone (BUSPAR) 5 mg tablet Take 1 tablet (5 mg total) by mouth 2 (two) times a day   • [DISCONTINUED] hydrOXYzine HCL (ATARAX) 25 mg tablet Take 1 tablet (25 mg total) by mouth 3 (three) times a day as needed for anxiety (Patient  "not taking: Reported on 5/11/2023)     Current Facility-Administered Medications on File Prior to Visit   Medication   • ondansetron (ZOFRAN-ODT) dispersible tablet 4 mg     She is allergic to amoxicillin and cefaclor..    Review of Systems   Constitutional:  Negative for activity change, chills, fatigue, fever and unexpected weight change.   HENT:  Negative for ear pain, postnasal drip, rhinorrhea, sinus pressure and sore throat.    Eyes:  Negative for pain.   Respiratory:  Negative for cough, choking, chest tightness, shortness of breath and wheezing.    Cardiovascular:  Positive for palpitations. Negative for chest pain and leg swelling.   Gastrointestinal:  Negative for abdominal pain, constipation, diarrhea, nausea and vomiting.        Occasionally heart burn   Genitourinary:  Negative for dysuria and hematuria.   Musculoskeletal:  Positive for back pain (occasionally). Negative for arthralgias, gait problem, joint swelling, myalgias and neck stiffness.   Skin:  Negative for pallor and rash.   Neurological:  Positive for headaches (has been having headaches). Negative for dizziness, tremors, seizures, syncope and light-headedness.   Hematological:  Negative for adenopathy.   Psychiatric/Behavioral:  Positive for sleep disturbance. Negative for behavioral problems. The patient is nervous/anxious.          Objective:      /90 (BP Location: Left arm, Patient Position: Sitting, Cuff Size: Large)   Pulse 77   Temp 97.9 °F (36.6 °C) (Temporal)   Ht 5' 2\" (1.575 m)   Wt 80.1 kg (176 lb 8 oz)   LMP 01/02/2024   SpO2 99% Comment: room air  BMI 32.28 kg/m²          Physical Exam  Constitutional:       General: She is not in acute distress.     Appearance: She is well-developed. She is not diaphoretic.   HENT:      Head: Normocephalic and atraumatic.      Right Ear: External ear normal.      Left Ear: External ear normal.      Nose: Nose normal.      Mouth/Throat:      Mouth: Mucous membranes are dry.      " Pharynx: Posterior oropharyngeal erythema (Very dry mucous membranes with oropharyngeal erythema) present. No oropharyngeal exudate.   Eyes:      General: No scleral icterus.        Right eye: No discharge.         Left eye: No discharge.      Conjunctiva/sclera: Conjunctivae normal.      Pupils: Pupils are equal, round, and reactive to light.   Neck:      Thyroid: No thyromegaly.      Vascular: No JVD.      Trachea: No tracheal deviation.   Cardiovascular:      Rate and Rhythm: Normal rate and regular rhythm.      Heart sounds: Normal heart sounds. No murmur heard.     No friction rub. No gallop.   Pulmonary:      Effort: Pulmonary effort is normal. No respiratory distress.      Breath sounds: Normal breath sounds. No wheezing or rales.   Chest:      Chest wall: No tenderness.   Abdominal:      General: Bowel sounds are normal. There is no distension.      Palpations: Abdomen is soft. There is no mass.      Tenderness: There is no abdominal tenderness. There is no guarding or rebound.   Musculoskeletal:         General: No tenderness or deformity. Normal range of motion.      Cervical back: Normal range of motion and neck supple.   Lymphadenopathy:      Cervical: No cervical adenopathy.   Skin:     General: Skin is warm and dry.      Coloration: Skin is not pale.      Findings: Lesion (mulitple skin tags) present. No erythema or rash.   Neurological:      Mental Status: She is alert and oriented to person, place, and time.      Cranial Nerves: No cranial nerve deficit.      Motor: No abnormal muscle tone.      Coordination: Coordination normal.      Deep Tendon Reflexes: Reflexes are normal and symmetric.   Psychiatric:         Mood and Affect: Mood is anxious (Anxious affect).         Behavior: Behavior normal.           Clinical Support on 01/18/2024   Component Date Value Ref Range Status   • Miscellaneous Lab Test Result 01/18/2024    Final    Specimen collection Kit. Collection only test, results will be  provided by 3rd party Reference lab directly to ordering provider.    Appointment on 01/15/2024   Component Date Value Ref Range Status   • WBC 01/15/2024 7.14  4.31 - 10.16 Thousand/uL Final   • RBC 01/15/2024 4.63  3.81 - 5.12 Million/uL Final   • Hemoglobin 01/15/2024 14.1  11.5 - 15.4 g/dL Final   • Hematocrit 01/15/2024 42.8  34.8 - 46.1 % Final   • MCV 01/15/2024 92  82 - 98 fL Final   • MCH 01/15/2024 30.5  26.8 - 34.3 pg Final   • MCHC 01/15/2024 32.9  31.4 - 37.4 g/dL Final   • RDW 01/15/2024 13.4  11.6 - 15.1 % Final   • MPV 01/15/2024 10.6  8.9 - 12.7 fL Final   • Platelets 01/15/2024 322  149 - 390 Thousands/uL Final   • nRBC 01/15/2024 0  /100 WBCs Final   • Neutrophils Relative 01/15/2024 66  43 - 75 % Final   • Immat GRANS % 01/15/2024 0  0 - 2 % Final   • Lymphocytes Relative 01/15/2024 24  14 - 44 % Final   • Monocytes Relative 01/15/2024 8  4 - 12 % Final   • Eosinophils Relative 01/15/2024 1  0 - 6 % Final   • Basophils Relative 01/15/2024 1  0 - 1 % Final   • Neutrophils Absolute 01/15/2024 4.71  1.85 - 7.62 Thousands/µL Final   • Immature Grans Absolute 01/15/2024 0.02  0.00 - 0.20 Thousand/uL Final   • Lymphocytes Absolute 01/15/2024 1.70  0.60 - 4.47 Thousands/µL Final   • Monocytes Absolute 01/15/2024 0.54  0.17 - 1.22 Thousand/µL Final   • Eosinophils Absolute 01/15/2024 0.10  0.00 - 0.61 Thousand/µL Final   • Basophils Absolute 01/15/2024 0.07  0.00 - 0.10 Thousands/µL Final   • TSH 3RD GENERATON 01/15/2024 2.743  0.450 - 4.500 uIU/mL Final    The recommended reference ranges for TSH during pregnancy are as follows:   First trimester 0.100 to 2.500 uIU/mL   Second trimester  0.200 to 3.000 uIU/mL   Third trimester 0.300 to 3.000 uIU/m    Note: Normal ranges may not apply to patients who are transgender, non-binary, or whose legal sex, sex at birth, and gender identity differ.  Adult TSH (3rd generation) reference range follows the recommended guidelines of the American Thyroid Association,  January, 2020.   • Sodium 01/15/2024 137  135 - 147 mmol/L Final   • Potassium 01/15/2024 3.9  3.5 - 5.3 mmol/L Final   • Chloride 01/15/2024 100  96 - 108 mmol/L Final   • CO2 01/15/2024 28  21 - 32 mmol/L Final   • ANION GAP 01/15/2024 9  mmol/L Final   • BUN 01/15/2024 15  5 - 25 mg/dL Final   • Creatinine 01/15/2024 0.69  0.60 - 1.30 mg/dL Final    Standardized to IDMS reference method   • Glucose, Fasting 01/15/2024 127 (H)  65 - 99 mg/dL Final   • Calcium 01/15/2024 9.8  8.4 - 10.2 mg/dL Final   • AST 01/15/2024 19  13 - 39 U/L Final   • ALT 01/15/2024 22  7 - 52 U/L Final    Specimen collection should occur prior to Sulfasalazine administration due to the potential for falsely depressed results.    • Alkaline Phosphatase 01/15/2024 46  34 - 104 U/L Final   • Total Protein 01/15/2024 7.5  6.4 - 8.4 g/dL Final   • Albumin 01/15/2024 4.1  3.5 - 5.0 g/dL Final   • Total Bilirubin 01/15/2024 0.49  0.20 - 1.00 mg/dL Final    Use of this assay is not recommended for patients undergoing treatment with eltrombopag due to the potential for falsely elevated results.  N-acetyl-p-benzoquinone imine (metabolite of Acetaminophen) will generate erroneously low results in samples for patients that have taken an overdose of Acetaminophen.   • eGFR 01/15/2024 98  ml/min/1.73sq m Final   • Cholesterol 01/15/2024 203 (H)  See Comment mg/dL Final    Cholesterol:         Pediatric <18 Years        Desirable          <170 mg/dL      Borderline High    170-199 mg/dL      High               >=200 mg/dL        Adult >=18 Years            Desirable         <200 mg/dL      Borderline High   200-239 mg/dL      High              >239 mg/dL     • Triglycerides 01/15/2024 137  See Comment mg/dL Final    Triglyceride:     0-9Y            <75mg/dL     10Y-17Y         <90 mg/dL       >=18Y     Normal          <150 mg/dL     Borderline High 150-199 mg/dL     High            200-499 mg/dL        Very High       >499 mg/dL    Specimen collection  should occur prior to Metamizole administration due to the potential for falsely depressed results.   • HDL, Direct 01/15/2024 53  >=50 mg/dL Final   • LDL Calculated 01/15/2024 123 (H)  0 - 100 mg/dL Final    LDL Cholesterol:     Optimal           <100 mg/dl     Near Optimal      100-129 mg/dl     Above Optimal       Borderline High 130-159 mg/dl       High            160-189 mg/dl       Very High       >189 mg/dl         This screening LDL is a calculated result.   It does not have the accuracy of the Direct Measured LDL in the monitoring of patients with hyperlipidemia and/or statin therapy.   Direct Measure LDL (UBG269) must be ordered separately in these patients.   • Non-HDL-Chol (CHOL-HDL) 01/15/2024 150  mg/dl Final   Hospital Outpatient Visit on 01/08/2024   Component Date Value Ref Range Status   • Case Report 01/08/2024    Final                    Value:Surgical Pathology Report                         Case: [id number]                                  Authorizing Provider:  Falguni Linda DO       Collected:           01/08/2024 1407              Ordering Location:     [facility] Breast Received:            01/08/2024 1847                                     Center                                                                       Pathologist:           Cam Melgoza MD                                                                 Specimens:   A) - Breast, Right, stereo specimen 1 @ 12:00- 1 core with calcs                                    B) - Breast, Right, stereo specimen 2 @ 12:00- 5 cores without calcs                                C) - Breast, Right, US BX Right breast 300 7cmfn 4 passes 12g adelee                     • Final Diagnosis 01/08/2024    Final                    Value:This result contains rich text formatting which cannot be displayed here.   • Additional Information  01/08/2024    Final                    Value:This result contains rich text formatting which cannot be displayed here.   • Synoptic Checklist 01/08/2024    Final                    Value:INVASIVE CARCINOMA OF THE BREAST: Biopsy                            INVASIVE CARCINOMA OF THE BREAST: BIOPSY - All Specimens                            Protocol posted: 9/21/2022                                                        SPECIMEN                               Procedure:    Needle biopsy                                Specimen Laterality:    Right                                                         TUMOR                               Tumor Site:    Clock position                                :    3 o'clock                              Histologic Type:    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)                              Histologic Grade (Carter Histologic Score):                                   Glandular (Acinar) / Tubular Differentiation:    Score 1                                Nuclear Pleomorphism:    Score 2                                Mitotic Rate:    Score 1                                Overall Grade:    Grade 1 (scores of 3, 4 or 5)                              Tumor Size:    Greatest dimension of largest invasive focus (Millimeters): 6 mm                             Ductal Carcinoma In Situ (DCIS):    Not identified                              Lymphovascular Invasion:    Not identified      • Gross Description 01/08/2024    Final                    Value:This result contains rich text formatting which cannot be displayed here.   • Clinical Information 01/08/2024    Final                    Value:Right stereotactic breast biopsy for calcifications @ 12:00/ CC position/ Vertical arm/ 8g9cm mammotome revolve/ 8g Petdavie maguire clip/ specimens collected/ 1 core with calcs/ 5 cores without calcs   Hospital Outpatient Visit on 01/08/2024   Component Date Value Ref Range Status   • Case  Report 01/08/2024    Final                    Value:Surgical Pathology Report                         Case: [id number]                                  Authorizing Provider:  Falguni Linda DO       Collected:           01/08/2024 1407              Ordering Location:     [facility] Received:            01/08/2024 1847                                     Center                                                                       Pathologist:           Cam Melgoza MD                                                                 Specimens:   A) - Breast, Right, stereo specimen 1 @ 12:00- 1 core with calcs                                    B) - Breast, Right, stereo specimen 2 @ 12:00- 5 cores without calcs                                C) - Breast, Right, US BX Right breast 300 7cmfn 4 passes 12g marquee                     • Final Diagnosis 01/08/2024    Final                    Value:This result contains rich text formatting which cannot be displayed here.   • Additional Information 01/08/2024    Final                    Value:This result contains rich text formatting which cannot be displayed here.   • Synoptic Checklist 01/08/2024    Final                    Value:INVASIVE CARCINOMA OF THE BREAST: Biopsy                            INVASIVE CARCINOMA OF THE BREAST: BIOPSY - All Specimens                            Protocol posted: 9/21/2022                                                        SPECIMEN                               Procedure:    Needle biopsy                                Specimen Laterality:    Right                                                         TUMOR                               Tumor Site:    Clock position                                :    3 o'clock                              Histologic Type:    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)                               Histologic Grade (Carter Histologic Score):                                   Glandular (Acinar) / Tubular Differentiation:    Score 1                                Nuclear Pleomorphism:    Score 2                                Mitotic Rate:    Score 1                                Overall Grade:    Grade 1 (scores of 3, 4 or 5)                              Tumor Size:    Greatest dimension of largest invasive focus (Millimeters): 6 mm                             Ductal Carcinoma In Situ (DCIS):    Not identified                              Lymphovascular Invasion:    Not identified      • Gross Description 01/08/2024    Final                    Value:This result contains rich text formatting which cannot be displayed here.   • Clinical Information 01/08/2024    Final                    Value:Right stereotactic breast biopsy for calcifications @ 12:00/ CC position/ Vertical arm/ 8g9cm mammotome revolve/ 8g Petite barbell clip/ specimens collected/ 1 core with calcs/ 5 cores without calcs   Hospital Outpatient Visit on 10/20/2023   Component Date Value Ref Range Status   • Case Report 10/20/2023    Final                    Value:Surgical Pathology Report                         Case: [id number]                                   Authorizing Provider:  Jonn Montoya MD      Collected:           10/20/2023 0937              Ordering Location:     [facility]      Received:            10/20/2023 [address] [place] [address] Endoscopy                                                           Pathologist:           Will Moseley MD                                                            Specimens:   A) - Polyp, Colorectal, cold bx - transverse polyp                                                  B) - Polyp, Colorectal, cold snare - descending polyp                                               C) - Polyp, Colorectal, cold snare - rectal polyp                                          • Final Diagnosis 10/20/2023    Final                    Value:This result contains rich text formatting which cannot be displayed here.   • Note 10/20/2023    Final                    Value:This result contains rich text formatting which cannot be displayed here.   • Additional Information 10/20/2023    Final                    Value:This result contains rich text formatting which cannot be displayed here.   • Synoptic Checklist 10/20/2023    Final                    Value:                            COLON/RECTUM POLYP FORM - GI - All Specimens                                                                                     :    Adenoma(s)     • Gross Description 10/20/2023    Final                    Value:This result contains rich text formatting which cannot be displayed here.   • Clinical Information 10/20/2023    Final                    Value:Colon polyps   Office Visit on 05/11/2023   Component Date Value Ref Range Status   • LEUKOCYTE ESTERASE,UA 05/11/2023 trace   Final   • NITRITE,UA 05/11/2023 negative   Final   • SL AMB POCT UROBILINOGEN 05/11/2023 0.2   Final   • POCT URINE PROTEIN 05/11/2023 30   Final   •  PH,UA 05/11/2023 6   Final   • BLOOD,UA 05/11/2023 large   Final   • SPECIFIC GRAVITY,UA 05/11/2023 1.005   Final   • KETONES,UA 05/11/2023 negative   Final   • BILIRUBIN,UA 05/11/2023 negative   Final   • GLUCOSE, UA 05/11/2023 negative   Final   •  COLOR,UA 05/11/2023 yellow   Final   • CLARITY,UA 05/11/2023 clear   Final   • Urine Culture 05/11/2023 60,000-69,000 cfu/ml   Final    Mixed Contaminants X4   Annual Exam on 03/21/2023   Component Date Value Ref Range Status   • Case Report 03/21/2023    Final                    Value:Gynecologic Cytology Report                       Case: [id number]                                  Authorizing Provider:  Falguni Linda DO       Collected:           03/21/2023 0906              Ordering Location:     [facility] [place]       Received:            03/21/2023 0906              First Screen:          Josephine Serna, CT                                                       Specimen:    LIQUID-BASED PAP, SCREENING, Cervix                                                       • Primary Interpretation 03/21/2023 Negative for intraepithelial lesion or malignancy   Final   • Specimen Adequacy 03/21/2023 Satisfactory for evaluation. Endocervical/transformation zone component present.   Final   • Additional Information 03/21/2023    Final                    Value:This result contains rich text formatting which cannot be displayed here.   • LMP 03/21/2023 3/14/2023   Final   • HPV Other HR 03/21/2023 Negative  Negative Final    HPV types: 31,33,35,39,45,51,52,56,58,59,66 and 68 DNA are undetectable or below the pre-set threshold.   • HPV16 03/21/2023 Negative  Negative Final   • HPV18 03/21/2023 Negative  Negative Final

## 2024-01-26 NOTE — PATIENT INSTRUCTIONS
PLEASE CHECK YOUR BLOOD PRESSURE TWICE A DAY, IN THE MORNING AND IN THE EVENING AND KEEP A BLOOD PRESSURE LOG.  GOAL BLOOD PRESSURE /80 AND BELOW.  PLEASE CALL THE OFFICE IF BLOOD PRESSURE IS CONSISTENTLY ABOVE 130/80.

## 2024-01-31 ENCOUNTER — HOSPITAL ENCOUNTER (OUTPATIENT)
Dept: RADIOLOGY | Age: 54
Discharge: HOME | End: 2024-01-31
Payer: COMMERCIAL

## 2024-01-31 DIAGNOSIS — R92.8 ABNORMAL MRI, BREAST: ICD-10-CM

## 2024-01-31 PROCEDURE — G1004 CDSM NDSC: HCPCS

## 2024-01-31 PROCEDURE — 71260 CT THORAX DX C+: CPT

## 2024-01-31 RX ADMIN — IOHEXOL 85 ML: 350 INJECTION, SOLUTION INTRAVENOUS at 08:20

## 2024-02-01 DIAGNOSIS — R91.1 LUNG NODULE: Primary | ICD-10-CM

## 2024-02-02 ENCOUNTER — TELEPHONE (OUTPATIENT)
Dept: SURGICAL ONCOLOGY | Facility: CLINIC | Age: 54
End: 2024-02-02

## 2024-02-02 ENCOUNTER — TELEPHONE (OUTPATIENT)
Dept: GENETICS | Facility: CLINIC | Age: 54
End: 2024-02-02

## 2024-02-02 NOTE — TELEPHONE ENCOUNTER
Genetic Test Result Note:    Summary:    Result Disclosure:   Today I left a voicemail for Ambreen reviewing the results of her genetic test for hereditary cancer. She underwent genetic testing through our program on 1/18/2024 due to her recent diagnosis of breast cancer. I encouraged her to call [phone] to discuss this result further.      A separate report of her STAT genetic test results were disclosed to her on 1/25/204. This result includes new genes and does not change her initial genetic test result.     Test Performed: [employer] BRCAplus STAT Panel (8 genes): KENIA, BRCA1, BRCA2, CDH1, CHEK2, PALB2, PTEN, TP53 with reflex to [employer] CustomNext Cancer Panel+RNA (47 genes): APC, KENIA, AXIN2, BARD1, BMPR1A, BRCA1, BRCA2, BRIP1, CDH1, CDK4, CDKN2A, CHEK2, CTNNA1, DICER1, EPCAM, GREM1, HOXB13, KIT, MEN1, MLH1, MSH2, MSH3, MSH6, MUTYH, NBN, NF1, NTHL1, PALB2, PDGFRA, PMS2, POLD1, POLE, PTEN, RAD50, RAD51C, RAD51D, SDHA, SDHB, SDHC, SDHD, SMAD4, SMARCA4, STK11, TP53, TSC1, TSC2, VHL     Result: Negative - No Clinically Significant Variants Detected     Assessment:   A negative result significantly reduces the likelihood that Nunu has a hereditary cancer syndrome. However, this testing is unable to completely rule out the presence of hereditary cancer. It remains possible that:  There is a variant in an area of a gene which was not tested or there is a variant not detectable due to technical limitations of this test.     There is a variant in another gene that was not included in this test or in a gene not known to be linked to cancer or tumors.   A family member has a genetic variant that the patient did not inherit.   The cancer in the family is sporadic and is related to non-hereditary factors.     Risks and Testing for Family Members:  Nunu was made aware that all of her first-degree relatives are at increased risk to develop breast cancer based on her recent diagnosis and family history of breast cancer. We  recommend that her first-degree relatives make their healthcare providers aware of the family history and discuss their options for screening and risk-reduction.    At this time we do not recommend testing for Nunu 's children based on her negative test result.  Nunu 's children still need to consider the history of cancer on the other side of their family when determining their risks.     If Nunu has any affected family members with a cancer diagnosis, especially at a young age, they may still consider genetic testing. Relatives who wish to pursue genetic testing can reach out to the Cancer Risk and Genetics Program at [phone] to schedule an appointment or visit [email or web address] to identify a local genetic counselor.         Plan:     Negative Result: This result does not have surgical implications and surgical options should be discussed with her healthcare provider. Ambreen's breast surgeon, Dr. Mcqueen will be made aware of her results

## 2024-02-02 NOTE — TELEPHONE ENCOUNTER
Received a phone message from Ambreen confirming she received the detailed message from office this morning.

## 2024-02-02 NOTE — TELEPHONE ENCOUNTER
----- Message from Dinorah Mcqueen MD sent at 2/1/2024  5:06 PM EST -----  CT shows no concern in the mediastinum. Just calcification. She does need a f/u CT in 3 months for a small incidental lung nodule.

## 2024-02-02 NOTE — TELEPHONE ENCOUNTER
As per Dr Mcqueen, attempted calling Ambreen to review her CT Scan results and the recommendation for a repeat CT Chest in 3 months. No answer via phone ,left her a detailed message with recommendations and the contact number to schedule her Ct Scan. Encouraged her to call the office with any questions.

## 2024-02-06 ENCOUNTER — HOSPITAL ENCOUNTER (OUTPATIENT)
Dept: ULTRASOUND IMAGING | Facility: CLINIC | Age: 54
Discharge: HOME | End: 2024-02-06
Payer: COMMERCIAL

## 2024-02-06 ENCOUNTER — HOSPITAL ENCOUNTER (OUTPATIENT)
Dept: MAMMOGRAPHY | Facility: CLINIC | Age: 54
Discharge: HOME | End: 2024-02-06
Payer: COMMERCIAL

## 2024-02-06 VITALS — HEART RATE: 62 BPM | SYSTOLIC BLOOD PRESSURE: 136 MMHG | DIASTOLIC BLOOD PRESSURE: 72 MMHG

## 2024-02-06 DIAGNOSIS — R92.8 ABNORMAL MRI, BREAST: ICD-10-CM

## 2024-02-06 DIAGNOSIS — C50.211 MALIGNANT NEOPLASM OF UPPER-INNER QUADRANT OF RIGHT FEMALE BREAST, UNSPECIFIED ESTROGEN RECEPTOR STATUS (HCC): ICD-10-CM

## 2024-02-06 PROCEDURE — 19083 BX BREAST 1ST LESION US IMAG: CPT

## 2024-02-06 PROCEDURE — 88342 IMHCHEM/IMCYTCHM 1ST ANTB: CPT | Performed by: PATHOLOGY

## 2024-02-06 PROCEDURE — 88305 TISSUE EXAM BY PATHOLOGIST: CPT | Performed by: PATHOLOGY

## 2024-02-06 PROCEDURE — 76642 ULTRASOUND BREAST LIMITED: CPT

## 2024-02-06 PROCEDURE — 19084 BX BREAST ADD LESION US IMAG: CPT

## 2024-02-06 PROCEDURE — 88374 M/PHMTRC ALYS ISHQUANT/SEMIQ: CPT | Performed by: PATHOLOGY

## 2024-02-06 PROCEDURE — 19081 BX BREAST 1ST LESION STRTCTC: CPT

## 2024-02-06 PROCEDURE — 88360 TUMOR IMMUNOHISTOCHEM/MANUAL: CPT | Performed by: PATHOLOGY

## 2024-02-06 PROCEDURE — 88341 IMHCHEM/IMCYTCHM EA ADD ANTB: CPT | Performed by: PATHOLOGY

## 2024-02-06 PROCEDURE — A4648 IMPLANTABLE TISSUE MARKER: HCPCS

## 2024-02-06 RX ORDER — LIDOCAINE HYDROCHLORIDE AND EPINEPHRINE BITARTRATE 20; .01 MG/ML; MG/ML
10 INJECTION, SOLUTION SUBCUTANEOUS ONCE
Status: COMPLETED | OUTPATIENT
Start: 2024-02-06 | End: 2024-02-06

## 2024-02-06 RX ORDER — LIDOCAINE HYDROCHLORIDE AND EPINEPHRINE BITARTRATE 20; .01 MG/ML; MG/ML
10 INJECTION, SOLUTION SUBCUTANEOUS ONCE
Status: DISCONTINUED | OUTPATIENT
Start: 2024-02-06 | End: 2024-02-07 | Stop reason: HOSPADM

## 2024-02-06 RX ORDER — LIDOCAINE HYDROCHLORIDE 10 MG/ML
5 INJECTION, SOLUTION EPIDURAL; INFILTRATION; INTRACAUDAL; PERINEURAL ONCE
Status: COMPLETED | OUTPATIENT
Start: 2024-02-06 | End: 2024-02-06

## 2024-02-06 RX ADMIN — LIDOCAINE HYDROCHLORIDE AND EPINEPHRINE 10 ML: 20; 10 INJECTION, SOLUTION INFILTRATION; PERINEURAL at 09:35

## 2024-02-06 RX ADMIN — LIDOCAINE HYDROCHLORIDE 5 ML: 10 INJECTION, SOLUTION EPIDURAL; INFILTRATION; INTRACAUDAL; PERINEURAL at 09:35

## 2024-02-06 RX ADMIN — LIDOCAINE HYDROCHLORIDE 5 ML: 10 INJECTION, SOLUTION EPIDURAL; INFILTRATION; INTRACAUDAL; PERINEURAL at 09:09

## 2024-02-06 RX ADMIN — LIDOCAINE HYDROCHLORIDE 5 ML: 10 INJECTION, SOLUTION EPIDURAL; INFILTRATION; INTRACAUDAL; PERINEURAL at 09:00

## 2024-02-06 NOTE — PROGRESS NOTES
Procedure type: Site 1    __x___ultrasound guided _____stereotactic    Breast:    __x___Left _____Right    Location: 2 o'clock 7cmfn    Needle: 12G    # of passes: 3    Clip: Christy      Procedure type: Site1    ___x__ultrasound guided _____stereotactic    Breast:    _____Left __x___Right    Location: 11 o'clock 9 cmfn    Needle: 12G    # of passes: 3    Clip: Christy      Performed by: Dr. Szymanski    Pressure held for 5 minutes by: Silvia Esquivel Strips:    ___X__yes _____no    Band aid:    __X___yes_____no    Tolerated procedure:    __X___yes _____no

## 2024-02-06 NOTE — PROGRESS NOTES
Procedure type:    _____ultrasound guided ___x__stereotactic    Breast:    _____Left ___x__Right    Location: 10 o'clock    Needle: 8G    # of passes: 6 cores all submitted    Clip: Triple Twist    Performed by: Dr. Steward    Pressure held for 5 minutes by:Silvia Esquivel Strips:    ___X__yes _____no    Band aid:    __X___yes_____no    Tolerated procedure:    __X___yes _____no

## 2024-02-06 NOTE — PROGRESS NOTES
Ice pack given:    __X___yes _____no    Discharge instructions reviewed and given to patient:    __X___yes _____no    Discharged via:    __X___amulatory with  Aidan    _____wheelchair    _____stretcher    Biopsy site clean and dry with no bleeding on discharge:    __X___yes ____no

## 2024-02-07 NOTE — PROGRESS NOTES
Post procedure call completed    Bleeding: _____yes __x___no    Pain: __x___yes ______no Right breast-advill effective    Redness/Swelling: ___x___yes ______no Right breast-Ambreen slept on abdomen; 2 sites approx 1 inch apart    Band aid removed: ___x__yes _____no    Steri-Strips intact: __x____yes _____no    Ambreen is continuing to apply ice as directed today and verbalized understanding to change to heat if needed tomorrow. She agreed to monitor right breast redness and to call [facility] Nurse with questions/concerns.

## 2024-02-08 PROCEDURE — 88342 IMHCHEM/IMCYTCHM 1ST ANTB: CPT | Performed by: PATHOLOGY

## 2024-02-08 PROCEDURE — 88341 IMHCHEM/IMCYTCHM EA ADD ANTB: CPT | Performed by: PATHOLOGY

## 2024-02-08 PROCEDURE — 88305 TISSUE EXAM BY PATHOLOGIST: CPT | Performed by: PATHOLOGY

## 2024-02-12 ENCOUNTER — TELEPHONE (OUTPATIENT)
Dept: SURGICAL ONCOLOGY | Facility: CLINIC | Age: 54
End: 2024-02-12

## 2024-02-12 ENCOUNTER — TELEPHONE (OUTPATIENT)
Dept: HEMATOLOGY ONCOLOGY | Facility: CLINIC | Age: 54
End: 2024-02-12

## 2024-02-12 DIAGNOSIS — C50.211 MALIGNANT NEOPLASM OF UPPER-INNER QUADRANT OF RIGHT FEMALE BREAST, UNSPECIFIED ESTROGEN RECEPTOR STATUS (HCC): Primary | ICD-10-CM

## 2024-02-12 NOTE — TELEPHONE ENCOUNTER
Tc to pt and left m offering an appt to come in to see Dr Mcqueen on Monday 2/19 at 11am. Instructed to call back to schedule.

## 2024-02-12 NOTE — TELEPHONE ENCOUNTER
Patient Call    Who are you speaking with? Patient    If it is not the patient, are they listed on an active communication consent form? N/A   What is the reason for this call? Pt can't do Mondays so she can't can't do 2/19   Does this require a call back? Yes   If a call back is required, please list best call back number [phone]    If a call back is required, advise that a message will be forwarded to their care team and someone will return their call as soon as possible.   Did you relay this information to the patient? Yes

## 2024-02-14 ENCOUNTER — TELEPHONE (OUTPATIENT)
Age: 54
End: 2024-02-14

## 2024-02-14 NOTE — TELEPHONE ENCOUNTER
Patient looking to schedule mastectomy and has a few questions if you could please call her at your earliest convenience to discuss, thank you!!

## 2024-02-21 ENCOUNTER — PATIENT OUTREACH (OUTPATIENT)
Dept: CASE MANAGEMENT | Facility: HOSPITAL | Age: 54
End: 2024-02-21

## 2024-02-21 NOTE — PROGRESS NOTES
OSW completed chart review. Patient will have appointment with Dr. Mcqueen on 2/28/24 to discuss plan. OSW will follow for plan and then outreach for assessment and DT.

## 2024-02-26 ENCOUNTER — OFFICE VISIT (OUTPATIENT)
Dept: PLASTIC SURGERY | Facility: CLINIC | Age: 54
End: 2024-02-26
Payer: COMMERCIAL

## 2024-02-26 VITALS
TEMPERATURE: 98.1 F | SYSTOLIC BLOOD PRESSURE: 178 MMHG | HEART RATE: 89 BPM | DIASTOLIC BLOOD PRESSURE: 100 MMHG | BODY MASS INDEX: 32.44 KG/M2 | WEIGHT: 176.25 LBS | HEIGHT: 62 IN

## 2024-02-26 DIAGNOSIS — C50.211 MALIGNANT NEOPLASM OF UPPER-INNER QUADRANT OF RIGHT FEMALE BREAST, UNSPECIFIED ESTROGEN RECEPTOR STATUS (HCC): ICD-10-CM

## 2024-02-26 PROCEDURE — 99245 OFF/OP CONSLTJ NEW/EST HI 55: CPT | Performed by: STUDENT IN AN ORGANIZED HEALTH CARE EDUCATION/TRAINING PROGRAM

## 2024-02-28 ENCOUNTER — OFFICE VISIT (OUTPATIENT)
Dept: SURGICAL ONCOLOGY | Facility: CLINIC | Age: 54
End: 2024-02-28
Payer: COMMERCIAL

## 2024-02-28 VITALS
HEART RATE: 77 BPM | TEMPERATURE: 98 F | SYSTOLIC BLOOD PRESSURE: 130 MMHG | DIASTOLIC BLOOD PRESSURE: 90 MMHG | WEIGHT: 173.6 LBS | BODY MASS INDEX: 31.94 KG/M2 | RESPIRATION RATE: 16 BRPM | OXYGEN SATURATION: 98 % | HEIGHT: 62 IN

## 2024-02-28 DIAGNOSIS — C50.811 MALIGNANT NEOPLASM OF OVERLAPPING SITES OF RIGHT BREAST IN FEMALE, ESTROGEN RECEPTOR POSITIVE: Primary | ICD-10-CM

## 2024-02-28 DIAGNOSIS — Z17.0 MALIGNANT NEOPLASM OF OVERLAPPING SITES OF RIGHT BREAST IN FEMALE, ESTROGEN RECEPTOR POSITIVE: Primary | ICD-10-CM

## 2024-02-28 DIAGNOSIS — R91.1 LUNG NODULE: ICD-10-CM

## 2024-02-28 DIAGNOSIS — R92.8 ABNORMAL MRI, BREAST: ICD-10-CM

## 2024-02-28 DIAGNOSIS — Z13.71 BRCA NEGATIVE: ICD-10-CM

## 2024-02-28 PROCEDURE — 99215 OFFICE O/P EST HI 40 MIN: CPT | Performed by: SURGERY

## 2024-02-28 RX ORDER — ENOXAPARIN SODIUM 100 MG/ML
40 INJECTION SUBCUTANEOUS ONCE
OUTPATIENT
Start: 2024-02-28 | End: 2024-02-28

## 2024-02-28 RX ORDER — ACETAMINOPHEN 10 MG/ML
1000 INJECTION, SOLUTION INTRAVENOUS
OUTPATIENT
Start: 2024-02-28 | End: 2024-02-29

## 2024-02-28 RX ORDER — CEFAZOLIN SODIUM 1 G/50ML
1000 SOLUTION INTRAVENOUS
OUTPATIENT
Start: 2024-02-28

## 2024-02-28 NOTE — PROGRESS NOTES
Plastic Surgery Consult    Reason for visit: right breast cancer, presents for reconstructive options    HPI on 2/26/24  Patient is a pleasant 52 y/o female who presents with newly diagnosed right breast cancer. She has noted ADH at 12:00 and carcinoma at 3:00 as well as additional calcifications in the upper outer region of the breast. She has also some concerning lesions in the left breast that she states was worked up and negative. Patient has follow-up with Dr Mcqueen this Wed to finalize surgical oncologic plan. She states that she is leaning toward right breast mastectomy and would like to discuss reconstructive options.    She is currently a 40 D cup    ROS: 12 pt ROS negative, except as otherwise noted in HPI    PMH: HTN, Dante's syndrome  Family History   Problem Relation Age of Onset    Depression Mother     Heart disease Mother     Diabetes Mother     Anxiety disorder Mother     Skin cancer Mother     Heart disease Father     Hypertension Father     Stroke Father     Diabetes Brother     No Known Problems Brother     Depression Daughter     No Known Problems Maternal Aunt     No Known Problems Paternal Aunt     No Known Problems Maternal Grandmother     No Known Problems Maternal Grandfather     No Known Problems Paternal Grandmother     No Known Problems Paternal Grandfather     Breast cancer Other 60       SurgHx: kidney stone removal  SocHx: no tobacco, no ETOH  Meds: no blood thinners, no steroids  Allergies: PCN, cefaclor    PE:    Vitals:    02/26/24 1518   BP: (!) 178/100   Pulse: 89   Temp: 98.1 °F (36.7 °C)       General: NC/AT, breathing comfortably on RA  Neuro: CN II-XII grossly intact, symmetric reflexes  HEENT: PERRLA, EOMI, external ears normal, no lesions or deformities, neck supple, trachea midline  Respiratory: CTAB, normal respiratory effort  Cardio: RRR, normal S1, S2, no murmur, rubs, gallops  GI: soft, non-tender, non-distended  Extremities/MSK: normal alignment, mobility, gait, no  edema  Skin: no rashes, lesions, subcutaneous nodules    BMI 32.2    Bilateral moderate to large sized breasts, grade II ptosis bilaterally  Normal nipple sensation  Ecchymosis of right breast biopsy site  No palpable mass, skin dimpling, nipple discharge    Breast measurements:    R  L  SN-N  30 cm  32 cm  N-IMF  15 cm  14 cm  BW   14.5-15.5 cm 14.5-15.5    A/P: 52 y/o female with right breast cancer, leaning towards right breast mastectomy and has appointment with Dr Mcqueen this Wednesday to discuss final oncologic surgical planning.    --I discussed in-depth the various reconstructive options, both implant and autologous reconstructive options. I discussed that both of these methods would require multiple surgeries.  -A detailed conversation was had regarding the patient’s options for breast reconstruction.  Five main points, which are explained to all breast reconstruction patients, were discussed.     Breast reconstruction is an optional process.  In addition, breast reconstruction can be performed in an immediate and delayed fashion.  Even if a patient does not opt for reconstruction now, it can performed at a later time.  Breast reconstruction is a multi-stage process which involves multiple surgeries spaced several months apart.  The entire process can take over one year.  The patient can stop within this process and/or resume it again at any time.  The major goal of breast reconstruction is to have the patient look normal in clothing.  When naked, there will always be scars and other stigmata of the breast reconstruction process.  Asymmetries are often present during the reconstruction process.  Several operations may be needed, including surgery to the non-cancerous breast, to achieve satisfactory results.  No matter the reconstructive method, there are ways that the reconstruction can fail and a secondary reconstructive plan would need to be created.     For each of the reconstruction method involving  expander/implant recon and autologous tissue (DORIS, latissimus dorsi), the risks, benefits, alternatives, scarring, and recovery time were discussed in great detail.  Specific risks detailed included bleeding, infection, hematoma, seroma, scarring, pain, wound healing complications, flap loss, fat necrosis, capsular contracture, need for implant removal, donor site complications, bulge, hernia, umbilical necrosis, need for urgent reoperation, and need for dressing changes were discussed.     -Patient is currently leaning toward right breast mastectomy. After discussion of all surgical options, patient would like to proceed with immediate tissue expander placement with ADM at the time of mastectomy. She will further discuss implant vs autologous second stage reconstruction with her family before deciding. I informed her there is no rush to decide on the second stage reconstruction as she has ample time to make that decision.     -I re-iterated that the first-step of the reconstructive process would be to place immediate tissue expander at the time of mastectomy (either submuscular or prepectoral with acellular dermal matrix) to expand and preserve the skin envelope. This is beneficial because this preserves the skin envelope in it's expanded state if the patient should need radiation therapy. I discussed that radiation increases the risk of all complications (infection, seroma, abscess, cap con, implant extrusion, delayed wound healing, wound dehiscence) and can prolong the reconstructive process. After the patient is expanded to her desired size, patient can subsequently undergo implant exchange for permanent prothesis or autologous DORIS flap reconstruction. However, if radiation is needed, this will delay implant or autologous reconstruction for at least 6 months to allow for the effects of radiation to dissipate and tissue to recover. In addition, radiation increases the risk of all complications of implant  reconstruction. If implant reconstruction were to fail, some form of autologous breast reconstruction could be performed (DORIS, latissimus flaps). Patient acknowledged.   -I discussed the risks of tissue expander placement including infection, bleeding, scarring, leakage, rupture, capsular contracture, animation deformity. Patient acknowledged.  -I discussed the process of immediate reconstruction with tissue expander with acellular dermal matrix at the time of breast mastectomy. Discussed use of multiple drains, follow-up, clinic expansions. Patient acknowledged.  -I discussed the goals of unilateral breast reconstruction in that the goal is to have symmetrical volume in a bra; however, when naked, there will be obvious asymmetries particularly with implant based reconstruction. With autologous DORIS flaps, the symmetry can be better optimized due to using own naturally ptotic tissue.  -I also discussed the procedures, risks, complications for reconstruction for mastectomy vs lumpectomy. If lumpectomy is an option and patient decides to proceed with lumpectomy, patient will need oncologic reduction and will further discuss at next visit.  -All questions answered, concerns addressed.  -Patient to see Dr Mcqueen again this Wednesday to finalize surgical oncologic plan. Patient will return the following week to see me for 2nd visit for further discussion and finalization of reconstructive plan, answering questions, and to obtain consent at that time     -Spent 65 minutes in consultation with patient. Greater than 50% of the total time was spent obtaining history, evaluation, performing exam, discussion of management options including post-operative care, answering patient's questions and concerns, chart reviewing, and documentation     Brando Olmedo MD   [facility]   [address], [address]   [place] [address]   Office: [phone]

## 2024-02-28 NOTE — PROGRESS NOTES
Surgical Oncology Follow Up       [address]  [facility] SURGICAL ONCOLOGY [place]  [address]  [facility] [id number]    Nunu Tirado  1970  [id number]  [address]  [facility] SURGICAL ONCOLOGY [place]  [address] [place] [address]    Chief Complaint   Patient presents with    Pre-op Exam       Assessment/Plan   Diagnoses and all orders for this visit:    Malignant neoplasm of overlapping sites of right breast in female, estrogen receptor positive     Lung nodule    BRCA negative    Abnormal MRI, breast    Other orders  -     enoxaparin (LOVENOX) subcutaneous injection 40 mg  -     Apply SCD or Foot pumps; Standing  -     ceFAZolin (ANCEF) IVPB (premix in dextrose) 1,000 mg 50 mL  -     acetaminophen (Ofirmev) injection 1,000 mg        Advance Care Planning/Advance Directives:  Discussed disease status, cancer treatment plans and/or cancer treatment goals with the patient.     Oncology History:    Oncology History   Malignant neoplasm of overlapping sites of right breast in female, estrogen receptor positive    1/8/2024 -  Cancer Staged    Staging form: Breast, AJCC 8th Edition  - Clinical stage from 1/8/2024: cT1b, cN0, cM0, G1, ER: Unknown, NJ: Unknown, HER2: Unknown - Signed by Dinorah Mcqueen MD on 1/18/2024  Method of lymph node assessment: Clinical  Histologic grading system: 3 grade system       1/18/2024 Initial Diagnosis    Malignant neoplasm of upper-inner quadrant of right female breast (HCC)         History of Present Illness: Right breast carcinoma, here today to discuss and set up surgery  -Interval History: Testing, additional imaging, additional biopsies, plastic surgery consult    Review of Systems:  Review of Systems   Constitutional: Negative.  Negative for appetite change, fever and unexpected weight change.   HENT: Negative.  Negative for trouble swallowing.    Eyes: Negative.    Respiratory: Negative.  Negative for cough and  shortness of breath.    Cardiovascular: Negative.  Negative for chest pain.   Gastrointestinal: Negative.  Negative for abdominal pain, nausea and vomiting.   Endocrine: Negative.    Genitourinary: Negative.  Negative for dysuria.   Musculoskeletal: Negative.  Negative for arthralgias and myalgias.   Skin: Negative.    Allergic/Immunologic: Negative.    Neurological: Negative.  Negative for headaches.   Hematological: Negative.  Negative for adenopathy. Does not bruise/bleed easily.   Psychiatric/Behavioral: Negative.         Patient Active Problem List   Diagnosis    Pseudohypoaldosteronism    Migraine without aura and without status migrainosus, not intractable    BMI 31.0-31.9,adult    Annual physical exam    Vitamin D insufficiency    Anxiety    Hypertension    Other insomnia    Seasonal allergies    Atypical ductal hyperplasia of right breast    Abnormal MRI, breast    Malignant neoplasm of overlapping sites of right breast in female, estrogen receptor positive     Lung nodule    BRCA negative     Past Medical History:   Diagnosis Date    Allergic     Seasonal/ 2 antibiotics    Anxiety 11/11/2022    COVID-19 virus infection 07/27/2021    GERD (gastroesophageal reflux disease)     Headache(784.0)     Hematuria     Hyperkalemia     Hypertension     Kidney stone 1985    Malignant neoplasm of upper-inner quadrant of right female breast (HCC) 01/18/2024    Proteinuria     Pseudohypoaldosteronism      Past Surgical History:   Procedure Laterality Date    BREAST BIOPSY Right 01/08/2024    right breast    KIDNEY STONE SURGERY  1985    MAMMO STEREOTACTIC BREAST BIOPSY RIGHT (ALL INC) Right 01/08/2024    MAMMO STEREOTACTIC BREAST BIOPSY RIGHT (ALL INC) Right 02/06/2024    MAMMO STEREOTACTIC BREAST BIOPSY RIGHT (ALL INC) Right 02/06/2024    US GUIDED BREAST BIOPSY LEFT COMPLETE Left 02/06/2024    US GUIDED BREAST BIOPSY RIGHT COMPLETE Right 01/08/2024    US GUIDED BREAST BIOPSY RIGHT COMPLETE Right 02/06/2024     Family  History   Problem Relation Age of Onset    Depression Mother     Heart disease Mother     Diabetes Mother     Anxiety disorder Mother     Skin cancer Mother     Heart disease Father     Hypertension Father     Stroke Father     Diabetes Brother     No Known Problems Brother     Depression Daughter     No Known Problems Maternal Aunt     No Known Problems Paternal Aunt     No Known Problems Maternal Grandmother     No Known Problems Maternal Grandfather     No Known Problems Paternal Grandmother     No Known Problems Paternal Grandfather     Breast cancer Other 60     Social History     Socioeconomic History    Marital status: /Civil Union     Spouse name: Not on file    Number of children: Not on file    Years of education: Not on file    Highest education level: Not on file   Occupational History    Not on file   Tobacco Use    Smoking status: Former     Current packs/day: 0.00     Average packs/day: 0.3 packs/day for 7.0 years (1.8 ttl pk-yrs)     Types: Cigarettes     Start date:      Quit date: 1993     Years since quittin.1     Passive exposure: Past    Smokeless tobacco: Never   Vaping Use    Vaping status: Never Used   Substance and Sexual Activity    Alcohol use: Yes     Alcohol/week: 3.0 standard drinks of alcohol     Types: 3 Cans of beer per week     Comment: social    Drug use: No    Sexual activity: Yes     Partners: Male     Birth control/protection: Male Sterilization   Other Topics Concern    Not on file   Social History Narrative    Not on file     Social Determinants of Health     Financial Resource Strain: Not on file   Food Insecurity: Not on file   Transportation Needs: Not on file   Physical Activity: Not on file   Stress: Not on file   Social Connections: Not on file   Intimate Partner Violence: Not on file   Housing Stability: Not on file       Current Outpatient Medications:     acetaminophen (TYLENOL) 325 mg tablet, Take 650 mg by mouth every 6 (six) hours as needed for  mild pain, Disp: , Rfl:     ALPRAZolam (XANAX) 0.25 mg tablet, Take 1 tablet (0.25 mg total) by mouth daily at bedtime as needed for anxiety or sleep, Disp: 30 tablet, Rfl: 0    busPIRone (BUSPAR) 5 mg tablet, Take 1 tablet (5 mg total) by mouth 2 (two) times a day, Disp: 180 tablet, Rfl: 1    Butalbital-APAP-Caffeine (Fioricet) -40 MG CAPS, 1 CAPSULE AT ONSET OF HEADACHE AND THEN MAY REPEAT AFTER 30 MINS IF NO IMPROVEMENT, NO MORE THAN 2 CAPSULES A DAY, Disp: 30 capsule, Rfl: 0    fexofenadine (ALLEGRA) 60 MG tablet, Take 60 mg by mouth as needed, Disp: , Rfl:     fluticasone (FLONASE) 50 mcg/act nasal spray, 1 spray into each nostril daily, Disp: 16 g, Rfl: 0    hydrochlorothiazide (HYDRODIURIL) 25 mg tablet, Take 1 tablet (25 mg total) by mouth daily, Disp: 90 tablet, Rfl: 1    Ibuprofen 200 MG CAPS, Take by mouth as needed , Disp: , Rfl:     Magnesium 400 MG CAPS, Take 400 mg by mouth in the morning, Disp: , Rfl:     Multiple Vitamin (multivitamin) tablet, Take 1 tablet by mouth daily, Disp: , Rfl:     Probiotic Product (PROBIOTIC DAILY PO), Take by mouth daily, Disp: , Rfl:     propranolol (Inderal LA) 80 mg 24 hr capsule, Take 1 capsule (80 mg total) by mouth daily, Disp: 90 capsule, Rfl: 1    ondansetron (ZOFRAN-ODT) 4 mg disintegrating tablet, Take 1 tablet (4 mg total) by mouth every 6 (six) hours as needed for nausea or vomiting (Patient not taking: Reported on 2/26/2024), Disp: 8 tablet, Rfl: 0    Current Facility-Administered Medications:     ondansetron (ZOFRAN-ODT) dispersible tablet 4 mg, 4 mg, Oral, Q6H PRN, Kiran Ogden MD  Allergies   Allergen Reactions    Amoxicillin     Cefaclor        The following portions of the patient's history were reviewed and updated as appropriate: allergies, current medications, past family history, past medical history, past social history, past surgical history, and problem list.        Vitals:    02/28/24 0757   BP: 130/90   Pulse: 77   Resp: 16   Temp: 98  °F (36.7 °C)   SpO2: 98%       Physical Exam  Constitutional:       General: She is not in acute distress.  HENT:      Head: Normocephalic and atraumatic.   Cardiovascular:      Heart sounds: Normal heart sounds.   Pulmonary:      Breath sounds: Normal breath sounds.   Chest:   Breasts:     Right: Skin change (Well-healed biopsy site) present. No mass.      Left: Skin change (Well-healed biopsy site) present.   Abdominal:      Palpations: Abdomen is soft.   Musculoskeletal:      Right lower leg: No edema.      Left lower leg: No edema.   Lymphadenopathy:      Upper Body:      Right upper body: No axillary adenopathy.   Neurological:      Mental Status: She is alert and oriented to person, place, and time.   Psychiatric:         Mood and Affect: Mood normal.           Results:  Labs:  Genetic testing done through [employer] was negative    Receptors on the primary tumor done in January are highly positive ER/GA at 95% and HER2 1+    2/6/2024 core biopsy left breast 2:00 showed benign stromal fibrosis, concordant with her imaging    2/6/2024 core biopsy right breast 11:00 showed invasive duct carcinoma also low-grade, hormone positive and HER2 negative    2/6/2024 stereotactic biopsy right breast 10:00 revealed atypical duct hyperplasia    Imaging  1/15/2024 bilateral breast MRI showed additional findings bilateral for which second look ultrasound and additional biopsies were performed as noted above    2/624 bilateral breast ultrasound the second focus of carcinoma in the right breast measures 7 mm    2/6/2024 postbiopsy mammogram is concordant with total extent of disease being over 15 cm    1/31/2024 CT of the chest prompted by a questionable concern adjacent to the internal mammary artery shows no issues in this region and 1 small 3 mm pulmonary nodule for which follow-up is are be scheduled        I reviewed the above laboratory and imaging data.    Discussion/Summary: 53-year-old female who presents with multicentric  carcinoma of the right breast.  Total extent of disease is over 15 cm.  Her left-sided biopsy was benign.  The CAT scan of the chest is likely benign.  Her genetic testing was negative.  I therefore counseled her on a right mastectomy with lymphatic mapping, sentinel node biopsy and possible axillary dissection.  She met with plastic surgery and will be following up with them as well.  She is interested in reconstruction.  She will likely have a contralateral reduction/lift.  She understands that she will meet with medical oncology in the postoperative setting and will need at least adjuvant hormone therapy.  I do not see any current indications for postmastectomy radiation.  All of her questions were answered.  Consent was signed today in the office.  She will be scheduled for surgery in the near term coordinating with plastics.

## 2024-03-06 ENCOUNTER — APPOINTMENT (OUTPATIENT)
Dept: LAB | Age: 54
End: 2024-03-06
Payer: COMMERCIAL

## 2024-03-06 ENCOUNTER — OFFICE VISIT (OUTPATIENT)
Dept: PLASTIC SURGERY | Facility: CLINIC | Age: 54
End: 2024-03-06
Payer: COMMERCIAL

## 2024-03-06 VITALS
BODY MASS INDEX: 32.2 KG/M2 | HEART RATE: 73 BPM | DIASTOLIC BLOOD PRESSURE: 91 MMHG | HEIGHT: 62 IN | TEMPERATURE: 97.9 F | SYSTOLIC BLOOD PRESSURE: 155 MMHG | WEIGHT: 175 LBS

## 2024-03-06 DIAGNOSIS — C50.811 MALIGNANT NEOPLASM OF OVERLAPPING SITES OF RIGHT BREAST IN FEMALE, ESTROGEN RECEPTOR POSITIVE: ICD-10-CM

## 2024-03-06 DIAGNOSIS — R73.9 HYPERGLYCEMIA: ICD-10-CM

## 2024-03-06 DIAGNOSIS — Z17.0 MALIGNANT NEOPLASM OF OVERLAPPING SITES OF RIGHT BREAST IN FEMALE, ESTROGEN RECEPTOR POSITIVE: ICD-10-CM

## 2024-03-06 DIAGNOSIS — C50.211 MALIGNANT NEOPLASM OF UPPER-INNER QUADRANT OF RIGHT FEMALE BREAST, UNSPECIFIED ESTROGEN RECEPTOR STATUS (HCC): Primary | ICD-10-CM

## 2024-03-06 LAB
ALBUMIN SERPL BCP-MCNC: 3.7 G/DL (ref 3.5–5)
ALP SERPL-CCNC: 43 U/L (ref 34–104)
ALT SERPL W P-5'-P-CCNC: 14 U/L (ref 7–52)
ANION GAP SERPL CALCULATED.3IONS-SCNC: 8 MMOL/L
AST SERPL W P-5'-P-CCNC: 14 U/L (ref 13–39)
BACTERIA UR QL AUTO: ABNORMAL /HPF
BASOPHILS # BLD AUTO: 0.04 THOUSANDS/ÂΜL (ref 0–0.1)
BASOPHILS NFR BLD AUTO: 1 % (ref 0–1)
BILIRUB SERPL-MCNC: 0.32 MG/DL (ref 0.2–1)
BILIRUB UR QL STRIP: NEGATIVE
BUN SERPL-MCNC: 8 MG/DL (ref 5–25)
CALCIUM SERPL-MCNC: 8.9 MG/DL (ref 8.4–10.2)
CHLORIDE SERPL-SCNC: 101 MMOL/L (ref 96–108)
CLARITY UR: CLEAR
CO2 SERPL-SCNC: 25 MMOL/L (ref 21–32)
COLOR UR: COLORLESS
CREAT SERPL-MCNC: 0.58 MG/DL (ref 0.6–1.3)
EOSINOPHIL # BLD AUTO: 0.14 THOUSAND/ÂΜL (ref 0–0.61)
EOSINOPHIL NFR BLD AUTO: 2 % (ref 0–6)
ERYTHROCYTE [DISTWIDTH] IN BLOOD BY AUTOMATED COUNT: 13.7 % (ref 11.6–15.1)
EST. AVERAGE GLUCOSE BLD GHB EST-MCNC: 123 MG/DL
GFR SERPL CREATININE-BSD FRML MDRD: 104 ML/MIN/1.73SQ M
GLUCOSE P FAST SERPL-MCNC: 102 MG/DL (ref 65–99)
GLUCOSE UR STRIP-MCNC: NEGATIVE MG/DL
HBA1C MFR BLD: 5.9 %
HCT VFR BLD AUTO: 38.4 % (ref 34.8–46.1)
HGB BLD-MCNC: 12.9 G/DL (ref 11.5–15.4)
HGB UR QL STRIP.AUTO: ABNORMAL
IMM GRANULOCYTES # BLD AUTO: 0.01 THOUSAND/UL (ref 0–0.2)
IMM GRANULOCYTES NFR BLD AUTO: 0 % (ref 0–2)
KETONES UR STRIP-MCNC: NEGATIVE MG/DL
LEUKOCYTE ESTERASE UR QL STRIP: NEGATIVE
LYMPHOCYTES # BLD AUTO: 1.78 THOUSANDS/ÂΜL (ref 0.6–4.47)
LYMPHOCYTES NFR BLD AUTO: 27 % (ref 14–44)
MCH RBC QN AUTO: 30.2 PG (ref 26.8–34.3)
MCHC RBC AUTO-ENTMCNC: 33.6 G/DL (ref 31.4–37.4)
MCV RBC AUTO: 90 FL (ref 82–98)
MONOCYTES # BLD AUTO: 0.53 THOUSAND/ÂΜL (ref 0.17–1.22)
MONOCYTES NFR BLD AUTO: 8 % (ref 4–12)
NEUTROPHILS # BLD AUTO: 4.08 THOUSANDS/ÂΜL (ref 1.85–7.62)
NEUTS SEG NFR BLD AUTO: 62 % (ref 43–75)
NITRITE UR QL STRIP: NEGATIVE
NON-SQ EPI CELLS URNS QL MICRO: ABNORMAL /HPF
NRBC BLD AUTO-RTO: 0 /100 WBCS
PH UR STRIP.AUTO: 6.5 [PH]
PLATELET # BLD AUTO: 280 THOUSANDS/UL (ref 149–390)
PMV BLD AUTO: 11.4 FL (ref 8.9–12.7)
POTASSIUM SERPL-SCNC: 3.6 MMOL/L (ref 3.5–5.3)
PROT SERPL-MCNC: 6.6 G/DL (ref 6.4–8.4)
PROT UR STRIP-MCNC: NEGATIVE MG/DL
RBC # BLD AUTO: 4.27 MILLION/UL (ref 3.81–5.12)
RBC #/AREA URNS AUTO: ABNORMAL /HPF
SODIUM SERPL-SCNC: 134 MMOL/L (ref 135–147)
SP GR UR STRIP.AUTO: 1.01 (ref 1–1.03)
UROBILINOGEN UR STRIP-ACNC: <2 MG/DL
WBC # BLD AUTO: 6.58 THOUSAND/UL (ref 4.31–10.16)
WBC #/AREA URNS AUTO: ABNORMAL /HPF

## 2024-03-06 PROCEDURE — 83036 HEMOGLOBIN GLYCOSYLATED A1C: CPT

## 2024-03-06 PROCEDURE — 36415 COLL VENOUS BLD VENIPUNCTURE: CPT

## 2024-03-06 PROCEDURE — 85025 COMPLETE CBC W/AUTO DIFF WBC: CPT

## 2024-03-06 PROCEDURE — 99215 OFFICE O/P EST HI 40 MIN: CPT | Performed by: STUDENT IN AN ORGANIZED HEALTH CARE EDUCATION/TRAINING PROGRAM

## 2024-03-06 PROCEDURE — 81001 URINALYSIS AUTO W/SCOPE: CPT

## 2024-03-06 PROCEDURE — 80053 COMPREHEN METABOLIC PANEL: CPT

## 2024-03-07 ENCOUNTER — TELEPHONE (OUTPATIENT)
Dept: SURGICAL ONCOLOGY | Facility: CLINIC | Age: 54
End: 2024-03-07

## 2024-03-07 NOTE — TELEPHONE ENCOUNTER
Called patient to inform her that her disability paperwork for the [employer] and [place] have been filled out and signed by Dr. Mcqueen. Patient will come by the [place] office today before closing to  the paperwork. Paperwork has been faxed to insurance company, copy has been faxed to [employer], and physical paperwork given to . Patient has no other needs.

## 2024-03-10 LAB
ATRIAL RATE: 73 BPM
P AXIS: 52 DEGREES
PR INTERVAL: 166 MS
QRS AXIS: 9 DEGREES
QRSD INTERVAL: 80 MS
QT INTERVAL: 420 MS
QTC INTERVAL: 462 MS
T WAVE AXIS: 1 DEGREES
VENTRICULAR RATE: 73 BPM

## 2024-03-11 DIAGNOSIS — C50.811 MALIGNANT NEOPLASM OF OVERLAPPING SITES OF RIGHT BREAST IN FEMALE, ESTROGEN RECEPTOR POSITIVE: Primary | ICD-10-CM

## 2024-03-11 DIAGNOSIS — Z17.0 MALIGNANT NEOPLASM OF OVERLAPPING SITES OF RIGHT BREAST IN FEMALE, ESTROGEN RECEPTOR POSITIVE: Primary | ICD-10-CM

## 2024-03-11 DIAGNOSIS — R94.31 ABNORMAL EKG: ICD-10-CM

## 2024-03-11 NOTE — PROGRESS NOTES
Plastic Surgery Consult     Reason for visit: right breast cancer, presents for further discussion of reconstruction and preop    HPI from 3/6/24  Patient presents for further discussion of reconstruction and preop    She is scheduled to undergo right breast mastectomy with SLNB with Dr Mcqueen.    At length, I again discussed the procedure of immediate reconstruction with tissue expander with ADM. I reiterated that breast reconstruction is a multi-step staged procedure and the first step that keeps both implant recon and autologous recon options available is placing a tissue expander. I discussed both options again in detail.    I discussed the increased risk of complications and the effects on tissue of both radiation and chemotherapy and how both can prolong the reconstructive process.    Patient knows she would like reconstruction and will proceed with tissue expander now, but is unsure of later breast reconstruction with either implants or autologous tissue.     Patient has no history of abdominal surgeries.    She would like to ideally be a C-cup after reconstruction and I discussed that this is a goal but not guaranteed. I also discussed the goals and expectations of asymmetric breast reconstruction. Patient acknowledged.    I discussed overnight observation, usage of drains, importance of compression, and drain management postoperatively. Patient acknowledged.    BMI 32.2     Bilateral moderate to large sized breasts, grade II ptosis bilaterally  Normal nipple sensation  Ecchymosis of right breast biopsy site  No palpable mass, skin dimpling, nipple discharge     Breast measurements:                          R                      L  SN-N               30 cm              32 cm  N-IMF              15 cm              14 cm  BW                  14.5-15.5 cm   14.5-15.5    All questions answered, concerns addressed  Photos taken, consents obtained  Will notify patient if need to anjali day before  Ordered  implants  -Spent 40 minutes in consultation with patient. Greater than 50% of the total time was spent obtaining history, evaluation, performing exam, discussion of management options including post-operative care, answering patient's questions and concerns, chart reviewing, and documentation    Brando Olmedo MD   [facility] Plastic and Reconstructive Surgery   [address] [place] [address], [address]   [place] [address]   Office: [phone]           HPI on 2/26/24  Patient is a pleasant 54 y/o female who presents with newly diagnosed right breast cancer. She has noted ADH at 12:00 and carcinoma at 3:00 as well as additional calcifications in the upper outer region of the breast. She has also some concerning lesions in the left breast that she states was worked up and negative. Patient has follow-up with Dr Mcqueen this Wed to finalize surgical oncologic plan. She states that she is leaning toward right breast mastectomy and would like to discuss reconstructive options.     She is currently a 40 D cup     ROS: 12 pt ROS negative, except as otherwise noted in HPI     PMH: HTN, Dante's syndrome  Family History         Family History   Problem Relation Age of Onset    Depression Mother      Heart disease Mother      Diabetes Mother      Anxiety disorder Mother      Skin cancer Mother      Heart disease Father      Hypertension Father      Stroke Father      Diabetes Brother      No Known Problems Brother      Depression Daughter      No Known Problems Maternal Aunt      No Known Problems Paternal Aunt      No Known Problems Maternal Grandmother      No Known Problems Maternal Grandfather      No Known Problems Paternal Grandmother      No Known Problems Paternal Grandfather      Breast cancer Other 60            SurgHx: kidney stone removal  SocHx: no tobacco, no ETOH  Meds: no blood thinners, no steroids  Allergies: PCN, cefaclor     PE:         Vitals:     02/26/24 1518   BP: (!) 178/100   Pulse: 89   Temp: 98.1 °F (36.7  °C)         General: NC/AT, breathing comfortably on RA  Neuro: CN II-XII grossly intact, symmetric reflexes  HEENT: PERRLA, EOMI, external ears normal, no lesions or deformities, neck supple, trachea midline  Respiratory: CTAB, normal respiratory effort  Cardio: RRR, normal S1, S2, no murmur, rubs, gallops  GI: soft, non-tender, non-distended  Extremities/MSK: normal alignment, mobility, gait, no edema  Skin: no rashes, lesions, subcutaneous nodules     BMI 32.2     Bilateral moderate to large sized breasts, grade II ptosis bilaterally  Normal nipple sensation  Ecchymosis of right breast biopsy site  No palpable mass, skin dimpling, nipple discharge     Breast measurements:                          R                      L  SN-N               30 cm              32 cm  N-IMF              15 cm              14 cm  BW                  14.5-15.5 cm   14.5-15.5     A/P: 52 y/o female with right breast cancer, leaning towards right breast mastectomy and has appointment with Dr Mcqueen this Wednesday to discuss final oncologic surgical planning.     --I discussed in-depth the various reconstructive options, both implant and autologous reconstructive options. I discussed that both of these methods would require multiple surgeries.  -A detailed conversation was had regarding the patient’s options for breast reconstruction.  Five main points, which are explained to all breast reconstruction patients, were discussed.     Breast reconstruction is an optional process.  In addition, breast reconstruction can be performed in an immediate and delayed fashion.  Even if a patient does not opt for reconstruction now, it can performed at a later time.  Breast reconstruction is a multi-stage process which involves multiple surgeries spaced several months apart.  The entire process can take over one year.  The patient can stop within this process and/or resume it again at any time.  The major goal of breast reconstruction is to have the  patient look normal in clothing.  When naked, there will always be scars and other stigmata of the breast reconstruction process.  Asymmetries are often present during the reconstruction process.  Several operations may be needed, including surgery to the non-cancerous breast, to achieve satisfactory results.  No matter the reconstructive method, there are ways that the reconstruction can fail and a secondary reconstructive plan would need to be created.     For each of the reconstruction method involving expander/implant recon and autologous tissue (DORIS, latissimus dorsi), the risks, benefits, alternatives, scarring, and recovery time were discussed in great detail.  Specific risks detailed included bleeding, infection, hematoma, seroma, scarring, pain, wound healing complications, flap loss, fat necrosis, capsular contracture, need for implant removal, donor site complications, bulge, hernia, umbilical necrosis, need for urgent reoperation, and need for dressing changes were discussed.     -Patient is currently leaning toward right breast mastectomy. After discussion of all surgical options, patient would like to proceed with immediate tissue expander placement with ADM at the time of mastectomy. She will further discuss implant vs autologous second stage reconstruction with her family before deciding. I informed her there is no rush to decide on the second stage reconstruction as she has ample time to make that decision.     -I re-iterated that the first-step of the reconstructive process would be to place immediate tissue expander at the time of mastectomy (either submuscular or prepectoral with acellular dermal matrix) to expand and preserve the skin envelope. This is beneficial because this preserves the skin envelope in it's expanded state if the patient should need radiation therapy. I discussed that radiation increases the risk of all complications (infection, seroma, abscess, cap con, implant extrusion,  delayed wound healing, wound dehiscence) and can prolong the reconstructive process. After the patient is expanded to her desired size, patient can subsequently undergo implant exchange for permanent prothesis or autologous DORIS flap reconstruction. However, if radiation is needed, this will delay implant or autologous reconstruction for at least 6 months to allow for the effects of radiation to dissipate and tissue to recover. In addition, radiation increases the risk of all complications of implant reconstruction. If implant reconstruction were to fail, some form of autologous breast reconstruction could be performed (DORIS, latissimus flaps). Patient acknowledged.   -I discussed the risks of tissue expander placement including infection, bleeding, scarring, leakage, rupture, capsular contracture, animation deformity. Patient acknowledged.  -I discussed the process of immediate reconstruction with tissue expander with acellular dermal matrix at the time of breast mastectomy. Discussed use of multiple drains, follow-up, clinic expansions. Patient acknowledged.  -I discussed the goals of unilateral breast reconstruction in that the goal is to have symmetrical volume in a bra; however, when naked, there will be obvious asymmetries particularly with implant based reconstruction. With autologous DROIS flaps, the symmetry can be better optimized due to using own naturally ptotic tissue.  -I also discussed the procedures, risks, complications for reconstruction for mastectomy vs lumpectomy. If lumpectomy is an option and patient decides to proceed with lumpectomy, patient will need oncologic reduction and will further discuss at next visit.  -All questions answered, concerns addressed.  -Patient to see Dr Mcqueen again this Wednesday to finalize surgical oncologic plan. Patient will return the following week to see me for 2nd visit for further discussion and finalization of reconstructive plan, answering questions, and to  obtain consent at that time

## 2024-03-13 RX ORDER — FAMOTIDINE 20 MG/1
20 TABLET, FILM COATED ORAL 2 TIMES DAILY PRN
COMMUNITY

## 2024-03-13 NOTE — PRE-PROCEDURE INSTRUCTIONS
Pre-Surgery Instructions:   Medication Instructions    acetaminophen (TYLENOL) 325 mg tablet Uses PRN- OK to take day of surgery    ALPRAZolam (XANAX) 0.25 mg tablet Uses PRN- OK to take day of surgery    busPIRone (BUSPAR) 5 mg tablet Take day of surgery.    Butalbital-APAP-Caffeine (Fioricet) -40 MG CAPS Uses PRN- OK to take day of surgery    famotidine (Pepcid) 20 mg tablet Uses PRN- OK to take day of surgery    fexofenadine (ALLEGRA) 60 MG tablet Take day of surgery.    fluticasone (FLONASE) 50 mcg/act nasal spray Take day of surgery.    hydrochlorothiazide (HYDRODIURIL) 25 mg tablet Hold day of surgery.    Ibuprofen 200 MG CAPS Stop taking 7 days prior to surgery.    Magnesium 400 MG CAPS Stop taking 7 days prior to surgery.    Multiple Vitamin (multivitamin) tablet Stop taking 7 days prior to surgery.    Probiotic Product (PROBIOTIC DAILY PO) Stop taking 7 days prior to surgery.    propranolol (Inderal LA) 80 mg 24 hr capsule Take night before surgery   Medication instructions for day surgery reviewed. Please use only a sip of water to take your instructed medications. Avoid all over the counter vitamins, supplements and NSAIDS for one week prior to surgery per anesthesia guidelines. Tylenol is ok to take as needed.     You will receive a call one business day prior to surgery with an arrival time and hospital directions. If your surgery is scheduled on a Monday, the hospital will be calling you on the Friday prior to your surgery. If you have not heard from anyone by 8pm, please call the hospital supervisor through the hospital  at [phone]. ([place] [phone] or [place] [phone]).    Do not eat or drink anything after midnight the night before your surgery, including candy, mints, lifesavers, or chewing gum. Do not drink alcohol 24hrs before your surgery. Try not to smoke at least 24hrs before your surgery.       Follow the pre surgery showering instructions as listed in the “My  Surgical Experience Booklet” or otherwise provided by your surgeon's office. Do not use a blade to shave the surgical area 1 week before surgery. It is okay to use a clean electric clippers up to 24 hours before surgery. Do not apply any lotions, creams, including makeup, cologne, deodorant, or perfumes after showering on the day of your surgery. Do not use dry shampoo, hair spray, hair gel, or any type of hair products.     No contact lenses, eye make-up, or artificial eyelashes. Remove nail polish, including gel polish, and any artificial, gel, or acrylic nails if possible. Remove all jewelry including rings and body piercing jewelry.     Wear causal clothing that is easy to take on and off. Consider your type of surgery.    Keep any valuables, jewelry, piercings at home. Please bring any specially ordered equipment (sling, braces) if indicated.    Arrange for a responsible person to drive you to and from the hospital on the day of your surgery. Please confirm the visitor policy for the day of your procedure when you receive your phone call with an arrival time.     Call the surgeon's office with any new illnesses, exposures, or additional questions prior to surgery.    Please reference your “My Surgical Experience Booklet” for additional information to prepare for your upcoming surgery.

## 2024-03-14 ENCOUNTER — ANESTHESIA EVENT (OUTPATIENT)
Dept: PERIOP | Facility: HOSPITAL | Age: 54
End: 2024-03-14
Payer: COMMERCIAL

## 2024-03-14 ENCOUNTER — CONSULT (OUTPATIENT)
Dept: INTERNAL MEDICINE CLINIC | Age: 54
End: 2024-03-14
Payer: COMMERCIAL

## 2024-03-14 ENCOUNTER — OFFICE VISIT (OUTPATIENT)
Dept: CARDIOLOGY CLINIC | Facility: CLINIC | Age: 54
End: 2024-03-14
Payer: COMMERCIAL

## 2024-03-14 VITALS
DIASTOLIC BLOOD PRESSURE: 86 MMHG | WEIGHT: 170 LBS | HEIGHT: 62 IN | SYSTOLIC BLOOD PRESSURE: 160 MMHG | BODY MASS INDEX: 31.28 KG/M2 | OXYGEN SATURATION: 99 % | HEART RATE: 73 BPM | TEMPERATURE: 98.3 F

## 2024-03-14 VITALS
HEART RATE: 96 BPM | WEIGHT: 172.4 LBS | DIASTOLIC BLOOD PRESSURE: 90 MMHG | SYSTOLIC BLOOD PRESSURE: 163 MMHG | HEIGHT: 62 IN | OXYGEN SATURATION: 99 % | BODY MASS INDEX: 31.73 KG/M2

## 2024-03-14 DIAGNOSIS — R31.29 OTHER MICROSCOPIC HEMATURIA: ICD-10-CM

## 2024-03-14 DIAGNOSIS — R94.31 ABNORMAL EKG: ICD-10-CM

## 2024-03-14 DIAGNOSIS — Z82.49 FAMILY HISTORY OF PREMATURE CORONARY ARTERY DISEASE: ICD-10-CM

## 2024-03-14 DIAGNOSIS — I10 PRIMARY HYPERTENSION: ICD-10-CM

## 2024-03-14 DIAGNOSIS — F41.9 ANXIETY: ICD-10-CM

## 2024-03-14 DIAGNOSIS — G43.909 MIGRAINE WITHOUT STATUS MIGRAINOSUS, NOT INTRACTABLE, UNSPECIFIED MIGRAINE TYPE: ICD-10-CM

## 2024-03-14 DIAGNOSIS — Z87.891 HISTORY OF NICOTINE DEPENDENCE: ICD-10-CM

## 2024-03-14 DIAGNOSIS — Z01.818 PREOPERATIVE GENERAL PHYSICAL EXAMINATION: Primary | ICD-10-CM

## 2024-03-14 DIAGNOSIS — Z17.0 MALIGNANT NEOPLASM OF OVERLAPPING SITES OF RIGHT BREAST IN FEMALE, ESTROGEN RECEPTOR POSITIVE: ICD-10-CM

## 2024-03-14 DIAGNOSIS — I10 PRIMARY HYPERTENSION: Primary | ICD-10-CM

## 2024-03-14 DIAGNOSIS — R07.9 CHEST PAIN, UNSPECIFIED TYPE: ICD-10-CM

## 2024-03-14 DIAGNOSIS — C50.811 MALIGNANT NEOPLASM OF OVERLAPPING SITES OF RIGHT BREAST IN FEMALE, ESTROGEN RECEPTOR POSITIVE: ICD-10-CM

## 2024-03-14 LAB
BILIRUB UR QL STRIP: NEGATIVE
CLARITY UR: CLEAR
COLOR UR: COLORLESS
GLUCOSE UR STRIP-MCNC: NEGATIVE MG/DL
HGB UR QL STRIP.AUTO: NEGATIVE
KETONES UR STRIP-MCNC: NEGATIVE MG/DL
LEUKOCYTE ESTERASE UR QL STRIP: NEGATIVE
NITRITE UR QL STRIP: NEGATIVE
PH UR STRIP.AUTO: 6.5 [PH]
PROT UR STRIP-MCNC: NEGATIVE MG/DL
SL AMB  POCT GLUCOSE, UA: ABNORMAL
SL AMB LEUKOCYTE ESTERASE,UA: ABNORMAL
SL AMB POCT BILIRUBIN,UA: ABNORMAL
SL AMB POCT BLOOD,UA: ABNORMAL
SL AMB POCT CLARITY,UA: ABNORMAL
SL AMB POCT COLOR,UA: ABNORMAL
SL AMB POCT KETONES,UA: ABNORMAL
SL AMB POCT NITRITE,UA: ABNORMAL
SL AMB POCT PH,UA: 6.5
SL AMB POCT SPECIFIC GRAVITY,UA: 1.01
SL AMB POCT URINE PROTEIN: ABNORMAL
SL AMB POCT UROBILINOGEN: 0.2
SP GR UR STRIP.AUTO: 1.01 (ref 1–1.03)
UROBILINOGEN UR STRIP-ACNC: <2 MG/DL

## 2024-03-14 PROCEDURE — 81003 URINALYSIS AUTO W/O SCOPE: CPT | Performed by: INTERNAL MEDICINE

## 2024-03-14 PROCEDURE — 93000 ELECTROCARDIOGRAM COMPLETE: CPT | Performed by: INTERNAL MEDICINE

## 2024-03-14 PROCEDURE — 99204 OFFICE O/P NEW MOD 45 MIN: CPT | Performed by: STUDENT IN AN ORGANIZED HEALTH CARE EDUCATION/TRAINING PROGRAM

## 2024-03-14 PROCEDURE — 99243 OFF/OP CNSLTJ NEW/EST LOW 30: CPT | Performed by: INTERNAL MEDICINE

## 2024-03-14 RX ORDER — ALPRAZOLAM 0.25 MG/1
0.25 TABLET ORAL
Qty: 30 TABLET | Refills: 0 | Status: SHIPPED | OUTPATIENT
Start: 2024-03-14

## 2024-03-14 RX ORDER — BUTALBITAL, ACETAMINOPHEN AND CAFFEINE 300; 40; 50 MG/1; MG/1; MG/1
CAPSULE ORAL
Qty: 30 CAPSULE | Refills: 0 | Status: SHIPPED | OUTPATIENT
Start: 2024-03-14

## 2024-03-14 RX ORDER — AMLODIPINE BESYLATE 2.5 MG/1
2.5 TABLET ORAL DAILY
Qty: 90 TABLET | Refills: 5 | Status: SHIPPED | OUTPATIENT
Start: 2024-03-14 | End: 2025-09-05

## 2024-03-14 RX ORDER — BUSPIRONE HYDROCHLORIDE 5 MG/1
5 TABLET ORAL 3 TIMES DAILY
Qty: 270 TABLET | Refills: 1 | Status: SHIPPED | OUTPATIENT
Start: 2024-03-14

## 2024-03-14 NOTE — PROGRESS NOTES
Assessment/Plan:    Preoperative general physical examination  -patient is scheduled for right breast mastectomy with right lymphoscintigraphy, lymphatic mapping, sentinel lymph node biopsy, possible axillary node dissection followed by right breast immediate reconstruction with tissue expander, by Dr. Dinorah Mcqueen  and Dr. Brando Olmedo respectively, on 3/19/2024 for right-sided breast cancer, using general anesthesia at [facility] operating room.  - she functions at 6 Mets of physical activity daily  - her revised cardiac risk index by Ed criteria shows  low risk with an estimated rate of myocardial infarction, pulmonary edema, ventricular fibrillation, cardiac arrest or complete heart block of 0.9%  -Her preop EKG showed Q waves in V1 and V2 concerning for septal infarct (unfortunately I cannot find any prior EKGs to compare ) and repeat EKG in the office showed different results and she has a very significant family history of cardiac disease as well as a history of nicotine dependence.  For this reason, we will refer her to cardiology for cardiac clearance since she will be undergoing general anesthesia.  Will try and get her into see cardiology ASAP.  -She was counseled to discontinue any NSAIDs including Advil today.  -she may proceed with surgery as soon as she is cleared by cardiology.  - she should inform her PCP and her surgeon if her clinical status should change prior to surgery  -will fax completed pre-op notes to surgeon      Breast cancer  -Scheduled for right-sided mastectomy on 3/19/2024    Primary hypertension  -Blood pressure has been intermittently elevated very likely secondary to uncontrolled anxiety  -Will increase her anxiety medication and monitor her blood pressure very closely  -Continue with propranolol and hydrochlorothiazide as well as a low-salt diet    Abnormal EKG  -Preop EKG showed Q waves in V1 and V2 concerning for septal infarct and I cannot find any prior EKGs in  patient's records.  -Point-of-care EKG done in the office showed different results for 2 different printouts done a short time apart, including different significant QTcs  -Will refer patient to cardiology ASAP for cardiac clearance and call and try to get her in ASAP.    Microscopic hematuria  -Lab results were reviewed and showed hematuria which she explained as coming from her menstrual period  -Point-of-care urine dipstick also showed trace blood even though her menstrual period is over.  -Will order urine culture and follow-up with results and treat her for UTI prior to surgery if results are positive for a UTI.    Anxiety  -Much worse secondary to recent life stressors.  -Will increase her buspirone from 5 mg twice daily to 5 mg 3 times daily  -We will refill her Xanax  -The [facility] website was queried and did not show misuse.    Migraine headaches  -Worse  -We will refill her Fioricet as requested  -She was encouraged to keep well-hydrated     Diagnoses and all orders for this visit:    Preoperative general physical examination  -     Ambulatory Referral to Cardiology; Future    Malignant neoplasm of overlapping sites of right breast in female, estrogen receptor positive     Primary hypertension    Abnormal EKG  -     POCT ECG  -     Ambulatory Referral to Cardiology; Future  -     Cancel: POCT ECG    Other microscopic hematuria  -     POCT urine dip  -     UA w Reflex to Microscopic w Reflex to Culture    History of nicotine dependence  -     POCT ECG  -     Ambulatory Referral to Cardiology; Future    Family history of premature coronary artery disease  -     Ambulatory Referral to Cardiology; Future    Anxiety  -     busPIRone (BUSPAR) 5 mg tablet; Take 1 tablet (5 mg total) by mouth 3 (three) times a day  -     ALPRAZolam (XANAX) 0.25 mg tablet; Take 1 tablet (0.25 mg total) by mouth daily at bedtime as needed for anxiety or sleep    Migraine without status migrainosus, not intractable,  unspecified migraine type  -     Butalbital-APAP-Caffeine (Fioricet) -40 MG CAPS; 1 CAPSULE AT ONSET OF HEADACHE AND THEN MAY REPEAT AFTER 30 MINS IF NO IMPROVEMENT, NO MORE THAN 2 CAPSULES A DAY             Subjective:      Patient ID: Nunu Tirado is a 53 y.o. female.    HPI    Patient presents for a preoperative physical exam. She is scheduled for right breast mastectomy with right lymphoscintigraphy, lymphatic mapping, sentinel lymph node biopsy, possible axillary node dissection followed by right breast immediate reconstruction with tissue expander by Dr. Dinorah Mcqueen  and Dr. Brando Olmedo on 3/19/2024 for right-sided breast cancer, using general anesthesia at [facility] operating room.      Past medical history-breast ca, migraine, lung nodule, insomnia, vit d insuffciency, pseudohypoaldosteronism, anxiety disorder, history of nicotine dependence, seasonal allergies, nephrolithiasis.    Past surgical history- percutaneous nephrostomy tube placement R,  breast biopsy b/l, dental surgery    Medication- see list     Allergies-see list - hives from amoxicillin and hives and sob with cefaclor    History of previous adverse reaction to anesthesia - none    Family history of adverse reaction to anesthesia - none known    Physical activity level - able to shovel snow    Preop labs- done and reviewed    Preop EKG- done and showed septal infarct    Anticoagulation use-  none    Anti-platelet use - none    NSAID use - occasional advil     Alcohol use - occasionally with a max 1-2 drinks a week    Nicotine use - smoked half a pack a day for 6 years and quit 31 years ago     Recreational drug use - never    Care after surgery-     Today, patient pt states that her anxiety has been worse.  She is anxious about multiple issues including her new cancer diagnosis, upcoming surgery, post surgical issues regarding reconstruction of her breast, her recent abnormal EKG and her family.  She states that  her mom is anxious about her cancer. Brother also has health issues.  Has issues with her daughter as well.   She states that she is trying to eat right and loose wt.  She had blood work done last week with urinalysis and it showed blood but she states that she she was on her  Menstrual period at that time and also that she has had blood in her urine for a long time and has a history of nephrolithiasis.  Of note, her EKG showed septal infarct and she has a significant family history.  Mom and bro have dm  Dad had a MI and CVA at age 46. He had htn since he was 16-17 years of age.  PGM had a CVA in her 30s and at age 42  PGF had 3 MIs  P aunt  from a brain bleed at age 56  Mom had a MI at age 80  M aunt has heart ds  Today, she admits to diaphoresis, nasal congestion, postnasal drip, and admits to history of seasonal allergies.  She also admits to occasional palpitations, lower abdominal pain which she believes may be because she stopped taking her probiotics, urinary frequency which she believes is secondary to the fact that she drinks a lot of water, dysphoric feelings and feelings of anxiety which have worsened as well as chronic and unchanged arthralgias.  She also states that she has been having more migraine headaches recently, about twice a week.  Of note, she only takes very low-dose of her BuSpar twice a day but has been using more of her Xanax.  She would like a refill of her Fioricet and Xanax.      The following portions of the patient's history were reviewed and updated as appropriate: She  has a past medical history of Allergic, Anxiety (2022), Colon polyp, COVID-19 virus infection (2021), GERD (gastroesophageal reflux disease), Headache(784.0), Hematuria, Hyperkalemia, Hypertension, Kidney stone (), Malignant neoplasm of upper-inner quadrant of right female breast (HCC) (2024), Migraines, Proteinuria, Pseudohypoaldosteronism, UTI (urinary tract infection), and Wears  glasses.  She   Patient Active Problem List    Diagnosis Date Noted   • Preoperative general physical examination 03/14/2024   • Abnormal EKG 03/11/2024   • BRCA negative 02/28/2024   • Lung nodule 02/01/2024   • Atypical ductal hyperplasia of right breast 01/18/2024   • Abnormal MRI, breast 01/18/2024   • Malignant neoplasm of overlapping sites of right breast in female, estrogen receptor positive  01/18/2024   • Seasonal allergies 06/19/2023   • Other insomnia 03/01/2023   • Hypertension 11/17/2022   • Annual physical exam 11/11/2022   • Vitamin D insufficiency 11/11/2022   • Anxiety 11/11/2022   • BMI 31.0-31.9,adult 08/03/2021   • Migraine without aura and without status migrainosus, not intractable 07/26/2021   • 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     She  has a past surgical history that includes Mammo stereotactic breast biopsy right (all inc) (Right, 01/08/2024); US guided breast biopsy right complete (Right, 01/08/2024); Breast biopsy (Right, 01/08/2024); Mammo stereotactic breast biopsy right (all inc) (Right, 02/06/2024); US guided breast biopsy left complete (Left, 02/06/2024); US guided breast biopsy right complete (Right, 02/06/2024); Mammo stereotactic breast biopsy right (all inc) (Right, 02/06/2024); Kidney stone surgery (1985); Colonoscopy; and Dental surgery.  Her family history includes Anxiety disorder in her mother; Breast cancer (age of onset: 60) in her other; Depression in her daughter and mother; Diabetes in her brother and mother; Heart disease in her father and mother; Hypertension in her father; No Known Problems in her brother, maternal aunt, maternal grandfather, maternal grandmother, paternal aunt, paternal grandfather, and paternal grandmother; Skin cancer in her mother; Stroke in her father.  She  reports that she quit smoking about 31 years ago. Her smoking use included cigarettes. She started smoking about 38 years ago. She has a 1.8 pack-year smoking history. She has been exposed  to tobacco smoke. She has never used smokeless tobacco. She reports current alcohol use of about 1.0 standard drink of alcohol per week. She reports that she does not use drugs.  Current Outpatient Medications   Medication Sig Dispense Refill   • acetaminophen (TYLENOL) 325 mg tablet Take 650 mg by mouth every 6 (six) hours as needed for mild pain     • ALPRAZolam (XANAX) 0.25 mg tablet Take 1 tablet (0.25 mg total) by mouth daily at bedtime as needed for anxiety or sleep 30 tablet 0   • busPIRone (BUSPAR) 5 mg tablet Take 1 tablet (5 mg total) by mouth 3 (three) times a day 270 tablet 1   • Butalbital-APAP-Caffeine (Fioricet) -40 MG CAPS 1 CAPSULE AT ONSET OF HEADACHE AND THEN MAY REPEAT AFTER 30 MINS IF NO IMPROVEMENT, NO MORE THAN 2 CAPSULES A DAY 30 capsule 0   • famotidine (Pepcid) 20 mg tablet Take 20 mg by mouth 2 (two) times a day as needed for heartburn or indigestion     • fexofenadine (ALLEGRA) 60 MG tablet Take 60 mg by mouth as needed     • fluticasone (FLONASE) 50 mcg/act nasal spray 1 spray into each nostril daily (Patient taking differently: 1 spray into each nostril daily as needed for allergies) 16 g 0   • hydrochlorothiazide (HYDRODIURIL) 25 mg tablet Take 1 tablet (25 mg total) by mouth daily 90 tablet 1   • Ibuprofen 200 MG CAPS Take by mouth as needed      • Magnesium 400 MG CAPS Take 400 mg by mouth in the morning     • Multiple Vitamin (multivitamin) tablet Take 1 tablet by mouth daily     • ondansetron (ZOFRAN-ODT) 4 mg disintegrating tablet Take 1 tablet (4 mg total) by mouth every 6 (six) hours as needed for nausea or vomiting 8 tablet 0   • Probiotic Product (PROBIOTIC DAILY PO) Take by mouth daily     • propranolol (Inderal LA) 80 mg 24 hr capsule Take 1 capsule (80 mg total) by mouth daily (Patient taking differently: Take 80 mg by mouth daily at bedtime) 90 capsule 1     Current Facility-Administered Medications   Medication Dose Route Frequency Provider Last Rate Last Admin   •  ondansetron (ZOFRAN-ODT) dispersible tablet 4 mg  4 mg Oral Q6H PRN Kiran Ogden MD         Current Outpatient Medications on File Prior to Visit   Medication Sig   • acetaminophen (TYLENOL) 325 mg tablet Take 650 mg by mouth every 6 (six) hours as needed for mild pain   • famotidine (Pepcid) 20 mg tablet Take 20 mg by mouth 2 (two) times a day as needed for heartburn or indigestion   • fexofenadine (ALLEGRA) 60 MG tablet Take 60 mg by mouth as needed   • fluticasone (FLONASE) 50 mcg/act nasal spray 1 spray into each nostril daily (Patient taking differently: 1 spray into each nostril daily as needed for allergies)   • hydrochlorothiazide (HYDRODIURIL) 25 mg tablet Take 1 tablet (25 mg total) by mouth daily   • Ibuprofen 200 MG CAPS Take by mouth as needed    • Magnesium 400 MG CAPS Take 400 mg by mouth in the morning   • Multiple Vitamin (multivitamin) tablet Take 1 tablet by mouth daily   • ondansetron (ZOFRAN-ODT) 4 mg disintegrating tablet Take 1 tablet (4 mg total) by mouth every 6 (six) hours as needed for nausea or vomiting   • Probiotic Product (PROBIOTIC DAILY PO) Take by mouth daily   • propranolol (Inderal LA) 80 mg 24 hr capsule Take 1 capsule (80 mg total) by mouth daily (Patient taking differently: Take 80 mg by mouth daily at bedtime)   • [DISCONTINUED] ALPRAZolam (XANAX) 0.25 mg tablet Take 1 tablet (0.25 mg total) by mouth daily at bedtime as needed for anxiety or sleep   • [DISCONTINUED] busPIRone (BUSPAR) 5 mg tablet Take 1 tablet (5 mg total) by mouth 2 (two) times a day   • [DISCONTINUED] Butalbital-APAP-Caffeine (Fioricet) -40 MG CAPS 1 CAPSULE AT ONSET OF HEADACHE AND THEN MAY REPEAT AFTER 30 MINS IF NO IMPROVEMENT, NO MORE THAN 2 CAPSULES A DAY     Current Facility-Administered Medications on File Prior to Visit   Medication   • ondansetron (ZOFRAN-ODT) dispersible tablet 4 mg     She is allergic to amoxicillin and cefaclor..    Review of Systems   Constitutional:  Positive for  "diaphoresis (hot flashes sometimes). Negative for activity change, chills, fatigue, fever and unexpected weight change.   HENT:  Positive for congestion and postnasal drip (has allergies). Negative for ear pain, rhinorrhea, sinus pressure and sore throat.    Eyes:  Negative for pain.   Respiratory:  Negative for cough, choking, chest tightness, shortness of breath and wheezing.    Cardiovascular:  Positive for palpitations (better now and sometimes 2/2 anxiety). Negative for chest pain and leg swelling.   Gastrointestinal:  Positive for abdominal pain (occasionaly in the lower abd since she stopped taking her prebiotics). Negative for constipation, diarrhea, nausea and vomiting.   Endocrine: Positive for polyuria.   Genitourinary:  Positive for frequency (she drinks a lot of water but no nocturia). Negative for dysuria, hematuria and urgency.   Musculoskeletal:  Positive for arthralgias (chronic  and unchanged). Negative for back pain, gait problem, joint swelling, myalgias and neck stiffness.   Skin:  Negative for pallor and rash.   Neurological:  Positive for headaches (has been having more headaches recently). Negative for dizziness, tremors, seizures, syncope and light-headedness.   Hematological:  Negative for adenopathy.   Psychiatric/Behavioral:  Positive for dysphoric mood. Negative for behavioral problems. The patient is nervous/anxious (worse).          Objective:      /86 (BP Location: Left arm, Patient Position: Sitting, Cuff Size: Standard)   Pulse 73   Temp 98.3 °F (36.8 °C) (Temporal)   Ht 5' 2\" (1.575 m)   Wt 77.1 kg (170 lb)   LMP 03/02/2024 (Exact Date)   SpO2 99%   BMI 31.09 kg/m²          Physical Exam  Constitutional:       General: She is not in acute distress.     Appearance: She is well-developed. She is not diaphoretic.   HENT:      Head: Normocephalic and atraumatic.      Right Ear: External ear normal.      Left Ear: External ear normal.      Nose: Nose normal.      " Mouth/Throat:      Mouth: Mucous membranes are dry.      Pharynx: Posterior oropharyngeal erythema (oropharyngeal erythema with dry mucous membranes) present. No oropharyngeal exudate.   Eyes:      General: No scleral icterus.        Right eye: No discharge.         Left eye: No discharge.      Conjunctiva/sclera: Conjunctivae normal.      Pupils: Pupils are equal, round, and reactive to light.   Neck:      Thyroid: No thyromegaly.      Vascular: No JVD.      Trachea: No tracheal deviation.   Cardiovascular:      Rate and Rhythm: Normal rate and regular rhythm.      Heart sounds: Murmur (1/6 systolic murmur maximal in the aortic valve region and radiating to the carotids) heard.      Systolic murmur is present with a grade of 1/6.      No friction rub. No gallop.   Pulmonary:      Effort: Pulmonary effort is normal. No respiratory distress.      Breath sounds: Normal breath sounds. No wheezing or rales.   Chest:      Chest wall: No tenderness.   Abdominal:      General: Bowel sounds are normal. There is no distension.      Palpations: Abdomen is soft. There is no mass.      Tenderness: There is no abdominal tenderness. There is no guarding or rebound.   Musculoskeletal:         General: No tenderness or deformity. Normal range of motion.      Cervical back: Normal range of motion and neck supple.   Lymphadenopathy:      Head:      Right side of head: Submandibular adenopathy present.      Left side of head: Submandibular adenopathy present.      Cervical: No cervical adenopathy.   Skin:     General: Skin is warm and dry.      Coloration: Skin is not pale.      Findings: No erythema or rash.   Neurological:      Mental Status: She is alert and oriented to person, place, and time.      Cranial Nerves: No cranial nerve deficit.      Motor: No abnormal muscle tone.      Coordination: Coordination normal.      Deep Tendon Reflexes: Reflexes are normal and symmetric.   Psychiatric:         Mood and Affect: Mood is anxious  (anxious affect).         Behavior: Behavior normal.           Consult on 03/14/2024   Component Date Value Ref Range Status   • LEUKOCYTE ESTERASE,UA 03/14/2024 neg'   Final   • NITRITE,UA 03/14/2024 neg   Final   • SL AMB POCT UROBILINOGEN 03/14/2024 0.2   Final   • POCT URINE PROTEIN 03/14/2024 neg   Final   •  PH,UA 03/14/2024 6.5   Final   • BLOOD,UA 03/14/2024 trace   Final   • SPECIFIC GRAVITY,UA 03/14/2024 1.015   Final   • KETONES,UA 03/14/2024 neg   Final   • BILIRUBIN,UA 03/14/2024 neg   Final   • GLUCOSE, UA 03/14/2024 neg   Final   •  COLOR,UA 03/14/2024 light yellow   Final   • CLARITY,UA 03/14/2024 slightly cloudy   Final   Appointment on 03/06/2024   Component Date Value Ref Range Status   • Ventricular Rate 03/06/2024 73  BPM Final   • Atrial Rate 03/06/2024 73  BPM Final   • HI Interval 03/06/2024 166  ms Final   • QRSD Interval 03/06/2024 80  ms Final   • QT Interval 03/06/2024 420  ms Final   • QTC Interval 03/06/2024 462  ms Final   • P Axis 03/06/2024 52  degrees Final   • QRS Axis 03/06/2024 9  degrees Final   • T Wave [place] 03/06/2024 1  degrees Final   Appointment on 03/06/2024   Component Date Value Ref Range Status   • Sodium 03/06/2024 134 (L)  135 - 147 mmol/L Final   • Potassium 03/06/2024 3.6  3.5 - 5.3 mmol/L Final   • Chloride 03/06/2024 101  96 - 108 mmol/L Final   • CO2 03/06/2024 25  21 - 32 mmol/L Final   • ANION GAP 03/06/2024 8  mmol/L Final   • BUN 03/06/2024 8  5 - 25 mg/dL Final   • Creatinine 03/06/2024 0.58 (L)  0.60 - 1.30 mg/dL Final    Standardized to IDMS reference method   • Glucose, Fasting 03/06/2024 102 (H)  65 - 99 mg/dL Final   • Calcium 03/06/2024 8.9  8.4 - 10.2 mg/dL Final   • AST 03/06/2024 14  13 - 39 U/L Final   • ALT 03/06/2024 14  7 - 52 U/L Final    Specimen collection should occur prior to Sulfasalazine administration due to the potential for falsely depressed results.    • Alkaline Phosphatase 03/06/2024 43  34 - 104 U/L Final   • Total Protein  03/06/2024 6.6  6.4 - 8.4 g/dL Final   • Albumin 03/06/2024 3.7  3.5 - 5.0 g/dL Final   • Total Bilirubin 03/06/2024 0.32  0.20 - 1.00 mg/dL Final    Use of this assay is not recommended for patients undergoing treatment with eltrombopag due to the potential for falsely elevated results.  N-acetyl-p-benzoquinone imine (metabolite of Acetaminophen) will generate erroneously low results in samples for patients that have taken an overdose of Acetaminophen.   • eGFR 03/06/2024 104  ml/min/1.73sq m Final   • WBC 03/06/2024 6.58  4.31 - 10.16 Thousand/uL Final   • RBC 03/06/2024 4.27  3.81 - 5.12 Million/uL Final   • Hemoglobin 03/06/2024 12.9  11.5 - 15.4 g/dL Final   • Hematocrit 03/06/2024 38.4  34.8 - 46.1 % Final   • MCV 03/06/2024 90  82 - 98 fL Final   • MCH 03/06/2024 30.2  26.8 - 34.3 pg Final   • MCHC 03/06/2024 33.6  31.4 - 37.4 g/dL Final   • RDW 03/06/2024 13.7  11.6 - 15.1 % Final   • MPV 03/06/2024 11.4  8.9 - 12.7 fL Final   • Platelets 03/06/2024 280  149 - 390 Thousands/uL Final   • nRBC 03/06/2024 0  /100 WBCs Final   • Neutrophils Relative 03/06/2024 62  43 - 75 % Final   • Immature Grans % 03/06/2024 0  0 - 2 % Final   • Lymphocytes Relative 03/06/2024 27  14 - 44 % Final   • Monocytes Relative 03/06/2024 8  4 - 12 % Final   • Eosinophils Relative 03/06/2024 2  0 - 6 % Final   • Basophils Relative 03/06/2024 1  0 - 1 % Final   • Neutrophils Absolute 03/06/2024 4.08  1.85 - 7.62 Thousands/µL Final   • Absolute Immature Grans 03/06/2024 0.01  0.00 - 0.20 Thousand/uL Final   • Absolute Lymphocytes 03/06/2024 1.78  0.60 - 4.47 Thousands/µL Final   • Absolute Monocytes 03/06/2024 0.53  0.17 - 1.22 Thousand/µL Final   • Eosinophils Absolute 03/06/2024 0.14  0.00 - 0.61 Thousand/µL Final   • Basophils Absolute 03/06/2024 0.04  0.00 - 0.10 Thousands/µL Final   • Color, UA 03/06/2024 Colorless   Final   • Clarity, UA 03/06/2024 Clear   Final   • Specific Gravity, UA 03/06/2024 1.008  1.003 - 1.030 Final   •  pH, UA 03/06/2024 6.5  4.5, 5.0, 5.5, 6.0, 6.5, 7.0, 7.5, 8.0 Final   • Leukocytes, UA 03/06/2024 Negative  Negative Final   • Nitrite, UA 03/06/2024 Negative  Negative Final   • Protein, UA 03/06/2024 Negative  Negative mg/dl Final   • Glucose, UA 03/06/2024 Negative  Negative mg/dl Final   • Ketones, UA 03/06/2024 Negative  Negative mg/dl Final   • Urobilinogen, UA 03/06/2024 <2.0  <2.0 mg/dl mg/dl Final   • Bilirubin, UA 03/06/2024 Negative  Negative Final   • Occult Blood, UA 03/06/2024 Large (A)  Negative Final   • Hemoglobin A1C 03/06/2024 5.9 (H)  Normal 4.0-5.6%; PreDiabetic 5.7-6.4%; Diabetic >=6.5%; Glycemic control for adults with diabetes <7.0% % Final   • EAG 03/06/2024 123  mg/dl Final   • RBC, UA 03/06/2024 2-4 (A)  None Seen, 1-2 /hpf Final   • WBC, UA 03/06/2024 None Seen  None Seen, 1-2 /hpf Final   • Epithelial Cells 03/06/2024 Occasional  None Seen, Occasional /hpf Final   • Bacteria, UA 03/06/2024 None Seen  None Seen, Occasional /hpf Final   Hospital Outpatient Visit on 02/06/2024   Component Date Value Ref Range Status   • Case Report 02/06/2024    Final                    Value:Surgical Pathology Report                         Case: [id number]                                  Authorizing Provider:  Dinorah Mcqueen MD           Collected:           02/06/2024 0904              Ordering Location:     [facility] Breast Received:            02/06/2024 1819                                     Center                                                                       Pathologist:           Felicity Downing MD                                                                  Specimens:   A) - Breast, Left, US BX LEFT  7CMFN 3 passes 12G                                             B) - Breast, Right, US BX RT BR 1100 9CMFN 3 passes 12G                                             C) - Breast, Right, RT stereo specimen @10:00 - 6 cores all with calcs                    • Addendum 2 02/06/2024     Final                    Value:This result contains rich text formatting which cannot be displayed here.   • Addendum 02/06/2024    Final                    Value:This result contains rich text formatting which cannot be displayed here.   • Final Diagnosis 02/06/2024    Final                    Value:This result contains rich text formatting which cannot be displayed here.   • Microscopic Description 02/06/2024    Final                    Value:This result contains rich text formatting which cannot be displayed here.   • Note 02/06/2024    Final                    Value:This result contains rich text formatting which cannot be displayed here.   • Additional Information 02/06/2024    Final                    Value:This result contains rich text formatting which cannot be displayed here.   • Synoptic Checklist 02/06/2024    Final                    Value:INVASIVE CARCINOMA OF THE BREAST: Biopsy                            INVASIVE CARCINOMA OF THE BREAST: BIOPSY - All Specimens                            Protocol posted: 9/21/2022                                                        SPECIMEN                               Procedure:    Needle biopsy                                Specimen Laterality:    Right                                                         TUMOR                               Tumor Site:    Clock position                                :    11 o'clock                              Tumor Site:    Distance from nipple (Centimeters): 9 cm                             Histologic Type:    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)                                Histologic Type Comment:    with tubular features                              Histologic Grade (McNabb Histologic Score):                                   Glandular (Acinar) / Tubular Differentiation:    Score 1                                Nuclear Pleomorphism:    Score 1                                Mitotic Rate:    Score 1                                 Overall Grade:    Grade 1 (scores of 3, 4 or 5)                              Tumor Size:    Greatest dimension of largest invasive focus (Millimeters): 3 mm                             Ductal Carcinoma In Situ (DCIS):    Present                                Architectural Patterns:    Cribriform                                Nuclear Grade:    Grade I (low)                                Necrosis:    Not identified                              Lymphovascular Invasion:    Not identified                              Microcalcifications:    Present in DCIS                                                          Breast Biomarker Studies (pending):    ER, [place], Her2      • Gross Description 02/06/2024    Final                    Value:This result contains rich text formatting which cannot be displayed here.   • Clinical Information 02/06/2024    Final                    Value:LEFT  1) MASS [D]: There is an 11 mm x 7 mm x 11 mm irregularly shaped, hypoechoic mass with indistinct margins with shadowing seen in the upper outer quadrant of the left breast at 2 o'clock, 7 cm from the nipple. The mass correlates with the prior MRI finding.  This is highly suspicious for malignancy and ultrasound-guided core biopsy recommended.     Evaluation of the axilla confirms no pathologic lymphadenopathy.     RIGHT  2) MASS [G]: There is a 7 mm x 6 mm x 7 mm irregularly shaped, non-parallel, hypoechoic mass with angular margins with shadowing seen in the upper outer quadrant of the right breast at 11 o'clock, 9 cm from the nipple. The mass correlates with the prior mammogram finding and MRI finding. Associated features include architectural distortion.   This is highly concerning for multifocal malignancy and ultrasound-guided core biopsy recommended.   Hospital Outpatient Visit on 02/06/2024   Component Date Value Ref Range Status   • Case Report 02/06/2024    Final                    Value:Surgical Pathology  Report                         Case: [id number]                                  Authorizing Provider:  Dinorah Mcqueen MD           Collected:           02/06/2024 0904              Ordering Location:     [facility] Received:            02/06/2024 1819                                     Center                                                                       Pathologist:           Felicity Downing MD                                                                  Specimens:   A) - Breast, Left, US BX LEFT  7CMFN 3 passes 12G                                             B) - Breast, Right, US BX RT BR 1100 9CMFN 3 passes 12G                                             C) - Breast, Right, RT stereo specimen @10:00 - 6 cores all with calcs                    • Addendum 2 02/06/2024    Final                    Value:This result contains rich text formatting which cannot be displayed here.   • Addendum 02/06/2024    Final                    Value:This result contains rich text formatting which cannot be displayed here.   • Final Diagnosis 02/06/2024    Final                    Value:This result contains rich text formatting which cannot be displayed here.   • Microscopic Description 02/06/2024    Final                    Value:This result contains rich text formatting which cannot be displayed here.   • Note 02/06/2024    Final                    Value:This result contains rich text formatting which cannot be displayed here.   • Additional Information 02/06/2024    Final                    Value:This result contains rich text formatting which cannot be displayed here.   • Synoptic Checklist 02/06/2024    Final                    Value:INVASIVE CARCINOMA OF THE BREAST: Biopsy                            INVASIVE CARCINOMA OF THE BREAST: BIOPSY - All Specimens                            Protocol posted: 9/21/2022                                                        SPECIMEN                                Procedure:    Needle biopsy                                Specimen Laterality:    Right                                                         TUMOR                               Tumor Site:    Clock position                                :    11 o'clock                              Tumor Site:    Distance from nipple (Centimeters): 9 cm                             Histologic Type:    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)                                Histologic Type Comment:    with tubular features                              Histologic Grade (Woodland Histologic Score):                                   Glandular (Acinar) / Tubular Differentiation:    Score 1                                Nuclear Pleomorphism:    Score 1                                Mitotic Rate:    Score 1                                Overall Grade:    Grade 1 (scores of 3, 4 or 5)                              Tumor Size:    Greatest dimension of largest invasive focus (Millimeters): 3 mm                             Ductal Carcinoma In Situ (DCIS):    Present                                Architectural Patterns:    Cribriform                                Nuclear Grade:    Grade I (low)                                Necrosis:    Not identified                              Lymphovascular Invasion:    Not identified                              Microcalcifications:    Present in DCIS                                                          Breast Biomarker Studies (pending):    ER, MD, Her2      • Gross Description 02/06/2024    Final                    Value:This result contains rich text formatting which cannot be displayed here.   • Clinical Information 02/06/2024    Final                    Value:LEFT  1) MASS [D]: There is an 11 mm x 7 mm x 11 mm irregularly shaped, hypoechoic mass with indistinct margins with shadowing seen in the upper outer quadrant of the left breast at 2 o'clock, 7 cm from the nipple. The mass  correlates with the prior MRI finding.  This is highly suspicious for malignancy and ultrasound-guided core biopsy recommended.     Evaluation of the axilla confirms no pathologic lymphadenopathy.     RIGHT  2) MASS [G]: There is a 7 mm x 6 mm x 7 mm irregularly shaped, non-parallel, hypoechoic mass with angular margins with shadowing seen in the upper outer quadrant of the right breast at 11 o'clock, 9 cm from the nipple. The mass correlates with the prior mammogram finding and MRI finding. Associated features include architectural distortion.   This is highly concerning for multifocal malignancy and ultrasound-guided core biopsy recommended.   Clinical Support on 01/18/2024   Component Date Value Ref Range Status   • Miscellaneous Lab Test Result 01/18/2024    Final    Specimen collection Kit. Collection only test, results will be provided by 3rd party Reference lab directly to ordering provider.    Appointment on 01/15/2024   Component Date Value Ref Range Status   • WBC 01/15/2024 7.14  4.31 - 10.16 Thousand/uL Final   • RBC 01/15/2024 4.63  3.81 - 5.12 Million/uL Final   • Hemoglobin 01/15/2024 14.1  11.5 - 15.4 g/dL Final   • Hematocrit 01/15/2024 42.8  34.8 - 46.1 % Final   • MCV 01/15/2024 92  82 - 98 fL Final   • MCH 01/15/2024 30.5  26.8 - 34.3 pg Final   • MCHC 01/15/2024 32.9  31.4 - 37.4 g/dL Final   • RDW 01/15/2024 13.4  11.6 - 15.1 % Final   • MPV 01/15/2024 10.6  8.9 - 12.7 fL Final   • Platelets 01/15/2024 322  149 - 390 Thousands/uL Final   • nRBC 01/15/2024 0  /100 WBCs Final   • Neutrophils Relative 01/15/2024 66  43 - 75 % Final   • Immature Grans % 01/15/2024 0  0 - 2 % Final   • Lymphocytes Relative 01/15/2024 24  14 - 44 % Final   • Monocytes Relative 01/15/2024 8  4 - 12 % Final   • Eosinophils Relative 01/15/2024 1  0 - 6 % Final   • Basophils Relative 01/15/2024 1  0 - 1 % Final   • Neutrophils Absolute 01/15/2024 4.71  1.85 - 7.62 Thousands/µL Final   • Absolute Immature Grans 01/15/2024  0.02  0.00 - 0.20 Thousand/uL Final   • Absolute Lymphocytes 01/15/2024 1.70  0.60 - 4.47 Thousands/µL Final   • Absolute Monocytes 01/15/2024 0.54  0.17 - 1.22 Thousand/µL Final   • Eosinophils Absolute 01/15/2024 0.10  0.00 - 0.61 Thousand/µL Final   • Basophils Absolute 01/15/2024 0.07  0.00 - 0.10 Thousands/µL Final   • TSH 3RD GENERATON 01/15/2024 2.743  0.450 - 4.500 uIU/mL Final    The recommended reference ranges for TSH during pregnancy are as follows:   First trimester 0.100 to 2.500 uIU/mL   Second trimester  0.200 to 3.000 uIU/mL   Third trimester 0.300 to 3.000 uIU/m    Note: Normal ranges may not apply to patients who are transgender, non-binary, or whose legal sex, sex at birth, and gender identity differ.  Adult TSH (3rd generation) reference range follows the recommended guidelines of the American Thyroid Association, January, 2020.   • Sodium 01/15/2024 137  135 - 147 mmol/L Final   • Potassium 01/15/2024 3.9  3.5 - 5.3 mmol/L Final   • Chloride 01/15/2024 100  96 - 108 mmol/L Final   • CO2 01/15/2024 28  21 - 32 mmol/L Final   • ANION GAP 01/15/2024 9  mmol/L Final   • BUN 01/15/2024 15  5 - 25 mg/dL Final   • Creatinine 01/15/2024 0.69  0.60 - 1.30 mg/dL Final    Standardized to IDMS reference method   • Glucose, Fasting 01/15/2024 127 (H)  65 - 99 mg/dL Final   • Calcium 01/15/2024 9.8  8.4 - 10.2 mg/dL Final   • AST 01/15/2024 19  13 - 39 U/L Final   • ALT 01/15/2024 22  7 - 52 U/L Final    Specimen collection should occur prior to Sulfasalazine administration due to the potential for falsely depressed results.    • Alkaline Phosphatase 01/15/2024 46  34 - 104 U/L Final   • Total Protein 01/15/2024 7.5  6.4 - 8.4 g/dL Final   • Albumin 01/15/2024 4.1  3.5 - 5.0 g/dL Final   • Total Bilirubin 01/15/2024 0.49  0.20 - 1.00 mg/dL Final    Use of this assay is not recommended for patients undergoing treatment with eltrombopag due to the potential for falsely elevated  results.  N-acetyl-p-benzoquinone imine (metabolite of Acetaminophen) will generate erroneously low results in samples for patients that have taken an overdose of Acetaminophen.   • eGFR 01/15/2024 98  ml/min/1.73sq m Final   • Cholesterol 01/15/2024 203 (H)  See Comment mg/dL Final    Cholesterol:         Pediatric <18 Years        Desirable          <170 mg/dL      Borderline High    170-199 mg/dL      High               >=200 mg/dL        Adult >=18 Years            Desirable         <200 mg/dL      Borderline High   200-239 mg/dL      High              >239 mg/dL     • Triglycerides 01/15/2024 137  See Comment mg/dL Final    Triglyceride:     0-9Y            <75mg/dL     10Y-17Y         <90 mg/dL       >=18Y     Normal          <150 mg/dL     Borderline High 150-199 mg/dL     High            200-499 mg/dL        Very High       >499 mg/dL    Specimen collection should occur prior to Metamizole administration due to the potential for falsely depressed results.   • HDL, Direct 01/15/2024 53  >=50 mg/dL Final   • LDL Calculated 01/15/2024 123 (H)  0 - 100 mg/dL Final    LDL Cholesterol:     Optimal           <100 mg/dl     Near Optimal      100-129 mg/dl     Above Optimal       Borderline High 130-159 mg/dl       High            160-189 mg/dl       Very High       >189 mg/dl         This screening LDL is a calculated result.   It does not have the accuracy of the Direct Measured LDL in the monitoring of patients with hyperlipidemia and/or statin therapy.   Direct Measure LDL (RBH086) must be ordered separately in these patients.   • Non-HDL-Chol (CHOL-HDL) 01/15/2024 150  mg/dl Final   Hospital Outpatient Visit on 01/08/2024   Component Date Value Ref Range Status   • Case Report 01/08/2024    Final                    Value:Surgical Pathology Report                         Case: [id number]                                  Authorizing Provider:  Falguni Linda DO       Collected:           01/08/2024 8735               Ordering Location:     [facility] Received:            01/08/2024 1847                                     Center                                                                       Pathologist:           Cam Melgoza MD                                                                 Specimens:   A) - Breast, Right, stereo specimen 1 @ 12:00- 1 core with calcs                                    B) - Breast, Right, stereo specimen 2 @ 12:00- 5 cores without calcs                                C) - Breast, Right, US BX Right breast 300 7cmfn 4 passes 12g marquee                     • Addendum 2 01/08/2024    Final                    Value:This result contains rich text formatting which cannot be displayed here.   • Addendum 01/08/2024    Final                    Value:This result contains rich text formatting which cannot be displayed here.   • Final Diagnosis 01/08/2024    Final                    Value:This result contains rich text formatting which cannot be displayed here.   • Additional Information 01/08/2024    Final                    Value:This result contains rich text formatting which cannot be displayed here.   • Synoptic Checklist 01/08/2024    Final                    Value:INVASIVE CARCINOMA OF THE BREAST: Biopsy                            INVASIVE CARCINOMA OF THE BREAST: BIOPSY - All Specimens                            Protocol posted: 9/21/2022                                                        SPECIMEN                               Procedure:    Needle biopsy                                Specimen Laterality:    Right                                                         TUMOR                               Tumor Site:    Clock position                                :    3 o'clock                              Histologic Type:    Invasive carcinoma of no special type  (ductal)                              Histologic Grade (Mclean Histologic Score):                                   Glandular (Acinar) / Tubular Differentiation:    Score 1                                Nuclear Pleomorphism:    Score 2                                Mitotic Rate:    Score 1                                Overall Grade:    Grade 1 (scores of 3, 4 or 5)                              Tumor Size:    Greatest dimension of largest invasive focus (Millimeters): 6 mm                             Ductal Carcinoma In Situ (DCIS):    Not identified                              Lymphovascular Invasion:    Not identified      • Gross Description 01/08/2024    Final                    Value:This result contains rich text formatting which cannot be displayed here.   • Clinical Information 01/08/2024    Final                    Value:Right stereotactic breast biopsy for calcifications @ 12:00/ CC position/ Vertical arm/ 8g9cm mammotome revolve/ 8g Petite barbell clip/ specimens collected/ 1 core with calcs/ 5 cores without calcs   Hospital Outpatient Visit on 01/08/2024   Component Date Value Ref Range Status   • Case Report 01/08/2024    Final                    Value:Surgical Pathology Report                         Case: [id number]                                  Authorizing Provider:  Falguni Linda DO       Collected:           01/08/2024 1407              Ordering Location:     [facility] Received:            01/08/2024 1847                                     Center                                                                       Pathologist:           Cam Melgoza MD                                                                 Specimens:   A) - Breast, Right, stereo specimen 1 @ 12:00- 1 core with calcs                                    B) - Breast, Right, stereo specimen 2 @  12:00- 5 cores without calcs                                C) - Breast, Right, US BX Right breast 300 7cmfn 4 passes 12g marquee                     • Addendum 2 01/08/2024    Final                    Value:This result contains rich text formatting which cannot be displayed here.   • Addendum 01/08/2024    Final                    Value:This result contains rich text formatting which cannot be displayed here.   • Final Diagnosis 01/08/2024    Final                    Value:This result contains rich text formatting which cannot be displayed here.   • Additional Information 01/08/2024    Final                    Value:This result contains rich text formatting which cannot be displayed here.   • Synoptic Checklist 01/08/2024    Final                    Value:INVASIVE CARCINOMA OF THE BREAST: Biopsy                            INVASIVE CARCINOMA OF THE BREAST: BIOPSY - All Specimens                            Protocol posted: 9/21/2022                                                        SPECIMEN                               Procedure:    Needle biopsy                                Specimen Laterality:    Right                                                         TUMOR                               Tumor Site:    Clock position                                :    3 o'clock                              Histologic Type:    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)                              Histologic Grade (Carter Histologic Score):                                   Glandular (Acinar) / Tubular Differentiation:    Score 1                                Nuclear Pleomorphism:    Score 2                                Mitotic Rate:    Score 1                                Overall Grade:    Grade 1 (scores of 3, 4 or 5)                              Tumor Size:    Greatest dimension of largest invasive focus (Millimeters): 6 mm                             Ductal Carcinoma In Situ (DCIS):    Not identified                               Lymphovascular Invasion:    Not identified      • Gross Description 01/08/2024    Final                    Value:This result contains rich text formatting which cannot be displayed here.   • Clinical Information 01/08/2024    Final                    Value:Right stereotactic breast biopsy for calcifications @ 12:00/ CC position/ Vertical arm/ 8g9cm mammotome revolve/ 8g Petite barbell clip/ specimens collected/ 1 core with calcs/ 5 cores without calcs   Hospital Outpatient Visit on 10/20/2023   Component Date Value Ref Range Status   • Case Report 10/20/2023    Final                    Value:Surgical Pathology Report                         Case: [id number]                                   Authorizing Provider:  Jonn Montoya MD      Collected:           10/20/2023 0937              Ordering Location:     [facility]      Received:            10/20/2023 [address] [place] [address] Endoscopy                                                           Pathologist:           Will Moseley MD                                                            Specimens:   A) - Polyp, Colorectal, cold bx - transverse polyp                                                  B) - Polyp, Colorectal, cold snare - descending polyp                                               C) - Polyp, Colorectal, cold snare - rectal polyp                                         • Final Diagnosis 10/20/2023    Final                    Value:This result contains rich text formatting which cannot be displayed here.   • Note 10/20/2023    Final                    Value:This result contains rich text formatting which cannot be displayed here.   • Additional Information 10/20/2023    Final                    Value:This result contains rich text formatting which cannot be displayed here.   • Synoptic Checklist 10/20/2023    Final                    Value:                             COLON/RECTUM POLYP FORM - GI - All Specimens                                                                                     :    Adenoma(s)     • Gross Description 10/20/2023    Final                    Value:This result contains rich text formatting which cannot be displayed here.   • Clinical Information 10/20/2023    Final                    Value:Colon polyps   There may be more visits with results that are not included.

## 2024-03-15 ENCOUNTER — HOSPITAL ENCOUNTER (OUTPATIENT)
Dept: NON INVASIVE DIAGNOSTICS | Facility: HOSPITAL | Age: 54
Discharge: HOME | End: 2024-03-15
Payer: COMMERCIAL

## 2024-03-15 ENCOUNTER — TELEPHONE (OUTPATIENT)
Dept: OTHER | Facility: OTHER | Age: 54
End: 2024-03-15

## 2024-03-15 DIAGNOSIS — I10 PRIMARY HYPERTENSION: Primary | ICD-10-CM

## 2024-03-15 DIAGNOSIS — R07.9 CHEST PAIN, UNSPECIFIED TYPE: ICD-10-CM

## 2024-03-15 LAB
MAX HR PERCENT: 96 %
MAX HR: 160 BPM
RATE PRESSURE PRODUCT: NORMAL
SL CV STRESS RECOVERY BP: NORMAL MMHG
SL CV STRESS RECOVERY HR: 98 BPM
SL CV STRESS RECOVERY O2 SAT: 99 %
SL CV STRESS STAGE REACHED: 2
STRESS ANGINA INDEX: 1
STRESS BASELINE BP: NORMAL MMHG
STRESS BASELINE HR: 90 BPM
STRESS O2 SAT REST: 99 %
STRESS PEAK HR: 160 BPM
STRESS POST ESTIMATED WORKLOAD: 7 METS
STRESS POST EXERCISE DUR MIN: 5 MIN
STRESS POST EXERCISE DUR SEC: 0 SEC
STRESS POST O2 SAT PEAK: 99 %
STRESS POST PEAK BP: 230 MMHG

## 2024-03-15 PROCEDURE — 93018 CV STRESS TEST I&R ONLY: CPT | Performed by: INTERNAL MEDICINE

## 2024-03-15 PROCEDURE — 93016 CV STRESS TEST SUPVJ ONLY: CPT | Performed by: INTERNAL MEDICINE

## 2024-03-15 PROCEDURE — 93017 CV STRESS TEST TRACING ONLY: CPT

## 2024-03-15 NOTE — TELEPHONE ENCOUNTER
Patient had a stress test done today and would like someone to give her a harry back to go over her results as she is having surgery on Tuesday.

## 2024-03-18 ENCOUNTER — TELEPHONE (OUTPATIENT)
Dept: SURGICAL ONCOLOGY | Facility: CLINIC | Age: 54
End: 2024-03-18

## 2024-03-18 NOTE — TELEPHONE ENCOUNTER
Called and spoke to patient and explained need to reschedule her surgery. New surgery date with Dr Mcqueen provided of 4/2/24 at [facility] with Dr Mcqueen and patient is agreeable. New post op visit made for 4/17/24 at 11:30 am in the [place] office with Dr Mcqueen.  The patient is also scheduled for an ECHO tomorrow in order to be cleared for surgery by cardiology. We will continue to follow for clearance.

## 2024-03-19 ENCOUNTER — ANESTHESIA (OUTPATIENT)
Dept: PERIOP | Facility: HOSPITAL | Age: 54
End: 2024-03-19
Payer: COMMERCIAL

## 2024-03-19 ENCOUNTER — HOSPITAL ENCOUNTER (OUTPATIENT)
Dept: NON INVASIVE DIAGNOSTICS | Facility: HOSPITAL | Age: 54
Discharge: HOME | End: 2024-03-19
Attending: STUDENT IN AN ORGANIZED HEALTH CARE EDUCATION/TRAINING PROGRAM
Payer: COMMERCIAL

## 2024-03-19 ENCOUNTER — APPOINTMENT (OUTPATIENT)
Dept: NON INVASIVE DIAGNOSTICS | Facility: HOSPITAL | Age: 54
End: 2024-03-19
Attending: STUDENT IN AN ORGANIZED HEALTH CARE EDUCATION/TRAINING PROGRAM
Payer: COMMERCIAL

## 2024-03-19 VITALS
DIASTOLIC BLOOD PRESSURE: 90 MMHG | BODY MASS INDEX: 31.65 KG/M2 | OXYGEN SATURATION: 99 % | WEIGHT: 172 LBS | HEART RATE: 70 BPM | SYSTOLIC BLOOD PRESSURE: 163 MMHG | HEIGHT: 62 IN

## 2024-03-19 VITALS
BODY MASS INDEX: 31.65 KG/M2 | SYSTOLIC BLOOD PRESSURE: 163 MMHG | DIASTOLIC BLOOD PRESSURE: 90 MMHG | HEART RATE: 70 BPM | HEIGHT: 62 IN | WEIGHT: 172 LBS

## 2024-03-19 DIAGNOSIS — R07.9 CHEST PAIN, UNSPECIFIED TYPE: ICD-10-CM

## 2024-03-19 LAB
AORTIC ROOT: 2.6 CM
APICAL FOUR CHAMBER EJECTION FRACTION: 57 %
BSA FOR ECHO PROCEDURE: 1.79 M2
E WAVE DECELERATION TIME: 223 MS
E/A RATIO: 1
FRACTIONAL SHORTENING: 39 (ref 28–44)
GLOBAL LONGITUIDAL STRAIN: -18 %
INTERVENTRICULAR SEPTUM IN DIASTOLE (PARASTERNAL SHORT AXIS VIEW): 0.8 CM
INTERVENTRICULAR SEPTUM: 0.8 CM (ref 0.6–1.1)
LAAS-AP2: 13.7 CM2
LAAS-AP4: 15.9 CM2
LEFT ATRIUM SIZE: 3.1 CM
LEFT ATRIUM VOLUME (MOD BIPLANE): 42 ML
LEFT ATRIUM VOLUME INDEX (MOD BIPLANE): 23.5 ML/M2
LEFT INTERNAL DIMENSION IN SYSTOLE: 2.7 CM (ref 2.1–4)
LEFT VENTRICULAR INTERNAL DIMENSION IN DIASTOLE: 4.4 CM (ref 3.5–6)
LEFT VENTRICULAR POSTERIOR WALL IN END DIASTOLE: 0.8 CM
LEFT VENTRICULAR STROKE VOLUME: 60 ML
LVSV (TEICH): 60 ML
MAX HR PERCENT: 100 %
MAX HR: 166 BPM
MV E'TISSUE VEL-LAT: 13 CM/S
MV E'TISSUE VEL-SEP: 13 CM/S
MV PEAK A VEL: 0.73 M/S
MV PEAK E VEL: 73 CM/S
MV STENOSIS PRESSURE HALF TIME: 65 MS
MV VALVE AREA P 1/2 METHOD: 3.38
RA PRESSURE ESTIMATED: 3 MMHG
RATE PRESSURE PRODUCT: NORMAL
RIGHT ATRIAL 2D VOLUME: 31 ML
RIGHT ATRIUM AREA SYSTOLE A4C: 14 CM2
RIGHT VENTRICLE ID DIMENSION: 3.3 CM
SL CV LEFT ATRIUM LENGTH A2C: 4.1 CM
SL CV LV EF: 55
SL CV LV EF: 55
SL CV PED ECHO LEFT VENTRICLE DIASTOLIC VOLUME (MOD BIPLANE) 2D: 86 ML
SL CV PED ECHO LEFT VENTRICLE SYSTOLIC VOLUME (MOD BIPLANE) 2D: 26 ML
SL CV STRESS RECOVERY BP: NORMAL MMHG
SL CV STRESS RECOVERY HR: 94 BPM
SL CV STRESS RECOVERY O2 SAT: 98 %
SL CV STRESS STAGE REACHED: 3
STRESS BASELINE BP: NORMAL MMHG
STRESS BASELINE HR: 89 BPM
STRESS O2 SAT REST: 99 %
STRESS PEAK HR: 140 BPM
STRESS POST ESTIMATED WORKLOAD: 7.8 METS
STRESS POST EXERCISE DUR MIN: 6 MIN
STRESS POST EXERCISE DUR SEC: 30 SEC
STRESS POST O2 SAT PEAK: 99 %
STRESS POST PEAK BP: 216 MMHG
STRESS ST DEPRESSION: 1.5 MM
TRICUSPID ANNULAR PLANE SYSTOLIC EXCURSION: 2.1 CM

## 2024-03-19 PROCEDURE — 93306 TTE W/DOPPLER COMPLETE: CPT | Performed by: INTERNAL MEDICINE

## 2024-03-19 PROCEDURE — 93356 MYOCRD STRAIN IMG SPCKL TRCK: CPT | Performed by: INTERNAL MEDICINE

## 2024-03-19 PROCEDURE — 93356 MYOCRD STRAIN IMG SPCKL TRCK: CPT

## 2024-03-19 PROCEDURE — 93350 STRESS TTE ONLY: CPT

## 2024-03-19 PROCEDURE — 93350 STRESS TTE ONLY: CPT | Performed by: INTERNAL MEDICINE

## 2024-03-19 PROCEDURE — 93306 TTE W/DOPPLER COMPLETE: CPT

## 2024-03-20 ENCOUNTER — TELEPHONE (OUTPATIENT)
Dept: SURGICAL ONCOLOGY | Facility: CLINIC | Age: 54
End: 2024-03-20

## 2024-03-20 ENCOUNTER — TELEPHONE (OUTPATIENT)
Dept: CARDIOLOGY CLINIC | Facility: CLINIC | Age: 54
End: 2024-03-20

## 2024-03-20 NOTE — RESULT ENCOUNTER NOTE
Please notify pt her resting and stress echo testing were acceptable. No strong evidence structural heart disease or CAD.    She can proceed with her surgery.      Thanks!    - Jovi

## 2024-03-20 NOTE — TELEPHONE ENCOUNTER
Called patient and gave results.       ----- Message from Jovi Mccarty MD sent at 3/20/2024  9:54 AM EDT -----  Please notify pt her resting and stress echo testing were acceptable. No strong evidence structural heart disease or CAD.    She can proceed with her surgery.      Thanks!    - Jovi

## 2024-03-20 NOTE — TELEPHONE ENCOUNTER
Received [facility] paperwork via email. Paperwork filled out and emailed back to patient. Also sent paperwork to [facility] to be entered into media of patient's chart.

## 2024-03-20 NOTE — TELEPHONE ENCOUNTER
Received return call from patient. She will be re-faxing and also emailing the entire FMLA packet for signature. Will await documents.

## 2024-03-20 NOTE — TELEPHONE ENCOUNTER
Received 2 pages of [place] paperwork from patient via fax. Called patient to confirm there are only 2 pages to sign. Previous FMLA paperwork were multiple pages. Unable to reach patient. Left  requesting return call.

## 2024-03-25 ENCOUNTER — TELEPHONE (OUTPATIENT)
Age: 54
End: 2024-03-25

## 2024-03-25 LAB
CHEST PAIN STATEMENT: NORMAL
CHEST PAIN STATEMENT: NORMAL
MAX DIASTOLIC BP: 70 MMHG
MAX DIASTOLIC BP: 86 MMHG
MAX PREDICTED HEART RATE: 165 BPM
MAX PREDICTED HEART RATE: 165 BPM
PROTOCOL NAME: NORMAL
PROTOCOL NAME: NORMAL
REASON FOR TERMINATION: NORMAL
REASON FOR TERMINATION: NORMAL
STRESS POST EXERCISE DUR MIN: 5 MIN
STRESS POST EXERCISE DUR MIN: 6 MIN
STRESS POST EXERCISE DUR SEC: 0 SEC
STRESS POST EXERCISE DUR SEC: 31 SEC
STRESS POST PEAK HR: 160 BPM
STRESS POST PEAK HR: 166 BPM
STRESS POST PEAK SYSTOLIC BP: 202 MMHG
STRESS POST PEAK SYSTOLIC BP: 230 MMHG
TARGET HR FORMULA: NORMAL
TARGET HR FORMULA: NORMAL
TEST INDICATION: NORMAL

## 2024-03-25 NOTE — TELEPHONE ENCOUNTER
Called and spoke with patient and reassured her Dr. Olmedo is still doing her reconstruction.   Marking appt and 3 post op appts made at this time as well.   Patient appreciative.

## 2024-03-25 NOTE — TELEPHONE ENCOUNTER
Received call from patient asking if Dr Olmedo will still be doing the reconstructive part of her right mastectomy?

## 2024-04-01 ENCOUNTER — OFFICE VISIT (OUTPATIENT)
Dept: PLASTIC SURGERY | Facility: CLINIC | Age: 54
End: 2024-04-01
Payer: COMMERCIAL

## 2024-04-01 VITALS
DIASTOLIC BLOOD PRESSURE: 100 MMHG | SYSTOLIC BLOOD PRESSURE: 156 MMHG | WEIGHT: 172 LBS | BODY MASS INDEX: 31.65 KG/M2 | HEART RATE: 87 BPM | TEMPERATURE: 97.9 F | HEIGHT: 62 IN

## 2024-04-01 DIAGNOSIS — C50.211 MALIGNANT NEOPLASM OF UPPER-INNER QUADRANT OF RIGHT FEMALE BREAST, UNSPECIFIED ESTROGEN RECEPTOR STATUS (HCC): Primary | ICD-10-CM

## 2024-04-01 PROCEDURE — 99213 OFFICE O/P EST LOW 20 MIN: CPT | Performed by: STUDENT IN AN ORGANIZED HEALTH CARE EDUCATION/TRAINING PROGRAM

## 2024-04-01 NOTE — PROGRESS NOTES
"Plastic Surgery Consult     Reason for visit: presents for further discussion of right breast reconstruction and marking    HPI from 4/1/24  Re-iterated our prior discussion of reconstruction with immediate tissue expander placement with ADM as well as risks of procedure including but not limited to infection, bleeding, scarring, implant leakage/rupture/failure, need for further surgery. Patient acknowledged.    Patient wishes to be ideally \"C\" cup after reconstruction which I reiterated is a goal but not guaranteed.    Discussed postoperative stay and postoperative care.    All questions answered, concerns addressed.  Patient marked  -Spent 20 minutes in consultation with patient. Greater than 50% of the total time was spent obtaining history, evaluation, performing exam, discussion of management options including post-operative care, answering patient's questions and concerns, chart reviewing, and documentation    Brando Olmedo MD   [facility]   [address] [place] [address], [address]   [place] [address]   Office: [phone]       HPI from 3/6/24  Patient presents for further discussion of reconstruction and preop     She is scheduled to undergo right breast mastectomy with SLNB with Dr Mcqueen.     At length, I again discussed the procedure of immediate reconstruction with tissue expander with ADM. I reiterated that breast reconstruction is a multi-step staged procedure and the first step that keeps both implant recon and autologous recon options available is placing a tissue expander. I discussed both options again in detail.     I discussed the increased risk of complications and the effects on tissue of both radiation and chemotherapy and how both can prolong the reconstructive process.     Patient knows she would like reconstruction and will proceed with tissue expander now, but is unsure of later breast reconstruction with either implants or autologous tissue.      Patient has no " history of abdominal surgeries.     She would like to ideally be a C-cup after reconstruction and I discussed that this is a goal but not guaranteed. I also discussed the goals and expectations of asymmetric breast reconstruction. Patient acknowledged.     I discussed overnight observation, usage of drains, importance of compression, and drain management postoperatively. Patient acknowledged.     BMI 32.2     Bilateral moderate to large sized breasts, grade II ptosis bilaterally  Normal nipple sensation  Ecchymosis of right breast biopsy site  No palpable mass, skin dimpling, nipple discharge     Breast measurements:                          R                      L  SN-N               30 cm              32 cm  N-IMF              15 cm              14 cm  BW                  14.5-15.5 cm   14.5-15.5     All questions answered, concerns addressed  Photos taken, consents obtained  Will notify patient if need to anjali day before  Ordered implants  -Spent 40 minutes in consultation with patient. Greater than 50% of the total time was spent obtaining history, evaluation, performing exam, discussion of management options including post-operative care, answering patient's questions and concerns, chart reviewing, and documentation     Brando Olmedo MD   [facility]   [address] [place] [address], [address]   [place] [address]   Office: [phone]              HPI on 2/26/24  Patient is a pleasant 52 y/o female who presents with newly diagnosed right breast cancer. She has noted ADH at 12:00 and carcinoma at 3:00 as well as additional calcifications in the upper outer region of the breast. She has also some concerning lesions in the left breast that she states was worked up and negative. Patient has follow-up with Dr Mcqueen this Wed to finalize surgical oncologic plan. She states that she is leaning toward right breast mastectomy and would like to discuss reconstructive options.     She is  currently a 40 D cup     ROS: 12 pt ROS negative, except as otherwise noted in HPI     PMH: HTN, Dante's syndrome  Family History             Family History   Problem Relation Age of Onset    Depression Mother      Heart disease Mother      Diabetes Mother      Anxiety disorder Mother      Skin cancer Mother      Heart disease Father      Hypertension Father      Stroke Father      Diabetes Brother      No Known Problems Brother      Depression Daughter      No Known Problems Maternal Aunt      No Known Problems Paternal Aunt      No Known Problems Maternal Grandmother      No Known Problems Maternal Grandfather      No Known Problems Paternal Grandmother      No Known Problems Paternal Grandfather      Breast cancer Other 60            SurgHx: kidney stone removal  SocHx: no tobacco, no ETOH  Meds: no blood thinners, no steroids  Allergies: PCN, cefaclor     PE:           Vitals:     02/26/24 1518   BP: (!) 178/100   Pulse: 89   Temp: 98.1 °F (36.7 °C)         General: NC/AT, breathing comfortably on RA  Neuro: CN II-XII grossly intact, symmetric reflexes  HEENT: PERRLA, EOMI, external ears normal, no lesions or deformities, neck supple, trachea midline  Respiratory: CTAB, normal respiratory effort  Cardio: RRR, normal S1, S2, no murmur, rubs, gallops  GI: soft, non-tender, non-distended  Extremities/MSK: normal alignment, mobility, gait, no edema  Skin: no rashes, lesions, subcutaneous nodules     BMI 32.2     Bilateral moderate to large sized breasts, grade II ptosis bilaterally  Normal nipple sensation  Ecchymosis of right breast biopsy site  No palpable mass, skin dimpling, nipple discharge     Breast measurements:                          R                      L  SN-N               30 cm              32 cm  N-IMF              15 cm              14 cm  BW                  14.5-15.5 cm   14.5-15.5     A/P: 54 y/o female with right breast cancer, leaning towards right breast mastectomy and has appointment with  Dr Mcqueen this Wednesday to discuss final oncologic surgical planning.     --I discussed in-depth the various reconstructive options, both implant and autologous reconstructive options. I discussed that both of these methods would require multiple surgeries.  -A detailed conversation was had regarding the patient’s options for breast reconstruction.  Five main points, which are explained to all breast reconstruction patients, were discussed.     Breast reconstruction is an optional process.  In addition, breast reconstruction can be performed in an immediate and delayed fashion.  Even if a patient does not opt for reconstruction now, it can performed at a later time.  Breast reconstruction is a multi-stage process which involves multiple surgeries spaced several months apart.  The entire process can take over one year.  The patient can stop within this process and/or resume it again at any time.  The major goal of breast reconstruction is to have the patient look normal in clothing.  When naked, there will always be scars and other stigmata of the breast reconstruction process.  Asymmetries are often present during the reconstruction process.  Several operations may be needed, including surgery to the non-cancerous breast, to achieve satisfactory results.  No matter the reconstructive method, there are ways that the reconstruction can fail and a secondary reconstructive plan would need to be created.     For each of the reconstruction method involving expander/implant recon and autologous tissue (DORIS, latissimus dorsi), the risks, benefits, alternatives, scarring, and recovery time were discussed in great detail.  Specific risks detailed included bleeding, infection, hematoma, seroma, scarring, pain, wound healing complications, flap loss, fat necrosis, capsular contracture, need for implant removal, donor site complications, bulge, hernia, umbilical necrosis, need for urgent reoperation, and need for dressing  changes were discussed.     -Patient is currently leaning toward right breast mastectomy. After discussion of all surgical options, patient would like to proceed with immediate tissue expander placement with ADM at the time of mastectomy. She will further discuss implant vs autologous second stage reconstruction with her family before deciding. I informed her there is no rush to decide on the second stage reconstruction as she has ample time to make that decision.     -I re-iterated that the first-step of the reconstructive process would be to place immediate tissue expander at the time of mastectomy (either submuscular or prepectoral with acellular dermal matrix) to expand and preserve the skin envelope. This is beneficial because this preserves the skin envelope in it's expanded state if the patient should need radiation therapy. I discussed that radiation increases the risk of all complications (infection, seroma, abscess, cap con, implant extrusion, delayed wound healing, wound dehiscence) and can prolong the reconstructive process. After the patient is expanded to her desired size, patient can subsequently undergo implant exchange for permanent prothesis or autologous DORIS flap reconstruction. However, if radiation is needed, this will delay implant or autologous reconstruction for at least 6 months to allow for the effects of radiation to dissipate and tissue to recover. In addition, radiation increases the risk of all complications of implant reconstruction. If implant reconstruction were to fail, some form of autologous breast reconstruction could be performed (DORIS, latissimus flaps). Patient acknowledged.   -I discussed the risks of tissue expander placement including infection, bleeding, scarring, leakage, rupture, capsular contracture, animation deformity. Patient acknowledged.  -I discussed the process of immediate reconstruction with tissue expander with acellular dermal matrix at the time of breast  mastectomy. Discussed use of multiple drains, follow-up, clinic expansions. Patient acknowledged.  -I discussed the goals of unilateral breast reconstruction in that the goal is to have symmetrical volume in a bra; however, when naked, there will be obvious asymmetries particularly with implant based reconstruction. With autologous DORIS flaps, the symmetry can be better optimized due to using own naturally ptotic tissue.  -I also discussed the procedures, risks, complications for reconstruction for mastectomy vs lumpectomy. If lumpectomy is an option and patient decides to proceed with lumpectomy, patient will need oncologic reduction and will further discuss at next visit.  -All questions answered, concerns addressed.  -Patient to see Dr Mcqueen again this Wednesday to finalize surgical oncologic plan. Patient will return the following week to see me for 2nd visit for further discussion and finalization of reconstructive plan, answering questions, and to obtain consent at that time

## 2024-04-01 NOTE — H&P (VIEW-ONLY)
"Plastic Surgery Consult     Reason for visit: presents for further discussion of right breast reconstruction and marking    HPI from 4/1/24  Re-iterated our prior discussion of reconstruction with immediate tissue expander placement with ADM as well as risks of procedure including but not limited to infection, bleeding, scarring, implant leakage/rupture/failure, need for further surgery. Patient acknowledged.    Patient wishes to be ideally \"C\" cup after reconstruction which I reiterated is a goal but not guaranteed.    Discussed postoperative stay and postoperative care.    All questions answered, concerns addressed.  Patient marked  -Spent 20 minutes in consultation with patient. Greater than 50% of the total time was spent obtaining history, evaluation, performing exam, discussion of management options including post-operative care, answering patient's questions and concerns, chart reviewing, and documentation    Brando Olmedo MD   [facility]   [address] [place] [address], [address]   [place] [address]   Office: [phone]       HPI from 3/6/24  Patient presents for further discussion of reconstruction and preop     She is scheduled to undergo right breast mastectomy with SLNB with Dr Mcqueen.     At length, I again discussed the procedure of immediate reconstruction with tissue expander with ADM. I reiterated that breast reconstruction is a multi-step staged procedure and the first step that keeps both implant recon and autologous recon options available is placing a tissue expander. I discussed both options again in detail.     I discussed the increased risk of complications and the effects on tissue of both radiation and chemotherapy and how both can prolong the reconstructive process.     Patient knows she would like reconstruction and will proceed with tissue expander now, but is unsure of later breast reconstruction with either implants or autologous tissue.      Patient has no " history of abdominal surgeries.     She would like to ideally be a C-cup after reconstruction and I discussed that this is a goal but not guaranteed. I also discussed the goals and expectations of asymmetric breast reconstruction. Patient acknowledged.     I discussed overnight observation, usage of drains, importance of compression, and drain management postoperatively. Patient acknowledged.     BMI 32.2     Bilateral moderate to large sized breasts, grade II ptosis bilaterally  Normal nipple sensation  Ecchymosis of right breast biopsy site  No palpable mass, skin dimpling, nipple discharge     Breast measurements:                          R                      L  SN-N               30 cm              32 cm  N-IMF              15 cm              14 cm  BW                  14.5-15.5 cm   14.5-15.5     All questions answered, concerns addressed  Photos taken, consents obtained  Will notify patient if need to anjali day before  Ordered implants  -Spent 40 minutes in consultation with patient. Greater than 50% of the total time was spent obtaining history, evaluation, performing exam, discussion of management options including post-operative care, answering patient's questions and concerns, chart reviewing, and documentation     Brando Olmedo MD   [facility]   [address] [place] [address], [address]   [place] [address]   Office: [phone]              HPI on 2/26/24  Patient is a pleasant 52 y/o female who presents with newly diagnosed right breast cancer. She has noted ADH at 12:00 and carcinoma at 3:00 as well as additional calcifications in the upper outer region of the breast. She has also some concerning lesions in the left breast that she states was worked up and negative. Patient has follow-up with Dr Mcqueen this Wed to finalize surgical oncologic plan. She states that she is leaning toward right breast mastectomy and would like to discuss reconstructive options.     She is  currently a 40 D cup     ROS: 12 pt ROS negative, except as otherwise noted in HPI     PMH: HTN, Dante's syndrome  Family History             Family History   Problem Relation Age of Onset    Depression Mother      Heart disease Mother      Diabetes Mother      Anxiety disorder Mother      Skin cancer Mother      Heart disease Father      Hypertension Father      Stroke Father      Diabetes Brother      No Known Problems Brother      Depression Daughter      No Known Problems Maternal Aunt      No Known Problems Paternal Aunt      No Known Problems Maternal Grandmother      No Known Problems Maternal Grandfather      No Known Problems Paternal Grandmother      No Known Problems Paternal Grandfather      Breast cancer Other 60            SurgHx: kidney stone removal  SocHx: no tobacco, no ETOH  Meds: no blood thinners, no steroids  Allergies: PCN, cefaclor     PE:           Vitals:     02/26/24 1518   BP: (!) 178/100   Pulse: 89   Temp: 98.1 °F (36.7 °C)         General: NC/AT, breathing comfortably on RA  Neuro: CN II-XII grossly intact, symmetric reflexes  HEENT: PERRLA, EOMI, external ears normal, no lesions or deformities, neck supple, trachea midline  Respiratory: CTAB, normal respiratory effort  Cardio: RRR, normal S1, S2, no murmur, rubs, gallops  GI: soft, non-tender, non-distended  Extremities/MSK: normal alignment, mobility, gait, no edema  Skin: no rashes, lesions, subcutaneous nodules     BMI 32.2     Bilateral moderate to large sized breasts, grade II ptosis bilaterally  Normal nipple sensation  Ecchymosis of right breast biopsy site  No palpable mass, skin dimpling, nipple discharge     Breast measurements:                          R                      L  SN-N               30 cm              32 cm  N-IMF              15 cm              14 cm  BW                  14.5-15.5 cm   14.5-15.5     A/P: 54 y/o female with right breast cancer, leaning towards right breast mastectomy and has appointment with  Dr Mcqueen this Wednesday to discuss final oncologic surgical planning.     --I discussed in-depth the various reconstructive options, both implant and autologous reconstructive options. I discussed that both of these methods would require multiple surgeries.  -A detailed conversation was had regarding the patient’s options for breast reconstruction.  Five main points, which are explained to all breast reconstruction patients, were discussed.     Breast reconstruction is an optional process.  In addition, breast reconstruction can be performed in an immediate and delayed fashion.  Even if a patient does not opt for reconstruction now, it can performed at a later time.  Breast reconstruction is a multi-stage process which involves multiple surgeries spaced several months apart.  The entire process can take over one year.  The patient can stop within this process and/or resume it again at any time.  The major goal of breast reconstruction is to have the patient look normal in clothing.  When naked, there will always be scars and other stigmata of the breast reconstruction process.  Asymmetries are often present during the reconstruction process.  Several operations may be needed, including surgery to the non-cancerous breast, to achieve satisfactory results.  No matter the reconstructive method, there are ways that the reconstruction can fail and a secondary reconstructive plan would need to be created.     For each of the reconstruction method involving expander/implant recon and autologous tissue (DORIS, latissimus dorsi), the risks, benefits, alternatives, scarring, and recovery time were discussed in great detail.  Specific risks detailed included bleeding, infection, hematoma, seroma, scarring, pain, wound healing complications, flap loss, fat necrosis, capsular contracture, need for implant removal, donor site complications, bulge, hernia, umbilical necrosis, need for urgent reoperation, and need for dressing  changes were discussed.     -Patient is currently leaning toward right breast mastectomy. After discussion of all surgical options, patient would like to proceed with immediate tissue expander placement with ADM at the time of mastectomy. She will further discuss implant vs autologous second stage reconstruction with her family before deciding. I informed her there is no rush to decide on the second stage reconstruction as she has ample time to make that decision.     -I re-iterated that the first-step of the reconstructive process would be to place immediate tissue expander at the time of mastectomy (either submuscular or prepectoral with acellular dermal matrix) to expand and preserve the skin envelope. This is beneficial because this preserves the skin envelope in it's expanded state if the patient should need radiation therapy. I discussed that radiation increases the risk of all complications (infection, seroma, abscess, cap con, implant extrusion, delayed wound healing, wound dehiscence) and can prolong the reconstructive process. After the patient is expanded to her desired size, patient can subsequently undergo implant exchange for permanent prothesis or autologous DORIS flap reconstruction. However, if radiation is needed, this will delay implant or autologous reconstruction for at least 6 months to allow for the effects of radiation to dissipate and tissue to recover. In addition, radiation increases the risk of all complications of implant reconstruction. If implant reconstruction were to fail, some form of autologous breast reconstruction could be performed (DORIS, latissimus flaps). Patient acknowledged.   -I discussed the risks of tissue expander placement including infection, bleeding, scarring, leakage, rupture, capsular contracture, animation deformity. Patient acknowledged.  -I discussed the process of immediate reconstruction with tissue expander with acellular dermal matrix at the time of breast  mastectomy. Discussed use of multiple drains, follow-up, clinic expansions. Patient acknowledged.  -I discussed the goals of unilateral breast reconstruction in that the goal is to have symmetrical volume in a bra; however, when naked, there will be obvious asymmetries particularly with implant based reconstruction. With autologous DORIS flaps, the symmetry can be better optimized due to using own naturally ptotic tissue.  -I also discussed the procedures, risks, complications for reconstruction for mastectomy vs lumpectomy. If lumpectomy is an option and patient decides to proceed with lumpectomy, patient will need oncologic reduction and will further discuss at next visit.  -All questions answered, concerns addressed.  -Patient to see Dr Mcqueen again this Wednesday to finalize surgical oncologic plan. Patient will return the following week to see me for 2nd visit for further discussion and finalization of reconstructive plan, answering questions, and to obtain consent at that time

## 2024-04-02 ENCOUNTER — HOSPITAL ENCOUNTER (OUTPATIENT)
Dept: NUCLEAR MEDICINE | Facility: HOSPITAL | Age: 54
Discharge: HOME | End: 2024-04-02
Attending: SURGERY
Payer: COMMERCIAL

## 2024-04-02 ENCOUNTER — HOSPITAL ENCOUNTER (OUTPATIENT)
Facility: HOSPITAL | Age: 54
Setting detail: OUTPATIENT SURGERY
Discharge: HOME HEALTH | End: 2024-04-03
Attending: SURGERY | Admitting: SURGERY
Payer: COMMERCIAL

## 2024-04-02 DIAGNOSIS — Z17.0 MALIGNANT NEOPLASM OF OVERLAPPING SITES OF RIGHT BREAST IN FEMALE, ESTROGEN RECEPTOR POSITIVE (HCC): ICD-10-CM

## 2024-04-02 DIAGNOSIS — C50.811 MALIGNANT NEOPLASM OF OVERLAPPING SITES OF RIGHT BREAST IN FEMALE, ESTROGEN RECEPTOR POSITIVE (HCC): ICD-10-CM

## 2024-04-02 LAB
EXT PREGNANCY TEST URINE: NEGATIVE
EXT. CONTROL: NORMAL

## 2024-04-02 PROCEDURE — C1789 PROSTHESIS, BREAST, IMP: HCPCS | Performed by: SURGERY

## 2024-04-02 PROCEDURE — 78195 LYMPH SYSTEM IMAGING: CPT

## 2024-04-02 PROCEDURE — 15777 ACELLULAR DERM MATRIX IMPLT: CPT | Performed by: STUDENT IN AN ORGANIZED HEALTH CARE EDUCATION/TRAINING PROGRAM

## 2024-04-02 PROCEDURE — 38900 IO MAP OF SENT LYMPH NODE: CPT | Performed by: SURGERY

## 2024-04-02 PROCEDURE — 19303 MAST SIMPLE COMPLETE: CPT | Performed by: SURGERY

## 2024-04-02 PROCEDURE — C1781 MESH (IMPLANTABLE): HCPCS | Performed by: SURGERY

## 2024-04-02 PROCEDURE — 88307 TISSUE EXAM BY PATHOLOGIST: CPT | Performed by: PATHOLOGY

## 2024-04-02 PROCEDURE — C9290 INJ, BUPIVACAINE LIPOSOME: HCPCS | Performed by: STUDENT IN AN ORGANIZED HEALTH CARE EDUCATION/TRAINING PROGRAM

## 2024-04-02 PROCEDURE — A9541 TC99M SULFUR COLLOID: HCPCS

## 2024-04-02 PROCEDURE — 81025 URINE PREGNANCY TEST: CPT | Performed by: SURGERY

## 2024-04-02 PROCEDURE — 15777 ACELLULAR DERM MATRIX IMPLT: CPT | Performed by: PHYSICIAN ASSISTANT

## 2024-04-02 PROCEDURE — 19357 TISS XPNDR PLMT BRST RCNSTJ: CPT | Performed by: PHYSICIAN ASSISTANT

## 2024-04-02 PROCEDURE — 88341 IMHCHEM/IMCYTCHM EA ADD ANTB: CPT | Performed by: PATHOLOGY

## 2024-04-02 PROCEDURE — 38525 BIOPSY/REMOVAL LYMPH NODES: CPT | Performed by: SURGERY

## 2024-04-02 PROCEDURE — 19357 TISS XPNDR PLMT BRST RCNSTJ: CPT | Performed by: STUDENT IN AN ORGANIZED HEALTH CARE EDUCATION/TRAINING PROGRAM

## 2024-04-02 PROCEDURE — 88342 IMHCHEM/IMCYTCHM 1ST ANTB: CPT | Performed by: PATHOLOGY

## 2024-04-02 DEVICE — (220 SQ CM) IMPLANT FLEXHD PLIABLE SHAPED PERF 11 X 20CM 07-1.4MM THCK  MED: Type: IMPLANTABLE DEVICE | Site: BREAST | Status: FUNCTIONAL

## 2024-04-02 DEVICE — SILTEX MEDIUM HEIGHT TISSUE EXPANDER STYLE 9200, 650CC
Type: IMPLANTABLE DEVICE | Site: BREAST | Status: FUNCTIONAL
Brand: MENTOR CPX 4 BREAST TISSUE EXPANDER WITH SUTURE TABS

## 2024-04-02 RX ORDER — ACETAMINOPHEN 500 MG
500 TABLET ORAL EVERY 4 HOURS PRN
Qty: 30 TABLET | Refills: 2 | Status: SHIPPED | OUTPATIENT
Start: 2024-04-02

## 2024-04-02 RX ORDER — GABAPENTIN 300 MG/1
300 CAPSULE ORAL ONCE
Status: COMPLETED | OUTPATIENT
Start: 2024-04-02 | End: 2024-04-02

## 2024-04-02 RX ORDER — ISOSULFAN BLUE 50 MG/5ML
INJECTION, SOLUTION SUBCUTANEOUS AS NEEDED
Status: DISCONTINUED | OUTPATIENT
Start: 2024-04-02 | End: 2024-04-02 | Stop reason: HOSPADM

## 2024-04-02 RX ORDER — BUTALBITAL, ACETAMINOPHEN AND CAFFEINE 300; 40; 50 MG/1; MG/1; MG/1
CAPSULE ORAL DAILY PRN
Status: DISCONTINUED | OUTPATIENT
Start: 2024-04-02 | End: 2024-04-02

## 2024-04-02 RX ORDER — ENOXAPARIN SODIUM 100 MG/ML
40 INJECTION SUBCUTANEOUS ONCE
Status: COMPLETED | OUTPATIENT
Start: 2024-04-02 | End: 2024-04-02

## 2024-04-02 RX ORDER — ACETAMINOPHEN 10 MG/ML
1000 INJECTION, SOLUTION INTRAVENOUS
Status: COMPLETED | OUTPATIENT
Start: 2024-04-02 | End: 2024-04-02

## 2024-04-02 RX ORDER — ENOXAPARIN SODIUM 100 MG/ML
40 INJECTION SUBCUTANEOUS DAILY
Status: DISCONTINUED | OUTPATIENT
Start: 2024-04-03 | End: 2024-04-03 | Stop reason: HOSPADM

## 2024-04-02 RX ORDER — HYDROCHLOROTHIAZIDE 25 MG/1
25 TABLET ORAL DAILY
Status: DISCONTINUED | OUTPATIENT
Start: 2024-04-03 | End: 2024-04-03 | Stop reason: HOSPADM

## 2024-04-02 RX ORDER — OXYCODONE HYDROCHLORIDE 5 MG/1
5 TABLET ORAL EVERY 4 HOURS PRN
Qty: 30 TABLET | Refills: 0 | Status: SHIPPED | OUTPATIENT
Start: 2024-04-02 | End: 2024-04-12

## 2024-04-02 RX ORDER — OXYCODONE HYDROCHLORIDE 5 MG/1
5 TABLET ORAL EVERY 4 HOURS PRN
Status: DISCONTINUED | OUTPATIENT
Start: 2024-04-02 | End: 2024-04-03 | Stop reason: HOSPADM

## 2024-04-02 RX ORDER — DIPHENHYDRAMINE HYDROCHLORIDE 50 MG/ML
25 INJECTION INTRAMUSCULAR; INTRAVENOUS EVERY 6 HOURS PRN
Status: DISCONTINUED | OUTPATIENT
Start: 2024-04-02 | End: 2024-04-03 | Stop reason: HOSPADM

## 2024-04-02 RX ORDER — OXYCODONE HYDROCHLORIDE 10 MG/1
10 TABLET ORAL EVERY 4 HOURS PRN
Status: DISCONTINUED | OUTPATIENT
Start: 2024-04-02 | End: 2024-04-03 | Stop reason: HOSPADM

## 2024-04-02 RX ORDER — GABAPENTIN 300 MG/1
300 CAPSULE ORAL 3 TIMES DAILY
Status: DISCONTINUED | OUTPATIENT
Start: 2024-04-02 | End: 2024-04-03 | Stop reason: HOSPADM

## 2024-04-02 RX ORDER — PROPRANOLOL HYDROCHLORIDE 80 MG/1
80 CAPSULE, EXTENDED RELEASE ORAL DAILY
Status: DISCONTINUED | OUTPATIENT
Start: 2024-04-02 | End: 2024-04-03 | Stop reason: HOSPADM

## 2024-04-02 RX ORDER — ONDANSETRON 2 MG/ML
INJECTION INTRAMUSCULAR; INTRAVENOUS AS NEEDED
Status: DISCONTINUED | OUTPATIENT
Start: 2024-04-02 | End: 2024-04-02

## 2024-04-02 RX ORDER — ONDANSETRON 4 MG/1
4 TABLET, FILM COATED ORAL EVERY 8 HOURS PRN
Qty: 20 TABLET | Refills: 0 | Status: SHIPPED | OUTPATIENT
Start: 2024-04-02

## 2024-04-02 RX ORDER — LIDOCAINE HYDROCHLORIDE 20 MG/ML
INJECTION, SOLUTION EPIDURAL; INFILTRATION; INTRACAUDAL; PERINEURAL AS NEEDED
Status: DISCONTINUED | OUTPATIENT
Start: 2024-04-02 | End: 2024-04-02

## 2024-04-02 RX ORDER — MIDAZOLAM HYDROCHLORIDE 2 MG/2ML
INJECTION, SOLUTION INTRAMUSCULAR; INTRAVENOUS AS NEEDED
Status: DISCONTINUED | OUTPATIENT
Start: 2024-04-02 | End: 2024-04-02

## 2024-04-02 RX ORDER — SODIUM CHLORIDE, SODIUM LACTATE, POTASSIUM CHLORIDE, CALCIUM CHLORIDE 600; 310; 30; 20 MG/100ML; MG/100ML; MG/100ML; MG/100ML
125 INJECTION, SOLUTION INTRAVENOUS CONTINUOUS
Status: DISCONTINUED | OUTPATIENT
Start: 2024-04-02 | End: 2024-04-02

## 2024-04-02 RX ORDER — DOCUSATE SODIUM 100 MG/1
100 CAPSULE, LIQUID FILLED ORAL 2 TIMES DAILY
Status: DISCONTINUED | OUTPATIENT
Start: 2024-04-02 | End: 2024-04-03 | Stop reason: HOSPADM

## 2024-04-02 RX ORDER — MAGNESIUM HYDROXIDE 1200 MG/15ML
LIQUID ORAL AS NEEDED
Status: DISCONTINUED | OUTPATIENT
Start: 2024-04-02 | End: 2024-04-02 | Stop reason: HOSPADM

## 2024-04-02 RX ORDER — FENTANYL CITRATE 50 UG/ML
INJECTION, SOLUTION INTRAMUSCULAR; INTRAVENOUS AS NEEDED
Status: DISCONTINUED | OUTPATIENT
Start: 2024-04-02 | End: 2024-04-02

## 2024-04-02 RX ORDER — DOCUSATE SODIUM 100 MG/1
100 CAPSULE, LIQUID FILLED ORAL 2 TIMES DAILY PRN
Qty: 20 CAPSULE | Refills: 2 | Status: SHIPPED | OUTPATIENT
Start: 2024-04-02

## 2024-04-02 RX ORDER — METHOCARBAMOL 500 MG/1
500 TABLET, FILM COATED ORAL EVERY 6 HOURS PRN
Status: DISCONTINUED | OUTPATIENT
Start: 2024-04-02 | End: 2024-04-03 | Stop reason: HOSPADM

## 2024-04-02 RX ORDER — SODIUM CHLORIDE 9 MG/ML
125 INJECTION, SOLUTION INTRAVENOUS CONTINUOUS
Status: DISCONTINUED | OUTPATIENT
Start: 2024-04-02 | End: 2024-04-02

## 2024-04-02 RX ORDER — FENTANYL CITRATE/PF 50 MCG/ML
25 SYRINGE (ML) INJECTION
Status: DISCONTINUED | OUTPATIENT
Start: 2024-04-02 | End: 2024-04-02 | Stop reason: HOSPADM

## 2024-04-02 RX ORDER — CALCIUM CARBONATE 500 MG/1
1000 TABLET, CHEWABLE ORAL DAILY PRN
Status: DISCONTINUED | OUTPATIENT
Start: 2024-04-02 | End: 2024-04-03 | Stop reason: HOSPADM

## 2024-04-02 RX ORDER — AMLODIPINE BESYLATE 2.5 MG/1
2.5 TABLET ORAL DAILY
Status: DISCONTINUED | OUTPATIENT
Start: 2024-04-03 | End: 2024-04-03 | Stop reason: HOSPADM

## 2024-04-02 RX ORDER — KETAMINE HCL IN NACL, ISO-OSM 100MG/10ML
SYRINGE (ML) INJECTION AS NEEDED
Status: DISCONTINUED | OUTPATIENT
Start: 2024-04-02 | End: 2024-04-02

## 2024-04-02 RX ORDER — FEXOFENADINE HCL 60 MG/1
60 TABLET, FILM COATED ORAL DAILY
Status: DISCONTINUED | OUTPATIENT
Start: 2024-04-03 | End: 2024-04-02

## 2024-04-02 RX ORDER — ALPRAZOLAM 0.25 MG/1
0.25 TABLET ORAL
Status: DISCONTINUED | OUTPATIENT
Start: 2024-04-02 | End: 2024-04-03 | Stop reason: HOSPADM

## 2024-04-02 RX ORDER — BUPIVACAINE HYDROCHLORIDE 5 MG/ML
INJECTION, SOLUTION EPIDURAL; INTRACAUDAL AS NEEDED
Status: DISCONTINUED | OUTPATIENT
Start: 2024-04-02 | End: 2024-04-02 | Stop reason: HOSPADM

## 2024-04-02 RX ORDER — ACETAMINOPHEN 325 MG/1
650 TABLET ORAL EVERY 6 HOURS PRN
Status: DISCONTINUED | OUTPATIENT
Start: 2024-04-02 | End: 2024-04-03 | Stop reason: HOSPADM

## 2024-04-02 RX ORDER — CEFAZOLIN SODIUM 1 G/50ML
1000 SOLUTION INTRAVENOUS
Status: DISCONTINUED | OUTPATIENT
Start: 2024-04-02 | End: 2024-04-02 | Stop reason: HOSPADM

## 2024-04-02 RX ORDER — PROPOFOL 10 MG/ML
INJECTION, EMULSION INTRAVENOUS CONTINUOUS PRN
Status: DISCONTINUED | OUTPATIENT
Start: 2024-04-02 | End: 2024-04-02

## 2024-04-02 RX ORDER — ROCURONIUM BROMIDE 10 MG/ML
INJECTION, SOLUTION INTRAVENOUS AS NEEDED
Status: DISCONTINUED | OUTPATIENT
Start: 2024-04-02 | End: 2024-04-02

## 2024-04-02 RX ORDER — SULFAMETHOXAZOLE AND TRIMETHOPRIM 800; 160 MG/1; MG/1
1 TABLET ORAL EVERY 12 HOURS SCHEDULED
Qty: 20 TABLET | Refills: 0 | Status: SHIPPED | OUTPATIENT
Start: 2024-04-02 | End: 2024-04-12

## 2024-04-02 RX ORDER — DEXAMETHASONE SODIUM PHOSPHATE 10 MG/ML
INJECTION, SOLUTION INTRAMUSCULAR; INTRAVENOUS AS NEEDED
Status: DISCONTINUED | OUTPATIENT
Start: 2024-04-02 | End: 2024-04-02

## 2024-04-02 RX ORDER — FLUTICASONE PROPIONATE 50 MCG
1 SPRAY, SUSPENSION (ML) NASAL DAILY
Status: DISCONTINUED | OUTPATIENT
Start: 2024-04-03 | End: 2024-04-03 | Stop reason: HOSPADM

## 2024-04-02 RX ORDER — HYDROMORPHONE HCL/PF 1 MG/ML
0.5 SYRINGE (ML) INJECTION
Status: DISCONTINUED | OUTPATIENT
Start: 2024-04-02 | End: 2024-04-02 | Stop reason: HOSPADM

## 2024-04-02 RX ORDER — PROPOFOL 10 MG/ML
INJECTION, EMULSION INTRAVENOUS AS NEEDED
Status: DISCONTINUED | OUTPATIENT
Start: 2024-04-02 | End: 2024-04-02

## 2024-04-02 RX ORDER — BUSPIRONE HYDROCHLORIDE 5 MG/1
5 TABLET ORAL 3 TIMES DAILY
Status: DISCONTINUED | OUTPATIENT
Start: 2024-04-02 | End: 2024-04-03 | Stop reason: HOSPADM

## 2024-04-02 RX ORDER — ONDANSETRON 2 MG/ML
4 INJECTION INTRAMUSCULAR; INTRAVENOUS ONCE AS NEEDED
Status: DISCONTINUED | OUTPATIENT
Start: 2024-04-02 | End: 2024-04-02 | Stop reason: HOSPADM

## 2024-04-02 RX ORDER — HYDROMORPHONE HCL/PF 1 MG/ML
0.5 SYRINGE (ML) INJECTION EVERY 4 HOURS PRN
Status: DISCONTINUED | OUTPATIENT
Start: 2024-04-02 | End: 2024-04-03 | Stop reason: HOSPADM

## 2024-04-02 RX ORDER — FAMOTIDINE 20 MG/1
20 TABLET, FILM COATED ORAL 2 TIMES DAILY PRN
Status: DISCONTINUED | OUTPATIENT
Start: 2024-04-02 | End: 2024-04-03 | Stop reason: HOSPADM

## 2024-04-02 RX ORDER — CEFAZOLIN SODIUM 1 G/50ML
1000 SOLUTION INTRAVENOUS EVERY 8 HOURS
Qty: 100 ML | Refills: 0 | Status: COMPLETED | OUTPATIENT
Start: 2024-04-02 | End: 2024-04-03

## 2024-04-02 RX ORDER — HYDROMORPHONE HCL/PF 1 MG/ML
SYRINGE (ML) INJECTION AS NEEDED
Status: DISCONTINUED | OUTPATIENT
Start: 2024-04-02 | End: 2024-04-02

## 2024-04-02 RX ADMIN — Medication 20 MG: at 11:03

## 2024-04-02 RX ADMIN — Medication 10 MG: at 12:57

## 2024-04-02 RX ADMIN — ROCURONIUM BROMIDE 50 MG: 10 INJECTION, SOLUTION INTRAVENOUS at 10:58

## 2024-04-02 RX ADMIN — HYDROMORPHONE HYDROCHLORIDE 0.5 MG: 1 INJECTION, SOLUTION INTRAMUSCULAR; INTRAVENOUS; SUBCUTANEOUS at 13:41

## 2024-04-02 RX ADMIN — GABAPENTIN 300 MG: 300 CAPSULE ORAL at 07:38

## 2024-04-02 RX ADMIN — FENTANYL CITRATE 50 MCG: 50 INJECTION INTRAMUSCULAR; INTRAVENOUS at 12:19

## 2024-04-02 RX ADMIN — OXYCODONE HYDROCHLORIDE 10 MG: 10 TABLET ORAL at 20:20

## 2024-04-02 RX ADMIN — FENTANYL CITRATE 25 MCG: 50 INJECTION INTRAMUSCULAR; INTRAVENOUS at 16:00

## 2024-04-02 RX ADMIN — GABAPENTIN 300 MG: 300 CAPSULE ORAL at 17:36

## 2024-04-02 RX ADMIN — SODIUM CHLORIDE, SODIUM LACTATE, POTASSIUM CHLORIDE, AND CALCIUM CHLORIDE 125 ML/HR: .6; .31; .03; .02 INJECTION, SOLUTION INTRAVENOUS at 16:07

## 2024-04-02 RX ADMIN — CEFAZOLIN SODIUM 1000 MG: 1 SOLUTION INTRAVENOUS at 10:55

## 2024-04-02 RX ADMIN — Medication 10 MG: at 12:19

## 2024-04-02 RX ADMIN — PROPOFOL 200 MG: 10 INJECTION, EMULSION INTRAVENOUS at 10:58

## 2024-04-02 RX ADMIN — ROCURONIUM BROMIDE 20 MG: 10 INJECTION, SOLUTION INTRAVENOUS at 11:52

## 2024-04-02 RX ADMIN — FENTANYL CITRATE 25 MCG: 50 INJECTION INTRAMUSCULAR; INTRAVENOUS at 15:19

## 2024-04-02 RX ADMIN — PROPRANOLOL HYDROCHLORIDE 80 MG: 80 CAPSULE, EXTENDED RELEASE ORAL at 20:11

## 2024-04-02 RX ADMIN — FENTANYL CITRATE 100 MCG: 50 INJECTION INTRAMUSCULAR; INTRAVENOUS at 10:58

## 2024-04-02 RX ADMIN — Medication 10 MG: at 11:47

## 2024-04-02 RX ADMIN — ONDANSETRON 4 MG: 2 INJECTION INTRAMUSCULAR; INTRAVENOUS at 14:01

## 2024-04-02 RX ADMIN — BUSPIRONE HYDROCHLORIDE 5 MG: 5 TABLET ORAL at 17:36

## 2024-04-02 RX ADMIN — MIDAZOLAM 2 MG: 1 INJECTION INTRAMUSCULAR; INTRAVENOUS at 10:50

## 2024-04-02 RX ADMIN — DEXAMETHASONE SODIUM PHOSPHATE 10 MG: 10 INJECTION INTRAMUSCULAR; INTRAVENOUS at 10:58

## 2024-04-02 RX ADMIN — LIDOCAINE HYDROCHLORIDE 100 MG: 20 INJECTION, SOLUTION EPIDURAL; INFILTRATION; INTRACAUDAL at 10:58

## 2024-04-02 RX ADMIN — SUGAMMADEX 200 MG: 100 INJECTION, SOLUTION INTRAVENOUS at 14:43

## 2024-04-02 RX ADMIN — ACETAMINOPHEN 1000 MG: 10 INJECTION INTRAVENOUS at 08:02

## 2024-04-02 RX ADMIN — SODIUM CHLORIDE, SODIUM LACTATE, POTASSIUM CHLORIDE, AND CALCIUM CHLORIDE 125 ML/HR: .6; .31; .03; .02 INJECTION, SOLUTION INTRAVENOUS at 07:52

## 2024-04-02 RX ADMIN — DOCUSATE SODIUM 100 MG: 100 CAPSULE, LIQUID FILLED ORAL at 17:36

## 2024-04-02 RX ADMIN — FENTANYL CITRATE 25 MCG: 50 INJECTION INTRAMUSCULAR; INTRAVENOUS at 15:12

## 2024-04-02 RX ADMIN — SODIUM CHLORIDE, SODIUM LACTATE, POTASSIUM CHLORIDE, AND CALCIUM CHLORIDE: .6; .31; .03; .02 INJECTION, SOLUTION INTRAVENOUS at 12:49

## 2024-04-02 RX ADMIN — ROCURONIUM BROMIDE 10 MG: 10 INJECTION, SOLUTION INTRAVENOUS at 12:57

## 2024-04-02 RX ADMIN — PROPOFOL 80 MCG/KG/MIN: 10 INJECTION, EMULSION INTRAVENOUS at 10:58

## 2024-04-02 RX ADMIN — CEFAZOLIN SODIUM 1000 MG: 1 SOLUTION INTRAVENOUS at 19:08

## 2024-04-02 RX ADMIN — FENTANYL CITRATE 50 MCG: 50 INJECTION INTRAMUSCULAR; INTRAVENOUS at 11:52

## 2024-04-02 RX ADMIN — ENOXAPARIN SODIUM 40 MG: 40 INJECTION SUBCUTANEOUS at 07:38

## 2024-04-02 NOTE — PLAN OF CARE
Problem: PAIN - ADULT  Goal: Verbalizes/displays adequate comfort level or baseline comfort level  Description: Interventions:  - Encourage patient to monitor pain and request assistance  - Assess pain using appropriate pain scale  - Administer analgesics based on type and severity of pain and evaluate response  - Implement non-pharmacological measures as appropriate and evaluate response  - Consider cultural and social influences on pain and pain management  - Notify physician/advanced practitioner if interventions unsuccessful or patient reports new pain  Outcome: Progressing     Problem: INFECTION - ADULT  Goal: Absence or prevention of progression during hospitalization  Description: INTERVENTIONS:  - Assess and monitor for signs and symptoms of infection  - Monitor lab/diagnostic results  - Monitor all insertion sites, i.e. indwelling lines, tubes, and drains  - Monitor endotracheal if appropriate and nasal secretions for changes in amount and color  - [place] appropriate cooling/warming therapies per order  - Administer medications as ordered  - Instruct and encourage patient and family to use good hand hygiene technique  - Identify and instruct in appropriate isolation precautions for identified infection/condition  Outcome: Progressing     Problem: SAFETY ADULT  Goal: Patient will remain free of falls  Description: INTERVENTIONS:  - Educate patient/family on patient safety including physical limitations  - Instruct patient to call for assistance with activity   - Consult OT/PT to assist with strengthening/mobility   - Keep Call bell within reach  - Keep bed low and locked with side rails adjusted as appropriate  - Keep care items and personal belongings within reach  - Initiate and maintain comfort rounds  - Make Fall Risk Sign visible to staff  - Apply yellow socks and bracelet for high fall risk patients  - Consider moving patient to room near nurses station  Outcome: Progressing     Problem: DISCHARGE  PLANNING  Goal: Discharge to home or other facility with appropriate resources  Description: INTERVENTIONS:  - Identify barriers to discharge w/patient and caregiver  - Arrange for needed discharge resources and transportation as appropriate  - Identify discharge learning needs (meds, wound care, etc.)  - Arrange for interpretive services to assist at discharge as needed  - Refer to Case Management Department for coordinating discharge planning if the patient needs post-hospital services based on physician/advanced practitioner order or complex needs related to functional status, cognitive ability, or social support system  Outcome: Progressing     Problem: Knowledge Deficit  Goal: Patient/family/caregiver demonstrates understanding of disease process, treatment plan, medications, and discharge instructions  Description: Complete learning assessment and assess knowledge base.  Interventions:  - Provide teaching at level of understanding  - Provide teaching via preferred learning methods  Outcome: Progressing     Problem: METABOLIC, FLUID AND ELECTROLYTES - ADULT  Goal: Electrolytes maintained within normal limits  Description: INTERVENTIONS:  - Monitor labs and assess patient for signs and symptoms of electrolyte imbalances  - Administer electrolyte replacement as ordered  - Monitor response to electrolyte replacements, including repeat lab results as appropriate  - Instruct patient on fluid and nutrition as appropriate  Outcome: Progressing     Problem: SKIN/TISSUE INTEGRITY - ADULT  Goal: Incision(s), wounds(s) or drain site(s) healing without S/S of infection  Description: INTERVENTIONS  - Assess and document dressing, incision, wound bed, drain sites and surrounding tissue  - Provide patient and family education  - Perform skin care/dressing changes every shift  Outcome: Progressing     Problem: MUSCULOSKELETAL - ADULT  Goal: Maintain or return mobility to safest level of function  Description: INTERVENTIONS:  -  Assess patient's ability to carry out ADLs; assess patient's baseline for ADL function and identify physical deficits which impact ability to perform ADLs (bathing, care of mouth/teeth, toileting, grooming, dressing, etc.)  - Assess/evaluate cause of self-care deficits   - Assess range of motion  - Assess patient's mobility  - Assess patient's need for assistive devices and provide as appropriate  - Encourage maximum independence but intervene and supervise when necessary  - Involve family in performance of ADLs  - Assess for home care needs following discharge   - Consider OT consult to assist with ADL evaluation and planning for discharge  - Provide patient education as appropriate  Outcome: Progressing

## 2024-04-02 NOTE — INTERVAL H&P NOTE
H&P reviewed. After examining the patient I find no changes in the patients condition since the H&P had been written.    Vitals:    04/02/24 0725   BP: 159/88   Pulse: 77   Resp: 16   Temp: 98.2 °F (36.8 °C)   SpO2: 100%

## 2024-04-02 NOTE — ANESTHESIA PREPROCEDURE EVALUATION
"Procedure:  RIGHT BREAST MASTECTOMY, RIGHT LYMPHOSCINTIGRAPHY, LYMPHATIC MAPPING, SENTINEL LYMPH NODE BIOPSY, PSB AXILLARY NODE DISSECTION; (Right: Breast)  RIGHT BREAST IMMEDIATE RECON W/ TISSUE EXPANDER AND ADM (Right: Breast)    Relevant Problems   CARDIO   (+) Hypertension   (+) Migraine without aura and without status migrainosus, not intractable      GYN   (+) Malignant neoplasm of overlapping sites of right breast in female, estrogen receptor positive (HCC)      NEURO/PSYCH   (+) Anxiety   (+) Migraine without aura and without status migrainosus, not intractable      Other   (+) Abnormal EKG        Physical Exam    Airway    Mallampati score: II  TM Distance: >3 FB  Neck ROM: full     Dental   No notable dental hx     Cardiovascular  Rhythm: regular, Rate: normal, Cardiovascular exam normal    Pulmonary  Pulmonary exam normal Breath sounds clear to auscultation    Other Findings  post-pubertal.      Anesthesia Plan  ASA Score- 2     Anesthesia Type- general with ASA Monitors.         Additional Monitors:     Airway Plan: ETT.    Comment: Seen by Cardiology w/r to \"abnormal EKG\".  W/U and Hx entirely negative..       Plan Factors-     EKG reviewed. Imaging results reviewed. Existing labs reviewed.     Patient is not a current smoker. Patient not instructed to abstain from smoking on day of procedure. Patient did not smoke on day of surgery.    There is medical exclusion for perioperative obstructive sleep apnea risk education.        Induction- intravenous.    Postoperative Plan- . Planned trial extubation    Informed Consent- Anesthetic plan and risks discussed with patient and spouse (Aidan).                  "

## 2024-04-02 NOTE — DISCHARGE INSTR - AVS FIRST PAGE
Discharge instructions    -Take tylenol 500 mg as scheduled for baseline mild to moderate pain control. For severe breakthrough pain, can use oxycodone.  -Do not drive or operate heavy machinery when taking narcotic pain medicine (oxycodone) as it can cause drowsiness.  -Do not get incisions wet for 3 days. After 3 days, can remove all dressings and shower with drains. Be careful with drains, do not let them hang and pull on skin.  -No submerging incisions (no baths, pools, hottubs). Pat dry incision and dress with gauze.  -Wear supportive bra at all times. Sports bra is okay. No underwire. After removing gauze, can place small hand towel in arm pit area to provide compression and then wear bra to keep secure. Should be snug but not too tight.  -No strenuous activity, no heavy lifting (nothing over 5 lbs), no reaching above head, no hard pushing or pulling with arms.   -Record and empty drains at least three-times-per day, more if needed. Bring recorded log to clinic as this determines when drains can be removed.  -Take all medicines as prescribed.   -Take antibiotics as prescribed until completion.  -Resume regular diet and home medications  -Call Plastic Surgery office to schedule post-op follow in 7 days.    Brando Olmedo MD   [facility]   [address] [place] [address], [address]   [place] [address]   Office: [phone]

## 2024-04-02 NOTE — ANESTHESIA POSTPROCEDURE EVALUATION
Post-Op Assessment Note    CV Status:  Stable  Pain Score: 4    Pain management: adequate       Mental Status:  Alert and awake   Hydration Status:  Euvolemic   PONV Controlled:  Controlled   Airway Patency:  Patent  Airway: intubated  Two or more mitigation strategies used for obstructive sleep apnea   Post Op Vitals Reviewed: Yes    No anethesia notable event occurred.    Staff: Anesthesiologist, CRNA               /88 (04/02/24 1519)    Temp      Pulse 100 (04/02/24 1519)   Resp 16 (04/02/24 1519)    SpO2 96 % (04/02/24 1519)

## 2024-04-02 NOTE — OP NOTE
OPERATIVE REPORT  PATIENT NAME: Nunu Tirado    :  1970  MRN: [id number]  Pt Location: [facility] OR ROOM 05    SURGERY DATE: 2024    Surgeons and Role:  Panel 1:     * Dinorah Mcqueen MD - Primary     * Driss Chan DO - Assisting     * Concha Richardson DPM - Observing  Panel 2:     * Branod Olmedo MD - Primary   Ned JIN - assisting    Preop Diagnosis:  Malignant neoplasm of overlapping sites of right breast in female, estrogen receptor positive (HCC) [C50.811, Z17.0]    Post-Op Diagnosis Codes:     * Malignant neoplasm of overlapping sites of right breast in female, estrogen receptor positive (HCC) [C50.811, Z17.0]    Procedure(s):  Surgical preparation of wound bed of right chest (size 14x1 cm)  Immediate right breast reconstruction with submuscular tissue expander with acellular dermal matrix    Specimen(s):  ID Type Source Tests Collected by Time Destination   1 : sentinel lymph node #1 right axilla Tissue Lymph Node, [place] TISSUE EXAM Dinorah Mcqueen MD 2024 12:18 PM    2 : sentinel lymph node #2 right axilla Tissue Lymph Node, [place] TISSUE EXAM Dinorah Mcqueen MD 2024 12:18 PM    3 : right breast short superior long lateral Tissue Breast, Right TISSUE EXAM Dinorah Mcqueen MD 2024 11:31 AM        Estimated Blood Loss:   Minimal    Drains:  Closed/Suction Drain Right Breast 15 Fr. (Active)   Number of days: 0       Urethral Catheter Latex 16 Fr. (Active)   Number of days: 0       Anesthesia Type:   General    Operative Indications:  Malignant neoplasm of overlapping sites of right breast in female, estrogen receptor positive (HCC) [C50.811, Z17.0]    Operative Findings:  Submuscular tissue expander  [place] CPX4 650 cc, BW 14.6 cm  Ref: [id number]. Lot [id number]. SN [id number].  Total of 20 cc of exparel used for multi-level right intercostal nerve block  Total of 250 cc normal saline injected into expander intraoperatively    Complications:   None    Procedure and  Technique:  Patient was already intubated in the operating room as she had undergone right mastectomy by Dr Mcqueen. Upon her completion of the procedure, the patient's chest was re- prepped and draped in the normal sterile fashion. A time-out was performed for my portion of the case. I first began by surgically preparing the wound bed. It was noted that the peripheral skin edges were burned from electrocautery. A small rim of burned skin was excisionally debrided circumferentially from the incision until the surrounding skin edges were healthy. Next, I continued by irrigating the chest operative fields and obtaining hemostasis. Next, the pectoralis major was released at the origin and the subpectoral plane was developed to fit the the breast footprint. FlexHD acellular dermal matrix was prepared and irrigated with antibiotic solution and 1:1 betadine:NS solution and then sutured to the origin of the pec major at the level of the inframammary fold with 0-vicyrl sutures in interrupted fashion to create the inferior sling of the subpectoral pocket. Two 15 [place] drains were placed in the pocket and secured with 3-0 nylon sutures, posterior drain in the submuscular pocket and anterior drain in the subcutaneous pocket. Next, the pocket was irrigated with Iricept and 1:1 betadine:NS solution, and double antibiotic solution. The pocket was measured and utilizing minimal touch technique, 650 cc textured round mentor tissue expander (BW 14.6 cm) was placed into the pocket and secured with 0-vicryl along the suture tabs. Prior to placement of expander, hemostasis was achieved with electrocautery. After the implant was secured, the ADM was sutured to the distal edge of the pectoralis major with running 3-0 stratafix suture to secure the subpectoral pocket. The pockets were irrigated again with betadine solution and hemostasis with electrocautery. Multilevel intercostal nerve block was performed with 20 cc of exparel. Next,  layered closure was performed with 3-0 vicryl for the subcutaneous tissue and dermis and 3-0 stratafix for the skin. Exofin tape and skin glue was applied overlying the incision.       The right expander was filled with 250 cc NS intraoperatively.     This concluded the procedure. Patient tolerated the procedure well without complications. At the end of the case, all sponge, needle, and instrument counts were correct. Patient was awakened from anesthesia and taken to the PACU in stable condition.     I was present for the entire procedure, A qualified resident physician was not available and A physician assistant was required during the procedure for retraction, tissue handling, dissection and suturing       Patient Disposition:  PACU         SIGNATURE: Brando Olmedo MD  DATE: April 2, 2024  TIME: 2:39 PM

## 2024-04-02 NOTE — PLAN OF CARE
Problem: PAIN - ADULT  Goal: Verbalizes/displays adequate comfort level or baseline comfort level  Description: Interventions:  - Encourage patient to monitor pain and request assistance  - Assess pain using appropriate pain scale  - Administer analgesics based on type and severity of pain and evaluate response  - Implement non-pharmacological measures as appropriate and evaluate response  - Consider cultural and social influences on pain and pain management  - Notify physician/advanced practitioner if interventions unsuccessful or patient reports new pain  Outcome: Progressing     Problem: INFECTION - ADULT  Goal: Absence or prevention of progression during hospitalization  Description: INTERVENTIONS:  - Assess and monitor for signs and symptoms of infection  - Monitor lab/diagnostic results  - Monitor all insertion sites, i.e. indwelling lines, tubes, and drains  - Monitor endotracheal if appropriate and nasal secretions for changes in amount and color  - [place] appropriate cooling/warming therapies per order  - Administer medications as ordered  - Instruct and encourage patient and family to use good hand hygiene technique  - Identify and instruct in appropriate isolation precautions for identified infection/condition  Outcome: Progressing     Problem: DISCHARGE PLANNING  Goal: Discharge to home or other facility with appropriate resources  Description: INTERVENTIONS:  - Identify barriers to discharge w/patient and caregiver  - Arrange for needed discharge resources and transportation as appropriate  - Identify discharge learning needs (meds, wound care, etc.)  - Arrange for interpretive services to assist at discharge as needed  - Refer to Case Management Department for coordinating discharge planning if the patient needs post-hospital services based on physician/advanced practitioner order or complex needs related to functional status, cognitive ability, or social support system  Outcome: Progressing

## 2024-04-02 NOTE — OP NOTE
OPERATIVE REPORT  PATIENT NAME: Nunu Tirado    :  1970  MRN: [id number]  Pt Location: [facility] OR ROOM 05    SURGERY DATE: 2024    Surgeons and Role:  Panel 1:     * Dinorah Mcqueen MD - Primary     * Driss Chan DO - Assisting     * Concha Richardson DPM - Observing      Preop Diagnosis:  Malignant neoplasm of overlapping sites of right breast in female, estrogen receptor positive (HCC) [C50.811, Z17.0]    Post-Op Diagnosis Codes:     * Malignant neoplasm of overlapping sites of right breast in female, estrogen receptor positive (HCC) [C50.811, Z17.0]    Procedure(s):  Right - RIGHT BREAST MASTECTOMY. RIGHT LYMPHOSCINTIGRAPHY. LYMPHATIC MAPPING. SENTINEL LYMPH NODE     Specimen(s):  ID Type Source Tests Collected by Time Destination   1 : sentinel lymph node #1 right axilla Tissue Lymph Node, [place] TISSUE EXAM Dinorah Mcqueen MD 2024 1218    2 : sentinel lymph node #2 right axilla Tissue Lymph Node, [place] TISSUE EXAM Dinorah Mcqueen MD 2024 1218    3 : right breast short superior long lateral Tissue Breast, Right TISSUE EXAM Dinorah Mcqueen MD 2024 1131        Estimated Blood Loss:   Minimal    Drains:  Urethral Catheter Latex 16 Fr. (Active)   Number of days: 0       Anesthesia Type:   General    Operative Indications:  Malignant neoplasm of overlapping sites of right breast in female, estrogen receptor positive (HCC) [C50.811, Z17.0]      Operative Findings:  2 sentinel nodes identified within the axilla    Complications:   None    Procedure and Technique:  Nunu is a 53-year-old female who presents with multicentric carcinoma of the right breast.  She was counseled on a mastectomy with sentinel node biopsy.  She opted for immediate reconstruction.  She presented the day of surgery to the suite and underwent lymphoscintigraphy of the right axilla.  From there she went to the operating room.  She had preoperative antibiotics and DVT prophylaxis.  She was administered general  anesthesia.  She had a 5 cc injection of Lymphazurin into the right subareolar plexus.  She was prepped and draped in the usual standard fashion.  Timeout was performed.  Attention was turned to the right breast.  Half percent Marcaine plain was injected for local anesthesia.  An elliptical incision was created through the skin and subcutaneous tissue.  Electrocautery was used to dissect her superior flap to the sternal border, clavicle and edge of the latissimus.  The clavipectoral fascia was then incised to expose the axillary fat pad.  There was a blue lymphatic channel that traced to a radioactive node.  This was elevated into the wound using an Allis clamp.  Hemoclips were placed on small draining vessels.  This was excised and submitted to sentinel node one of the right axilla.  Deeper in the mid axilla a second radioactive node.  This was elevated into the wound using an Allis clamp.  Hemoclips again were placed on draining vessels.  This was excised and submitted as sentinel node to the right axilla.  Following removal of the second node, there were no additional blue stained, radioactive or palpable nodes.  The lymph nodes were instructed to go fresh for touch prep analysis as was explicitly outlined the timeout.  The nodes however were placed in formalin.  Pathology was notified.  They advised to do a routine evaluation rather than either touch prep or frozen section secondary to the risk of false negativity.  Therefore no additional axillary surgery was performed.  The inferior mastectomy flap was created down to the inframammary fold.  The breast was then excised from the underlying pectoralis including the pectoralis fascia.  This was marked with a short stitch superior and a long stitch lateral.  The breast was submitted to pathology in formalin.  Her wound was irrigated and hemostasis was achieved.  The patient then remained intubated and hemodynamically stable condition for the remaining portion of  her procedure which will be dictated by Dr. Olmedo.       Patient Disposition:  hemodynamically stable    [place] Node Biopsy for Breast Cancer - Right  Operation performed with curative intent. Yes   Tracer(s) used to identify sentinel nodes in the upfront surgery (non-neoadjuvant) setting (select all that apply). Dye and Radioactive tracer   Tracer(s) used to identify sentinel nodes in the neoadjuvant setting (select all that apply). N/A   All nodes (colored or non-colored) present at the end of a dye-filled lymphatic channel were removed. Yes   All significantly radioactive nodes were removed. Yes   All palpably suspicious nodes were removed. N/A   Biopsy-proven positive nodes marked with clips prior to chemotherapy were identified and removed. N/A            SIGNATURE: Dinorah Mcqueen MD  DATE: April 2, 2024  TIME: 12:54 PM

## 2024-04-02 NOTE — PROGRESS NOTES
"Progress Note - Surgical Oncology    Nunu Tirado 53 y.o. female MRN: [id number]  Unit/Bed#: E2 -01 Encounter: [id number]    Assessment:  53 F p/w ER+ R Breast Ca, now s/p R Mastectomy,Axillary SLNBx and tissue expander reconstruction on 4/2.      Plan:  -diet as tolerated  -pain control   -local wound care, surgical bra  -Maintain SUSY drains, monitor and record output   -DVTPPX  -Disposition planning, D/c today with [facility]     Subjective/Objective   Chief Complaint: incisional pain     Subjective: pain tolerable, taking diet without issue, ambulating, no additional concerns     Objective: R SUSY 75cc and 40cc respectively, serosanguineous     Blood pressure 127/86, pulse 92, temperature (!) 96.7 °F (35.9 °C), temperature source Temporal, resp. rate 18, height 5' 2\" (1.575 m), weight 77.4 kg (170 lb 10.2 oz), last menstrual period 03/16/2024, SpO2 98%, not currently breastfeeding.,Body mass index is 31.21 kg/m².      Intake/Output Summary (Last 24 hours) at 4/3/2024 0729  Last data filed at 4/3/2024 0701  Gross per 24 hour   Intake 4430 ml   Output 1875 ml   Net 2555 ml       Invasive Devices       Peripheral Intravenous Line  Duration             Peripheral IV 04/02/24 Dorsal (posterior);Left Hand <1 day              Drain  Duration             Closed/Suction Drain Right Breast 15 Fr. <1 day    Closed/Suction Drain Right;Superior 15 Fr. <1 day                    Physical Exam:       General: NAD  HEENT: normocephalic, atraumatic, mucous membranes moist  Cardiovascular: RRR  Respiratory: no distress  Chest: r chest incisions c/d/I, SUSY drains serosanguineous   Abdomen: soft, non-distended, nontender   Extremities: no c/c/e  Neuro: AAOx3  Skin: warm, dry       Lab, Imaging and other studies:I have personally reviewed pertinent lab results.  , CBC:   Lab Results   Component Value Date    WBC 9.72 04/03/2024    HGB 12.2 04/03/2024    HCT 37.6 04/03/2024    MCV 91 04/03/2024     04/03/2024    RBC 4.12 " 04/03/2024    MCH 29.6 04/03/2024    MCHC 32.4 04/03/2024    RDW 13.9 04/03/2024    MPV 10.9 04/03/2024   , CMP:   Lab Results   Component Value Date    SODIUM 139 04/03/2024    K 3.3 (L) 04/03/2024     04/03/2024    CO2 25 04/03/2024    BUN 6 04/03/2024    CREATININE 0.63 04/03/2024    CALCIUM 8.6 04/03/2024    EGFR 101 04/03/2024     VTE Pharmacologic Prophylaxis: Enoxaparin (Lovenox)  VTE Mechanical Prophylaxis: sequential compression device

## 2024-04-03 ENCOUNTER — HOME HEALTH ADMISSION (OUTPATIENT)
Dept: HOME HEALTH SERVICES | Facility: HOME HEALTHCARE | Age: 54
End: 2024-04-03
Payer: COMMERCIAL

## 2024-04-03 ENCOUNTER — ANCILLARY PROCEDURE (OUTPATIENT)
Dept: LAB | Facility: HOSPITAL | Age: 54
End: 2024-04-03
Attending: SURGERY
Payer: COMMERCIAL

## 2024-04-03 ENCOUNTER — TELEPHONE (OUTPATIENT)
Age: 54
End: 2024-04-03

## 2024-04-03 VITALS
HEIGHT: 62 IN | HEART RATE: 78 BPM | RESPIRATION RATE: 18 BRPM | OXYGEN SATURATION: 98 % | WEIGHT: 170.64 LBS | SYSTOLIC BLOOD PRESSURE: 109 MMHG | BODY MASS INDEX: 31.4 KG/M2 | TEMPERATURE: 97.5 F | DIASTOLIC BLOOD PRESSURE: 76 MMHG

## 2024-04-03 DIAGNOSIS — Z17.0 MALIGNANT NEOPLASM OF OVERLAPPING SITES OF RIGHT BREAST IN FEMALE, ESTROGEN RECEPTOR POSITIVE (HCC): Primary | ICD-10-CM

## 2024-04-03 DIAGNOSIS — C50.811 MALIGNANT NEOPLASM OF OVERLAPPING SITES OF RIGHT BREAST IN FEMALE, ESTROGEN RECEPTOR POSITIVE (HCC): Primary | ICD-10-CM

## 2024-04-03 LAB
ANION GAP SERPL CALCULATED.3IONS-SCNC: 9 MMOL/L (ref 4–13)
BUN SERPL-MCNC: 6 MG/DL (ref 5–25)
CALCIUM SERPL-MCNC: 8.6 MG/DL (ref 8.4–10.2)
CHLORIDE SERPL-SCNC: 105 MMOL/L (ref 96–108)
CO2 SERPL-SCNC: 25 MMOL/L (ref 21–32)
CREAT SERPL-MCNC: 0.63 MG/DL (ref 0.6–1.3)
ERYTHROCYTE [DISTWIDTH] IN BLOOD BY AUTOMATED COUNT: 13.9 % (ref 11.6–15.1)
GFR SERPL CREATININE-BSD FRML MDRD: 101 ML/MIN/1.73SQ M
GLUCOSE SERPL-MCNC: 133 MG/DL (ref 65–140)
HCT VFR BLD AUTO: 37.6 % (ref 34.8–46.1)
HGB BLD-MCNC: 12.2 G/DL (ref 11.5–15.4)
MAGNESIUM SERPL-MCNC: 1.7 MG/DL (ref 1.9–2.7)
MCH RBC QN AUTO: 29.6 PG (ref 26.8–34.3)
MCHC RBC AUTO-ENTMCNC: 32.4 G/DL (ref 31.4–37.4)
MCV RBC AUTO: 91 FL (ref 82–98)
PLATELET # BLD AUTO: 246 THOUSANDS/UL (ref 149–390)
PMV BLD AUTO: 10.9 FL (ref 8.9–12.7)
POTASSIUM SERPL-SCNC: 3.3 MMOL/L (ref 3.5–5.3)
RBC # BLD AUTO: 4.12 MILLION/UL (ref 3.81–5.12)
SODIUM SERPL-SCNC: 139 MMOL/L (ref 135–147)
WBC # BLD AUTO: 9.72 THOUSAND/UL (ref 4.31–10.16)

## 2024-04-03 PROCEDURE — 80048 BASIC METABOLIC PNL TOTAL CA: CPT | Performed by: STUDENT IN AN ORGANIZED HEALTH CARE EDUCATION/TRAINING PROGRAM

## 2024-04-03 PROCEDURE — NC001 PR NO CHARGE: Performed by: SURGERY

## 2024-04-03 PROCEDURE — 99024 POSTOP FOLLOW-UP VISIT: CPT | Performed by: SURGERY

## 2024-04-03 PROCEDURE — 83735 ASSAY OF MAGNESIUM: CPT | Performed by: STUDENT IN AN ORGANIZED HEALTH CARE EDUCATION/TRAINING PROGRAM

## 2024-04-03 PROCEDURE — 85027 COMPLETE CBC AUTOMATED: CPT | Performed by: STUDENT IN AN ORGANIZED HEALTH CARE EDUCATION/TRAINING PROGRAM

## 2024-04-03 RX ORDER — POTASSIUM CHLORIDE 20 MEQ/1
40 TABLET, EXTENDED RELEASE ORAL 2 TIMES DAILY
Status: DISCONTINUED | OUTPATIENT
Start: 2024-04-03 | End: 2024-04-03 | Stop reason: HOSPADM

## 2024-04-03 RX ORDER — MAGNESIUM SULFATE HEPTAHYDRATE 40 MG/ML
2 INJECTION, SOLUTION INTRAVENOUS ONCE
Qty: 50 ML | Refills: 0 | Status: COMPLETED | OUTPATIENT
Start: 2024-04-03 | End: 2024-04-03

## 2024-04-03 RX ADMIN — GABAPENTIN 300 MG: 300 CAPSULE ORAL at 09:05

## 2024-04-03 RX ADMIN — POTASSIUM CHLORIDE 40 MEQ: 1500 TABLET, EXTENDED RELEASE ORAL at 09:05

## 2024-04-03 RX ADMIN — AMLODIPINE BESYLATE 2.5 MG: 2.5 TABLET ORAL at 09:06

## 2024-04-03 RX ADMIN — HYDROCHLOROTHIAZIDE 25 MG: 25 TABLET ORAL at 09:04

## 2024-04-03 RX ADMIN — ENOXAPARIN SODIUM 40 MG: 40 INJECTION SUBCUTANEOUS at 09:03

## 2024-04-03 RX ADMIN — DOCUSATE SODIUM 100 MG: 100 CAPSULE, LIQUID FILLED ORAL at 09:04

## 2024-04-03 RX ADMIN — BUSPIRONE HYDROCHLORIDE 5 MG: 5 TABLET ORAL at 09:05

## 2024-04-03 RX ADMIN — ACETAMINOPHEN 325MG 650 MG: 325 TABLET ORAL at 13:25

## 2024-04-03 RX ADMIN — MAGNESIUM SULFATE HEPTAHYDRATE 2 G: 40 INJECTION, SOLUTION INTRAVENOUS at 09:12

## 2024-04-03 RX ADMIN — ACETAMINOPHEN 325MG 650 MG: 325 TABLET ORAL at 02:50

## 2024-04-03 RX ADMIN — CEFAZOLIN SODIUM 1000 MG: 1 SOLUTION INTRAVENOUS at 02:46

## 2024-04-03 RX ADMIN — OXYCODONE 5 MG: 5 TABLET ORAL at 09:06

## 2024-04-03 NOTE — TELEPHONE ENCOUNTER
Alyssa from [facility] called to see if patient had surgery on 3/19.  I informed her she did not have surgery on that date.  I informed her that surgery was done yesterday.  She also wanted to verify patient had a post op scheduled, which is scheduled on 4/9

## 2024-04-03 NOTE — DISCHARGE SUMMARY
Discharge Summary - Surgical Oncology  Nunu Tirado 53 y.o. female MRN: [id number]  Unit/Bed#: E2 -01 Encounter: [id number]    Admission Date: 4/2/2024     Discharge Date: 4/3/2024    Admitting Diagnosis: Malignant neoplasm of overlapping sites of right breast in female, estrogen receptor positive (HCC) [C50.811, Z17.0]    Discharge Diagnosis: Malignant Neoplasm of overlapping sites of right breast in female, estrogen receptor positive    Attending and Service: Dr. Mcqueen,  Oncology-surgical    Consulting Physician(s): Dr. Olmedo - Plastic Surgery    Imaging and Procedures Performed: No orders of the defined types were placed in this encounter.      NM lymphatic breast    Result Date: 4/2/2024  Impression: [place] lymph nodes localized to right axilla. Workstation performed: [id number]       1. Malignant Neoplasm of right breast, estrogen receptor positive     Pathology: Multicentric carcinoma of the right breast    Hospital Course: Nunu Tirado is a 53-year-old female who originally came to Dr. Mcqueen on 2/28/2024 for for breast biopsy.  On 4/2/2024 patient underwent elective right breast mastectomy with right lymphoscintigraphy. Lymphatic mapping and sentinel lymph node by Dr. Mcqueen.  Surgery was successful without any complications.  2 sentinel nodes were identified within the axilla and sent for pathology.  Plastic surgery was then done for immediate right breast reconstruction with submuscular tissue expander with acellular dermal matrix by Dr. Olmedo.  The procedure was successful without any complications.  A total of 250 cc normal saline injected in the expander intraoperatively.  2 SUSY drains were placed in the anterior and posterior portion of the right breast. Patient was then transferred to the second floor for postoperative monitoring.   While on the floor, patient's diet was tolerated, pain was controlled, surgical bra was placed and local wound care was done, and SUSY drain output was  monitored.  Patient began ambulating around the halls.  Patient was put on 40 mg of Lovenox and SCDs for DVT prophylaxis as well as pain regimen of Roxicodone 5mg and 10mg for moderate and severe pain as well as Dilaudid for breakthrough pain. Maintenance fluids of LR was infused at a rate of 125mL/hr for fluid rehydration.    On discharge, the patient is instructed to follow-up with the patient's primary care provider within the next 2 weeks to review the events of the recent hospitalization. The patient is instructed to follow-up with surgical oncology as scheduled on your after visit summary The patient is instructed to follow the provided discharge instructions.     Condition at Discharge: good     Discharge instructions/Information to patient and family:   See after visit summary for information provided to patient and family.      Provisions for Follow-Up Care:  See after visit summary for information related to follow-up care and any pertinent home health orders.      Disposition: See After Visit Summary for discharge disposition information.    Planned Readmission: No    Discharge Statement   I spent 30 minutes discharging the patient. This time was spent on the day of discharge. I had direct contact with the patient on the day of discharge. Additional documentation is required if more than 30 minutes were spent on discharge.     Discharge Medications:  See after visit summary for reconciled discharge medications provided to patient and family.    Johnny Miller PA-C  4/3/2024  2:15 PM

## 2024-04-03 NOTE — CASE MANAGEMENT
Case Management Discharge Planning Note    Patient name Nunu Tirado  [facility] /E2 -* MRN [id number]  : 1970 Date 4/3/2024       Current Admission Date: 2024  Current Admission Diagnosis:Malignant neoplasm of overlapping sites of right breast in female, estrogen receptor positive (HCC)   Patient Active Problem List    Diagnosis Date Noted    Preoperative general physical examination 2024    Abnormal EKG 2024    BRCA negative 2024    Lung nodule 2024    Atypical ductal hyperplasia of right breast 2024    Abnormal MRI, breast 2024    Malignant neoplasm of overlapping sites of right breast in female, estrogen receptor positive (HCC) 2024    Seasonal allergies 2023    Other insomnia 2023    Hypertension 2022    Annual physical exam 2022    Vitamin D insufficiency 2022    Anxiety 2022    BMI 31.0-31.9,adult 2021    Migraine without aura and without status migrainosus, not intractable 2021    Pseudohypoaldosteronism 2021      LOS (days): 0  Geometric Mean LOS (GMLOS) (days):   Days to GMLOS:     OBJECTIVE:            Current admission status: Outpatient Surgery   Preferred Pharmacy:   [facility] - [place] - [address]  [address]  [place] [address]  Phone: [phone] Fax: [phone]    [id number] - [place] - [address]  [facility]  [place] [address]  Phone: [phone] Fax: [phone]    [facility] [place]  [place] - [address]  [facility]  [address]  [facility]  First [address] [facility] [place] [address]  Phone: [phone] Fax: [phone]    Primary Care Provider: Elvie Ordoñez DO    Primary Insurance: CAPITAL  Secondary Insurance:     DISCHARGE DETAILS:    Discharge planning discussed with:: Patient and spouse  Freedom of Choice: Yes  Comments - Freedom of Choice: Agreeable to   [facility] for SN care  CM contacted family/caregiver?: Yes  Were Treatment Team discharge recommendations reviewed with patient/caregiver?: Yes  Did patient/caregiver verbalize understanding of patient care needs?: Yes       Contacts  Patient Contacts: Aidan  Relationship to Patient:: Family  Contact Method: In Person  Reason/Outcome: Discharge Planning, Referral    Requested Home Health Care         Is the patient interested in HHC at discharge?: Yes  Home Health Discipline requested:: Nursing  Home Health Agency Name:: [facility]  Home Health Follow-Up Provider:: PCP  Home Health Services Needed:: Wound/Ostomy Care (drain care)  Homebound Criteria Met:: Immunosuppressed  Supporting Clincal Findings:: Limited Endurance    DME Referral Provided  Referral made for DME?: No    Other Referral/Resources/Interventions Provided:  Interventions: HHC         Treatment Team Recommendation: Home with Home Health Care  Discharge Destination Plan:: Home with Home Health Care  Transport at Discharge : Family

## 2024-04-03 NOTE — PLAN OF CARE
Problem: PAIN - ADULT  Goal: Verbalizes/displays adequate comfort level or baseline comfort level  Description: Interventions:  - Encourage patient to monitor pain and request assistance  - Assess pain using appropriate pain scale  - Administer analgesics based on type and severity of pain and evaluate response  - Implement non-pharmacological measures as appropriate and evaluate response  - Consider cultural and social influences on pain and pain management  - Notify physician/advanced practitioner if interventions unsuccessful or patient reports new pain  Outcome: Progressing     Problem: INFECTION - ADULT  Goal: Absence or prevention of progression during hospitalization  Description: INTERVENTIONS:  - Assess and monitor for signs and symptoms of infection  - Monitor lab/diagnostic results  - Monitor all insertion sites, i.e. indwelling lines, tubes, and drains  - Monitor endotracheal if appropriate and nasal secretions for changes in amount and color  - Offerle appropriate cooling/warming therapies per order  - Administer medications as ordered  - Instruct and encourage patient and family to use good hand hygiene technique  - Identify and instruct in appropriate isolation precautions for identified infection/condition  Outcome: Progressing     Problem: SAFETY ADULT  Goal: Patient will remain free of falls  Description: INTERVENTIONS:  - Educate patient/family on patient safety including physical limitations  - Instruct patient to call for assistance with activity   - Consult OT/PT to assist with strengthening/mobility   - Keep Call bell within reach  - Keep bed low and locked with side rails adjusted as appropriate  - Keep care items and personal belongings within reach  - Initiate and maintain comfort rounds  - Make Fall Risk Sign visible to staff  - Offer Toileting every 2 Hours, in advance of need  - Apply yellow socks and bracelet for high fall risk patients  - Consider moving patient to room near nurses  station  Outcome: Progressing  Goal: Maintain or return to baseline ADL function  Description: INTERVENTIONS:  -  Assess patient's ability to carry out ADLs; assess patient's baseline for ADL function and identify physical deficits which impact ability to perform ADLs (bathing, care of mouth/teeth, toileting, grooming, dressing, etc.)  - Assess/evaluate cause of self-care deficits   - Assess range of motion  - Assess patient's mobility; develop plan if impaired  - Assess patient's need for assistive devices and provide as appropriate  - Encourage maximum independence but intervene and supervise when necessary  - Involve family in performance of ADLs  - Assess for home care needs following discharge   - Consider OT consult to assist with ADL evaluation and planning for discharge  - Provide patient education as appropriate  Outcome: Progressing  Goal: Maintains/Returns to pre admission functional level  Description: INTERVENTIONS:  - Perform AM-PAC 6 Click Basic Mobility/ Daily Activity assessment daily.  - Set and communicate daily mobility goal to care team and patient/family/caregiver.   - Collaborate with rehabilitation services on mobility goals if consulted  - Perform Range of Motion 3 times a day.  - Reposition patient every 2 hours.  - Dangle patient 3 times a day  - Stand patient 3 times a day  - Ambulate patient 3 times a day  - Out of bed to chair 3 times a day   - Out of bed for meals 3 times a day  - Out of bed for toileting  - Record patient progress and toleration of activity level   Outcome: Progressing     Problem: DISCHARGE PLANNING  Goal: Discharge to home or other facility with appropriate resources  Description: INTERVENTIONS:  - Identify barriers to discharge w/patient and caregiver  - Arrange for needed discharge resources and transportation as appropriate  - Identify discharge learning needs (meds, wound care, etc.)  - Arrange for interpretive services to assist at discharge as needed  - Refer  to Case Management Department for coordinating discharge planning if the patient needs post-hospital services based on physician/advanced practitioner order or complex needs related to functional status, cognitive ability, or social support system  Outcome: Progressing     Problem: Knowledge Deficit  Goal: Patient/family/caregiver demonstrates understanding of disease process, treatment plan, medications, and discharge instructions  Description: Complete learning assessment and assess knowledge base.  Interventions:  - Provide teaching at level of understanding  - Provide teaching via preferred learning methods  Outcome: Progressing     Problem: METABOLIC, FLUID AND ELECTROLYTES - ADULT  Goal: Electrolytes maintained within normal limits  Description: INTERVENTIONS:  - Monitor labs and assess patient for signs and symptoms of electrolyte imbalances  - Administer electrolyte replacement as ordered  - Monitor response to electrolyte replacements, including repeat lab results as appropriate  - Instruct patient on fluid and nutrition as appropriate  Outcome: Progressing     Problem: SKIN/TISSUE INTEGRITY - ADULT  Goal: Incision(s), wounds(s) or drain site(s) healing without S/S of infection  Description: INTERVENTIONS  - Assess and document dressing, incision, wound bed, drain sites and surrounding tissue  - Provide patient and family education  - Perform skin care/dressing changes every day  Outcome: Progressing     Problem: MUSCULOSKELETAL - ADULT  Goal: Maintain or return mobility to safest level of function  Description: INTERVENTIONS:  - Assess patient's ability to carry out ADLs; assess patient's baseline for ADL function and identify physical deficits which impact ability to perform ADLs (bathing, care of mouth/teeth, toileting, grooming, dressing, etc.)  - Assess/evaluate cause of self-care deficits   - Assess range of motion  - Assess patient's mobility  - Assess patient's need for assistive devices and provide  as appropriate  - Encourage maximum independence but intervene and supervise when necessary  - Involve family in performance of ADLs  - Assess for home care needs following discharge   - Consider OT consult to assist with ADL evaluation and planning for discharge  - Provide patient education as appropriate  Outcome: Progressing

## 2024-04-05 ENCOUNTER — HOME CARE VISIT (OUTPATIENT)
Dept: HOME HEALTH SERVICES | Facility: HOME HEALTHCARE | Age: 54
End: 2024-04-05
Payer: COMMERCIAL

## 2024-04-05 VITALS
HEART RATE: 71 BPM | OXYGEN SATURATION: 100 % | RESPIRATION RATE: 18 BRPM | DIASTOLIC BLOOD PRESSURE: 86 MMHG | TEMPERATURE: 96.5 F | SYSTOLIC BLOOD PRESSURE: 142 MMHG

## 2024-04-05 LAB
DME PARACHUTE DELIVERY DATE REQUESTED: NORMAL
DME PARACHUTE ITEM DESCRIPTION: NORMAL
DME PARACHUTE ORDER STATUS: NORMAL
DME PARACHUTE SUPPLIER NAME: NORMAL
DME PARACHUTE SUPPLIER PHONE: NORMAL

## 2024-04-05 PROCEDURE — G0299 HHS/HOSPICE OF RN EA 15 MIN: HCPCS

## 2024-04-05 PROCEDURE — 400013 VN SOC

## 2024-04-05 NOTE — CASE COMMUNICATION
Ship to -  Clinician     Ordering MD Glenroy Olmedo, Brando ORTEGA MD;     Wound 1 Right Breast Mastectomy         Partial x     Frequency daily      Gauze Kerlix (fluff roll) 4inch sterile [id number]  ABD 5x9 [id number] -2

## 2024-04-05 NOTE — CASE COMMUNICATION
[facility] has admitted your patient to Home Health service with the following disciplines:      SN  This report is informational only, no response is needed  Primary focus of home health care: [place]  Patient stated goals of care: Get stronger drains out.   Anticipated visit pattern: 1w1 2w2 and next visit date: 4/9/24 DRAIN CARE SP Right Mastectomy. DC on 4.3  Plastic surgery was then done for immediate right breast reconstruction wit h submuscular tissue expander with acellular dermal matrix by Dr. Olmedo.   See medication list - meds in home differ from AVS: Taking Vitamin B complex daily in addition to AVS.  Significant clinical findings: 1117 /98 left arm HR 65 regular took amlodipine. 1250 142/86 HR 71. Patient reports more forgetful then prior to surgery but not confused. Allergic post nasal drip took allegra. LBM 4/1 taking colace and will take miralax if  no BM today. Nonsymptomatic.   Patient is inquiring about the Magnet in the port card. Is this permanent or will this be removed with reconstruction?  Other pertinent information: Patient is inquiring about mastectomy kit ordered from [facility] if they have the appropriate size and when to expect arrival. TC to [employer] [facility] reports script signed as we speak. Appointment for fitting scheduled during this call. June 10th 8am.    TT to Dr. Olmedo to clarify wound care. Leave dressing on - Keep it clean, dry, and intact until clinic visit is incorrect.     Thank you for allowing us to participate in the care of your patient.      Alberto Velasquez RN

## 2024-04-05 NOTE — CASE COMMUNICATION
Ship to - Pt. Home    Ordering MD Glenroy Olmedo, Brando ORTEGA MD;     Wound 1 Right Breast Mastectomy         Partial x     Frequency daily    Gauze 4x4 N/S 200 4x4s per unit  [id number]   Gauze Kerlix (fluff roll) 4inch sterile [id number] -14  ABD 5x9 [id number] -14

## 2024-04-06 PROCEDURE — 10330064 BANDAGE, GAUZE COTTON  6PLY STR 4"X4YDS

## 2024-04-06 PROCEDURE — 10330064 PAD, ABD 5X9" STR LF (1/PK 20PK/BX) MGM1

## 2024-04-08 ENCOUNTER — HOME CARE VISIT (OUTPATIENT)
Dept: HOME HEALTH SERVICES | Facility: HOME HEALTHCARE | Age: 54
End: 2024-04-08
Payer: COMMERCIAL

## 2024-04-08 VITALS
DIASTOLIC BLOOD PRESSURE: 60 MMHG | RESPIRATION RATE: 16 BRPM | HEART RATE: 89 BPM | SYSTOLIC BLOOD PRESSURE: 122 MMHG | TEMPERATURE: 98 F | OXYGEN SATURATION: 99 %

## 2024-04-08 PROCEDURE — G0299 HHS/HOSPICE OF RN EA 15 MIN: HCPCS

## 2024-04-09 ENCOUNTER — OFFICE VISIT (OUTPATIENT)
Dept: PLASTIC SURGERY | Facility: CLINIC | Age: 54
End: 2024-04-09

## 2024-04-09 DIAGNOSIS — C50.811 MALIGNANT NEOPLASM OF OVERLAPPING SITES OF RIGHT BREAST IN FEMALE, ESTROGEN RECEPTOR POSITIVE (HCC): Primary | ICD-10-CM

## 2024-04-09 DIAGNOSIS — Z17.0 MALIGNANT NEOPLASM OF OVERLAPPING SITES OF RIGHT BREAST IN FEMALE, ESTROGEN RECEPTOR POSITIVE (HCC): Primary | ICD-10-CM

## 2024-04-09 PROCEDURE — 99024 POSTOP FOLLOW-UP VISIT: CPT | Performed by: PHYSICIAN ASSISTANT

## 2024-04-09 NOTE — PROGRESS NOTES
Assessment/Plan:     Patient is a 53-year-old female who is status post surgical preparation of wound bed of right chest with immediate right breast reconstruction with submuscular tissue expander with ADM by Dr. Olmedo on 4/2/24. Please see HPI.     Patient is today for first postoperative visit and drain check.  Drain 1 was removed today.  Patient will return to our office in 1 week for drain removal and first tissue expander fill.     Diagnoses and all orders for this visit:    Malignant neoplasm of overlapping sites of right breast in female, estrogen receptor positive (HCC)          Subjective:     Patient ID: Nunu Tirado is a 53 y.o. female.    HPI    Patient has several questions in regards to reconstruction and postoperative care, all questions answered to patient's satisfaction.  She has been doing well postoperatively.    Review of Systems    See HPI    Objective:     Physical Exam      Incisions clean, dry and intact.  Tissue expanders palpable and in place.  Drains are serous.

## 2024-04-10 PROCEDURE — G0180 MD CERTIFICATION HHA PATIENT: HCPCS | Performed by: STUDENT IN AN ORGANIZED HEALTH CARE EDUCATION/TRAINING PROGRAM

## 2024-04-11 ENCOUNTER — HOME CARE VISIT (OUTPATIENT)
Dept: HOME HEALTH SERVICES | Facility: HOME HEALTHCARE | Age: 54
End: 2024-04-11
Payer: COMMERCIAL

## 2024-04-11 VITALS
SYSTOLIC BLOOD PRESSURE: 124 MMHG | TEMPERATURE: 98 F | OXYGEN SATURATION: 98 % | HEART RATE: 90 BPM | RESPIRATION RATE: 16 BRPM | DIASTOLIC BLOOD PRESSURE: 60 MMHG

## 2024-04-11 PROCEDURE — G0299 HHS/HOSPICE OF RN EA 15 MIN: HCPCS

## 2024-04-12 ENCOUNTER — PATIENT OUTREACH (OUTPATIENT)
Dept: CASE MANAGEMENT | Facility: HOSPITAL | Age: 54
End: 2024-04-12

## 2024-04-12 PROCEDURE — 88342 IMHCHEM/IMCYTCHM 1ST ANTB: CPT | Performed by: PATHOLOGY

## 2024-04-12 PROCEDURE — 88307 TISSUE EXAM BY PATHOLOGIST: CPT | Performed by: PATHOLOGY

## 2024-04-12 PROCEDURE — 88341 IMHCHEM/IMCYTCHM EA ADD ANTB: CPT | Performed by: PATHOLOGY

## 2024-04-12 NOTE — PROGRESS NOTES
Biopsychosocial and Barriers Assessment    Cancer Diagnosis: Breast   Home/Cell Phone: [phone]  Emergency Contact: Aidan- spouse  Marital Status:   Interpretation concerns, speaks another language, preferred language: English  Cultural concerns: none  Ability to read or write: yes    Caregiver/Support: good family/friend support   Children: adult children, 2 who reside out of the home.   Child/Elder care: no    Housing: house  Home Setup: split level home  Lives With:  spouse  Daily Living Activities: independent   Durable Medical Equipment: no  Ambulation:  independent     [facility]  High co-pays with insurance:  no  High co-pays with medication coverage: no  No medication coverage:   has coverage     Primary Care Provider: Dr. 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   [place] Status/Location: no  Ability to pay bills: yes. Receiving short term disability.   POA/LW/AD: has not completed, would like information   Transportation Plan/Concerns: no concerns      What do you know about your Cancer Diagnosis    What has your doctor told you about your cancer diagnosis: breast    What has your doctor told you about your cancer treatment: recent surgery mastectomy with reconstruction. Waiting on pathology results, may not need additional treatment.     What specific concerns do you have about your diagnosis and treatment:  nothing, that she hasn't discussed with providers.     Have you been made aware of any hair loss associated with treatment: did not discuss    Additional Comments:  OSW outreached to patient to complete assessment and DT. Patient rated DT as 3/10.     Daughter had grandson yesterday    Recently had surgery, physical limitations associated with surgery.     Patient does deny having  any currently physical, emotional, social, practical or spiritual concerns at this time. Patient does report some concern over finances depending on how long she will be out of work but currently they are managing. Patient was encouraged to reach out should she need assistance in the future, OSW contact information provided.     OSW also mailed ACP document to patient with business card.

## 2024-04-15 ENCOUNTER — HOME CARE VISIT (OUTPATIENT)
Dept: HOME HEALTH SERVICES | Facility: HOME HEALTHCARE | Age: 54
End: 2024-04-15
Payer: COMMERCIAL

## 2024-04-15 VITALS
HEART RATE: 70 BPM | TEMPERATURE: 98 F | RESPIRATION RATE: 16 BRPM | SYSTOLIC BLOOD PRESSURE: 126 MMHG | OXYGEN SATURATION: 98 % | DIASTOLIC BLOOD PRESSURE: 68 MMHG

## 2024-04-15 PROCEDURE — G0299 HHS/HOSPICE OF RN EA 15 MIN: HCPCS

## 2024-04-16 ENCOUNTER — OFFICE VISIT (OUTPATIENT)
Dept: PLASTIC SURGERY | Facility: CLINIC | Age: 54
End: 2024-04-16

## 2024-04-16 DIAGNOSIS — Z17.0 MALIGNANT NEOPLASM OF OVERLAPPING SITES OF RIGHT BREAST IN FEMALE, ESTROGEN RECEPTOR POSITIVE (HCC): Primary | ICD-10-CM

## 2024-04-16 DIAGNOSIS — C50.811 MALIGNANT NEOPLASM OF OVERLAPPING SITES OF RIGHT BREAST IN FEMALE, ESTROGEN RECEPTOR POSITIVE (HCC): Primary | ICD-10-CM

## 2024-04-16 PROCEDURE — 99024 POSTOP FOLLOW-UP VISIT: CPT | Performed by: PHYSICIAN ASSISTANT

## 2024-04-16 NOTE — PROGRESS NOTES
Assessment/Plan:     Patient is a 53-year-old female who is status post surgical preparation of wound bed of right chest with immediate right breast reconstruction with submuscular tissue expander with ADM by Dr. Olmedo on 4/2/24. Please see HPI.      Patient returns to our office for drain removal and first tissue expander fill. Pt's drain output has been 50-70cc daily over the past few days, however after discussing risks and benefits, drain was removed today.  Pt's 650cc capacity TE was filled with 100cc for a total of 350cc. The patient was counseled on signs and symptoms of seroma and will return to the office in 1 week for an additional TE fill or sooner with any questions or concerns.      Diagnoses and all orders for this visit:    Malignant neoplasm of overlapping sites of right breast in female, estrogen receptor positive (HCC)          Subjective:     Patient ID: Nunu Tirado is a 53 y.o. female.    HPI    Patient reports that she has been doing well.  No issues or concerns today.    Review of Systems    See HPI    Objective:     Physical Exam      Clean, dry and intact.  Drain is serous.  Tissue expander is palpable and in place.

## 2024-04-17 ENCOUNTER — PATIENT OUTREACH (OUTPATIENT)
Dept: HEMATOLOGY ONCOLOGY | Facility: CLINIC | Age: 54
End: 2024-04-17

## 2024-04-17 ENCOUNTER — OFFICE VISIT (OUTPATIENT)
Dept: SURGICAL ONCOLOGY | Facility: CLINIC | Age: 54
End: 2024-04-17

## 2024-04-17 VITALS
RESPIRATION RATE: 16 BRPM | HEIGHT: 62 IN | HEART RATE: 64 BPM | TEMPERATURE: 98.2 F | BODY MASS INDEX: 31.73 KG/M2 | DIASTOLIC BLOOD PRESSURE: 74 MMHG | SYSTOLIC BLOOD PRESSURE: 116 MMHG | OXYGEN SATURATION: 97 % | WEIGHT: 172.4 LBS

## 2024-04-17 DIAGNOSIS — C50.811 MALIGNANT NEOPLASM OF OVERLAPPING SITES OF RIGHT BREAST IN FEMALE, ESTROGEN RECEPTOR POSITIVE (HCC): Primary | ICD-10-CM

## 2024-04-17 DIAGNOSIS — Z17.0 MALIGNANT NEOPLASM OF OVERLAPPING SITES OF RIGHT BREAST IN FEMALE, ESTROGEN RECEPTOR POSITIVE (HCC): Primary | ICD-10-CM

## 2024-04-17 PROCEDURE — 99024 POSTOP FOLLOW-UP VISIT: CPT | Performed by: SURGERY

## 2024-04-17 NOTE — PROGRESS NOTES
Contacted patient as directed by SUSU Calle  to schedule medical oncology consult.      Introduced myself and my role.      Type of appointment-Medical oncology consult   Provider-Dr. Ya  Vntn-9-  Time-3:20pm  Location-[place]    Patient had no other questions or concerns at this time. I provided my contact information in case any should arise.

## 2024-04-17 NOTE — PROGRESS NOTES
53 y.o. female is here today s/p right mastectomy and sentinel node biopsy along with expander placement. She reports feeling sore but overall feels well.      Physical Exam  Constitutional:       General: She is not in acute distress.     Appearance: Normal appearance.   Chest:   Breasts:     Right: Skin change (Well-healing mastectomy incision, expander in place, no signs of infection) present.   Neurological:      Mental Status: She is alert and oriented to person, place, and time.   Psychiatric:         Mood and Affect: Mood normal.         Data:       Staging:    Multifocal carcinoma including Paget's with the largest focus being 8 mm invasive ductal  Tumor grade 1  LVI absent  Margins clean  Estrogen receptor and progesterone receptor status positive  HER2 status and test method negative  Lymph node assessment/status 0 out of 2      Neoadjuvant therapy: Not applicable  Stage: IA        Diagnoses and all orders for this visit:    Malignant neoplasm of overlapping sites of right breast in female, estrogen receptor positive (HCC)  -     Ambulatory referral to Hematology / Oncology; Future        Assessment/Plan: 53-year-old female status post right mastectomy with reconstruction secondary to multifocal carcinoma of the right breast including Paget's disease noted on final pathology.  She is healing well with no signs of infection.  She will continue care with plastics.  We will plan to see her again in 6 months for survivorship visit.  I am also referring her to medical oncology for consultation.  There is no indication for radiation.

## 2024-04-18 ENCOUNTER — HOME CARE VISIT (OUTPATIENT)
Dept: HOME HEALTH SERVICES | Facility: HOME HEALTHCARE | Age: 54
End: 2024-04-18
Payer: COMMERCIAL

## 2024-04-18 VITALS
SYSTOLIC BLOOD PRESSURE: 126 MMHG | HEART RATE: 72 BPM | TEMPERATURE: 98 F | OXYGEN SATURATION: 98 % | DIASTOLIC BLOOD PRESSURE: 68 MMHG | RESPIRATION RATE: 16 BRPM

## 2024-04-18 PROCEDURE — G0299 HHS/HOSPICE OF RN EA 15 MIN: HCPCS

## 2024-04-22 ENCOUNTER — OFFICE VISIT (OUTPATIENT)
Dept: PLASTIC SURGERY | Facility: CLINIC | Age: 54
End: 2024-04-22

## 2024-04-22 DIAGNOSIS — C50.811 MALIGNANT NEOPLASM OF OVERLAPPING SITES OF RIGHT BREAST IN FEMALE, ESTROGEN RECEPTOR POSITIVE (HCC): Primary | ICD-10-CM

## 2024-04-22 DIAGNOSIS — Z17.0 MALIGNANT NEOPLASM OF OVERLAPPING SITES OF RIGHT BREAST IN FEMALE, ESTROGEN RECEPTOR POSITIVE (HCC): Primary | ICD-10-CM

## 2024-04-22 PROCEDURE — 99024 POSTOP FOLLOW-UP VISIT: CPT | Performed by: PHYSICIAN ASSISTANT

## 2024-04-22 NOTE — PROGRESS NOTES
Assessment/Plan:     Patient is a 53-year-old female who is status post surgical preparation of wound bed of right chest with immediate right breast reconstruction with submuscular tissue expander with ADM by Dr. Olmedo on 4/2/24. Please see HPI.      Patient returns to our office today for a TE fill  Pt's 650cc capacity TE was filled with 100cc for a total of 450cc. The patient will return to the office in 1 week for an additional TE fill or sooner with any questions or concerns.  Of note, patient will not require radiation, awaiting determination for chemo. She is only interested in DORIS flap reconstruction at this time.      Diagnoses and all orders for this visit:    Malignant neoplasm of overlapping sites of right breast in female, estrogen receptor positive (HCC)          Subjective:     Patient ID: Nunu Tirado is a 53 y.o. female.    HPI    Patient has several questions in regard to aftercare, all questions answered by myself and Dr. Olmedo.      Review of Systems    See HPI     Objective:     Physical Exam      All incisions are clean, dry and intact. TE is palpable and in place.

## 2024-04-23 ENCOUNTER — DOCUMENTATION (OUTPATIENT)
Dept: HEMATOLOGY ONCOLOGY | Facility: CLINIC | Age: 54
End: 2024-04-23

## 2024-04-23 ENCOUNTER — CONSULT (OUTPATIENT)
Dept: HEMATOLOGY ONCOLOGY | Facility: CLINIC | Age: 54
End: 2024-04-23
Payer: COMMERCIAL

## 2024-04-23 VITALS
TEMPERATURE: 98.2 F | BODY MASS INDEX: 31.83 KG/M2 | OXYGEN SATURATION: 98 % | HEIGHT: 62 IN | DIASTOLIC BLOOD PRESSURE: 76 MMHG | WEIGHT: 173 LBS | RESPIRATION RATE: 17 BRPM | SYSTOLIC BLOOD PRESSURE: 124 MMHG | HEART RATE: 88 BPM

## 2024-04-23 DIAGNOSIS — Z17.0 MALIGNANT NEOPLASM OF OVERLAPPING SITES OF RIGHT BREAST IN FEMALE, ESTROGEN RECEPTOR POSITIVE (HCC): ICD-10-CM

## 2024-04-23 DIAGNOSIS — C50.811 MALIGNANT NEOPLASM OF OVERLAPPING SITES OF RIGHT BREAST IN FEMALE, ESTROGEN RECEPTOR POSITIVE (HCC): ICD-10-CM

## 2024-04-23 PROCEDURE — 99245 OFF/OP CONSLTJ NEW/EST HI 55: CPT | Performed by: INTERNAL MEDICINE

## 2024-04-23 NOTE — PROGRESS NOTES
Hematology/Oncology Outpatient Office Note  Date of Service: 4/23/2024    [facility]AT[facility] [address]IRA [address]  [facility] [address]  [phone]    Reason for Consultation:   Chief Complaint   Patient presents with    Consult     Referral Physician: Dinorah Mcqueen MD  Primary Care Physician:  Elvie Ordoñez DO     Oncology history:     Oncology History   Malignant neoplasm of overlapping sites of right breast in female, estrogen receptor positive (HCC)   1/8/2024 Biopsy    Right breast biopsy 300 7cmfn:  - Invasive ductal carcinoma  Grade 1  ER 90%  HI 90%  HER2 negative     1/8/2024 -  Cancer Staged    Staging form: Breast, AJCC 8th Edition  - Clinical stage from 1/8/2024: Stage IA (cT1b, cN0, cM0, G1, ER+, HI+, HER2-) - Signed by Dinorah Mcqueen MD on 4/17/2024  Method of lymph node assessment: Clinical  Histologic grading system: 3 grade system       1/2024 Genetic Testing    [employer] BRCAplus STAT Panel (8 genes): KEINA, BRCA1, BRCA2, CDH1, CHEK2, PALB2, PTEN, TP53 with reflex to [employer] CustomNext Cancer Panel+RNA (47 genes): APC, KENIA, AXIN2, BARD1, BMPR1A, BRCA1, BRCA2, BRIP1, CDH1, CDK4, CDKN2A, CHEK2, CTNNA1, DICER1, EPCAM, GREM1, HOXB13, KIT, MEN1, MLH1, MSH2, MSH3, MSH6, MUTYH, NBN, NF1, NTHL1, PALB2, PDGFRA, PMS2, POLD1, POLE, PTEN, RAD50, RAD51C, RAD51D, SDHA, SDHB, SDHC, SDHD, SMAD4, SMARCA4, STK11, TP53, TSC1, TSC2, VHL      Result: Negative     2/6/2024 Biopsy    Right breast biopsy 11:00 9cmfn  - Invasive ductal carcinoma with tubular features  Grade 1  ER 85%  HI 80%  HER2 FISH negative     4/2/2024 Surgery    Right mastectomy with sentinel lymph node biopsy:  - Clear margins  - 0/2 lymph nodes    Right expander placement  Dr. Mcqueen, Dr. Olmedo     4/2/2024 -  Cancer Staged    Staging form: Breast, AJCC 8th Edition  - Pathologic stage from 4/2/2024: Stage IA (pT1b, pN0(sn), cM0, G1, ER+, HI+, HER2-) - Signed by Dinorah Mcqueen MD on 4/17/2024  Stage  prefix: Initial diagnosis  Method of lymph node assessment: [place] lymph node biopsy  Histologic grading system: 3 grade system           Primary Diagnosis:  1.  Stage IA (mpT1b pN0 cM0) right breast invasive ductal carcinoma.  Greatest dimension of largest invasive focus 8 mm.  Grade 1.  ER positive (85-90%), MI positive (80-85%), HER2 equivocal by IHC (score 2+), negative by FISH.  Diagnosed April 2024.  2.  BRCA1/2 negative      Previous Hematologic/ Oncologic Treatment:   S/p mastectomy with sentinel lymph node biopsy     Current Hematologic/ Oncologic Treatment:    Plan adjuvant endocrine therapy with tamoxifen pending Oncotype DX recurrence score.    History of present illness:   Ambreen Tirado is a 53-year-old premenopausal female with past medical history significant for GERD, HTN, kidney stones, migraines noted to have abnormal breast MRI.  She underwent biopsy in January 2024 which was consistent with invasive ductal carcinoma.  Her genetic testing was negative.  She was evaluated by breast surgery (Dr. Dinorah Mcqueen) and underwent right mastectomy and sentinel lymph node biopsy along with expander placement.  Postop surgical course has been unremarkable.  Final pathology was consistent with multifocal carcinoma including Paget's with the largest focus being 8 mm invasive ductal, grade 1, margins clear, ER/MI positive, HER2 negative, 0/2 lymph nodes negative.  Final stage IA.  Genetic testing was negative.  Her CT chest from January 2024 noted a 3 mm left lower lobe nodule and she has a repeat scan scheduled.  Patient presents for initial oncology evaluation.    He denies any chest pain, palpitations or respiratory symptoms.  Denies any bone pain.    LMP: 2 weeks ago  Menarche: 11 years old  Age at first pregnancy: 27 years old      Review of systems:   Review of Systems   Constitutional: Negative. Negative for fever and malaise/fatigue.   HENT: Negative.     Cardiovascular: Negative.  Negative for chest  pain, dyspnea on exertion and palpitations.   Respiratory: Negative.  Negative for shortness of breath.    Hematologic/Lymphatic: Negative.  Does not bruise/bleed easily.   Skin: Negative.    Musculoskeletal: Negative.    Gastrointestinal: Negative.  Negative for abdominal pain.   Neurological: Negative.    Psychiatric/Behavioral: Negative.          A 10-point review of system was performed, pertinent positive and negative were detailed as above. Otherwise, the 10-point review of system was negative.    Past Medical History:   Diagnosis Date    Allergic     Seasonal/ 2 antibiotics    Anxiety 11/11/2022    Colon polyp     COVID-19 virus infection 07/27/2021 Nov. 2023    GERD (gastroesophageal reflux disease)     Headache(784.0)     Hematuria     Hyperkalemia     Hypertension     Kidney stone 1985    Migraines     Proteinuria     Pseudohypoaldosteronism     type 2 (per patient)    UTI (urinary tract infection)     Wears glasses        Past Surgical History:   Procedure Laterality Date    BREAST BIOPSY Right 01/08/2024    right breast    COLONOSCOPY      DENTAL SURGERY      KIDNEY STONE SURGERY  1985    MAMMO STEREOTACTIC BREAST BIOPSY RIGHT (ALL INC) Right 01/08/2024    MAMMO STEREOTACTIC BREAST BIOPSY RIGHT (ALL INC) Right 02/06/2024    MAMMO STEREOTACTIC BREAST BIOPSY RIGHT (ALL INC) Right 02/06/2024    CT MASTECTOMY SIMPLE COMPLETE Right 4/2/2024    Procedure: RIGHT BREAST MASTECTOMY, RIGHT LYMPHOSCINTIGRAPHY, LYMPHATIC MAPPING, SENTINEL LYMPH NODE BIOPSY;  Surgeon: Dinorah Mcqueen MD;  Location: [facility];  Service: Surgical Oncology    CT TISSUE EXPANDER PLACEMENT BREAST RECONSTRUCTION Right 4/2/2024    Procedure: RIGHT BREAST IMMEDIATE RECON W/ TISSUE EXPANDER AND ADM;  Surgeon: Brando Olmedo MD;  Location: [facility];  Service: Plastics    US GUIDED BREAST BIOPSY LEFT COMPLETE Left 02/06/2024    US GUIDED BREAST BIOPSY RIGHT COMPLETE Right 01/08/2024    US GUIDED BREAST BIOPSY RIGHT COMPLETE Right  2024       Family History   Problem Relation Age of Onset    Depression Mother     Heart disease Mother     Diabetes Mother     Anxiety disorder Mother     Skin cancer Mother     Heart disease Father     Hypertension Father     Stroke Father     Diabetes Brother     No Known Problems Brother     Depression Daughter     No Known Problems Maternal Aunt     No Known Problems Paternal Aunt     No Known Problems Maternal Grandmother     No Known Problems Maternal Grandfather     No Known Problems Paternal Grandmother     No Known Problems Paternal Grandfather     Breast cancer Other 60       Social History     Socioeconomic History    Marital status: /Civil Union     Spouse name: Not on file    Number of children: Not on file    Years of education: Not on file    Highest education level: Not on file   Occupational History    Not on file   Tobacco Use    Smoking status: Former     Current packs/day: 0.00     Average packs/day: 0.3 packs/day for 7.0 years (1.8 ttl pk-yrs)     Types: Cigarettes     Start date:      Quit date: 1993     Years since quittin.3     Passive exposure: Past    Smokeless tobacco: Never   Vaping Use    Vaping status: Never Used   Substance and Sexual Activity    Alcohol use: Yes     Alcohol/week: 1.0 standard drink of alcohol     Types: 1 Cans of beer per week     Comment: social    Drug use: No    Sexual activity: Yes     Partners: Male     Birth control/protection: Male Sterilization   Other Topics Concern    Not on file   Social History Narrative    Not on file     Social Determinants of Health     Financial Resource Strain: Not on file   Food Insecurity: No Food Insecurity (4/3/2024)    Hunger Vital Sign     Worried About Running Out of Food in the Last Year: Never true     Ran Out of Food in the Last Year: Never true   Transportation Needs: No Transportation Needs (4/3/2024)    PRAPARE - Transportation     Lack of Transportation (Medical): No     Lack of Transportation  (Non-Medical): No   Physical Activity: Not on file   Stress: Not on file   Social Connections: Not on file   Intimate Partner Violence: Not on file   Housing Stability: Low Risk  (4/3/2024)    Housing Stability Vital Sign     Unable to Pay for Housing in the Last Year: No     Number of Places Lived in the Last Year: 1     Unstable Housing in the Last Year: No       Allergies   Allergen Reactions    Amoxicillin Hives    Cefaclor Hives       Current Outpatient Medications   Medication Sig Dispense Refill    acetaminophen (TYLENOL) 500 mg tablet Take 1 tablet (500 mg total) by mouth every 4 (four) hours as needed for mild pain or moderate pain 30 tablet 2    ALPRAZolam (XANAX) 0.25 mg tablet Take 1 tablet (0.25 mg total) by mouth daily at bedtime as needed for anxiety or sleep 30 tablet 0    amLODIPine (NORVASC) 2.5 mg tablet Take 1 tablet (2.5 mg total) by mouth daily 90 tablet 5    busPIRone (BUSPAR) 5 mg tablet Take 1 tablet (5 mg total) by mouth 3 (three) times a day 270 tablet 1    Butalbital-APAP-Caffeine (Fioricet) -40 MG CAPS 1 CAPSULE AT ONSET OF HEADACHE AND THEN MAY REPEAT AFTER 30 MINS IF NO IMPROVEMENT, NO MORE THAN 2 CAPSULES A DAY 30 capsule 0    docusate sodium (COLACE) 100 mg capsule Take 1 capsule (100 mg total) by mouth 2 (two) times a day as needed for constipation 20 capsule 2    famotidine (Pepcid) 20 mg tablet Take 20 mg by mouth 2 (two) times a day as needed for heartburn or indigestion      fexofenadine (ALLEGRA) 60 MG tablet Take 60 mg by mouth as needed      fluticasone (FLONASE) 50 mcg/act nasal spray 1 spray into each nostril daily (Patient taking differently: 1 spray into each nostril daily as needed for allergies) 16 g 0    hydrochlorothiazide (HYDRODIURIL) 25 mg tablet Take 1 tablet (25 mg total) by mouth daily 90 tablet 1    Ibuprofen 200 MG CAPS Take by mouth as needed       Magnesium 400 MG CAPS Take 400 mg by mouth in the morning      Multiple Vitamin (multivitamin) tablet Take  1 tablet by mouth daily      ondansetron (ZOFRAN) 4 mg tablet Take 1 tablet (4 mg total) by mouth every 8 (eight) hours as needed for nausea or vomiting 20 tablet 0    Probiotic Product (PROBIOTIC DAILY PO) Take by mouth daily      propranolol (Inderal LA) 80 mg 24 hr capsule Take 1 capsule (80 mg total) by mouth daily (Patient taking differently: Take 80 mg by mouth daily at bedtime) 90 capsule 1     No current facility-administered medications for this visit.     I have reviewed the relevant past medical, surgical, social and family history. I have also reviewed allergies and medications for this patient.    Objective:      Vitals:    04/23/24 1511   BP: 124/76   Pulse: 88   Resp: 17   Temp: 98.2 °F (36.8 °C)   SpO2: 98%       Wt Readings from Last 3 Encounters:   04/23/24 78.5 kg (173 lb)   04/17/24 78.2 kg (172 lb 6.4 oz)   04/02/24 77.4 kg (170 lb 10.2 oz)       Physical Exam  Vitals and nursing note reviewed.   Constitutional:       General: She is not in acute distress.     Appearance: She is well-developed.   HENT:      Head: Normocephalic and atraumatic.   Eyes:      Conjunctiva/sclera: Conjunctivae normal.   Cardiovascular:      Rate and Rhythm: Normal rate and regular rhythm.   Pulmonary:      Effort: Pulmonary effort is normal. No respiratory distress.      Breath sounds: Normal breath sounds.   Chest:          Comments: Right breast: S/p mastectomy with expander in place.  No palpable abnormality. No palpable supra/infraclavicular/axillary LAD.    Left breast: Negative exam  Abdominal:      Palpations: Abdomen is soft.      Tenderness: There is no abdominal tenderness.   Musculoskeletal:         General: No swelling.      Cervical back: Neck supple.   Skin:     General: Skin is warm and dry.   Neurological:      Mental Status: She is alert.   Psychiatric:         Mood and Affect: Mood normal.         Data Review:  Pathology Result:  Final Diagnosis   Date Value Ref Range Status   04/02/2024   Final     A. Lymph Node, Stephan, sentinel lymph node #1 right axilla:  - One benign lymph node (0/1)  - Cytokeratin immunostain is negative        B. Lymph Node, Stephan, sentinel lymph node #2 right axilla:  - One benign lymph node (0/1)  - Cytokeratin immunostain is negative      C. Breast, Right, right breast short superior long lateral:  - Three foci of invasive ductal carcinoma. The invasive carcinoma at 3:00 measuring 8 mm (0.8 cm) (slide C13), well-differentiated with Hope score 5 (tubule formation 2, nuclear grade 2, mitotic count 1).  Invasive carcinoma at 11:00 measuring 4 mm (0.4 cm) (slide C2). Invasive carcinoma at 10:00 measuring 2.5 mm (0.25 cm) (slide C8). Carcinomas at 10:00 and 11:00 look similar and are well differentiated, with Hope score 4 (tubule formation 1, nuclear grade 2, mitotic count 1). See note  - Ductal carcinoma in situ (DCIS), solid and cribriform types, intermediate nuclear grade  - Margins negative for invasive carcinoma and DCIS.  Invasive carcinoma is more than 10 mm (1.0 cm) from closest anterior margin.  DCIS is about 5 mm (0.5 cm) from closest anterior margin (slide C2)  - Incidental finding of microscopic foci of invasive carcinoma in the dermis of the nipple, measuring 1.5 mm (0.15 cm) (cytokeratin immunostain slide C1).  - Focal presence of tumor cells in the epidermis of nipple, consistent with Paget's disease (cytokeratin immunostain and H&E stain slide C1)  - Microcalcifications present in invasive carcinoma and DCIS.  - Multiple foci of biopsy site changes  - Fibroadenomatoid changes present (slide C3)  - Atypical ductal hyperplasia (ADH) and focal atypical lobular hyperplasia (ALH) (slide C10)  - Seborrheic keratosis on the skin surface  - Please see CAP checklist    NOTE C: Cytokeratin immunostain (AE1/AE3) on slide C1 confirms the presence of incidental microscopic focus of invasive carcinoma in the dermis of nipple and the focal presence of Paget's  disease.    E-cadherin and p120 catenin immunostains on block C7 favor the presence of DCIS (not lobular neoplasia).  Smooth muscle myosin heavy chain and p63 immunostains on block C7 confirm the presence of separate small focus of invasive ductal carcinoma.    Smooth muscle myosin heavy chain and p63 immunostains on block C8 confirm the presence of small focus of invasive ductal carcinoma.    E-cadherin and p120 catenin immunostains on block C10 shows reduced expression of E-cadherin and focal cytoplasmic expression of p120 catenin, favor the focal presence of ALH.    Pancytokeratin AE1/AE3, smooth muscle myosin heavy chain and p63 immunostains on block C17 do not reveal the presence of invasive carcinoma on this slide.  Sclerosing adenosis is favored on the slide.         02/06/2024   Final    A. Breast, Left, 200 7 cm from nipple, Biopsy:  - Benign breast tissue with dense stromal fibrosis.  - Negative for atypia or carcinoma.     B. Breast, Right, 1100 9 cm from nipple, Biopsy:  - Invasive mammary carcinoma of no special type (ductal, NOS; 3 mm greatest dimension) with tubular features    - Ozona score: 3 (of 9), grade 1       - Tubule formation: Score 1 (of 3)       - Nuclear pleomorphism: Score 1 (of 3)       - Mitoses: Score 1 (of 3)     C. Breast, Right, 10:00 - 6 cores all with calcs, Biopsy:  - Atypical ductal hyperplasia bordering low grade ductal carcinoma in-situ with calcifications.      02/06/2024   Final    A. Breast, Left, 200 7 cm from nipple, Biopsy:  - Benign breast tissue with dense stromal fibrosis.  - Negative for atypia or carcinoma.     B. Breast, Right, 1100 9 cm from nipple, Biopsy:  - Invasive mammary carcinoma of no special type (ductal, NOS; 3 mm greatest dimension) with tubular features    - Carter score: 3 (of 9), grade 1       - Tubule formation: Score 1 (of 3)       - Nuclear pleomorphism: Score 1 (of 3)       - Mitoses: Score 1 (of 3)     C. Breast, Right, 10:00 - 6 cores  all with calcs, Biopsy:  - Atypical ductal hyperplasia bordering low grade ductal carcinoma in-situ with calcifications.      01/08/2024   Final    A. Breast, Right, stereo specimen 1 @ 12:00 1 core with calcs:  - Focal atypical ductal hyperplasia (ADH) in a background of columnar cell change.  - Microcalcifications present in atypical and non-atypical tissue.  - Negative for invasive carcinoma.     B. Breast, Right, stereo specimen 2 @ 12:00 5 cores without calcs:  - Focal atypical ductal hyperplasia (ADH) in a background of columnar cell change.  - Negative for invasive carcinoma.      C. Breast, Right, US  7cmfn:  - Invasive mammary carcinoma of no special type, 6 mm in greatest dimension.  See comment and synoptic report.    Comment: Immunohistochemistry on part C for SMM[facility] and p63 demonstrates a loss of the myoepithelial layer.    Results reported by [facility] to the [facility] on 1/10/24 at 11:33am.     01/08/2024   Final    A. Breast, Right, stereo specimen 1 @ 12:00 1 core with calcs:  - Focal atypical ductal hyperplasia (ADH) in a background of columnar cell change.  - Microcalcifications present in atypical and non-atypical tissue.  - Negative for invasive carcinoma.     B. Breast, Right, stereo specimen 2 @ 12:00 5 cores without calcs:  - Focal atypical ductal hyperplasia (ADH) in a background of columnar cell change.  - Negative for invasive carcinoma.      C. Breast, Right, US  7cmfn:  - Invasive mammary carcinoma of no special type, 6 mm in greatest dimension.  See comment and synoptic report.    Comment: Immunohistochemistry on part C for SMMHC and p63 demonstrates a loss of the myoepithelial layer.    Results reported by [facility] to the [facility] on 1/10/24 at 11:33am.     10/20/2023   Final    A. Polyp, Colorectal, cold bx - transverse polyp:  Tubular adenoma, no high grade dysplasia or malignancy identified    B. Polyp, Colorectal, cold  snare - descending polyp:  Tubular adenoma, no high grade dysplasia or malignancy identified    C. Polyp, Colorectal, cold snare - rectal polyp:  Tubular adenoma, no high grade dysplasia or malignancy identified         04/26/2016   Final    A. Skin, right dorsal foot, punch excision:  - Acral compound nevus with mild atypia, irritated; visualized margins uninvolved. See Note.         Interpretation performed at [facility], [address] [address].           Image Results:  Image result are reviewed and documented in Hematology/Oncology history    1/15/2024: MRI breast bilateral:  Right breast:  1.  The biopsy-proven malignancy at 3:00, 7 cm from the nipple measures 6 mm on MRI.  2.  The biopsy clip in the superior central breast projects between 2 areas of non-mass enhancement.  Together the span 39 mm on the sagittal view.  Surgical excision of the entire area is recommended.  3.  There is a 7 mm area of non-mass enhancement in the upper outer quadrant which is suspicious.  There is a possible subtle correlate on mammogram.  Targeted second look ultrasound and core needle biopsy is indicated.     Left breast:  1.  There is a 15 mm irregular enhancing mass at 2:00.  Targeted ultrasound and image guided core needle biopsy is recommended.   Additional findings: There are no abnormally enlarged axillary lymph nodes.  There is a 5 mm poorly defined area of enhancement in the anterior left chest adjacent to the internal mammary arteries.  This does not demonstrate definite MRI features of a lymph node.  This is incompletely characterized.  Consider contrast-enhanced CT chest and/or bone scan.    1/31/2024: CT chest:  No acute findings in the chest. Specifically, at the area of concern on recent breast MRI there is no evidence for soft tissue mass or other CT findings to suggest malignancy in that location.  There is a 3 mm left lower lobe nodule. Based on current Fleischner Society 2017 Guidelines on  incidental pulmonary nodule, recommendation is for initial three-month follow-up chest CT.    Labs:  Lab data are reviewed and documented in [facility] history.     Lab Results   Component Value Date    HGB 12.2 04/03/2024    HCT 37.6 04/03/2024    MCV 91 04/03/2024     04/03/2024    WBC 9.72 04/03/2024    NRBC 0 03/06/2024     Lab Results   Component Value Date     08/19/2015    K 3.3 (L) 04/03/2024     04/03/2024    CO2 25 04/03/2024    ANIONGAP 8 08/19/2015    BUN 6 04/03/2024    CREATININE 0.63 04/03/2024    GLUCOSE 106 08/19/2015    GLUF 102 (H) 03/06/2024    CALCIUM 8.6 04/03/2024    CORRECTEDCA 9.6 11/08/2022    AST 14 03/06/2024    ALT 14 03/06/2024    ALKPHOS 43 03/06/2024    PROT 7.7 08/19/2015    BILITOT 0.37 08/19/2015    EGFR 101 04/03/2024       Lab Results   Component Value Date    FERRITIN 17 01/09/2017    FERRITIN 18.1 08/19/2015    FERRITIN 20.5 01/19/2015       Assessment/plan:      Diagnosis ICD-10-CM Associated Orders   1. Malignant neoplasm of overlapping sites of right breast in female, estrogen receptor positive (HCC)  C50.811 Ambulatory referral to Hematology / Oncology    Z17.0         1.  Stage IA (mpT1b pN0 cM0) right breast invasive ductal carcinoma.  Greatest dimension of largest invasive focus 8 mm.  Grade 1.  ER positive (85-90%), CT positive (80-85%), HER2 equivocal by IHC (score 2+), negative by FISH.  Diagnosed April 2024.  2.  BRCA1/2 negative    Nunu Tirado is a 53 y.o. premenopausal female with PMHx notable for GERD, HTN, kidney stones, migraines, being seen in consultation for stage IA right breast invasive ductal carcinoma.  Patient initially noted to have abnormal breast MRI. She was evaluated by breast surgery (Dr. Dinorah Mcqueen) and underwent right mastectomy and sentinel lymph node biopsy along with expander placement. Final pathology was consistent with multifocal carcinoma including Paget's disease with the largest focus being 8 mm invasive ductal,  grade 1, margins clear, ER/WA positive, HER2 negative, 0/2 lymph nodes negative.  Final stage IA.  Genetic testing was negative.     I discussed with the patient the clinical course leading up to their cancer diagnosis. I reviewed relevant office notes, imaging reports and pathology result as well. I personally reviewed the lab results, the imaging studies, pathology, other specialty/physicians consult notes and recommendations, and outside medical records. I had a lengthy discussion with the patient and shared the work-up findings. We discussed the diagnosis and management plan as below. I spent 60 minutes reviewing the records (labs, clinician notes, outside records, medical history, ordering medicine/tests/procedures, monitoring of anti-neoplastic toxicities, interpreting the imaging/labs previously done) and coordination of care as well as direct time with the patient today, of which greater than 50% of the time was spent in counseling and coordination of care with the patient/family.  Recommendations consistent with NCCN guidelines.    We discussed her diagnosis of multifocal  breast carcinoma, ER/WA positive disease. Given the size of her tumor and premenopausal status we will request Oncotype DX recurrence score to see if patient will benefit from adjuvant chemotherapy.  We also discussed adjuvant endocrine therapy should Oncotype DX recurrence score show no benefit from chemotherapy. We discussed the potential side effects of tamoxifen which include a small risk of blood clot and stroke, a small risk of uterine cancer and menopausal symptoms such as hot flashes, mood swings, weight gain, difficulty sleeping, decreased sexual interest, and vaginal discharge.  It can also affect LFTs.  Patient signed informed consent for tamoxifen after all of her questions were addressed to her satisfaction.  She had a teaching session with RN.  She will await Oncotype DX recurrence score results prior to starting endocrine  therapy.  Office follow-up in 3 months.    Her CT chest from January 2024 noted a 3 mm left lower lobe nodule and she has a repeat scan scheduled.      All questions were answered to the patient's satisfaction during this encounter. Thank you very much for your consultation and making us a part of this patient's care.     Return in about 3 months (around 7/23/2024) for Office Visit, Labs - See Treatment Plan.    Maty Ya, DO 4/23/2024   Hematology & Medical Oncology Physician    Disclaimer: This document was prepared using [employer] technology. If a word or phrase is confusing, or does not make sense, this is likely due to recognition error which was not discovered during this clinician's review. If you believe an error has occurred, please contact me through [employer] service line for alex?cation.

## 2024-04-23 NOTE — PROGRESS NOTES
Reviewed Tamoxifen handout with Pt and spouse.  Gave them a copy of the handout.  Instructed possible side effects and importance of reporting any symptoms.  Pt signed consent.  Dr Ya will send rx to retail pharmacy after she gets results from [facility] results.

## 2024-04-29 ENCOUNTER — OFFICE VISIT (OUTPATIENT)
Dept: PLASTIC SURGERY | Facility: CLINIC | Age: 54
End: 2024-04-29

## 2024-04-29 ENCOUNTER — HOSPITAL ENCOUNTER (OUTPATIENT)
Dept: RADIOLOGY | Age: 54
Discharge: HOME | End: 2024-04-29
Payer: COMMERCIAL

## 2024-04-29 DIAGNOSIS — I10 HYPERTENSION, ESSENTIAL: ICD-10-CM

## 2024-04-29 DIAGNOSIS — Z17.0 MALIGNANT NEOPLASM OF OVERLAPPING SITES OF RIGHT BREAST IN FEMALE, ESTROGEN RECEPTOR POSITIVE (HCC): Primary | ICD-10-CM

## 2024-04-29 DIAGNOSIS — R91.1 LUNG NODULE: ICD-10-CM

## 2024-04-29 DIAGNOSIS — C50.811 MALIGNANT NEOPLASM OF OVERLAPPING SITES OF RIGHT BREAST IN FEMALE, ESTROGEN RECEPTOR POSITIVE (HCC): Primary | ICD-10-CM

## 2024-04-29 PROCEDURE — 99024 POSTOP FOLLOW-UP VISIT: CPT | Performed by: PHYSICIAN ASSISTANT

## 2024-04-29 PROCEDURE — 71250 CT THORAX DX C-: CPT

## 2024-04-29 PROCEDURE — G1004 CDSM NDSC: HCPCS

## 2024-04-29 RX ORDER — PROPRANOLOL HYDROCHLORIDE 80 MG/1
80 CAPSULE, EXTENDED RELEASE ORAL DAILY
Qty: 90 CAPSULE | Refills: 1 | Status: SHIPPED | OUTPATIENT
Start: 2024-04-29 | End: 2024-10-26

## 2024-04-29 NOTE — PROGRESS NOTES
Assessment/Plan:     Patient is a 53-year-old female who is status post surgical preparation of wound bed of right chest with immediate right breast reconstruction with submuscular tissue expander with ADM by Dr. Olmedo on 4/2/24. Please see HPI.      Patient returns to our office today for a TE fill  Pt's 650cc capacity TE was filled with 100cc for a total of 550cc. The patient will return to the office in 1 week for an additional TE fill or sooner with any questions or concerns.  Of note, patient will not require radiation, awaiting determination for chemo. She is only interested in DORIS flap reconstruction at this time.      Diagnoses and all orders for this visit:    Malignant neoplasm of overlapping sites of right breast in female, estrogen receptor positive (HCC)          Subjective:     Patient ID: Nunu Tirado is a 53 y.o. female.    HPI    Patient reports no issues or concerns. Awaiting determination for chemo.     Review of Systems    See HPI     Objective:     Physical Exam      Incision is clean, dry and intact.  Tissue expander is palpable and in place.

## 2024-04-30 ENCOUNTER — EVALUATION (OUTPATIENT)
Dept: PHYSICAL THERAPY | Facility: REHABILITATION | Age: 54
End: 2024-04-30
Payer: COMMERCIAL

## 2024-04-30 DIAGNOSIS — Z17.0 MALIGNANT NEOPLASM OF OVERLAPPING SITES OF RIGHT BREAST IN FEMALE, ESTROGEN RECEPTOR POSITIVE (HCC): ICD-10-CM

## 2024-04-30 DIAGNOSIS — C50.811 MALIGNANT NEOPLASM OF OVERLAPPING SITES OF RIGHT BREAST IN FEMALE, ESTROGEN RECEPTOR POSITIVE (HCC): ICD-10-CM

## 2024-04-30 PROCEDURE — 97162 PT EVAL MOD COMPLEX 30 MIN: CPT | Performed by: PHYSICAL THERAPIST

## 2024-04-30 PROCEDURE — 97110 THERAPEUTIC EXERCISES: CPT | Performed by: PHYSICAL THERAPIST

## 2024-04-30 NOTE — PROGRESS NOTES
Lymphedema Evaluation    Today's Date: 2024  Patient name: Nunu Tirado  : 1970  MRN: [id number]  Referring provider: Juana Villarreal*  Dx:  Encounter Diagnosis     ICD-10-CM    1. Malignant neoplasm of overlapping sites of right breast in female, estrogen receptor positive (HCC)  C50.811 Ambulatory Referral to Physical Therapy    Z17.0           Assessment  Assessment details: Nunu Tirado is a 53 y.o. female with history of right breast cancer s/p mastectomy with SLNB and tissue expander placement on 24 presenting for rehabilitation of upper quarter function.  She is a good candidate for Strength After Breast Cancer program due to impairments of upper quarter strength and mobility. She does not have UE lymphedema. She has job requirements of lifting up to 30 lb from floor and overhead, and she lacks muscular strength and endurance to be ready for this at this time. Recommending return to work in 4 weeks. Skilled rehabilitative exercise program to include aerobic, strengthening of core/UE/LE, and generalized flexibility. She will be educated on monitoring symptoms of pain vs lymphedema and how to progress exercise safely and effectively.     During initial evaluation, education was provided on lymphatic anatomy and physiology, lymphedema risk reduction behaviors, and healthy habits; decongestion vs maintenance treatment options. Recommendations were made for compression garments, skin care,and muscle pump exercises to address edema and patient consented to these recommendations. She is in agreement with recommended plan of care and goals for therapy, and demonstrates motivation for active participation in proposed plan of care.  Understanding of Dx/Px/POC: excellent  Goals  - Painfree overhead lifting up to 30 lb with bilateral arms in 4 weeks.  - Painfree lifting of at lesat 30 lb from floor in 4 weeks.  - Patient will demonstrate no worsening of lymphedema volumetrics after 4  weeks of progressive resistance exercise.  -Patient will demonstrate compliance and adherence to compression garment wear/care with exercise.  -Patient will report 25% improvement in upper quarter function after 8 weeks of strengthening exercise.  -Upon discharge, patient will verbalize how/when to progress/regress strengthening exercise and how/when to re-order compression garments and how/when to follow up with a certified lymphedema therapist for re-evaluation.    Plan  Frequency: 1x week  Duration in weeks: 8  Plan of Care beginning date: 4/30/2024  Plan of Care expiration date: 6/25/2024  Treatment plan discussed with: referring physician and patient        Subjective/History:  Chief Complaint: R lateral thoracic tightness, numbness, and intermittent soreness.  Needs for return to work with lifting up to 30lb; returns to work in 2 weeks and will need to push/pull overhead; lateral thoracic pain with washing hair and other overhead ADLs; has not lifted > 8 lbs since surgery.  Denies edema  Cancer history and treatments  Type of CA: R breast CA, ER positive  Stage: Stage 1b  Metastasis: no  Surgical excision: mastectomy 4/2/24  Lymph node dissection? Yes 2 SLNB  Reconstruction: submuscular tissue expander with ADM by Dr. 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 no    Skin Assessment:  Mastectomy scar is healing well, 2 small areas of granular tissue still; moderately restricted throughout with tissue expander in place.    Functional Capacity:  Upper quarter Sensation: Normal; impaired R chest wall anteriorly and posteriorly  Upper quarter Range of Motion: Abnormal, see below  Shldr OH flex:L 180, R 150 (lateral thoracic soreness)  Shldr OH abd: L 180, R 170 (anterior shoulder pain)  Shldr ER: L T2, R T2  Shldr IR: L T6, R T9  Elbow flex/ext: WNL  Wrist flex/ext: WNL  Upper Quarter Strength: Abnormal, see below  Shldr OH flex: L 5/5, R 4/5  Shldr OH abd: L 5/5, R 4+/5  Shldr ER: L 5/5, R 4/5  Shldr IR: L 5/5, R 4-/5 PAIN  Elbow flex/ext: 5/5 BRYAN  Wrist flex/ext: 5/5 BRYAN  Serratus: L 5/5, R 4+/5  Shoulder special tests: negative Preston Ephraim, negative empty can, negative flip sign       Precautions: h/o R breast CA    POC expires Auth Status? (BOMN, approved, pending) Unit limit (Daily) Auth Start date Expiration date PT/OT + Visit Limit?   6/25/24 bomn         Date of Service 4/30        Visits used 1        Visits remaining 99                          Manual Ther                                                      Ther Ex         upper quarter stretching Doorway pec stretch 90 deg 3x30\"  Wall flexion 3x30\"  Wall abduction 3x30\"        Upper quarter strengthening  **       Lower quarter strengthening  ** prep for squats/floor lifts/floor transfers       Aerobic exe                           Ther Activity & Self Care         Lifting mechanics and progression   **                                 Pt Education                              "

## 2024-05-03 ENCOUNTER — OFFICE VISIT (OUTPATIENT)
Dept: PHYSICAL THERAPY | Facility: REHABILITATION | Age: 54
End: 2024-05-03
Payer: COMMERCIAL

## 2024-05-03 DIAGNOSIS — Z17.0 MALIGNANT NEOPLASM OF OVERLAPPING SITES OF RIGHT BREAST IN FEMALE, ESTROGEN RECEPTOR POSITIVE (HCC): Primary | ICD-10-CM

## 2024-05-03 DIAGNOSIS — C50.811 MALIGNANT NEOPLASM OF OVERLAPPING SITES OF RIGHT BREAST IN FEMALE, ESTROGEN RECEPTOR POSITIVE (HCC): Primary | ICD-10-CM

## 2024-05-03 PROCEDURE — 97140 MANUAL THERAPY 1/> REGIONS: CPT

## 2024-05-03 PROCEDURE — 97110 THERAPEUTIC EXERCISES: CPT

## 2024-05-03 NOTE — PROGRESS NOTES
"Daily Note     Today's date: 5/3/2024  Patient name: Nunu Tirado  : 1970  MRN: [id number]  Referring provider: Juana Villarreal*  Dx:   Encounter Diagnosis     ICD-10-CM    1. Malignant neoplasm of overlapping sites of right breast in female, estrogen receptor positive (HCC)  C50.811     Z17.0           Start Time: 0800  Stop Time: 0900  Total time in clinic (min): 60 minutes    Subjective: Pt reported mild SI joint pain 2/2 tightness of the morning. Reports currently able to walk for 20 min at home, but hopes to increase to 30 min intervals.     Patient handout of job description provide by employer:   \"Frequent use of fingers and hands to operate equipment, pull x-ray folders and chart information.  Walking or standing for up to 8 hours per shift and 30-minute increments.  Pulling, pushing and lifting patients up to 300 pounds with assistance.  L.\"   ifting and moving objects of up to 30 pounds.  Frequent stooping, crouching and bending.  Frequently lifting arms above shoulder level      Objective: See treatment diary below    Lower Quarter Screen:     Lumbar:    ROM: WFL     Squat:   Anterior tibial translation bilaterally and reported bilateral knee pain     Hip MMTs:  Flexion: R 4/5 L 4-/5  Extension: R 3+/5 L 3+/5  Abduction: R 3+/5 L 3+/5  Adduction: R 3+/5 L 3+/5  ER: R 4/5 L 4/5  IR: R 4/5 L 4/5      Assessment: Tolerated treatment well. Pt reported moderate activity after 5 min on TM using RPE scale. Pt tolerated therapeutic exercise w/ no pain reported and proper form/ breathing w/ cueing. Pt was educated on exercise principles and endurance principles throughout session. Patient would benefit from continued PT to achieve her goals and guide interventions to return to work. HEP for this week was to perform therapeutic exercise x2 this week and ambulate x3 for 20-30 min at mod activity for 80% of time. Next visit, continue to progress therapeutic exercise as tolerated to functional " "tasks pending pt report of reactions to exercise over the week - can progress HEP as needed.       Plan: Continue per plan of care.      Precautions: h/o R breast CA    POC expires Auth Status? (BOMN, approved, pending) Unit limit (Daily) Auth Start date Expiration date PT/OT + Visit Limit?   6/25/24 bomn         Date of Service 4/30 5/3       Visits used 1 2       Visits remaining 99 99                         Manual Ther         Lower Quarter Screen  Performed                                            Ther Ex         upper quarter stretching Doorway pec stretch 90 deg 3x30\"  Wall flexion 3x30\"  Wall abduction 3x30\"        Upper quarter strengthening  **       Lower quarter strengthening  Clamshells x20 each     Glute Bridges x20     Half Plank Rock back w/ 3 sec hold 2x5     Standing Shoulder Abduction 2x30        Aerobic exe  TM Walking 1.8 mph 10'   RPE 3-5                         Ther Activity & Self Care         Lifting mechanics and progression   **                                 Pt Education                                "

## 2024-05-06 ENCOUNTER — OFFICE VISIT (OUTPATIENT)
Dept: PLASTIC SURGERY | Facility: CLINIC | Age: 54
End: 2024-05-06

## 2024-05-06 DIAGNOSIS — Z85.3 HISTORY OF BREAST CANCER: Primary | ICD-10-CM

## 2024-05-06 PROCEDURE — 99024 POSTOP FOLLOW-UP VISIT: CPT | Performed by: STUDENT IN AN ORGANIZED HEALTH CARE EDUCATION/TRAINING PROGRAM

## 2024-05-06 NOTE — PROGRESS NOTES
PRS Note    Patient underwent right breast mastectomy with immediate breast reconstruction with tissue expander and ADM on 4/2/24    She has been doing well and has been serially expanded.     Patient is following with PT for her RUE.    She is currently filled to 550 cc of a possible 650 cc expander. She currently likes the size of her right breast.    Patient also had indepth discussion with her family and is interested in autologous DORIS flap reconstruction of the right breast.    Patient has adequate abdominal donor site for DORIS flap breast reconstruction.    She has no history of abdominal surgeries.    From oncologic standpoint, patient does not need radiation but she is still waiting from oncology for determination if she will require chemotherapy. I discussed with patient that we would delay any reconstruction until after her chemotherapy treatments. Patient acknowledged.    Plan:  -Will order CTA to evaluate for perforators  -Will add to DORIS list to save surgery date  -Filled out [facility] paperwork to be off until 6/10/24    Brando Olmedo MD   [facility]   [address], [address]   [place] [address]   Office: [phone]

## 2024-05-08 ENCOUNTER — OFFICE VISIT (OUTPATIENT)
Dept: PHYSICAL THERAPY | Facility: REHABILITATION | Age: 54
End: 2024-05-08
Payer: COMMERCIAL

## 2024-05-08 ENCOUNTER — TELEPHONE (OUTPATIENT)
Age: 54
End: 2024-05-08

## 2024-05-08 DIAGNOSIS — Z17.0 MALIGNANT NEOPLASM OF OVERLAPPING SITES OF RIGHT BREAST IN FEMALE, ESTROGEN RECEPTOR POSITIVE (HCC): Primary | ICD-10-CM

## 2024-05-08 DIAGNOSIS — C50.811 MALIGNANT NEOPLASM OF OVERLAPPING SITES OF RIGHT BREAST IN FEMALE, ESTROGEN RECEPTOR POSITIVE (HCC): Primary | ICD-10-CM

## 2024-05-08 PROCEDURE — 97110 THERAPEUTIC EXERCISES: CPT

## 2024-05-08 NOTE — TELEPHONE ENCOUNTER
Pt called, gave Dr Olmedo paperwork to extend her medical leave and she also is waiting for paperwork for short term disability    Please call patient back when available

## 2024-05-08 NOTE — PROGRESS NOTES
"Daily Note     Today's date: 2024  Patient name: Nunu Tirado  : 1970  MRN: [id number]  Referring provider: Juana Villarreal*  Dx:   Encounter Diagnosis     ICD-10-CM    1. Malignant neoplasm of overlapping sites of right breast in female, estrogen receptor positive (HCC)  C50.811     Z17.0             Start Time: 730  Stop Time: 08  Total time in clinic (min): 50 minutes    Subjective: Pt is scheduled to RTW .  Does work independently at 1 location and is more anxious with the demands of the job.  Feels good today, able to do some chores yesterday.  Son and grandson are coming to visit tomorrow.       Patient handout of job description provide by employer:   \"Frequent use of fingers and hands to operate equipment, pull x-ray folders and chart information.  Walking or standing for up to 8 hours per shift and 30-minute increments.  Pulling, pushing and lifting patients up to 300 pounds with assistance.  L.\"   ifting and moving objects of up to 30 pounds.  Frequent stooping, crouching and bending.  Frequently lifting arms above shoulder level      Objective: See treatment diary below      Assessment: Tolerated treatment well. Did well with a combination of LE and UE strengthening exercises following warmup on TM. Also practiced pushing me in the wheelchair back and forth to the elevator. Next visit, continue to progress therapeutic exercise as tolerated to functional tasks pending pt report of reactions to exercise over the week - can progress HEP as needed.       Plan: Continue per plan of care.  Add quadriped hip hinges next session to build her quadriped endurance.     Precautions: h/o R breast CA    POC expires Auth Status? (BOMN, approved, pending) Unit limit (Daily) Auth Start date Expiration date PT/OT + Visit Limit?   24 bomn         Date of Service 4/30 5/3 5/8      Visits used 1 2 3      Visits remaining 99 99 99                        Manual Ther         Lower " "Quarter Screen  Performed                                            Ther Ex         upper quarter stretching Doorway pec stretch 90 deg 3x30\"  Wall flexion 3x30\"  Wall abduction 3x30\"        Upper quarter strengthening  ** 2# on R, 3# on L  Bicep curls Scaption  Bent over triceps      Lower quarter strengthening  Clamshells x20 each     Glute Bridges x20     Half Plank Rock back w/ 3 sec hold 2x5     Standing Shoulder Abduction 2x30  Clamshells x20    Bridges 2x10    Standing abduction  2x10    Standing hip extension    Standing marches against the wall w/o compensation      Aerobic exe  TM Walking 1.8 mph 10'   RPE 3-5 TM 2.0 mph RPE 3 \"could have kept going\"                        Ther Activity & Self Care         Lifting mechanics and progression   WC push back and forth to the elevator Lifting                                 Pt Education                                "

## 2024-05-10 ENCOUNTER — APPOINTMENT (OUTPATIENT)
Dept: PHYSICAL THERAPY | Facility: REHABILITATION | Age: 54
End: 2024-05-10
Payer: COMMERCIAL

## 2024-05-10 DIAGNOSIS — F41.9 ANXIETY: ICD-10-CM

## 2024-05-13 ENCOUNTER — PREP FOR PROCEDURE (OUTPATIENT)
Dept: PLASTIC SURGERY | Facility: CLINIC | Age: 54
End: 2024-05-13

## 2024-05-13 ENCOUNTER — TELEPHONE (OUTPATIENT)
Dept: PLASTIC SURGERY | Facility: CLINIC | Age: 54
End: 2024-05-13

## 2024-05-13 DIAGNOSIS — I10 HYPERTENSION, ESSENTIAL: ICD-10-CM

## 2024-05-13 DIAGNOSIS — Z85.3 HISTORY OF BREAST CANCER: Primary | ICD-10-CM

## 2024-05-13 DIAGNOSIS — Z85.3 PERSONAL HISTORY OF BREAST CANCER: Primary | ICD-10-CM

## 2024-05-13 RX ORDER — ALPRAZOLAM 0.25 MG/1
0.25 TABLET ORAL
Qty: 30 TABLET | Refills: 0 | Status: SHIPPED | OUTPATIENT
Start: 2024-05-13

## 2024-05-14 ENCOUNTER — OFFICE VISIT (OUTPATIENT)
Dept: PHYSICAL THERAPY | Facility: REHABILITATION | Age: 54
End: 2024-05-14
Payer: COMMERCIAL

## 2024-05-14 DIAGNOSIS — C50.811 MALIGNANT NEOPLASM OF OVERLAPPING SITES OF RIGHT BREAST IN FEMALE, ESTROGEN RECEPTOR POSITIVE (HCC): Primary | ICD-10-CM

## 2024-05-14 DIAGNOSIS — Z17.0 MALIGNANT NEOPLASM OF OVERLAPPING SITES OF RIGHT BREAST IN FEMALE, ESTROGEN RECEPTOR POSITIVE (HCC): Primary | ICD-10-CM

## 2024-05-14 PROCEDURE — 97110 THERAPEUTIC EXERCISES: CPT

## 2024-05-14 PROCEDURE — 97530 THERAPEUTIC ACTIVITIES: CPT

## 2024-05-14 NOTE — PROGRESS NOTES
"Daily Note     Today's date: 2024  Patient name: Nunu Tirado  : 1970  MRN: [id number]  Referring provider: Juana Villarreal*  Dx:   Encounter Diagnosis     ICD-10-CM    1. Malignant neoplasm of overlapping sites of right breast in female, estrogen receptor positive (HCC)  C50.811     Z17.0               Start Time: 0800  Stop Time: 0845  Total time in clinic (min): 45 minutes    Subjective: Busy week-end holding her grandson (10#) mostly on her L side but is feeling more chest tightness.     Patient handout of job description provide by employer:   \"Frequent use of fingers and hands to operate equipment, pull x-ray folders and chart information.  Walking or standing for up to 8 hours per shift and 30-minute increments.  Pulling, pushing and lifting patients up to 300 pounds with assistance.  L.\"   ifting and moving objects of up to 30 pounds.  Frequent stooping, crouching and bending.  Frequently lifting arms above shoulder level      Objective: See treatment diary below      Assessment: Tolerated treatment well. Challenged appropriately t/o program with addition of more lifting, pushing and pulling.  Benefits from cueing to improve form and breathing pattern.  Does feel its easier when she exhales on the effort.  Next visit, continue to progress therapeutic exercise as tolerated to functional tasks pending pt report of reactions to exercise over the week - can progress HEP as needed.       Plan: Continue per plan of care.       Precautions: h/o R breast CA    POC expires Auth Status? (BOMN, approved, pending) Unit limit (Daily) Auth Start date Expiration date PT/OT + Visit Limit?   24 bomn         Date of Service 4/30 5/3 5/8 5/14     Visits used 1 2 3 4     Visits remaining 99 99 99 99                       Manual Ther         Lower Quarter Screen  Performed                                            Ther Ex         upper quarter stretching Doorway pec stretch 90 deg 3x30\"  Wall " "flexion 3x30\"  Wall abduction 3x30\"        Upper quarter strengthening  ** 2# on R, 3# on L  Bicep curls Scaption  Bent over triceps 2#,3# 2x10  Last set increased to 4#, 5#   Chest press on mat 4#5# 2x10     Lower quarter strengthening  Clamshells x20 each     Glute Bridges x20     Half Plank Rock back w/ 3 sec hold 2x5     Standing Shoulder Abduction 2x30  Clamshells x20    Bridges 2x10    Standing abduction  2x10    Standing hip extension    Standing marches against the wall w/o compensation Clamshells w/ resistance 20x    Single leg bridge (figure 4) 2x10    Hip hinge 10x    Bird dog 2x10 (more challenged when lifting R UE)     Aerobic exe  TM Walking 1.8 mph 10'   RPE 3-5 TM 2.0 mph RPE 3 \"could have kept going\" TM 2.5 mph 10'                       Ther Activity & Self Care         Lifting mechanics and progression   WC push back and forth to the elevator Lifting 12.5# 10x    Pulling, pushing at reformer 20x blue spring    Shoulder abduction YCO 2x10    2 lifts on mat w/ 125# patient (PT)                                Pt Education                                "

## 2024-05-15 RX ORDER — HYDROCHLOROTHIAZIDE 25 MG/1
25 TABLET ORAL DAILY
Qty: 90 TABLET | Refills: 1 | Status: SHIPPED | OUTPATIENT
Start: 2024-05-15 | End: 2024-11-11

## 2024-05-16 ENCOUNTER — OFFICE VISIT (OUTPATIENT)
Dept: PHYSICAL THERAPY | Facility: REHABILITATION | Age: 54
End: 2024-05-16
Payer: COMMERCIAL

## 2024-05-16 DIAGNOSIS — C50.811 MALIGNANT NEOPLASM OF OVERLAPPING SITES OF RIGHT BREAST IN FEMALE, ESTROGEN RECEPTOR POSITIVE (HCC): Primary | ICD-10-CM

## 2024-05-16 DIAGNOSIS — Z17.0 MALIGNANT NEOPLASM OF OVERLAPPING SITES OF RIGHT BREAST IN FEMALE, ESTROGEN RECEPTOR POSITIVE (HCC): Primary | ICD-10-CM

## 2024-05-16 PROCEDURE — 97110 THERAPEUTIC EXERCISES: CPT | Performed by: PHYSICAL THERAPIST

## 2024-05-16 NOTE — PROGRESS NOTES
"Daily Note     Today's date: 2024  Patient name: Nunu Tirado  : 1970  MRN: [id number]  Referring provider: Juana Villarreal*  Dx:   Encounter Diagnosis     ICD-10-CM    1. Malignant neoplasm of overlapping sites of right breast in female, estrogen receptor positive (HCC)  C50.811     Z17.0                            Subjective: Some shooting pain in R axilla yesterday for the first time. Lasting < 2 minutes.    Patient handout of job description provide by employer:   \"Frequent use of fingers and hands to operate equipment, pull x-ray folders and chart information.  Walking or standing for up to 8 hours per shift and 30-minute increments.  Pulling, pushing and lifting patients up to 300 pounds with assistance.  L.\"   ifting and moving objects of up to 30 pounds.  Frequent stooping, crouching and bending.  Frequently lifting arms above shoulder level    Objective: See treatment diary below.    Assessment: Tolerated treatment well. Progressed strengthening today asymtomatically. If minimal/no muscle soreness, will progress toward 20-30# lifting and pushing/pulling next visit in preparation for return to work.      Plan: Continue per plan of care.       Precautions: h/o R breast CA    Access Code: [email or web address]  URL: [email or web address]  Date: 2024  Prepared by: Baylee Steimling    Exercises  - Lateral Step Up  - 3 x weekly - 30 reps  - Standing Hip Abduction  - 3 x weekly - 30 reps  - Sidelying Hip Abduction  - 3 x weekly - 30 reps  - Push Up on Table  - 3 x weekly - 2 sets - 15 reps  - Squat to Standing Upright Shoulder Row  - 3 x weekly - 2 sets - 10 reps  - Shoulder Overhead Press in Abduction with Dumbbells  - 3 x weekly - 2 sets - 10 reps    POC expires Auth Status? (BOMN, approved, pending) Unit limit (Daily) Auth Start date Expiration date PT/OT + Visit Limit?   24 bomn         Date of Service  5/3 5/ 514 16    Visits used 1 2 3 4 5    Visits remaining 99 " "99 99 99 99                      Manual Ther         Lower Quarter Screen  Performed                                            Ther Ex         upper quarter stretching Doorway pec stretch 90 deg 3x30\"  Wall flexion 3x30\"  Wall abduction 3x30\"        Upper quarter strengthening  ** 2# on R, 3# on L  Bicep curls Scaption  Bent over triceps 2#,3# 2x10  Last set increased to 4#, 5#   Chest press on mat 4#5# 2x10  Shoulder abduction YCO 2x10 OH press 4# 2x10    Bicep curl 7# 2x10    LUIS row 6# 2x10    Incline push up 2x15    Chest press 5# 2x15 ** LPD, chops/lifts, push up on knees   Lower quarter strengthening  Clamshells x20 each     Glute Bridges x20     Half Plank Rock back w/ 3 sec hold 2x5     Standing Shoulder Abduction 2x30  Clamshells x20    Bridges 2x10    Standing abduction  2x10    Standing hip extension    Standing marches against the wall w/o compensation Clamshells w/ resistance 20x    Single leg bridge (figure 4) 2x10    Hip hinge 10x    Bird dog 2x10 (more challenged when lifting R UE) Squat 15# KB 2x15 Squat 15# with upright row x10    Lateral step up and over 10# KB x20 alt    S/l hip abd x20 ea    Aerobic exe  TM Walking 1.8 mph 10'   RPE 3-5 TM 2.0 mph RPE 3 \"could have kept going\" TM 2.5 mph 10' TM 2.5 10' ** UBE                     Ther Activity & Self Care         Lifting mechanics and progression   WC push back and forth to the elevator Lifting 12.5# 10x    Pulling, pushing at reformer 20x blue spring        2 lifts on mat w/ 125# patient (PT)                                Pt Education                                "

## 2024-05-20 ENCOUNTER — HOSPITAL ENCOUNTER (OUTPATIENT)
Dept: RADIOLOGY | Age: 54
Discharge: HOME | End: 2024-05-20
Payer: COMMERCIAL

## 2024-05-20 DIAGNOSIS — Z85.3 HISTORY OF BREAST CANCER: ICD-10-CM

## 2024-05-20 PROCEDURE — 74174 CTA ABD&PLVS W/CONTRAST: CPT

## 2024-05-20 RX ADMIN — IOHEXOL 100 ML: 350 INJECTION, SOLUTION INTRAVENOUS at 09:53

## 2024-05-22 ENCOUNTER — OFFICE VISIT (OUTPATIENT)
Dept: OBGYN CLINIC | Facility: CLINIC | Age: 54
End: 2024-05-22
Payer: COMMERCIAL

## 2024-05-22 ENCOUNTER — OFFICE VISIT (OUTPATIENT)
Dept: PHYSICAL THERAPY | Facility: REHABILITATION | Age: 54
End: 2024-05-22
Payer: COMMERCIAL

## 2024-05-22 VITALS
SYSTOLIC BLOOD PRESSURE: 138 MMHG | WEIGHT: 174 LBS | HEIGHT: 62 IN | DIASTOLIC BLOOD PRESSURE: 80 MMHG | BODY MASS INDEX: 32.02 KG/M2

## 2024-05-22 DIAGNOSIS — Z17.0 MALIGNANT NEOPLASM OF OVERLAPPING SITES OF RIGHT BREAST IN FEMALE, ESTROGEN RECEPTOR POSITIVE (HCC): Primary | ICD-10-CM

## 2024-05-22 DIAGNOSIS — Z17.0 MALIGNANT NEOPLASM OF OVERLAPPING SITES OF RIGHT BREAST IN FEMALE, ESTROGEN RECEPTOR POSITIVE (HCC): ICD-10-CM

## 2024-05-22 DIAGNOSIS — N92.6 IRREGULAR MENSES: ICD-10-CM

## 2024-05-22 DIAGNOSIS — C50.811 MALIGNANT NEOPLASM OF OVERLAPPING SITES OF RIGHT BREAST IN FEMALE, ESTROGEN RECEPTOR POSITIVE (HCC): Primary | ICD-10-CM

## 2024-05-22 DIAGNOSIS — C50.811 MALIGNANT NEOPLASM OF OVERLAPPING SITES OF RIGHT BREAST IN FEMALE, ESTROGEN RECEPTOR POSITIVE (HCC): ICD-10-CM

## 2024-05-22 DIAGNOSIS — D21.9 FIBROIDS: ICD-10-CM

## 2024-05-22 DIAGNOSIS — Z01.419 ENCOUNTER FOR ANNUAL ROUTINE GYNECOLOGICAL EXAMINATION: Primary | ICD-10-CM

## 2024-05-22 PROCEDURE — 97110 THERAPEUTIC EXERCISES: CPT | Performed by: PHYSICAL THERAPIST

## 2024-05-22 PROCEDURE — G0145 SCR C/V CYTO,THINLAYER,RESCR: HCPCS | Performed by: OBSTETRICS & GYNECOLOGY

## 2024-05-22 PROCEDURE — S0612 ANNUAL GYNECOLOGICAL EXAMINA: HCPCS | Performed by: OBSTETRICS & GYNECOLOGY

## 2024-05-22 RX ORDER — VITAMIN B COMPLEX
1 CAPSULE ORAL DAILY
COMMUNITY

## 2024-05-22 NOTE — PROGRESS NOTES
Ambulatory Visit  Name: Nunu Tirado      : 1970      MRN: [id number]  Encounter Provider: Falguni Linda DO  Encounter Date: 2024   Encounter department: [facility]    Assessment & Plan   1. Encounter for annual routine gynecological examination  2. Malignant neoplasm of overlapping sites of right breast in female, estrogen receptor positive (HCC)      Pap done for cytology, reflex HPV.  Encouraged self breast examinations.  She will continue to follow-up with her breast team with scheduled reconstructive surgery 2024.  Reviewed colon cancer screening, up-to-date.  Will obtain pelvic ultrasound follow-up fibroid uterus and assess endometrial stripe.  Return to office in 4 to 5 weeks for endometrial biopsy.  Reviewed signs and symptoms of perimenopause/menopause.    History of Present Illness         Nunu Tirado is a 53 y.o. female    This is a pleasant 53-year-old female P2 ( x 2, age 24, 22) presents for her GYN exam.  In the course of the year she was diagnosed with stage I breast cancer, status post right mastectomy.  She is scheduled for reconstructive surgery 2024.  She underwent genetic testing which was negative.  Her menstrual cycles continue to be irregular: , , 3/2, 3/16, , .  She denies any hot flashes or night sweats.  No changes in bowel or bladder function.  She has been in a monogamous relationship for over 28 years.  Method of contraception is vasectomy.    She continues to follow-up with her family physician on a regular basis.  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  Br CA, right mastectomy, stage 1, neg gene    Colon 2023 + polyps f/u 5 yrs    3/2023 pap wnl    3/2023 pelvic ultrasound revealing 3.8 cm subserosal fibroid, 1 cm intramural fibroid, endometrial stripe 14 mm, normal ovaries x 2    Review of Systems   Constitutional:  Negative for fatigue, fever and unexpected weight change.   Respiratory:   Negative for cough, chest tightness, shortness of breath and wheezing.    Cardiovascular: Negative.  Negative for chest pain and palpitations.   Gastrointestinal: Negative.  Negative for abdominal distention, abdominal pain, blood in stool, constipation, diarrhea, nausea and vomiting.   Genitourinary: Negative.  Negative for difficulty urinating, dyspareunia, dysuria, flank pain, frequency, genital sores, hematuria, pelvic pain, urgency, vaginal bleeding, vaginal discharge and vaginal pain.   Skin:  Negative for rash.       Current Outpatient Medications on File Prior to Visit   Medication Sig Dispense Refill    acetaminophen (TYLENOL) 500 mg tablet Take 1 tablet (500 mg total) by mouth every 4 (four) hours as needed for mild pain or moderate pain 30 tablet 2    ALPRAZolam (XANAX) 0.25 mg tablet Take 1 tablet (0.25 mg total) by mouth daily at bedtime as needed for anxiety or sleep 30 tablet 0    amLODIPine (NORVASC) 2.5 mg tablet Take 1 tablet (2.5 mg total) by mouth daily 90 tablet 5    b complex vitamins capsule Take 1 capsule by mouth daily      busPIRone (BUSPAR) 5 mg tablet Take 1 tablet (5 mg total) by mouth 3 (three) times a day 270 tablet 1    Butalbital-APAP-Caffeine (Fioricet) -40 MG CAPS 1 CAPSULE AT ONSET OF HEADACHE AND THEN MAY REPEAT AFTER 30 MINS IF NO IMPROVEMENT, NO MORE THAN 2 CAPSULES A DAY 30 capsule 0    famotidine (Pepcid) 20 mg tablet Take 20 mg by mouth 2 (two) times a day as needed for heartburn or indigestion      fexofenadine (ALLEGRA) 60 MG tablet Take 60 mg by mouth as needed      hydroCHLOROthiazide 25 mg tablet Take 1 tablet (25 mg total) by mouth daily 90 tablet 1    Ibuprofen 200 MG CAPS Take by mouth as needed       Magnesium 400 MG CAPS Take 400 mg by mouth in the morning      Multiple Vitamin (multivitamin) tablet Take 1 tablet by mouth daily      Probiotic Product (PROBIOTIC DAILY PO) Take by mouth daily      propranolol (Inderal LA) 80 mg 24 hr capsule Take 1 capsule (80  "mg total) by mouth daily 90 capsule 1    docusate sodium (COLACE) 100 mg capsule Take 1 capsule (100 mg total) by mouth 2 (two) times a day as needed for constipation (Patient not taking: Reported on 5/22/2024) 20 capsule 2    fluticasone (FLONASE) 50 mcg/act nasal spray 1 spray into each nostril daily (Patient taking differently: 1 spray into each nostril daily as needed for allergies) 16 g 0    ondansetron (ZOFRAN) 4 mg tablet Take 1 tablet (4 mg total) by mouth every 8 (eight) hours as needed for nausea or vomiting (Patient not taking: Reported on 5/22/2024) 20 tablet 0     No current facility-administered medications on file prior to visit.        Objective     /80   Ht 5' 2\" (1.575 m)   Wt 78.9 kg (174 lb)   LMP 04/13/2024 (Exact Date)   BMI 31.83 kg/m²     Physical Exam  Constitutional:       Appearance: Normal appearance. She is well-developed.   HENT:      Head: Normocephalic and atraumatic.   Cardiovascular:      Rate and Rhythm: Normal rate and regular rhythm.   Pulmonary:      Effort: Pulmonary effort is normal.      Breath sounds: Normal breath sounds.   Chest:   Breasts:     Right: No inverted nipple, mass, nipple discharge, skin change or tenderness.      Left: No inverted nipple, mass, nipple discharge, skin change or tenderness.   Abdominal:      General: Bowel sounds are normal. There is no distension.      Palpations: Abdomen is soft.      Tenderness: There is no abdominal tenderness. There is no guarding or rebound.   Genitourinary:     Labia:         Right: No rash, tenderness or lesion.         Left: No rash, tenderness or lesion.       Vagina: Normal. No signs of injury. No vaginal discharge or tenderness.      Cervix: No cervical motion tenderness, discharge, friability, lesion or cervical bleeding.      Uterus: Not enlarged and not tender.       Adnexa:         Right: No mass, tenderness or fullness.          Left: No mass, tenderness or fullness.     Neurological:      Mental " Status: She is alert.   Psychiatric:         Behavior: Behavior normal.         Administrative Statements       I have spent a total time of 30 minutes on 05/22/24 In caring for this patient including Diagnostic results, Risks and benefits of tx options, Instructions for management, Impressions, Counseling / Coordination of care, Documenting in the medical record, Reviewing / ordering tests, medicine, procedures  , and Obtaining or reviewing history  .

## 2024-05-22 NOTE — PROGRESS NOTES
"Daily Note     Today's date: 2024  Patient name: Nunu Tirado  : 1970  MRN: [id number]  Referring provider: Juana Villarreal*  Dx:   Encounter Diagnosis     ICD-10-CM    1. Malignant neoplasm of overlapping sites of right breast in female, estrogen receptor positive (HCC)  C50.811     Z17.0         Subjective: Some low back pain after last workout, consistent with chronic intermittent aching central LBP that she has experienced for years. Reports known spondylosis L5-S1.    Patient handout of job description provide by employer:   \"Frequent use of fingers and hands to operate equipment, pull x-ray folders and chart information.  Walking or standing for up to 8 hours per shift and 30-minute increments.  Pulling, pushing and lifting patients up to 300 pounds with assistance.  L.\"   ifting and moving objects of up to 30 pounds.  Frequent stooping, crouching and bending.  Frequently lifting arms above shoulder level    Objective: See treatment diary below.    Assessment: Added small posterior pelvic tilt repositioning to reduce lumbar extension moment with lifting activities to reduce low back pain, as well as improve automatic abdominal wall engagement. Utilized with squat/floor lifts, LPD, and horizontal pulling/diagonal pulling. Patient reports that resistance exceeds load of lifting at work, so she is optimistic about returning to work without injury or limitations.    Plan: Continue per plan of care.       Precautions: h/o R breast CA    Access Code: [email or web address]  URL: [email or web address]  Date: 2024  Prepared by: Baylee Steimling    Exercises  - Lateral Step Up  - 3 x weekly - 30 reps  - Standing Hip Abduction  - 3 x weekly - 30 reps  - Sidelying Hip Abduction  - 3 x weekly - 30 reps  - Push Up on Table  - 3 x weekly - 2 sets - 15 reps  - Squat to Standing Upright Shoulder Row  - 3 x weekly - 2 sets - 10 reps  - Shoulder Overhead Press in Abduction with Dumbbells  - 3 x " "weekly - 2 sets - 10 reps    POC expires Auth Status? (BOMN, approved, pending) Unit limit (Daily) Auth Start date Expiration date PT/OT + Visit Limit?   6/25/24 bomn         Date of Service 4/30 5/3 5/8 5/14 5/16 5/22   Visits used 1 2 3 4 5 6   Visits remaining 99 99 99 99 99 99                     Manual Ther         Lower Quarter Screen  Performed                                            Ther Ex         upper quarter stretching Doorway pec stretch 90 deg 3x30\"  Wall flexion 3x30\"  Wall abduction 3x30\"        Core strengthening      Table plank w PPT  Table plank with leg and arm variations   Upper quarter strengthening  ** 2# on R, 3# on L  Bicep curls Scaption  Bent over triceps 2#,3# 2x10  Last set increased to 4#, 5#   Chest press on mat 4#5# 2x10  Shoulder abduction YCO 2x10 OH press 4# 2x10    Bicep curl 7# 2x10    LUIS row 6# 2x10    Incline push up 2x15    Chest press 5# 2x15 LPD 15# x20  W march x10    Chop 10# x20    Horizontal pull 10# x20 ea    Bicep curl 7# 2x10    LUIS row 6# 2x10    Incline push up 2x15    Chest press 5# 2x15   Lower quarter strengthening  Clamshells x20 each     Glute Bridges x20     Half Plank Rock back w/ 3 sec hold 2x5     Standing Shoulder Abduction 2x30  Clamshells x20    Bridges 2x10    Standing abduction  2x10    Standing hip extension    Standing marches against the wall w/o compensation Clamshells w/ resistance 20x    Single leg bridge (figure 4) 2x10    Hip hinge 10x    Bird dog 2x10 (more challenged when lifting R UE) Squat 15# KB 2x15 Squat 15# with upright row x10    Lateral step up and over 10# KB x20 alt    S/l hip abd x20 ea Squat 15# KB x10  Uni KB lift w squat 15# x20 alt   Aerobic exe  TM Walking 1.8 mph 10'   RPE 3-5 TM 2.0 mph RPE 3 \"could have kept going\" TM 2.5 mph 10' TM 2.5 10' UBE  3'/3'  TM 8' 2.5-3.0                     Ther Activity & Self Care         Lifting mechanics and progression   WC push back and forth to the elevator Lifting 12.5# " 10x    Pulling, pushing at reformer 20x blue spring        2 lifts on mat w/ 125# patient (PT)                                Pt Education

## 2024-05-23 ENCOUNTER — TELEPHONE (OUTPATIENT)
Dept: HEMATOLOGY ONCOLOGY | Facility: CLINIC | Age: 54
End: 2024-05-23

## 2024-05-23 ENCOUNTER — LAB REQUISITION (OUTPATIENT)
Dept: LAB | Facility: HOSPITAL | Age: 54
End: 2024-05-23

## 2024-05-23 DIAGNOSIS — C50.411 MALIGNANT NEOPLASM OF UPPER-OUTER QUADRANT OF RIGHT FEMALE BREAST (HCC): ICD-10-CM

## 2024-05-23 DIAGNOSIS — Z17.0 MALIGNANT NEOPLASM OF RIGHT BREAST IN FEMALE, ESTROGEN RECEPTOR POSITIVE, UNSPECIFIED SITE OF BREAST (HCC): Primary | ICD-10-CM

## 2024-05-23 DIAGNOSIS — C50.911 MALIGNANT NEOPLASM OF RIGHT BREAST IN FEMALE, ESTROGEN RECEPTOR POSITIVE, UNSPECIFIED SITE OF BREAST (HCC): Primary | ICD-10-CM

## 2024-05-23 RX ORDER — TAMOXIFEN CITRATE 20 MG/1
20 TABLET ORAL DAILY
Qty: 30 TABLET | Refills: 3 | Status: SHIPPED | OUTPATIENT
Start: 2024-05-23

## 2024-05-23 NOTE — TELEPHONE ENCOUNTER
Patient Call    Who are you speaking with? Patient    If it is not the patient, are they listed on an active communication consent form? N/A   What is the reason for this call? Pt is retuning Nidhi's missed called.     I spoke to Nidhi and she will give her a call back, she is on the phone currently with another patient.    Does this require a call back? Yes   If a call back is required, please list best call back number [phone]   If a call back is required, advise that a message will be forwarded to their care team and someone will return their call as soon as possible.   Did you relay this information to the patient? Yes

## 2024-05-23 NOTE — TELEPHONE ENCOUNTER
Left VM for patient to review oncotype dx score and plan. Left number to call back and further discuss

## 2024-05-23 NOTE — TELEPHONE ENCOUNTER
Spoke with patient to let her know know her oncotype dx recurrence score is 14 and the benefit of chemotherapy would be less than 1% so Dr. Ya is recommending Tamoxifen endocrine therapy. Pt appreciated call and stated she would like to wait to start taking it until 6/4 after her vaginal US. I let her know that is fine and to call us with any questions or concerns.

## 2024-05-24 ENCOUNTER — APPOINTMENT (OUTPATIENT)
Dept: PHYSICAL THERAPY | Facility: REHABILITATION | Age: 54
End: 2024-05-24
Payer: COMMERCIAL

## 2024-05-24 LAB — SCAN RESULT: NORMAL

## 2024-05-24 NOTE — PROGRESS NOTES
"Daily Note     Today's date: 2024  Patient name: Nunu Tirado  : 1970  MRN: [id number]  Referring provider: Juana Villarreal*  Dx:   No diagnosis found.    Subjective: Some low back pain after last workout, consistent with chronic intermittent aching central LBP that she has experienced for years. Reports known spondylosis L5-S1.    Patient handout of job description provide by employer:   \"Frequent use of fingers and hands to operate equipment, pull x-ray folders and chart information.  Walking or standing for up to 8 hours per shift and 30-minute increments.  Pulling, pushing and lifting patients up to 300 pounds with assistance.  L.\"   ifting and moving objects of up to 30 pounds.  Frequent stooping, crouching and bending.  Frequently lifting arms above shoulder level    Objective: See treatment diary below.    Assessment: Added small posterior pelvic tilt repositioning to reduce lumbar extension moment with lifting activities to reduce low back pain, as well as improve automatic abdominal wall engagement. Utilized with squat/floor lifts, LPD, and horizontal pulling/diagonal pulling. Patient reports that resistance exceeds load of lifting at work, so she is optimistic about returning to work without injury or limitations.    Plan: Continue per plan of care.       Precautions: h/o R breast CA    Access Code: [email or web address]  URL: [email or web address]  Date: 2024  Prepared by: Baylee Steimling    Exercises  - Lateral Step Up  - 3 x weekly - 30 reps  - Standing Hip Abduction  - 3 x weekly - 30 reps  - Sidelying Hip Abduction  - 3 x weekly - 30 reps  - Push Up on Table  - 3 x weekly - 2 sets - 15 reps  - Squat to Standing Upright Shoulder Row  - 3 x weekly - 2 sets - 10 reps  - Shoulder Overhead Press in Abduction with Dumbbells  - 3 x weekly - 2 sets - 10 reps    POC expires Auth Status? (BOMN, approved, pending) Unit limit (Daily) Auth Start date Expiration date PT/OT + Visit " "Limit?   6/25/24 bomn         Date of Service 4/30 5/3 5/8 5/14 5/16 5/22   Visits used 1 2 3 4 5 6   Visits remaining 99 99 99 99 99 99                     Manual Ther         Lower Quarter Screen  Performed                                            Ther Ex         upper quarter stretching Doorway pec stretch 90 deg 3x30\"  Wall flexion 3x30\"  Wall abduction 3x30\"        Core strengthening      Table plank w PPT  Table plank with leg and arm variations   Upper quarter strengthening  ** 2# on R, 3# on L  Bicep curls Scaption  Bent over triceps 2#,3# 2x10  Last set increased to 4#, 5#   Chest press on mat 4#5# 2x10  Shoulder abduction YCO 2x10 OH press 4# 2x10    Bicep curl 7# 2x10    LUIS row 6# 2x10    Incline push up 2x15    Chest press 5# 2x15 LPD 15# x20  W march x10    Chop 10# x20    Horizontal pull 10# x20 ea    Bicep curl 7# 2x10    LUIS row 6# 2x10    Incline push up 2x15    Chest press 5# 2x15   Lower quarter strengthening  Clamshells x20 each     Glute Bridges x20     Half Plank Rock back w/ 3 sec hold 2x5     Standing Shoulder Abduction 2x30  Clamshells x20    Bridges 2x10    Standing abduction  2x10    Standing hip extension    Standing marches against the wall w/o compensation Clamshells w/ resistance 20x    Single leg bridge (figure 4) 2x10    Hip hinge 10x    Bird dog 2x10 (more challenged when lifting R UE) Squat 15# KB 2x15 Squat 15# with upright row x10    Lateral step up and over 10# KB x20 alt    S/l hip abd x20 ea Squat 15# KB x10  Uni KB lift w squat 15# x20 alt   Aerobic exe  TM Walking 1.8 mph 10'   RPE 3-5 TM 2.0 mph RPE 3 \"could have kept going\" TM 2.5 mph 10' TM 2.5 10' UBE  3'/3'  TM 8' 2.5-3.0                     Ther Activity & Self Care         Lifting mechanics and progression   WC push back and forth to the elevator Lifting 12.5# 10x    Pulling, pushing at reformer 20x blue spring        2 lifts on mat w/ 125# patient (PT)                                Pt Education                      "

## 2024-05-28 ENCOUNTER — APPOINTMENT (OUTPATIENT)
Dept: PHYSICAL THERAPY | Facility: REHABILITATION | Age: 54
End: 2024-05-28
Payer: COMMERCIAL

## 2024-05-30 ENCOUNTER — APPOINTMENT (OUTPATIENT)
Dept: PHYSICAL THERAPY | Facility: REHABILITATION | Age: 54
End: 2024-05-30
Payer: COMMERCIAL

## 2024-05-31 LAB
LAB AP GYN PRIMARY INTERPRETATION: NORMAL
Lab: NORMAL

## 2024-06-03 ENCOUNTER — OFFICE VISIT (OUTPATIENT)
Dept: PLASTIC SURGERY | Facility: CLINIC | Age: 54
End: 2024-06-03

## 2024-06-03 VITALS
BODY MASS INDEX: 32.27 KG/M2 | HEIGHT: 62 IN | WEIGHT: 175.38 LBS | HEART RATE: 77 BPM | DIASTOLIC BLOOD PRESSURE: 93 MMHG | TEMPERATURE: 98.3 F | SYSTOLIC BLOOD PRESSURE: 162 MMHG

## 2024-06-03 DIAGNOSIS — C50.811 MALIGNANT NEOPLASM OF OVERLAPPING SITES OF RIGHT BREAST IN FEMALE, ESTROGEN RECEPTOR POSITIVE (HCC): ICD-10-CM

## 2024-06-03 DIAGNOSIS — Z17.0 MALIGNANT NEOPLASM OF OVERLAPPING SITES OF RIGHT BREAST IN FEMALE, ESTROGEN RECEPTOR POSITIVE (HCC): ICD-10-CM

## 2024-06-03 DIAGNOSIS — Z85.3 HISTORY OF BREAST CANCER: Primary | ICD-10-CM

## 2024-06-03 PROCEDURE — 99024 POSTOP FOLLOW-UP VISIT: CPT | Performed by: STUDENT IN AN ORGANIZED HEALTH CARE EDUCATION/TRAINING PROGRAM

## 2024-06-03 NOTE — PROGRESS NOTES
PRS Note    HPI from 6/3/24  Patient presents for follow-up after CTA abdomen and further discussion of breast reconstruction.    Patient is 2 months s/p immediate right breast recon with tissue expander with ADM, doing well.    She does not require radiation or chemotherapy.     She is on tamoxifen.    Discussed CTA abdomen results, she has adequate perforators and tissue for right breast reconstruction with DORIS flap.    Discussed surgical procedure, postoperative course and care.    She is currently filled to 550 cc of possible 650 cc expander.    I discussed after complete reconstruction of the right side, we can then perform reduction on the left breast.    She has no history of abdominal surgeries.    Patient has scheduled surgery date of Sept 30.  -Patient has pre-op on August 30, will instruct to hold tamoxifen due to increased risk of possible thrombosis and flap loss  -Return to work noted given for 6/10  -Will require cardiac clearance again at time of preop  -All questions answered, concerns addressed.    Brando Olmedo MD   [facility]   [address] [place] [address], [address]   [place] [address]   Office: [phone]      HPI from 5/6/24     Patient underwent right breast mastectomy with immediate breast reconstruction with tissue expander and ADM on 4/2/24     She has been doing well and has been serially expanded.      Patient is following with PT for her RUE.     She is currently filled to 550 cc of a possible 650 cc expander. She currently likes the size of her right breast.     Patient also had indepth discussion with her family and is interested in autologous DORIS flap reconstruction of the right breast.     Patient has adequate abdominal donor site for DORIS flap breast reconstruction.     She has no history of abdominal surgeries.     From oncologic standpoint, patient does not need radiation but she is still waiting from oncology for determination if she will require  chemotherapy. I discussed with patient that we would delay any reconstruction until after her chemotherapy treatments. Patient acknowledged.     Plan:  -Will order CTA to evaluate for perforators  -Will add to DORIS list to save surgery date  -Filled out [facility] paperwork to be off until 6/10/24     Brando Olmedo MD   [facility]   [address] [place] [address], [address]   [place] [address]   Office: [phone]

## 2024-06-05 ENCOUNTER — TELEPHONE (OUTPATIENT)
Dept: OBGYN CLINIC | Facility: OTHER | Age: 54
End: 2024-06-05

## 2024-06-05 NOTE — TELEPHONE ENCOUNTER
I called patient and left message to schedule fitting in [facility]. Call back #: [phone].

## 2024-06-10 ENCOUNTER — TELEPHONE (OUTPATIENT)
Dept: OBGYN CLINIC | Facility: OTHER | Age: 54
End: 2024-06-10

## 2024-06-10 DIAGNOSIS — G43.909 MIGRAINE WITHOUT STATUS MIGRAINOSUS, NOT INTRACTABLE, UNSPECIFIED MIGRAINE TYPE: ICD-10-CM

## 2024-06-10 NOTE — TELEPHONE ENCOUNTER
I called patient and left message to schedule bra fitting in [facility]. Call back #: [phone].

## 2024-06-11 ENCOUNTER — TELEPHONE (OUTPATIENT)
Age: 54
End: 2024-06-11

## 2024-06-11 NOTE — TELEPHONE ENCOUNTER
Pt's appt needs to be rescheduled d/t provider not being in the office. Attempted to reach patient about need of appointment change with no success. Appointment canceled and detailed VM left with [facility] number [phone] for patient to return call to reschedule.     Additional message sent Via [employer].

## 2024-06-12 RX ORDER — BUTALBITAL, ACETAMINOPHEN AND CAFFEINE 300; 40; 50 MG/1; MG/1; MG/1
CAPSULE ORAL
Qty: 30 CAPSULE | Refills: 0 | Status: SHIPPED | OUTPATIENT
Start: 2024-06-12

## 2024-06-17 ENCOUNTER — HOSPITAL ENCOUNTER (OUTPATIENT)
Dept: RADIOLOGY | Age: 54
Discharge: HOME | End: 2024-06-17
Payer: COMMERCIAL

## 2024-06-17 DIAGNOSIS — D21.9 FIBROIDS: ICD-10-CM

## 2024-06-17 DIAGNOSIS — N92.6 IRREGULAR MENSES: ICD-10-CM

## 2024-06-17 PROCEDURE — 76856 US EXAM PELVIC COMPLETE: CPT

## 2024-06-17 PROCEDURE — 76830 TRANSVAGINAL US NON-OB: CPT

## 2024-06-25 ENCOUNTER — TELEPHONE (OUTPATIENT)
Dept: ANESTHESIOLOGY | Facility: CLINIC | Age: 54
End: 2024-06-25

## 2024-06-25 ENCOUNTER — OFFICE VISIT (OUTPATIENT)
Dept: NEPHROLOGY | Facility: CLINIC | Age: 54
End: 2024-06-25
Payer: COMMERCIAL

## 2024-06-25 VITALS
SYSTOLIC BLOOD PRESSURE: 140 MMHG | OXYGEN SATURATION: 99 % | HEART RATE: 74 BPM | DIASTOLIC BLOOD PRESSURE: 76 MMHG | WEIGHT: 176 LBS | HEIGHT: 62 IN | BODY MASS INDEX: 32.39 KG/M2

## 2024-06-25 DIAGNOSIS — N25.89 PSEUDOHYPOALDOSTERONISM: Primary | ICD-10-CM

## 2024-06-25 DIAGNOSIS — I10 HYPERTENSION, UNSPECIFIED TYPE: ICD-10-CM

## 2024-06-25 PROCEDURE — 99214 OFFICE O/P EST MOD 30 MIN: CPT | Performed by: INTERNAL MEDICINE

## 2024-06-25 NOTE — PROGRESS NOTES
NEPHROLOGY PROGRESS NOTE    Nunu Tirado 53 y.o. female MRN: [id number]  Unit/Bed#:  Encounter: [id number]  Reason for Consult: Pseudohypoaldosteronism and hypertension    The patient is here for follow-up.  She has had an eventful clinical course since I have seen her.  She was diagnosed with breast cancer on routine mammogram and underwent right mastectomy.  Fortunately she is doing great and is just recommended tamoxifen for 5 years and does not require radiation or chemotherapy.  Otherwise no complaints.    ASSESSMENT/PLAN:  1.  Pseudohypoaldosteronism    Patient carries this diagnosis for many decades that she used to see a nephrologist in another practice for many years.  Her potassium levels have been normal on diuretic most recent 1 was 3.3 she states she just manages it with diet as opposed to medications.  Renal function is normal.  No changes for now.    2.  Hypertension    The patient stated that she was having increasing in her blood pressure that may have been environmental related to stress or versus just her hypertension.  She had amlodipine added and states that her blood pressures at home are running systolic 120s I got a more acceptable level here than prior visit so for now no changes and monitor.    I have spent a total time of 30 minutes on 06/25/24 in caring for this patient including Diagnostic results, Instructions for management, Importance of tx compliance, Reviewing / ordering tests, medicine, procedures  , and Obtaining or reviewing history  .         SUBJECTIVE:  Review of Systems   Constitutional: Negative for chills, diaphoresis and fever.   HENT: Negative.     Eyes: Negative.    Cardiovascular:  Negative for chest pain, dyspnea on exertion, leg swelling and orthopnea.   Respiratory: Negative.  Negative for cough, shortness of breath and sputum production.    Gastrointestinal:  Negative for abdominal pain, diarrhea, nausea and vomiting.   Genitourinary: Negative.    Neurological:  " Negative for dizziness, focal weakness, headaches and weakness.   Psychiatric/Behavioral:  Negative for altered mental status.        OBJECTIVE:  Current Weight: Weight - Scale: 79.8 kg (176 lb)  Vitals:@TMAX(24)@Current:     Height 5' 2\" (1.575 m), weight 79.8 kg (176 lb), not currently breastfeeding. , Body mass index is 32.19 kg/m².    [unfilled]    Physical Exam: Ht 5' 2\" (1.575 m)   Wt 79.8 kg (176 lb)   BMI 32.19 kg/m²   Physical Exam  Constitutional:       General: She is not in acute distress.     Appearance: She is not ill-appearing or toxic-appearing.   HENT:      Head: Normocephalic and atraumatic.      Nose: Nose normal.      Mouth/Throat:      Mouth: Mucous membranes are moist.   Eyes:      General: No scleral icterus.     Extraocular Movements: Extraocular movements intact.   Cardiovascular:      Rate and Rhythm: Normal rate and regular rhythm.      Heart sounds:      No friction rub. No gallop.   Pulmonary:      Effort: Pulmonary effort is normal. No respiratory distress.      Breath sounds: No wheezing or rales.   Abdominal:      General: Bowel sounds are normal. There is no distension.      Tenderness: There is no abdominal tenderness. There is no rebound.   Musculoskeletal:      Cervical back: Normal range of motion and neck supple.   Neurological:      Mental Status: She is alert and oriented to person, place, and time. Mental status is at baseline.   Psychiatric:         Mood and Affect: Mood normal.         Behavior: Behavior normal.         Thought Content: Thought content normal.         Medications:    Current Outpatient Medications:     acetaminophen (TYLENOL) 500 mg tablet, Take 1 tablet (500 mg total) by mouth every 4 (four) hours as needed for mild pain or moderate pain, Disp: 30 tablet, Rfl: 2    ALPRAZolam (XANAX) 0.25 mg tablet, Take 1 tablet (0.25 mg total) by mouth daily at bedtime as needed for anxiety or sleep, Disp: 30 tablet, Rfl: 0    amLODIPine (NORVASC) 2.5 mg tablet, Take 1 " tablet (2.5 mg total) by mouth daily, Disp: 90 tablet, Rfl: 5    b complex vitamins capsule, Take 1 capsule by mouth daily, Disp: , Rfl:     busPIRone (BUSPAR) 5 mg tablet, Take 1 tablet (5 mg total) by mouth 3 (three) times a day, Disp: 270 tablet, Rfl: 1    Butalbital-APAP-Caffeine (Fioricet) -40 MG CAPS, 1 CAPSULE AT ONSET OF HEADACHE AND THEN MAY REPEAT AFTER 30 MINS IF NO IMPROVEMENT, NO MORE THAN 2 CAPSULES A DAY, Disp: 30 capsule, Rfl: 0    docusate sodium (COLACE) 100 mg capsule, Take 1 capsule (100 mg total) by mouth 2 (two) times a day as needed for constipation, Disp: 20 capsule, Rfl: 2    famotidine (Pepcid) 20 mg tablet, Take 20 mg by mouth 2 (two) times a day as needed for heartburn or indigestion, Disp: , Rfl:     fexofenadine (ALLEGRA) 60 MG tablet, Take 60 mg by mouth as needed, Disp: , Rfl:     fluticasone (FLONASE) 50 mcg/act nasal spray, 1 spray into each nostril daily (Patient taking differently: 1 spray into each nostril daily as needed for allergies), Disp: 16 g, Rfl: 0    hydroCHLOROthiazide 25 mg tablet, Take 1 tablet (25 mg total) by mouth daily, Disp: 90 tablet, Rfl: 1    Ibuprofen 200 MG CAPS, Take by mouth as needed , Disp: , Rfl:     Magnesium 400 MG CAPS, Take 400 mg by mouth in the morning, Disp: , Rfl:     Multiple Vitamin (multivitamin) tablet, Take 1 tablet by mouth daily, Disp: , Rfl:     Probiotic Product (PROBIOTIC DAILY PO), Take by mouth daily, Disp: , Rfl:     propranolol (Inderal LA) 80 mg 24 hr capsule, Take 1 capsule (80 mg total) by mouth daily, Disp: 90 capsule, Rfl: 1    tamoxifen (NOLVADEX) 20 mg tablet, Take 1 tablet (20 mg total) by mouth daily, Disp: 30 tablet, Rfl: 3    ondansetron (ZOFRAN) 4 mg tablet, Take 1 tablet (4 mg total) by mouth every 8 (eight) hours as needed for nausea or vomiting (Patient not taking: Reported on 5/22/2024), Disp: 20 tablet, Rfl: 0    Laboratory Results:  Lab Results   Component Value Date    WBC 9.72 04/03/2024    HGB 12.2  "04/03/2024    HCT 37.6 04/03/2024    MCV 91 04/03/2024     04/03/2024     Lab Results   Component Value Date    SODIUM 139 04/03/2024    K 3.3 (L) 04/03/2024     04/03/2024    CO2 25 04/03/2024    BUN 6 04/03/2024    CREATININE 0.63 04/03/2024    GLUC 133 04/03/2024    CALCIUM 8.6 04/03/2024     Lab Results   Component Value Date    CALCIUM 8.6 04/03/2024    PHOS 2.7 04/17/2017     No results found for: \"LABPROT\"      "

## 2024-06-25 NOTE — PATIENT INSTRUCTIONS
You are here for follow-up glad to hear the good news about your breast cancer treatment and that you are doing so well with it.  I would say if you eventually do go on tamoxifen you just monitor your blood pressure to make sure it does not go up.  You did inform me that you had to have amlodipine added to your blood pressure regimen of course you were going through a lot of stressful events but I think it is good you took it because it has come down.    As we said your potassium was actually a little bit low so you adjusted with diet and for now no changes in medications.    Great luck with your surgery and please call me if you have any problems before next visit.

## 2024-07-01 ENCOUNTER — TELEPHONE (OUTPATIENT)
Dept: ANESTHESIOLOGY | Facility: CLINIC | Age: 54
End: 2024-07-01

## 2024-07-02 ENCOUNTER — CLINICAL SUPPORT (OUTPATIENT)
Dept: OBGYN CLINIC | Facility: OTHER | Age: 54
End: 2024-07-02

## 2024-07-02 DIAGNOSIS — Z17.0 MALIGNANT NEOPLASM OF OVERLAPPING SITES OF RIGHT BREAST IN FEMALE, ESTROGEN RECEPTOR POSITIVE (HCC): Primary | ICD-10-CM

## 2024-07-02 DIAGNOSIS — C50.811 MALIGNANT NEOPLASM OF OVERLAPPING SITES OF RIGHT BREAST IN FEMALE, ESTROGEN RECEPTOR POSITIVE (HCC): Primary | ICD-10-CM

## 2024-07-02 NOTE — PROGRESS NOTES
Mastectomy Bra Fitting Order Details    Nunu Tirado  1970  [id number]    Reason For Visit  Mastectomy bra and prosthesis .    Precautions  History of breast cancer     Subjective  Ambreen underwent a right sided mastectomy     Surgery Type: Mastectomy with tissue expander. Scheduled for a  DORIS flap on 9/30/24     Lymph node removal yes    Date of surgery 4/2/24    Surgical side right    Objective  Ambreen has a well healed chest wall. No further complications.    Assessment  Band 17.5 x 2 = 35 + 5 = 40   Cup 23 x 2 = 46   Trial of Marcelo 44/46 per MD preference of medium compression.    Plan  Ship to home. Patient was instructed on wear and care of her products. Instructed that her products would be shipped day after surgery      Order Details     Marcelo 44/46 x 1 nude  Marcelo 44/46 x 1 black  Ship to home day after surgery

## 2024-07-03 ENCOUNTER — TELEPHONE (OUTPATIENT)
Dept: ANESTHESIOLOGY | Facility: CLINIC | Age: 54
End: 2024-07-03

## 2024-07-19 ENCOUNTER — TELEPHONE (OUTPATIENT)
Dept: HEMATOLOGY ONCOLOGY | Facility: CLINIC | Age: 54
End: 2024-07-19

## 2024-07-19 NOTE — TELEPHONE ENCOUNTER
Telephone call left voice message instructing Pt to call me back to discuss.  Dr Ya does not recommend holding off taking endocrine therapy until after surgery.

## 2024-07-22 ENCOUNTER — TELEPHONE (OUTPATIENT)
Dept: HEMATOLOGY ONCOLOGY | Facility: CLINIC | Age: 54
End: 2024-07-22

## 2024-08-02 ENCOUNTER — TELEPHONE (OUTPATIENT)
Dept: INTERNAL MEDICINE CLINIC | Age: 54
End: 2024-08-02

## 2024-08-02 NOTE — TELEPHONE ENCOUNTER
Left a message for patient to call back and schedule a separate appointment for the Pre op clearance for her surgery that she is to have done 09/30/2024 with  [facility].  I did state that it has to be separate from her 08/14/2024 appointment.    Form will be in the bath office in the brown folder.

## 2024-08-09 ENCOUNTER — TELEPHONE (OUTPATIENT)
Dept: ANESTHESIOLOGY | Facility: CLINIC | Age: 54
End: 2024-08-09

## 2024-08-13 ENCOUNTER — OFFICE VISIT (OUTPATIENT)
Dept: URGENT CARE | Facility: CLINIC | Age: 54
End: 2024-08-13
Payer: COMMERCIAL

## 2024-08-13 VITALS
HEART RATE: 83 BPM | SYSTOLIC BLOOD PRESSURE: 148 MMHG | OXYGEN SATURATION: 99 % | RESPIRATION RATE: 16 BRPM | DIASTOLIC BLOOD PRESSURE: 86 MMHG | TEMPERATURE: 98.6 F

## 2024-08-13 DIAGNOSIS — R30.0 DYSURIA: ICD-10-CM

## 2024-08-13 DIAGNOSIS — N30.01 ACUTE CYSTITIS WITH HEMATURIA: Primary | ICD-10-CM

## 2024-08-13 LAB
SL AMB  POCT GLUCOSE, UA: NEGATIVE
SL AMB LEUKOCYTE ESTERASE,UA: NEGATIVE
SL AMB POCT BILIRUBIN,UA: NEGATIVE
SL AMB POCT BLOOD,UA: ABNORMAL
SL AMB POCT CLARITY,UA: CLEAR
SL AMB POCT COLOR,UA: YELLOW
SL AMB POCT KETONES,UA: NEGATIVE
SL AMB POCT NITRITE,UA: POSITIVE
SL AMB POCT PH,UA: 5
SL AMB POCT SPECIFIC GRAVITY,UA: 1.01
SL AMB POCT URINE PROTEIN: NEGATIVE
SL AMB POCT UROBILINOGEN: 0.2

## 2024-08-13 PROCEDURE — 87086 URINE CULTURE/COLONY COUNT: CPT

## 2024-08-13 PROCEDURE — 99213 OFFICE O/P EST LOW 20 MIN: CPT

## 2024-08-13 PROCEDURE — 81002 URINALYSIS NONAUTO W/O SCOPE: CPT

## 2024-08-13 RX ORDER — NITROFURANTOIN 25; 75 MG/1; MG/1
100 CAPSULE ORAL 2 TIMES DAILY
Qty: 10 CAPSULE | Refills: 0 | Status: SHIPPED | OUTPATIENT
Start: 2024-08-13 | End: 2024-08-18

## 2024-08-13 NOTE — PATIENT INSTRUCTIONS
Take antibiotic as prescribed - take the entire course of medication even if you start feeling better. Future infections can be difficult to treat if you do not take all of the pills.    Urine culture sent, we will notify you if any changes need to be made to your medication.    If on birth control pills - antibiotics can decrease the effectiveness of birth control medications, use a back-up method during medication use and for 3-5 days afterwards.     You are encouraged to try over-the-counter Azo for symptom relief.     Stay hydrated with water to help flush out bacteria. Avoid bladder irritants such as citrus, caffeine, chocolate, and coffee.     Follow up with your PCP in 3-5 days.    Go to the ER if symptoms worsen.

## 2024-08-13 NOTE — PROGRESS NOTES
[facility]        NAME: Nunu Tirado is a 53 y.o. female  : 1970    MRN: [id number]  DATE: 2024  TIME: 11:12 AM    Assessment and Plan   Acute cystitis with hematuria [N30.01]  1. Acute cystitis with hematuria  nitrofurantoin (MACROBID) 100 mg capsule      2. Dysuria  POCT urine dip    Urine culture    Urine culture          Urine dip completed showing small blood and nitrites. Will be sent for culture. Antibiotics prescribed. Will follow up on urine culture results and change antibiotic as needed.      Patient Instructions     Take antibiotic as prescribed - take the entire course of medication even if you start feeling better. Future infections can be difficult to treat if you do not take all of the pills.    Urine culture sent, we will notify you if any changes need to be made to your medication.    If on birth control pills - antibiotics can decrease the effectiveness of birth control medications, use a back-up method during medication use and for 3-5 days afterwards.     You are encouraged to try over-the-counter Azo for symptom relief.     Stay hydrated with water to help flush out bacteria. Avoid bladder irritants such as citrus, caffeine, chocolate, and coffee.     Follow up with your PCP in 3-5 days.    Go to the ER if symptoms worsen.     If tests are performed, our office will contact you with results only if changes need to made to the care plan discussed with you at the visit. You can [facility] on [facility].      Chief Complaint     Chief Complaint   Patient presents with    Possible UTI     PT presents with lower back pain x 5 days and pelvic pain x 7 days. She has been using advil and Azo for symptom relief.  No fevers.          History of Present Illness       53-year-old female presenting with suprapubic discomfort and low back pain x 1 week.  Also with urinary frequency/urgency.  Patient states symptoms have worsened over the past 1-2 days.   She denies known fever/chills, dysuria, and obvious hematuria.  Denies history of frequent UTIs.  Taking Azo for symptom relief.        Review of Systems   Review of Systems   Constitutional:  Negative for chills and fever.   Respiratory:  Negative for shortness of breath.    Cardiovascular:  Negative for chest pain.   Gastrointestinal:  Positive for abdominal pain (Suprapubic). Negative for diarrhea, nausea and vomiting.   Genitourinary:  Positive for frequency and urgency. Negative for dysuria and hematuria.   Musculoskeletal:  Positive for back pain. Negative for myalgias.   Neurological:  Negative for dizziness and headaches.         Current Medications       Current Outpatient Medications:     acetaminophen (TYLENOL) 500 mg tablet, Take 1 tablet (500 mg total) by mouth every 4 (four) hours as needed for mild pain or moderate pain, Disp: 30 tablet, Rfl: 2    ALPRAZolam (XANAX) 0.25 mg tablet, Take 1 tablet (0.25 mg total) by mouth daily at bedtime as needed for anxiety or sleep, Disp: 30 tablet, Rfl: 0    amLODIPine (NORVASC) 2.5 mg tablet, Take 1 tablet (2.5 mg total) by mouth daily, Disp: 90 tablet, Rfl: 5    b complex vitamins capsule, Take 1 capsule by mouth daily, Disp: , Rfl:     busPIRone (BUSPAR) 5 mg tablet, Take 1 tablet (5 mg total) by mouth 3 (three) times a day, Disp: 270 tablet, Rfl: 1    Butalbital-APAP-Caffeine (Fioricet) -40 MG CAPS, 1 CAPSULE AT ONSET OF HEADACHE AND THEN MAY REPEAT AFTER 30 MINS IF NO IMPROVEMENT, NO MORE THAN 2 CAPSULES A DAY, Disp: 30 capsule, Rfl: 0    docusate sodium (COLACE) 100 mg capsule, Take 1 capsule (100 mg total) by mouth 2 (two) times a day as needed for constipation, Disp: 20 capsule, Rfl: 2    famotidine (Pepcid) 20 mg tablet, Take 20 mg by mouth 2 (two) times a day as needed for heartburn or indigestion, Disp: , Rfl:     fexofenadine (ALLEGRA) 60 MG tablet, Take 60 mg by mouth as needed, Disp: , Rfl:     fluticasone (FLONASE) 50 mcg/act nasal spray, 1  spray into each nostril daily (Patient taking differently: 1 spray into each nostril daily as needed for allergies), Disp: 16 g, Rfl: 0    hydroCHLOROthiazide 25 mg tablet, Take 1 tablet (25 mg total) by mouth daily, Disp: 90 tablet, Rfl: 1    Ibuprofen 200 MG CAPS, Take by mouth as needed , Disp: , Rfl:     Magnesium 400 MG CAPS, Take 400 mg by mouth in the morning, Disp: , Rfl:     Multiple Vitamin (multivitamin) tablet, Take 1 tablet by mouth daily, Disp: , Rfl:     nitrofurantoin (MACROBID) 100 mg capsule, Take 1 capsule (100 mg total) by mouth 2 (two) times a day for 5 days, Disp: 10 capsule, Rfl: 0    Probiotic Product (PROBIOTIC DAILY PO), Take by mouth daily, Disp: , Rfl:     propranolol (Inderal LA) 80 mg 24 hr capsule, Take 1 capsule (80 mg total) by mouth daily, Disp: 90 capsule, Rfl: 1    ondansetron (ZOFRAN) 4 mg tablet, Take 1 tablet (4 mg total) by mouth every 8 (eight) hours as needed for nausea or vomiting (Patient not taking: Reported on 5/22/2024), Disp: 20 tablet, Rfl: 0    tamoxifen (NOLVADEX) 20 mg tablet, Take 1 tablet (20 mg total) by mouth daily (Patient not taking: Reported on 8/13/2024), Disp: 30 tablet, Rfl: 3    Current Allergies     Allergies as of 08/13/2024 - Reviewed 08/13/2024   Allergen Reaction Noted    Amoxicillin Hives 08/26/2015    Cefaclor Hives 08/26/2015            The following portions of the patient's history were reviewed and updated as appropriate: allergies, current medications, past family history, past medical history, past social history, past surgical history and problem list.     Past Medical History:   Diagnosis Date    Allergic     Seasonal/ 2 antibiotics    Anxiety 11/11/2022    Colon polyp     COVID-19 virus infection 07/27/2021 Nov. 2023    GERD (gastroesophageal reflux disease)     Headache(784.0)     Hematuria     Hyperkalemia     Hypertension     Kidney stone 1985    Migraines     Proteinuria     Pseudohypoaldosteronism     type 2 (per patient)    UTI  (urinary tract infection)     Wears glasses        Past Surgical History:   Procedure Laterality Date    BREAST BIOPSY Right 01/08/2024    right breast    COLONOSCOPY      DENTAL SURGERY      KIDNEY STONE SURGERY  1985    MAMMO STEREOTACTIC BREAST BIOPSY RIGHT (ALL INC) Right 01/08/2024    MAMMO STEREOTACTIC BREAST BIOPSY RIGHT (ALL INC) Right 02/06/2024    MAMMO STEREOTACTIC BREAST BIOPSY RIGHT (ALL INC) Right 02/06/2024    CO MASTECTOMY SIMPLE COMPLETE Right 4/2/2024    Procedure: RIGHT BREAST MASTECTOMY, RIGHT LYMPHOSCINTIGRAPHY, LYMPHATIC MAPPING, SENTINEL LYMPH NODE BIOPSY;  Surgeon: Dinorah Mcqueen MD;  Location: [facility];  Service: Surgical Oncology    CO TISSUE EXPANDER PLACEMENT BREAST RECONSTRUCTION Right 4/2/2024    Procedure: RIGHT BREAST IMMEDIATE RECON W/ TISSUE EXPANDER AND ADM;  Surgeon: Brando Olmedo MD;  Location: [facility];  Service: Plastics    US GUIDED BREAST BIOPSY LEFT COMPLETE Left 02/06/2024    US GUIDED BREAST BIOPSY RIGHT COMPLETE Right 01/08/2024    US GUIDED BREAST BIOPSY RIGHT COMPLETE Right 02/06/2024       Family History   Problem Relation Age of Onset    Depression Mother     Heart disease Mother     Diabetes Mother     Anxiety disorder Mother     Skin cancer Mother     Heart disease Father     Hypertension Father     Stroke Father     Diabetes Brother     No Known Problems Brother     Depression Daughter     No Known Problems Maternal Aunt     No Known Problems Paternal Aunt     No Known Problems Maternal Grandmother     No Known Problems Maternal Grandfather     No Known Problems Paternal Grandmother     No Known Problems Paternal Grandfather     Breast cancer Other 60         Medications have been verified.        Objective   /86   Pulse 83   Temp 98.6 °F (37 °C)   Resp 16   SpO2 99%        Physical Exam     Physical Exam  Vitals and nursing note reviewed.   Constitutional:       General: She is not in acute distress.     Appearance: She is not ill-appearing or  diaphoretic.   HENT:      Head: Normocephalic and atraumatic.      Right Ear: External ear normal.      Left Ear: External ear normal.      Nose: Nose normal.      Mouth/Throat:      Mouth: Mucous membranes are moist.   Eyes:      Conjunctiva/sclera: Conjunctivae normal.   Cardiovascular:      Rate and Rhythm: Normal rate.   Pulmonary:      Effort: Pulmonary effort is normal.   Musculoskeletal:         General: Normal range of motion.      Cervical back: Normal range of motion and neck supple.   Skin:     General: Skin is warm and dry.      Capillary Refill: Capillary refill takes less than 2 seconds.   Neurological:      Mental Status: She is alert and oriented to person, place, and time.

## 2024-08-15 LAB — BACTERIA UR CULT: NORMAL

## 2024-08-30 ENCOUNTER — OFFICE VISIT (OUTPATIENT)
Dept: PLASTIC SURGERY | Facility: CLINIC | Age: 54
End: 2024-08-30
Payer: COMMERCIAL

## 2024-08-30 VITALS
TEMPERATURE: 97 F | HEIGHT: 62 IN | BODY MASS INDEX: 32.2 KG/M2 | WEIGHT: 175 LBS | HEART RATE: 70 BPM | DIASTOLIC BLOOD PRESSURE: 99 MMHG | SYSTOLIC BLOOD PRESSURE: 160 MMHG

## 2024-08-30 DIAGNOSIS — Z17.0 MALIGNANT NEOPLASM OF OVERLAPPING SITES OF RIGHT BREAST IN FEMALE, ESTROGEN RECEPTOR POSITIVE (HCC): ICD-10-CM

## 2024-08-30 DIAGNOSIS — Z85.3 HISTORY OF BREAST CANCER: Primary | ICD-10-CM

## 2024-08-30 DIAGNOSIS — C50.811 MALIGNANT NEOPLASM OF OVERLAPPING SITES OF RIGHT BREAST IN FEMALE, ESTROGEN RECEPTOR POSITIVE (HCC): ICD-10-CM

## 2024-08-30 PROCEDURE — 99215 OFFICE O/P EST HI 40 MIN: CPT | Performed by: STUDENT IN AN ORGANIZED HEALTH CARE EDUCATION/TRAINING PROGRAM

## 2024-08-30 NOTE — PROGRESS NOTES
PRS Note    Patient is 5 months s/p immediate right breast reconstruction with submuscular tissue expander, she has been doing well. She did not require chemotherapy or radiation.    Her incisions are well healed. No signs of infection.    She had CTA at last visit which showed adequate perforators for DORIS flap.   She has no history of abdominal surgeries.    I discussed that after complete reconstruction on the right side, we can perform reduction on the contralateral side to improve symmetry.    She is currently filled to 550 cc of possible 650 cc expander.    She was presenting today for preop for DORIS on 9/30; however, she would like to postpone DORIS surgery until early next year. I informed her that this is totally fine.    I will have  call her to reschedule her DORIS to next year.  In the mean time, patient will contact mastectomy bra clinic for insert. I informed her to contact our office if she needs help to facilitate this.    -Will need cardiac clearance at the time of preop due to abnormal EKG in the past (cardiologist Dr Jesus).  -Will hold tamoxifen for 2 weeks prior to surgery due to increased risk of possible thrombosis and flap loss.  -Preop 2 weeks before new surgery date  -Spent 45 minutes in consultation with patient. Greater than 50% of the total time was spent obtaining history, evaluation, performing exam, discussion of management options including post-operative care, answering patient's questions and concerns, chart reviewing, and documentation    Brando Olmedo MD   [facility]   [address], [address]   [place] [address]   Office: [phone]

## 2024-09-11 ENCOUNTER — TELEPHONE (OUTPATIENT)
Dept: OBGYN CLINIC | Facility: OTHER | Age: 54
End: 2024-09-11

## 2024-09-11 NOTE — TELEPHONE ENCOUNTER
Patient called in and would like to come in for a fitting since she cancelled surgery for end of month. Would like to come get fitted for a prothesis. Cancelled DORIS flap surgery.

## 2024-09-12 ENCOUNTER — TELEPHONE (OUTPATIENT)
Dept: OBGYN CLINIC | Facility: OTHER | Age: 54
End: 2024-09-12

## 2024-09-12 NOTE — TELEPHONE ENCOUNTER
Left message to reschedule her mastectomy [facility] yoli appointment on September 18th. Call back number is [phone]

## 2024-09-19 ENCOUNTER — CLINICAL SUPPORT (OUTPATIENT)
Dept: OBGYN CLINIC | Facility: OTHER | Age: 54
End: 2024-09-19

## 2024-09-19 DIAGNOSIS — Z17.0 MALIGNANT NEOPLASM OF OVERLAPPING SITES OF RIGHT BREAST IN FEMALE, ESTROGEN RECEPTOR POSITIVE (HCC): Primary | ICD-10-CM

## 2024-09-19 DIAGNOSIS — C50.811 MALIGNANT NEOPLASM OF OVERLAPPING SITES OF RIGHT BREAST IN FEMALE, ESTROGEN RECEPTOR POSITIVE (HCC): Primary | ICD-10-CM

## 2024-09-19 NOTE — PROGRESS NOTES
Mastectomy Bra Fitting Order Details    Nunu Tirado  1970  [id number]    Reason For Visit  Prosthesis     Precautions   History of breast cancer      Subjective  Ambreen was scheduled for a DORIS flap reconstruction on 9/30/24 but has cancelled that surgery until she has made a decision on how she wants to progress.     Surgery Type: Mastectomy    Lymph node removal yes    Date of surgery 4/2/24    Surgical side right    Objective  Ambreen has a well healed chest wall. She would benefit from a prosthesis to achieve symmetry. She was instructed on wear schedule .     Assessment  Band - 19 x 2 = 38 + 4 = 42  Cup 23 x 2 = 46  Trial of Maria Elena, Wanda and May. Pleased with fit  Trial of  however switched to full prosthesis to improve projection and achieve symmetry to L side     Plan  Ship to home. She was educated on the wear and care of her prosthesis.     Order Details   May XL black x 1- received 9/19/24  May XL nude x 1  Wanda XL x 1   Maria Elena 44B light nude x 1   Adapt air light style 327 size 7 x 1 - received 9/19/2024

## 2024-10-01 ENCOUNTER — TELEPHONE (OUTPATIENT)
Dept: PLASTIC SURGERY | Facility: CLINIC | Age: 54
End: 2024-10-01

## 2024-10-03 LAB
DME PARACHUTE DELIVERY DATE ACTUAL: NORMAL
DME PARACHUTE DELIVERY DATE REQUESTED: NORMAL
DME PARACHUTE ITEM DESCRIPTION: NORMAL
DME PARACHUTE ORDER STATUS: NORMAL
DME PARACHUTE SUPPLIER NAME: NORMAL
DME PARACHUTE SUPPLIER PHONE: NORMAL

## 2024-10-07 ENCOUNTER — TELEPHONE (OUTPATIENT)
Dept: PLASTIC SURGERY | Facility: CLINIC | Age: 54
End: 2024-10-07

## 2024-10-16 DIAGNOSIS — F41.9 ANXIETY: ICD-10-CM

## 2024-10-17 ENCOUNTER — TELEPHONE (OUTPATIENT)
Dept: PLASTIC SURGERY | Facility: CLINIC | Age: 54
End: 2024-10-17

## 2024-10-18 RX ORDER — ALPRAZOLAM 0.25 MG/1
0.25 TABLET ORAL
Qty: 30 TABLET | Refills: 0 | Status: SHIPPED | OUTPATIENT
Start: 2024-10-18

## 2024-10-30 ENCOUNTER — TELEPHONE (OUTPATIENT)
Dept: NEPHROLOGY | Facility: CLINIC | Age: 54
End: 2024-10-30

## 2024-10-30 ENCOUNTER — PATIENT OUTREACH (OUTPATIENT)
Dept: CASE MANAGEMENT | Facility: OTHER | Age: 54
End: 2024-10-30

## 2024-10-30 ENCOUNTER — OFFICE VISIT (OUTPATIENT)
Dept: SURGICAL ONCOLOGY | Facility: CLINIC | Age: 54
End: 2024-10-30
Payer: COMMERCIAL

## 2024-10-30 VITALS
OXYGEN SATURATION: 99 % | WEIGHT: 180 LBS | HEIGHT: 62 IN | DIASTOLIC BLOOD PRESSURE: 90 MMHG | SYSTOLIC BLOOD PRESSURE: 152 MMHG | TEMPERATURE: 96.9 F | BODY MASS INDEX: 33.13 KG/M2 | HEART RATE: 84 BPM

## 2024-10-30 DIAGNOSIS — C50.811 MALIGNANT NEOPLASM OF OVERLAPPING SITES OF RIGHT BREAST IN FEMALE, ESTROGEN RECEPTOR POSITIVE (HCC): Primary | ICD-10-CM

## 2024-10-30 DIAGNOSIS — Z79.810 USE OF TAMOXIFEN (NOLVADEX): ICD-10-CM

## 2024-10-30 DIAGNOSIS — Z17.0 MALIGNANT NEOPLASM OF OVERLAPPING SITES OF RIGHT BREAST IN FEMALE, ESTROGEN RECEPTOR POSITIVE (HCC): Primary | ICD-10-CM

## 2024-10-30 PROCEDURE — 99215 OFFICE O/P EST HI 40 MIN: CPT | Performed by: NURSE PRACTITIONER

## 2024-10-30 NOTE — TELEPHONE ENCOUNTER
Called patient and left a voicemail regarding a follow up with Dr. Lawton from [facility].   I scheduled in [place] Tuesday 6/10/2025 at 11:30 Am.   mailing an appointment card.

## 2024-10-30 NOTE — PROGRESS NOTES
Assessment/Plan:    Diagnoses and all orders for this visit:    Malignant neoplasm of overlapping sites of right breast in female, estrogen receptor positive (HCC)    Use of tamoxifen (Nolvadex)    Patient is a 54-year-old female that was diagnosed with a right-sided breast cancer in January 2024.  Her pathology revealed invasive ductal carcinoma, ER/IL 90%, HER2 negative.  She underwent genetic testing which was negative.  She had multifocal disease.  She underwent a right mastectomy and sentinel node biopsy with Dr. Mcqueen.  She had reconstruction with Dr. Olmedo.  Oncotype score was 14.  She was prescribed Tamoxifen but has not yet started taking this.  Breast cancer survivorship visit performed today and treatment summary and care plan were reviewed with patient. She is doing very well. She was initially scheduled for a DROIS flap but she states she is still undecided what reconstruction, if any, she wants to move forward with.  She has been wearing a breast prosthesis and finds this comfortable and is considering going flat and wearing a prosthetic.  She states that she did not start her tamoxifen as she did not want to take it prior to reconstructive surgery.  She is going to have surgery in January and plans to reevaluate tamoxifen use at that time.  I have encouraged her to reach out to cancer support groups.  She offers no new complaints today and there are no worrisome findings on today's clinical exam.  She is up-to-date on colorectal and cervical cancer screenings.  She is already scheduled for a left-sided mammogram in November.  We will see her back in 6 months for a routine follow-up visit or sooner should the need arise.  She was instructed to contact us with any changes or concerns in the interim.  All of her questions were answered today.    REASON FOR VISIT:   Survivorship      Previous therapy:  Oncology History   Malignant neoplasm of overlapping sites of right breast in female, estrogen receptor  positive (HCC)   1/8/2024 Biopsy    Right breast biopsy 300 7cmfn:  - Invasive ductal carcinoma  Grade 1  ER 90%  WA 90%  HER2 negative     1/8/2024 -  Cancer Staged    Staging form: Breast, AJCC 8th Edition  - Clinical stage from 1/8/2024: Stage IA (cT1b, cN0, cM0, G1, ER+, WA+, HER2-) - Signed by Dinorah Mcqueen MD on 4/17/2024  Method of lymph node assessment: Clinical  Histologic grading system: 3 grade system       1/2024 Genetic Testing    [facility] BRCAplus STAT Panel (8 genes): KENIA, BRCA1, BRCA2, CDH1, CHEK2, PALB2, PTEN, TP53 with reflex to [facility] CustomNext Cancer Panel+RNA (47 genes): APC, KENIA, AXIN2, BARD1, BMPR1A, BRCA1, BRCA2, BRIP1, CDH1, CDK4, CDKN2A, CHEK2, CTNNA1, DICER1, EPCAM, GREM1, HOXB13, KIT, MEN1, MLH1, MSH2, MSH3, MSH6, MUTYH, NBN, NF1, NTHL1, PALB2, PDGFRA, PMS2, POLD1, POLE, PTEN, RAD50, RAD51C, RAD51D, SDHA, SDHB, SDHC, SDHD, SMAD4, SMARCA4, STK11, TP53, TSC1, TSC2, VHL      Result: Negative     2/6/2024 Biopsy    Right breast biopsy 11:00 9cmfn  - Invasive ductal carcinoma with tubular features  Grade 1  ER 85%  WA 80%  HER2 FISH negative     4/2/2024 Surgery    Right mastectomy with sentinel lymph node biopsy:  - Clear margins  - 0/2 lymph nodes    Right expander placement  Dr. Mcqueen, Dr. Olmedo     4/2/2024 -  Cancer Staged    Staging form: Breast, AJCC 8th Edition  - Pathologic stage from 4/2/2024: Stage IA (pT1b, pN0(sn), cM0, G1, ER+, WA+, HER2-) - Signed by Dinorah Mcqueen MD on 4/17/2024  Stage prefix: Initial diagnosis  Method of lymph node assessment: Ponder lymph node biopsy  Histologic grading system: 3 grade system        Genomic Testing    Oncotype Dx: 14     6/2024 -  Hormone Therapy    Tamoxifen   Dr. Ya           Patient ID: Nunu Tirado is a 54 y.o. female  Presenting today for a breast cancer survivorship visit.  She has not yet started the tamoxifen as she wanted to wait until after her reconstructive surgery to reduce her risk of a blood clot.  She denies persistent  "headaches, back pain or bone pain, cough or shortness of breath, abdominal pain.  She is undecided what type of reconstruction, if any she would like to proceed with.  She completed physical therapy and finds that she has good range of motion of her shoulder but I will feel tight at times.  She works as a radiology tech in the network          Review of Systems   Constitutional:  Negative for activity change, appetite change, chills, fatigue, fever and unexpected weight change.   Respiratory:  Negative for cough and shortness of breath.    Cardiovascular:  Negative for chest pain.   Gastrointestinal:  Negative for abdominal pain, constipation, diarrhea, nausea and vomiting.   Musculoskeletal:  Negative for arthralgias, back pain, gait problem and myalgias.   Skin:  Negative for color change and rash.   Neurological:  Negative for dizziness and headaches.   Hematological:  Negative for adenopathy.   Psychiatric/Behavioral:  Negative for agitation and confusion.    All other systems reviewed and are negative.      Objective:    Temperature (!) 96.9 °F (36.1 °C), temperature source Temporal, height 5' 2\" (1.575 m), weight 81.6 kg (180 lb), not currently breastfeeding.  Body mass index is 32.92 kg/m².      Physical Exam  Vitals reviewed.   Constitutional:       General: She is not in acute distress.     Appearance: Normal appearance. She is well-developed. She is not diaphoretic.   HENT:      Head: Normocephalic and atraumatic.   Cardiovascular:      Rate and Rhythm: Normal rate and regular rhythm.      Heart sounds: Normal heart sounds.   Pulmonary:      Effort: Pulmonary effort is normal.      Breath sounds: Normal breath sounds.   Chest:   Breasts:     Left: No swelling, bleeding, inverted nipple, mass, nipple discharge, skin change or tenderness.      Comments: Right reconstructed breast with expander present.  There are no dominant masses, skin changes or nodules.  Abdominal:      Palpations: Abdomen is soft. There " is no mass.      Tenderness: There is no abdominal tenderness.   Musculoskeletal:         General: Normal range of motion.      Cervical back: Normal range of motion.   Lymphadenopathy:      Upper Body:      Right upper body: No supraclavicular or axillary adenopathy.      Left upper body: No supraclavicular or axillary adenopathy.   Skin:     General: Skin is warm and dry.      Findings: No rash.   Neurological:      Mental Status: She is alert and oriented to person, place, and time.   Psychiatric:         Speech: Speech normal.         Discussed symptoms related to disease recurrence, Yes    Evaluated for late effects related to cancer treatment, Yes, describe: discussed effects of surgery, SERM therapy    Screening current for cervical cancer, Yes, describe: pap 5/2024    Screening current for colon cancer, Yes, describe: colonoscopy 10/2023, repeat in 5 years    Cancer rehabilitation services addressed, Yes, describe: completed PT    Screening current for osteoporosis, Yes, describe: n/a    Oncology Treatment Summary reviewed with patient and copy provided, Yes    Referral placed for psychosocial evaluation/screening to oncology social work  Yes    I have spent 45 minutes with Patient and family today in which greater than 50% of this time was spent in counseling/coordination of care regarding breast cancer survivorship.

## 2024-11-03 DIAGNOSIS — I10 HYPERTENSION, ESSENTIAL: ICD-10-CM

## 2024-11-05 RX ORDER — PROPRANOLOL HYDROCHLORIDE 80 MG/1
80 CAPSULE, EXTENDED RELEASE ORAL DAILY
Qty: 90 CAPSULE | Refills: 1 | Status: SHIPPED | OUTPATIENT
Start: 2024-11-05 | End: 2025-05-04

## 2024-11-07 ENCOUNTER — PATIENT OUTREACH (OUTPATIENT)
Dept: CASE MANAGEMENT | Facility: OTHER | Age: 54
End: 2024-11-07

## 2024-11-07 ENCOUNTER — OFFICE VISIT (OUTPATIENT)
Dept: INTERNAL MEDICINE CLINIC | Age: 54
End: 2024-11-07
Payer: COMMERCIAL

## 2024-11-07 VITALS
WEIGHT: 179 LBS | DIASTOLIC BLOOD PRESSURE: 72 MMHG | OXYGEN SATURATION: 97 % | HEART RATE: 85 BPM | TEMPERATURE: 99.2 F | HEIGHT: 62 IN | BODY MASS INDEX: 32.94 KG/M2 | SYSTOLIC BLOOD PRESSURE: 140 MMHG

## 2024-11-07 DIAGNOSIS — C50.811 MALIGNANT NEOPLASM OF OVERLAPPING SITES OF RIGHT BREAST IN FEMALE, ESTROGEN RECEPTOR POSITIVE (HCC): ICD-10-CM

## 2024-11-07 DIAGNOSIS — Z17.0 MALIGNANT NEOPLASM OF OVERLAPPING SITES OF RIGHT BREAST IN FEMALE, ESTROGEN RECEPTOR POSITIVE (HCC): ICD-10-CM

## 2024-11-07 DIAGNOSIS — G47.09 OTHER INSOMNIA: ICD-10-CM

## 2024-11-07 DIAGNOSIS — E55.9 VITAMIN D INSUFFICIENCY: ICD-10-CM

## 2024-11-07 DIAGNOSIS — G43.009 MIGRAINE WITHOUT AURA AND WITHOUT STATUS MIGRAINOSUS, NOT INTRACTABLE: Primary | ICD-10-CM

## 2024-11-07 DIAGNOSIS — I10 PRIMARY HYPERTENSION: ICD-10-CM

## 2024-11-07 DIAGNOSIS — F41.9 ANXIETY: ICD-10-CM

## 2024-11-07 DIAGNOSIS — J30.2 SEASONAL ALLERGIES: ICD-10-CM

## 2024-11-07 PROCEDURE — 99214 OFFICE O/P EST MOD 30 MIN: CPT | Performed by: INTERNAL MEDICINE

## 2024-11-07 RX ORDER — RIZATRIPTAN BENZOATE 5 MG/1
5 TABLET, ORALLY DISINTEGRATING ORAL ONCE AS NEEDED
Qty: 10 TABLET | Refills: 1 | Status: SHIPPED | OUTPATIENT
Start: 2024-11-07

## 2024-11-07 NOTE — ASSESSMENT & PLAN NOTE
-Currently well controlled  -Will discontinue Fioricet and start patient on rizatriptan as needed  - continue to try and keep well-hydrated  Orders:    rizatriptan (MAXALT-MLT) 5 mg disintegrating tablet; Take 1 tablet (5 mg total) by mouth once as needed for migraine for up to 20 doses may repeat in 2 hours if necessary

## 2024-11-07 NOTE — ASSESSMENT & PLAN NOTE
- blood pressure is stable  -continue with hydrochlorothiazide, propranolol and amlodipine  -continue with a low-salt diet and with exercise

## 2024-11-07 NOTE — ASSESSMENT & PLAN NOTE
-Status post lumpectomy  -Follow-up with oncology for further treatment as scheduled  -Follow-up for repeat mammogram as scheduled

## 2024-11-07 NOTE — ASSESSMENT & PLAN NOTE
-Not well controlled  -Will start patient on fluoxetine at 20 mg daily  -She was counseled to monitor herself for side effects  -She was counseled that the medication will take about 4 to 6 weeks for its full effect to be observed and that she should not stop it abruptly but should call for instructions to taper it up if she decides that she no longer wants to take it.  - continue with as needed alprazolam    Orders:    FLUoxetine (PROzac) 20 mg capsule; Take 1 capsule (20 mg total) by mouth daily

## 2024-11-07 NOTE — PROGRESS NOTES
Ambulatory Visit  Name: Nunu Tirado      : 1970      MRN: [id number]  Encounter Provider: Elvie Ordoñez DO  Encounter Date: 2024   Encounter department: [facility]    Assessment & Plan  Migraine without aura and without status migrainosus, not intractable  -Currently well controlled  -Will discontinue Fioricet and start patient on rizatriptan as needed  - continue to try and keep well-hydrated  Orders:    rizatriptan (MAXALT-MLT) 5 mg disintegrating tablet; Take 1 tablet (5 mg total) by mouth once as needed for migraine for up to 20 doses may repeat in 2 hours if necessary    Primary hypertension   - blood pressure is stable  -continue with hydrochlorothiazide, propranolol and amlodipine  -continue with a low-salt diet and with exercise         Anxiety  -Not well controlled  -Will start patient on fluoxetine at 20 mg daily  -She was counseled to monitor herself for side effects  -She was counseled that the medication will take about 4 to 6 weeks for its full effect to be observed and that she should not stop it abruptly but should call for instructions to taper it up if she decides that she no longer wants to take it.  - continue with as needed alprazolam    Orders:    FLUoxetine (PROzac) 20 mg capsule; Take 1 capsule (20 mg total) by mouth daily    Malignant neoplasm of overlapping sites of right breast in female, estrogen receptor positive (HCC)  -Status post lumpectomy  -Follow-up with oncology for further treatment as scheduled  -Follow-up for repeat mammogram as scheduled       Other insomnia  - well controlled   - continue with as needed alprazolam         Vitamin D insufficiency  -Stable  - continue with vitamin D supplements         Seasonal allergies  - well controlled   - continue with fexofenadine       With regards to her itchy skin, she was counseled to discontinue any new products and to monitor herself for worsening symptoms.     History of  Present Illness     HPI    Patient presents for a follow-up visit regarding her breast cancer, migraine headache, hypertension, anxiety.  She states that she has been taking her medications as prescribed.  Today pt states that she had a total  mastectomy sx for her right breast ca and they want her on tamoxifen but she has not started it  yet because she is concerned about possible side effects.  She wants to have reconstruction sx and wants to wait to have it   She currently has a tissue expander in.  Has a tumor on the left and has a mammogram to be done next week   She had more than one malignant spot in her right breast  Will go to breast ca survivors group and will go for the cancer menu group so that she can eat better.  She states that she wants the deep flap and is concerned about recovery time cos she takes care of her mom.  She reports that her Anxiety is okay   She states that she stopped taking the buspar cos she felt that it interacts with the amlodipine and was making her feel dizzy whenever she took the buspar so she stopped taking it and the dizziness resolved.  She reports that her anxiety is okay off the buspar but then admits that she has an overall background anxiety.  Taking xanax once or twice a week or when she cannot sleep - half or 1/4 tablet.  Migraines are okay - she is off the fioricet cos it finished and she did not get any refil cos she was told that it interacts with the tamoxifen which she was prescribed but is not taking yet.  Last migriane was 2 months ago and she used over-the-counter medications that helped.  C/o itchiness around the nose and eyes that started today   No rash  She states that she used a new product and may have caused it.        Review of Systems   Constitutional:  Positive for unexpected weight change (wt gain). Negative for activity change, chills, fatigue and fever.   HENT:  Negative for ear pain, postnasal drip, rhinorrhea, sinus pressure and sore throat.   "  Eyes:  Negative for pain.   Respiratory:  Negative for cough, choking, chest tightness, shortness of breath and wheezing.    Cardiovascular:  Negative for chest pain, palpitations and leg swelling.   Gastrointestinal:  Negative for abdominal pain, constipation, diarrhea, nausea and vomiting.   Genitourinary:  Negative for dysuria and hematuria.   Musculoskeletal:  Negative for arthralgias, back pain, gait problem, joint swelling, myalgias and neck stiffness.   Skin:  Negative for pallor and rash.   Neurological:  Positive for headaches. Negative for dizziness, tremors, seizures, syncope and light-headedness.   Hematological:  Negative for adenopathy.   Psychiatric/Behavioral:  Negative for behavioral problems.            Objective     /72 (BP Location: Left arm, Patient Position: Sitting, Cuff Size: Standard)   Pulse 85   Temp 99.2 °F (37.3 °C) (Temporal)   Ht 5' 2\" (1.575 m)   Wt 81.2 kg (179 lb)   SpO2 97%   BMI 32.74 kg/m²     Physical Exam  Constitutional:       General: She is not in acute distress.     Appearance: She is well-developed. She is not diaphoretic.   HENT:      Head: Normocephalic and atraumatic.      Right Ear: External ear normal.      Left Ear: External ear normal.      Nose: Nose normal.      Mouth/Throat:      Pharynx: No oropharyngeal exudate.   Eyes:      General: No scleral icterus.        Right eye: No discharge.         Left eye: No discharge.      Conjunctiva/sclera: Conjunctivae normal.      Pupils: Pupils are equal, round, and reactive to light.   Neck:      Thyroid: No thyromegaly.      Vascular: No JVD.      Trachea: No tracheal deviation.   Cardiovascular:      Rate and Rhythm: Normal rate and regular rhythm.      Heart sounds: Normal heart sounds. No murmur heard.     No friction rub. No gallop.   Pulmonary:      Effort: Pulmonary effort is normal. No respiratory distress.      Breath sounds: Normal breath sounds. No wheezing or rales.   Chest:      Chest wall: No " tenderness.   Abdominal:      General: Bowel sounds are normal. There is no distension.      Palpations: Abdomen is soft. There is no mass.      Tenderness: There is no abdominal tenderness. There is no guarding or rebound.   Musculoskeletal:         General: No tenderness or deformity. Normal range of motion.      Cervical back: Normal range of motion and neck supple.   Lymphadenopathy:      Cervical: No cervical adenopathy.   Skin:     General: Skin is warm and dry.      Coloration: Skin is not pale.      Findings: Lesion (redness of the tip of the nose and the cheeks without any visible reshes) present. No erythema, signs of injury or rash.   Neurological:      Mental Status: She is alert and oriented to person, place, and time.      Cranial Nerves: No cranial nerve deficit.      Motor: No abnormal muscle tone.      Coordination: Coordination normal.      Deep Tendon Reflexes: Reflexes are normal and symmetric.   Psychiatric:         Mood and Affect: Mood is anxious (Anxious affect).         Behavior: Behavior normal.

## 2024-11-07 NOTE — ASSESSMENT & PLAN NOTE
- well controlled   - continue with fexofenadine       With regards to her itchy skin, she was counseled to discontinue any new products and to monitor herself for worsening symptoms.

## 2024-11-13 ENCOUNTER — HOSPITAL ENCOUNTER (OUTPATIENT)
Dept: MAMMOGRAPHY | Facility: CLINIC | Age: 54
Discharge: HOME | End: 2024-11-13
Payer: COMMERCIAL

## 2024-11-13 DIAGNOSIS — Z12.31 VISIT FOR SCREENING MAMMOGRAM: ICD-10-CM

## 2024-11-13 PROCEDURE — G0279 TOMOSYNTHESIS, MAMMO: HCPCS

## 2024-11-13 PROCEDURE — 77065 DX MAMMO INCL CAD UNI: CPT

## 2024-11-14 ENCOUNTER — PATIENT OUTREACH (OUTPATIENT)
Dept: CASE MANAGEMENT | Facility: OTHER | Age: 54
End: 2024-11-14

## 2024-11-21 ENCOUNTER — PATIENT OUTREACH (OUTPATIENT)
Dept: CASE MANAGEMENT | Facility: OTHER | Age: 54
End: 2024-11-21

## 2024-11-25 ENCOUNTER — IMMUNIZATIONS (OUTPATIENT)
Dept: INTERNAL MEDICINE CLINIC | Age: 54
End: 2024-11-25
Payer: COMMERCIAL

## 2024-11-25 DIAGNOSIS — Z23 NEEDS FLU SHOT: Primary | ICD-10-CM

## 2024-11-25 PROCEDURE — 90471 IMMUNIZATION ADMIN: CPT

## 2024-11-25 PROCEDURE — 90673 RIV3 VACCINE NO PRESERV IM: CPT

## 2024-11-26 ENCOUNTER — PATIENT OUTREACH (OUTPATIENT)
Dept: CASE MANAGEMENT | Facility: OTHER | Age: 54
End: 2024-11-26

## 2024-11-26 NOTE — PROGRESS NOTES
[facility]W made 3 attempts to outreach to patient with no response. OSW will close at this time. Should patient need assistance in the future, please feel free to re-refer.

## 2024-12-17 DIAGNOSIS — I10 HYPERTENSION, ESSENTIAL: ICD-10-CM

## 2024-12-18 RX ORDER — HYDROCHLOROTHIAZIDE 25 MG/1
25 TABLET ORAL DAILY
Qty: 90 TABLET | Refills: 1 | Status: SHIPPED | OUTPATIENT
Start: 2024-12-18 | End: 2025-06-16

## 2025-01-03 ENCOUNTER — TELEPHONE (OUTPATIENT)
Dept: PLASTIC SURGERY | Facility: CLINIC | Age: 55
End: 2025-01-03

## 2025-01-03 NOTE — TELEPHONE ENCOUNTER
Patient is ready to reschedule surgery but would also like to talk to Dr. Olmedo to get more information first.     Bill: Please call patient back to answer her questions prior to rescheduling surgery. She also wants to talk to Dr. Olmedo before scheduling surgery.

## 2025-01-15 ENCOUNTER — OFFICE VISIT (OUTPATIENT)
Age: 55
End: 2025-01-15
Payer: COMMERCIAL

## 2025-01-15 VITALS
BODY MASS INDEX: 32.76 KG/M2 | SYSTOLIC BLOOD PRESSURE: 110 MMHG | WEIGHT: 178 LBS | TEMPERATURE: 97.7 F | HEART RATE: 89 BPM | HEIGHT: 62 IN | DIASTOLIC BLOOD PRESSURE: 70 MMHG | OXYGEN SATURATION: 98 %

## 2025-01-15 DIAGNOSIS — Z85.3 HISTORY OF BREAST CANCER: ICD-10-CM

## 2025-01-15 DIAGNOSIS — C50.811 MALIGNANT NEOPLASM OF OVERLAPPING SITES OF RIGHT BREAST IN FEMALE, ESTROGEN RECEPTOR POSITIVE (HCC): Primary | ICD-10-CM

## 2025-01-15 DIAGNOSIS — Z17.0 MALIGNANT NEOPLASM OF OVERLAPPING SITES OF RIGHT BREAST IN FEMALE, ESTROGEN RECEPTOR POSITIVE (HCC): Primary | ICD-10-CM

## 2025-01-15 PROCEDURE — 99215 OFFICE O/P EST HI 40 MIN: CPT | Performed by: STUDENT IN AN ORGANIZED HEALTH CARE EDUCATION/TRAINING PROGRAM

## 2025-01-15 NOTE — PROGRESS NOTES
Plastic Surgery Consult    Reason for visit:  patient presents for further discussion for breast reconstruction    HPI from 1/15/25  Patient is a 53 y/o female who is wellknown to me with history of right breast cancer who underwent right breast mastectomy and immediate reconstruction with submuscular tissue expander. She was serially inflated to 550 cc of possible 650 cc expander. She did not require chemotherapy or radiation. She does not take tamoxifen. She was originally interested in DORIS flap but presents today for further discussion for reconstructive options.    She denies any pain or discomfort with right chest expander.    She denies tobacco, smoking, blood thinners, steroids.    Her left breast is 40D cup    ROS: 12 pt ROS negative, except as otherwise noted in HPI    PMH: HTN, Dante's syndrome  Family History         Family History   Problem Relation Age of Onset    Depression Mother      Heart disease Mother      Diabetes Mother      Anxiety disorder Mother      Skin cancer Mother      Heart disease Father      Hypertension Father      Stroke Father      Diabetes Brother      No Known Problems Brother      Depression Daughter      No Known Problems Maternal Aunt      No Known Problems Paternal Aunt      No Known Problems Maternal Grandmother      No Known Problems Maternal Grandfather      No Known Problems Paternal Grandmother      No Known Problems Paternal Grandfather      Breast cancer Other 60            SurgHx: kidney stone removal  SocHx: no tobacco, no ETOH  Meds: no blood thinners, no steroids  Allergies: PCN, cefaclor    PE:    Vitals:    01/15/25 0929   BP: 110/70   Pulse: 89   Temp: 97.7 °F (36.5 °C)   SpO2: 98%       General: NC/AT, breathing comfortably on RA  Neuro: CN II-XII grossly intact, symmetric reflexes  HEENT: PERRLA, EOMI, external ears normal, no lesions or deformities, neck supple, trachea midline  Respiratory: CTAB, normal respiratory effort  Cardio: RRR, normal S1, S2, no  murmur, rubs, gallops  GI: soft, non-tender, non-distended  Extremities/MSK: normal alignment, mobility, gait, no edema  Skin: no rashes, lesions, subcutaneous nodules    BMI 32.5    Right chest expander soft, non-tender, well healed  No signs of infection, capsular contracture  Mild lateral displacement  BW 14.5-15.5 cm    A/P: 53 y/o female who presents for discussion for next steps in breast reconstruction.  -Upon further discussion and patient's discussion with family, patient does not want to proceed with DORIS flap. She had been debating between going flat vs expander exchange for permanent implant. After indepth discussion regarding various options, patient opted for expander exchange for permanent implant. She stated she would like to be slightly larger than her currently size and slightly more projected. She is currently filled to 550 cc of possible 650 cc expander. Patient also does not want anything done to the left side at the same time. I discussed that breast reconstruction is as many or as few surgeries as the patients' desires and can be started, paused, stopped at any time. Patient acknowledged.  -I discussed the risks of the surgery which include but not limited to infection, bleeding, scarring, implant leakage/rupture/malposition/capsular contracture, abscess, seromas, need for further surgery.  -Current plan: Revision of right reconstructed breast and implant exchange for permanent implant. Will order range of implants from 500-750 cc, higher projection.  -All questions answered, concerns addressed.  -Consent obtained, photos taken.    -Spent 45 minutes in consultation with patient. Greater than 50% of the total time was spent obtaining history, evaluation, performing exam, discussion of management options including post-operative care, answering patient's questions and concerns, chart reviewing, and documentation    Brando Olmedo MD   [facility]   [address]  [place], [address]   [place] [address]   Office: [phone]

## 2025-01-31 DIAGNOSIS — F41.9 ANXIETY: ICD-10-CM

## 2025-02-01 RX ORDER — ALPRAZOLAM 0.25 MG/1
0.25 TABLET ORAL
Qty: 30 TABLET | Refills: 0 | Status: SHIPPED | OUTPATIENT
Start: 2025-02-01

## 2025-02-04 ENCOUNTER — TELEPHONE (OUTPATIENT)
Dept: PLASTIC SURGERY | Facility: CLINIC | Age: 55
End: 2025-02-04

## 2025-02-18 ENCOUNTER — PREP FOR PROCEDURE (OUTPATIENT)
Dept: PLASTIC SURGERY | Facility: CLINIC | Age: 55
End: 2025-02-18

## 2025-02-18 DIAGNOSIS — Z85.3 HISTORY OF BREAST CANCER: ICD-10-CM

## 2025-02-18 DIAGNOSIS — C50.811 MALIGNANT NEOPLASM OF OVERLAPPING SITES OF RIGHT BREAST IN FEMALE, ESTROGEN RECEPTOR POSITIVE (HCC): Primary | ICD-10-CM

## 2025-02-18 DIAGNOSIS — Z17.0 MALIGNANT NEOPLASM OF OVERLAPPING SITES OF RIGHT BREAST IN FEMALE, ESTROGEN RECEPTOR POSITIVE (HCC): Primary | ICD-10-CM

## 2025-02-20 ENCOUNTER — OFFICE VISIT (OUTPATIENT)
Dept: INTERNAL MEDICINE CLINIC | Age: 55
End: 2025-02-20
Payer: COMMERCIAL

## 2025-02-20 VITALS
SYSTOLIC BLOOD PRESSURE: 136 MMHG | WEIGHT: 179 LBS | HEIGHT: 62 IN | HEART RATE: 74 BPM | OXYGEN SATURATION: 99 % | DIASTOLIC BLOOD PRESSURE: 66 MMHG | BODY MASS INDEX: 32.94 KG/M2 | TEMPERATURE: 98.7 F

## 2025-02-20 DIAGNOSIS — J06.9 URTI (ACUTE UPPER RESPIRATORY INFECTION): Primary | ICD-10-CM

## 2025-02-20 PROBLEM — R51.9 FACE PAIN: Status: ACTIVE | Noted: 2025-02-20

## 2025-02-20 PROCEDURE — 99213 OFFICE O/P EST LOW 20 MIN: CPT | Performed by: INTERNAL MEDICINE

## 2025-02-20 RX ORDER — LEVOFLOXACIN 500 MG/1
500 TABLET, FILM COATED ORAL EVERY 24 HOURS
Qty: 7 TABLET | Refills: 0 | Status: SHIPPED | OUTPATIENT
Start: 2025-02-20 | End: 2025-02-27

## 2025-02-20 NOTE — PROGRESS NOTES
Name: Nunu Tirado      : 1970      MRN: [id number]  Encounter Provider: Elvie Ordoñez DO  Encounter Date: 2025   Encounter department: [facility]  :  Assessment & Plan  URTI (acute upper respiratory infection)  - likely viral with bacterial superinfection vs bacterial  - we will start pt on Levofloxacin 500 mg daily for 7 days, Flonase nasal spray for rhinitis and Mucinex for any productive cough  - Pt was counseled to use OTC tylenol  for aches and pains, warm salt water gargles for sore throat and was encouraged to drink lots of fluids, get plenty of rest and wash her hands liberally.  - pt was also counseled to take antibiotics with food and also to use a probiotic like yogurt daily as long as she is taking antibiotics  - She should return to the clinic if her symptoms worsen or do not improve.    Orders:  •  levofloxacin (LEVAQUIN) 500 mg tablet; Take 1 tablet (500 mg total) by mouth every 24 hours for 7 days           History of Present Illness       HPI    Patient presents with complaints of feeling sick for the past 11 days  Started with a fever with a t max of 101.2, with sore throat, body aches, dizziness, ear ache, nasal congestion, PND, sinus pain and pressure, mild cough with scanty phlegm from PND, diarrhea and arthralgias and myalgias  Did Covid/ flu test and it was neg  Took tylenol   Sore throat has resolved   She feels that she is getting better but she has sinus pain behind her eyes and had scalp pain yesterday   She has been waxing and waning   Takes multivitamins   No hx of contact  + works as an xray tech       Review of Systems   Constitutional:  Positive for fever (resolved). Negative for activity change, chills, fatigue and unexpected weight change.   HENT:  Positive for congestion (sometimes), postnasal drip, rhinorrhea (with clear to  yellow discharge with some blood), sinus pressure, sneezing and sore throat. Negative for ear pain  "(last week - resolved).    Eyes:  Negative for pain.   Respiratory:  Positive for cough (minimal cough from the pnd - productive of scanty clear to yellow phlegm). Negative for choking, chest tightness, shortness of breath and wheezing.    Cardiovascular:  Negative for chest pain, palpitations and leg swelling.   Gastrointestinal:  Positive for diarrhea (had diarrhea for one day - on Tuesday). Negative for abdominal pain, constipation, nausea and vomiting.        Heart burn    Genitourinary:  Negative for dysuria and hematuria.   Musculoskeletal:  Positive for arthralgias (on and off) and myalgias (on and off). Negative for back pain, gait problem, joint swelling and neck stiffness.   Skin:  Negative for pallor and rash.   Neurological:  Positive for dizziness (occasionally) and headaches. Negative for tremors, seizures, syncope and light-headedness.   Hematological:  Negative for adenopathy.   Psychiatric/Behavioral:  Negative for behavioral problems.        Objective   /66 (BP Location: Left arm, Patient Position: Sitting, Cuff Size: Standard)   Pulse 74   Temp 98.7 °F (37.1 °C) (Temporal)   Ht 5' 2\" (1.575 m)   Wt 81.2 kg (179 lb)   SpO2 99%   BMI 32.74 kg/m²      Physical Exam  Constitutional:       General: She is not in acute distress.     Appearance: She is well-developed. She is not diaphoretic.   HENT:      Head: Normocephalic and atraumatic.      Right Ear: External ear normal. Tympanic membrane is erythematous (mild).      Left Ear: External ear normal.      Nose:      Right Sinus: Maxillary sinus tenderness (mild) present.      Left Sinus: Maxillary sinus tenderness present.      Mouth/Throat:      Mouth: Mucous membranes are dry.      Pharynx: Posterior oropharyngeal erythema (oropharyngeal erythema with dry mucous membranes and Mallampati class 4 orophrynx) present. No oropharyngeal exudate.   Eyes:      General: No scleral icterus.        Right eye: No discharge.         Left eye: No " discharge.      Conjunctiva/sclera: Conjunctivae normal.      Pupils: Pupils are equal, round, and reactive to light.   Neck:      Thyroid: No thyromegaly.      Vascular: No JVD.      Trachea: No tracheal deviation.   Cardiovascular:      Rate and Rhythm: Normal rate and regular rhythm.      Heart sounds: Normal heart sounds. No murmur heard.     No friction rub. No gallop.   Pulmonary:      Effort: Pulmonary effort is normal. No respiratory distress.      Breath sounds: Normal breath sounds. No wheezing or rales.   Chest:      Chest wall: No tenderness.   Abdominal:      General: Bowel sounds are normal. There is no distension.      Palpations: Abdomen is soft. There is no mass.      Tenderness: There is no abdominal tenderness. There is no guarding or rebound.   Musculoskeletal:         General: No tenderness or deformity. Normal range of motion.      Cervical back: Normal range of motion and neck supple.   Lymphadenopathy:      Cervical: Cervical adenopathy present.      Left cervical: Superficial cervical adenopathy present.   Skin:     General: Skin is warm and dry.      Coloration: Skin is not pale.      Findings: No erythema or rash.   Neurological:      Mental Status: She is alert and oriented to person, place, and time.      Cranial Nerves: No cranial nerve deficit.      Motor: No abnormal muscle tone.      Coordination: Coordination normal.      Deep Tendon Reflexes: Reflexes are normal and symmetric.   Psychiatric:         Behavior: Behavior normal.

## 2025-02-24 ENCOUNTER — TELEPHONE (OUTPATIENT)
Dept: ANESTHESIOLOGY | Facility: CLINIC | Age: 55
End: 2025-02-24

## 2025-03-14 ENCOUNTER — TELEPHONE (OUTPATIENT)
Dept: ANESTHESIOLOGY | Facility: CLINIC | Age: 55
End: 2025-03-14

## 2025-03-17 ENCOUNTER — TELEPHONE (OUTPATIENT)
Dept: ANESTHESIOLOGY | Facility: CLINIC | Age: 55
End: 2025-03-17

## 2025-03-19 ENCOUNTER — TELEPHONE (OUTPATIENT)
Dept: OBGYN CLINIC | Facility: OTHER | Age: 55
End: 2025-03-19

## 2025-03-19 NOTE — PROGRESS NOTES
"Outpatient Advanced Heart Failure/Pulmonary Hypertension Note - Nunu Tirado 54 y.o. 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 It seems she was referred from PCP for her cardiac risk factors and +FH of cardiac dz at young age.  I first met Nunu Tirado on 3/14/24.    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), managed since childhood with lasix and HCTZ  Anxiety  Migraines  Breast ca  +FH cardiac dz and death at young age in multiple family members  Smoked tobacco 4 years in college late 1980s      I have reviewed all pertinent patient data including but not limited to:        Lab Units 04/03/24  0438 03/06/24  0649 01/15/24  0635   CREATININE mg/dL 0.63 0.58* 0.69         Lab Results   Component Value Date    K 3.3 (L) 04/03/2024     Lab Results   Component Value Date    HGBA1C 5.9 (H) 03/06/2024     Lab Results   Component Value Date    VIG9GNUHDFCK 2.743 01/15/2024     Lab Results   Component Value Date    LDLCALC 123 (H) 01/15/2024     No results found for: \"BNP\"   No results found for: \"NTBNP\"       TODAY'S PLAN:     03/20/25  Warm, euvolemic  No new cardiac complaints, feels generally well  Active, care now xray tech, walks stairs heavily at work during downtim, asymptomatic  BP home log wnl    We again Reviewed her past cardiac evaluations - reassuring    ECG today reassuring    No med changes today    Pre op evaluation:  - the patient is low-moderate risk for non-cardiac surgery  - the pt likely can complete 4 mets exertion without issue  - no evidence of acute cardiac " issue including decompensated HF, uncontrolled arrhythmia, severe valvulopathy, or an ACS  - optimized from cardiac perspective  - continue current home medications with any madelin-op modifications per anesthesia/surgery teams  - continue bblocker uninterrupted    Needs weight loss, exercise    Follow up:  With me as needed basis.  In addition to follow up with their other medical providers    Key info from my prior notes:    Seemingly Mild CAC incidentally seen on CT chest, +some artifact  Very minimal NSAID use    Discussed healthy lifestyle, stress reduction as able    Studies:  I have reviewed all pertinent patient data/labs/imaging where available, including but not limited to the below studies. Selected results may be displayed here but comprehensive listing is omitted for note clarity and can be found in the epic chart.    ECG.    Echo.    Stress.    Cath.    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 It seems she was referred from PCP for her cardiac risk factors and +FH of cardiac dz at young age.  I first met Nunu Tirado on 3/14/24.  No new SOB/dizziness/palpitations/syncope.  +fatigue she associates with stress  No new leg swelling, PND, pillow orthopnea.    Interval History:  As noted in 'plan' section above and prior epic chart notes.    No new CP/SOB/dizziness/palpitations/syncope.  No new fatigue.  No new unintentional weight changes.  No new leg swelling, PND, pillow orthopnea.  No new fevers, chills, cough, nausea, vomiting, diarrhea, dysuria.      Past Medical History:   Diagnosis Date    Allergic     Seasonal/ 2 antibiotics    Anxiety 11/11/2022    Breast cancer (HCC)     Right Mastectomy    Colon polyp     COVID-19 virus infection 07/27/2021 Nov. 2023     GERD (gastroesophageal reflux disease)     Headache(784.0)     Hematuria     Hyperkalemia     Hypertension     Kidney stone 1985    Migraines     Proteinuria     Pseudohypoaldosteronism     type 2 (per patient)    UTI (urinary tract infection)     Wears glasses      Patient Active Problem List    Diagnosis Date Noted    Face pain 02/20/2025    Use of tamoxifen (Nolvadex) 10/30/2024    Preoperative general physical examination 03/14/2024    Abnormal EKG 03/11/2024    BRCA negative 02/28/2024    Lung nodule 02/01/2024    Atypical ductal hyperplasia of right breast 01/18/2024    Abnormal MRI, breast 01/18/2024    Malignant neoplasm of overlapping sites of right breast in female, estrogen receptor positive (HCC) 01/18/2024    Seasonal allergies 06/19/2023    Other insomnia 03/01/2023    Hypertension 11/17/2022    Annual physical exam 11/11/2022    Vitamin D insufficiency 11/11/2022    Anxiety 11/11/2022    BMI 31.0-31.9,adult 08/03/2021    Migraine without aura and without status migrainosus, not intractable 07/26/2021    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       ROS:  10 point ROS negative except as specified in HPI    Allergies   Allergen Reactions    Amoxicillin Hives    Cefaclor Hives     Current Outpatient Medications   Medication Instructions    acetaminophen (TYLENOL) 500 mg, Oral, Every 4 hours PRN    ALPRAZolam (XANAX) 0.25 mg, Oral, 2 times daily PRN    amLODIPine (NORVASC) 2.5 mg, Oral, Daily    b complex vitamins capsule 1 capsule, Daily    famotidine (PEPCID) 20 mg, 2 times daily PRN    fexofenadine (ALLEGRA) 60 mg, As needed    FLUoxetine (PROZAC) 20 mg, Oral, Daily    fluticasone (FLONASE) 50 mcg/act nasal spray 1 spray, Nasal, Daily    hydroCHLOROthiazide 25 mg, Oral, Daily    Ibuprofen 200 MG CAPS As needed    Magnesium 400 mg, Daily    Multiple Vitamin (multivitamin) tablet 1 tablet, Daily    Probiotic Product (PROBIOTIC DAILY PO) Daily    propranolol (INDERAL LA) 80 mg, Oral, Daily    rizatriptan  (MAXALT-MLT) 5 mg, Oral, Once as needed, may repeat in 2 hours if necessary      Social History     Socioeconomic History    Marital status: /Civil Union     Spouse name: Not on file    Number of children: Not on file    Years of education: Not on file    Highest education level: Not on file   Occupational History    Not on file   Tobacco Use    Smoking status: Former     Current packs/day: 0.00     Average packs/day: 0.3 packs/day for 7.0 years (1.8 ttl pk-yrs)     Types: Cigarettes     Start date:      Quit date: 1993     Years since quittin.2     Passive exposure: Past    Smokeless tobacco: Never   Vaping Use    Vaping status: Never Used   Substance and Sexual Activity    Alcohol use: Yes     Alcohol/week: 3.0 standard drinks of alcohol     Types: 3 Cans of beer per week     Comment: social    Drug use: No    Sexual activity: Yes     Partners: Male     Birth control/protection: Male Sterilization   Other Topics Concern    Not on file   Social History Narrative    Not on file     Social Drivers of Health     Financial Resource Strain: Not on file   Food Insecurity: No Food Insecurity (4/3/2024)    Nursing - Inadequate Food Risk Classification     Worried About Running Out of Food in the Last Year: Never true     Ran Out of Food in the Last Year: Never true     Ran Out of Food in the Last Year: Not on file   Transportation Needs: No Transportation Needs (2024)    [place] : Transportation     Lack of Transportation (Medical): No     Lack of Transportation (Non-Medical): No     Patient Unable or Declines to Respond: No   Physical Activity: Not on file   Stress: Not on file   Social Connections: Not on file   Intimate Partner Violence: Not on file   Housing Stability: Low Risk  (4/3/2024)    Housing Stability Vital Sign     Unable to Pay for Housing in the Last Year: No     Number of Times Moved in the Last Year: 1     Homeless in the Last Year: No     Family History   Problem Relation Age  of Onset    Depression Mother     Heart disease Mother     Diabetes Mother     Anxiety disorder Mother     Skin cancer Mother     Heart disease Father     Hypertension Father     Stroke Father     Diabetes Brother     No Known Problems Brother     Depression Daughter     No Known Problems Maternal Aunt     No Known Problems Paternal Aunt     No Known Problems Maternal Grandmother     No Known Problems Maternal Grandfather     No Known Problems Paternal Grandmother     No Known Problems Paternal Grandfather     Breast cancer Other 60       Physical Exam:  Vitals:    03/20/25 1557   BP: 140/86   BP Location: Left arm   Patient Position: Sitting   Cuff Size: Large   Pulse: 73   SpO2: 99%   Weight: 82.5 kg (181 lb 12.8 oz)       Constitutional: NAD, non toxic  Ears/nose/mouth/throat: atraumatic  CV: RRR, nl S1S2, no murmurs/rubs/gallups, no JVD, no HJR  Resp: CTABL  GI: Soft, NTND  MSK: no swollen joints in exposed areas  Extr: No edema, warm LE  Pysche: normal affect  Neuro: appropriate in conversation  Skin: dry and intact in exposed areas    Labs & Results:  Lab Results   Component Value Date    WBC 9.72 04/03/2024    HGB 12.2 04/03/2024    HCT 37.6 04/03/2024    MCV 91 04/03/2024     04/03/2024     Lab Results   Component Value Date    SODIUM 139 04/03/2024    K 3.3 (L) 04/03/2024     04/03/2024    CO2 25 04/03/2024    BUN 6 04/03/2024    CREATININE 0.63 04/03/2024    GLUC 133 04/03/2024    CALCIUM 8.6 04/03/2024       Counseling / Coordination of Care  Greater than 50% of total time was spent with the patient and / or family counseling and / or coordination of care. We discussed diagnoses, most recent studies and any changes in treatment.    Thank you for the opportunity to participate in the care of this patient.    Jovi Mccarty MD  Attending Physician  Advanced Heart Failure and Transplant Cardiology  [facility]

## 2025-03-20 ENCOUNTER — OFFICE VISIT (OUTPATIENT)
Dept: CARDIOLOGY CLINIC | Facility: CLINIC | Age: 55
End: 2025-03-20
Payer: COMMERCIAL

## 2025-03-20 ENCOUNTER — OFFICE VISIT (OUTPATIENT)
Dept: INTERNAL MEDICINE CLINIC | Age: 55
End: 2025-03-20
Payer: COMMERCIAL

## 2025-03-20 VITALS
BODY MASS INDEX: 33.13 KG/M2 | TEMPERATURE: 97.8 F | DIASTOLIC BLOOD PRESSURE: 80 MMHG | SYSTOLIC BLOOD PRESSURE: 136 MMHG | HEART RATE: 80 BPM | HEIGHT: 62 IN | WEIGHT: 180 LBS | OXYGEN SATURATION: 98 %

## 2025-03-20 VITALS
BODY MASS INDEX: 33.25 KG/M2 | OXYGEN SATURATION: 99 % | WEIGHT: 181.8 LBS | HEART RATE: 73 BPM | SYSTOLIC BLOOD PRESSURE: 140 MMHG | DIASTOLIC BLOOD PRESSURE: 86 MMHG

## 2025-03-20 DIAGNOSIS — Z17.0 MALIGNANT NEOPLASM OF OVERLAPPING SITES OF RIGHT BREAST IN FEMALE, ESTROGEN RECEPTOR POSITIVE (HCC): ICD-10-CM

## 2025-03-20 DIAGNOSIS — I10 PRIMARY HYPERTENSION: ICD-10-CM

## 2025-03-20 DIAGNOSIS — R94.31 ABNORMAL EKG: ICD-10-CM

## 2025-03-20 DIAGNOSIS — Z85.3 HISTORY OF BREAST CANCER: ICD-10-CM

## 2025-03-20 DIAGNOSIS — Z00.00 ANNUAL PHYSICAL EXAM: ICD-10-CM

## 2025-03-20 DIAGNOSIS — C50.811 MALIGNANT NEOPLASM OF OVERLAPPING SITES OF RIGHT BREAST IN FEMALE, ESTROGEN RECEPTOR POSITIVE (HCC): ICD-10-CM

## 2025-03-20 DIAGNOSIS — F41.9 ANXIETY: ICD-10-CM

## 2025-03-20 DIAGNOSIS — Z01.818 PREOPERATIVE GENERAL PHYSICAL EXAMINATION: Primary | ICD-10-CM

## 2025-03-20 DIAGNOSIS — F43.9 STRESS: ICD-10-CM

## 2025-03-20 PROCEDURE — 99214 OFFICE O/P EST MOD 30 MIN: CPT | Performed by: STUDENT IN AN ORGANIZED HEALTH CARE EDUCATION/TRAINING PROGRAM

## 2025-03-20 PROCEDURE — 99214 OFFICE O/P EST MOD 30 MIN: CPT | Performed by: INTERNAL MEDICINE

## 2025-03-20 PROCEDURE — 93000 ELECTROCARDIOGRAM COMPLETE: CPT | Performed by: STUDENT IN AN ORGANIZED HEALTH CARE EDUCATION/TRAINING PROGRAM

## 2025-03-20 RX ORDER — ALPRAZOLAM 0.25 MG
0.25 TABLET ORAL 2 TIMES DAILY PRN
Qty: 60 TABLET | Refills: 0 | Status: SHIPPED | OUTPATIENT
Start: 2025-03-20

## 2025-03-20 NOTE — ASSESSMENT & PLAN NOTE
-Patient is due for an annual physical examination.  -Will order a CBC, CMP, TSH, lipid panel  -Follow-up in about 3 months for an annual physical exam.    Orders:  •  CBC and differential; Future  •  TSH, 3rd generation with Free T4 reflex; Future  •  Comprehensive metabolic panel; Future  •  Lipid panel; Future

## 2025-03-20 NOTE — PROGRESS NOTES
Name: Nunu Tirado      : 1970      MRN: [id number]  Encounter Provider: Elvie Ordoñez DO  Encounter Date: 3/20/2025   Encounter department: [facility] PRIMARY CARE BATH  :  Assessment & Plan  Preoperative general physical examination  -patient is scheduled for right chest implant exchange for permanent implant and revision of right reconstructed breast, using general anesthesia for her history of breast cancer status post mastectomy, by Dr. Brando Olmedo, at [facility] OR on 25.  - she functions at 4 Mets of physical activity daily  - her revised cardiac risk index by Ed criteria shows very low risk with an estimated rate of myocardial infarction, pulmonary edema, ventricular fibrillation, cardiac arrest or complete heart block of 0.4%  -She has a history of abnormal EKG and will follow-up with cardiology for clearance today as well.  -She is yet to do her preop labs and this will be reviewed by the surgeon when they are available, prior to surgery.  -she may proceed with surgery as long as she is cleared by cardiology.  -She was counseled to avoid any NSAIDs from 7 days prior to surgery.  - she should inform her PCP and her surgeon if her clinical status should change prior to surgery  -will fax completed pre-op notes to surgeon         Malignant neoplasm of overlapping sites of right breast in female, estrogen receptor positive (HCC)  -Status post mastectomy with immediate right breast reconstruction with tissue expander   -Patient is scheduled   for revision and exchange for permanent implant.  Orders:  •  Ambulatory referral to Internal Medicine    History of breast cancer  -Status post mastectomy and now ongoing for right breast reconstruction  Orders:  •  Ambulatory referral to Internal Medicine    Primary hypertension   - blood pressure is mostly well controlled in her home blood pressure log  -continue with hydrochlorothiazide, amlodipine and  propranolol  -continue with a low-salt diet and with exercise    Orders:  •  TSH, 3rd generation with Free T4 reflex; Future  •  Comprehensive metabolic panel; Future    Anxiety  -Not optimally controlled especially with increased life stressors  -We will increase the dose of her alprazolam for just 1 month from 0.25 mg daily as needed to 0.25 mg twice daily.  -The [facility] website was queried and did not show misuse  Orders:  •  ALPRAZolam (XANAX) 0.25 mg tablet; Take 1 tablet (0.25 mg total) by mouth 2 (two) times a day as needed for anxiety or sleep  •  TSH, 3rd generation with Free T4 reflex; Future    Stress  -Anxiety is worse secondary to current life stressors  -Will increase alprazolam dose for just 1 month and then de-escalate when the stressor when this stressful period is over.  -Will follow-up with the result of her TSH  Orders:  •  ALPRAZolam (XANAX) 0.25 mg tablet; Take 1 tablet (0.25 mg total) by mouth 2 (two) times a day as needed for anxiety or sleep  •  TSH, 3rd generation with Free T4 reflex; Future    Annual physical exam  -Patient is due for an annual physical examination.  -Will order a CBC, CMP, TSH, lipid panel  -Follow-up in about 3 months for an annual physical exam.    Orders:  •  CBC and differential; Future  •  TSH, 3rd generation with Free T4 reflex; Future  •  Comprehensive metabolic panel; Future  •  Lipid panel; Future           History of Present Illness     HPI      Patient presents for preoperative physical exam. She is scheduled for right chest implant exchange for permanent implant and revision of right reconstructed breast, using general anesthesia for her history of breast cancer status post mastectomy, by Dr. Brando Olmedo, at [facility] OR on 04/8/25.    Past medical history-right breast cancer, migraine headaches, hypertension, anxiety, lung nodule,, insomnia, vitamin D insufficiency, seasonal allergies, pseudohypoaldosteronism    Past  surgical history-right mastectomy with right breast immediate reconstruction with tissue expander, percutaneous nephrostomy tube placement on the right, bilateral breast biopsy, dental surgery    Medication-see list    Allergies-see list    History of previous adverse reaction to anesthesia- none    Family history of adverse reaction to anesthesia- none    Physical activity level- has been working and doing exercise in the past 2 weeks, able to walk up 2 flights of stairs without sob    Preop labs- ordered and yet to be done - 2 weeks prior to sx     Preop EKG-will see cardiology this afternoon    Anticoagulation use- none     Anti-platelet use-none    NSAID use-takes Advil if pain is really but will stop from 7 days prior to sx     Alcohol use- 2-3 drinks occasionally     Nicotine use- - smoked half a pack a day for 6 years and quit 32 years ago - quit 1914    Recreational drug use-never    Care after surgery-     Today, patient states that she has been having headaches in the past 2 weeks - migraine type headaches with aura.  She admits that she has been under stress cos her mom needs surgery and has health issues including getting hip injections and also has an upcoming cataract sx and she is beginning to feel overwhelmed and having more anxiety.  She states that she has not been taking her xanax everyday.  Her periods also make her nervous and anxious and she recently had her period with her PMS.  She states that she feels better with walking   She states that she has not been taking her prozac  She kept a blood pressure log and brought it into the visit and it showed blood pressures ranging from - 116-133/70-83.  She admits to nasal congestion, arthralgias, back pain but denies any  fever, chills, night sweats,  dizziness, runny nose, pnd, sore throat, ear ache, sinus pain or pressure, wheezing, cough, chest pain, sob, palpitations, nausea, vomiting, diarrhea, constipation, hematochezia, hematuria,  "melena stools,  myalgias, feelings of  depression or insomnia.      Review of Systems   Constitutional:  Negative for activity change, chills, fatigue, fever and unexpected weight change.   HENT:  Positive for congestion. Negative for ear pain, postnasal drip, rhinorrhea, sinus pressure and sore throat.    Eyes:  Negative for pain.   Respiratory:  Negative for cough, choking, chest tightness, shortness of breath and wheezing.    Cardiovascular:  Negative for chest pain, palpitations and leg swelling.   Gastrointestinal:  Negative for abdominal pain, constipation, diarrhea, nausea and vomiting.   Genitourinary:  Negative for dysuria and hematuria.   Musculoskeletal:  Positive for arthralgias and back pain (on and off). Negative for gait problem, joint swelling, myalgias and neck stiffness.   Skin:  Negative for pallor and rash.   Neurological:  Positive for headaches (recurrent migraine headaches with aura). Negative for dizziness, tremors, seizures, syncope and light-headedness.   Hematological:  Negative for adenopathy.   Psychiatric/Behavioral:  Negative for behavioral problems. The patient is nervous/anxious.        Objective   /80 (BP Location: Left arm, Patient Position: Sitting, Cuff Size: Large)   Pulse 80   Temp 97.8 °F (36.6 °C) (Temporal)   Ht 5' 2\" (1.575 m)   Wt 81.6 kg (180 lb)   SpO2 98%   BMI 32.92 kg/m²      Physical Exam  Constitutional:       General: She is not in acute distress.     Appearance: She is well-developed. She is not diaphoretic.   HENT:      Head: Normocephalic and atraumatic.      Right Ear: External ear normal.      Left Ear: External ear normal.      Nose: Nose normal.      Mouth/Throat:      Mouth: Mucous membranes are dry.      Pharynx: Posterior oropharyngeal erythema present. No oropharyngeal exudate.   Eyes:      General: No scleral icterus.        Right eye: No discharge.         Left eye: No discharge.      Conjunctiva/sclera: Conjunctivae normal.      Pupils: " Pupils are equal, round, and reactive to light.   Neck:      Thyroid: No thyromegaly.      Vascular: No JVD.      Trachea: No tracheal deviation.   Cardiovascular:      Rate and Rhythm: Normal rate and regular rhythm.      Heart sounds: Normal heart sounds. No murmur heard.     No friction rub. No gallop.   Pulmonary:      Effort: Pulmonary effort is normal. No respiratory distress.      Breath sounds: Normal breath sounds. No wheezing or rales.   Chest:      Chest wall: No tenderness.   Abdominal:      General: Bowel sounds are normal. There is no distension.      Palpations: Abdomen is soft. There is no mass.      Tenderness: There is no abdominal tenderness. There is no guarding or rebound.   Musculoskeletal:         General: No tenderness or deformity. Normal range of motion.      Cervical back: Normal range of motion and neck supple.   Lymphadenopathy:      Cervical: No cervical adenopathy.   Skin:     General: Skin is warm and dry.      Coloration: Skin is not pale.      Findings: No erythema or rash.   Neurological:      Mental Status: She is alert and oriented to person, place, and time.      Cranial Nerves: No cranial nerve deficit.      Motor: No abnormal muscle tone.      Coordination: Coordination normal.      Deep Tendon Reflexes: Reflexes are normal and symmetric.   Psychiatric:         Mood and Affect: Mood is anxious (Anxious affect).         Behavior: Behavior normal.

## 2025-03-20 NOTE — LETTER
2025     Brando Olmedo MD  [address]  [address]  [place] [address]    Patient: Nunu Tirado   YOB: 1970   Date of Visit: 3/20/2025       Dear Dr. Olmedo:    Thank you for referring Nunu Tirado to me for evaluation. Below are my notes for this consultation.    If you have questions, please do not hesitate to call me. I look forward to following your patient along with you.         Sincerely,        Elvie Ordoñez DO        CC: No Recipients    Elvie Ordoñez DO  3/20/2025  8:46 PM  Sign when Signing Visit  Name: Nunu Tirado      : 1970      MRN: [id number]  Encounter Provider: Elvie Ordoñez DO  Encounter Date: 3/20/2025   Encounter department: [facility] PRIMARY CARE BATH  :  Assessment & Plan  Preoperative general physical examination  -patient is scheduled for right chest implant exchange for permanent implant and revision of right reconstructed breast, using general anesthesia for her history of breast cancer status post mastectomy, by Dr. Brando Olmedo, at [facility] OR on 25.  - she functions at 4 Mets of physical activity daily  - her revised cardiac risk index by Ed criteria shows very low risk with an estimated rate of myocardial infarction, pulmonary edema, ventricular fibrillation, cardiac arrest or complete heart block of 0.4%  -She has a history of abnormal EKG and will follow-up with cardiology for clearance today as well.  -She is yet to do her preop labs and this will be reviewed by the surgeon when they are available, prior to surgery.  -she may proceed with surgery as long as she is cleared by cardiology.  -She was counseled to avoid any NSAIDs from 7 days prior to surgery.  - she should inform her PCP and her surgeon if her clinical status should change prior to surgery  -will fax completed pre-op notes to surgeon         Malignant neoplasm of overlapping sites of right breast in female,  estrogen receptor positive (HCC)  -Status post mastectomy with immediate right breast reconstruction with tissue expander   -Patient is scheduled   for revision and exchange for permanent implant.  Orders:  •  Ambulatory referral to Internal Medicine    History of breast cancer  -Status post mastectomy and now ongoing for right breast reconstruction  Orders:  •  Ambulatory referral to Internal Medicine    Primary hypertension   - blood pressure is mostly well controlled in her home blood pressure log  -continue with hydrochlorothiazide, amlodipine and propranolol  -continue with a low-salt diet and with exercise    Orders:  •  TSH, 3rd generation with Free T4 reflex; Future  •  Comprehensive metabolic panel; Future    Anxiety  -Not optimally controlled especially with increased life stressors  -We will increase the dose of her alprazolam for just 1 month from 0.25 mg daily as needed to 0.25 mg twice daily.  -The [facility] website was queried and did not show misuse  Orders:  •  ALPRAZolam (XANAX) 0.25 mg tablet; Take 1 tablet (0.25 mg total) by mouth 2 (two) times a day as needed for anxiety or sleep  •  TSH, 3rd generation with Free T4 reflex; Future    Stress  -Anxiety is worse secondary to current life stressors  -Will increase alprazolam dose for just 1 month and then de-escalate when the stressor when this stressful period is over.  -Will follow-up with the result of her TSH  Orders:  •  ALPRAZolam (XANAX) 0.25 mg tablet; Take 1 tablet (0.25 mg total) by mouth 2 (two) times a day as needed for anxiety or sleep  •  TSH, 3rd generation with Free T4 reflex; Future    Annual physical exam  -Patient is due for an annual physical examination.  -Will order a CBC, CMP, TSH, lipid panel  -Follow-up in about 3 months for an annual physical exam.    Orders:  •  CBC and differential; Future  •  TSH, 3rd generation with Free T4 reflex; Future  •  Comprehensive metabolic panel; Future  •  Lipid panel; Future            History of Present Illness    HPI      Patient presents for preoperative physical exam. She is scheduled for right chest implant exchange for permanent implant and revision of right reconstructed breast, using general anesthesia for her history of breast cancer status post mastectomy, by Dr. Brando Olmedo, at [facility] OR on 04/8/25.    Past medical history-right breast cancer, migraine headaches, hypertension, anxiety, lung nodule,, insomnia, vitamin D insufficiency, seasonal allergies, pseudohypoaldosteronism    Past surgical history-right mastectomy with right breast immediate reconstruction with tissue expander, percutaneous nephrostomy tube placement on the right, bilateral breast biopsy, dental surgery    Medication-see list    Allergies-see list    History of previous adverse reaction to anesthesia- none    Family history of adverse reaction to anesthesia- none    Physical activity level- has been working and doing exercise in the past 2 weeks, able to walk up 2 flights of stairs without sob    Preop labs- ordered and yet to be done - 2 weeks prior to sx     Preop EKG-will see cardiology this afternoon    Anticoagulation use- none     Anti-platelet use-none    NSAID use-takes Advil if pain is really but will stop from 7 days prior to sx     Alcohol use- 2-3 drinks occasionally     Nicotine use- - smoked half a pack a day for 6 years and quit 32 years ago - quit 1914    Recreational drug use-never    Care after surgery-     Today, patient states that she has been having headaches in the past 2 weeks - migraine type headaches with aura.  She admits that she has been under stress cos her mom needs surgery and has health issues including getting hip injections and also has an upcoming cataract sx and she is beginning to feel overwhelmed and having more anxiety.  She states that she has not been taking her xanax everyday.  Her periods also make her nervous and anxious and she  "recently had her period with her PMS.  She states that she feels better with walking   She states that she has not been taking her prozac  She kept a blood pressure log and brought it into the visit and it showed blood pressures ranging from - 116-133/70-83.  She admits to nasal congestion, arthralgias, back pain but denies any  fever, chills, night sweats,  dizziness, runny nose, pnd, sore throat, ear ache, sinus pain or pressure, wheezing, cough, chest pain, sob, palpitations, nausea, vomiting, diarrhea, constipation, hematochezia, hematuria, melena stools,  myalgias, feelings of  depression or insomnia.      Review of Systems   Constitutional:  Negative for activity change, chills, fatigue, fever and unexpected weight change.   HENT:  Positive for congestion. Negative for ear pain, postnasal drip, rhinorrhea, sinus pressure and sore throat.    Eyes:  Negative for pain.   Respiratory:  Negative for cough, choking, chest tightness, shortness of breath and wheezing.    Cardiovascular:  Negative for chest pain, palpitations and leg swelling.   Gastrointestinal:  Negative for abdominal pain, constipation, diarrhea, nausea and vomiting.   Genitourinary:  Negative for dysuria and hematuria.   Musculoskeletal:  Positive for arthralgias and back pain (on and off). Negative for gait problem, joint swelling, myalgias and neck stiffness.   Skin:  Negative for pallor and rash.   Neurological:  Positive for headaches (recurrent migraine headaches with aura). Negative for dizziness, tremors, seizures, syncope and light-headedness.   Hematological:  Negative for adenopathy.   Psychiatric/Behavioral:  Negative for behavioral problems. The patient is nervous/anxious.        Objective  /80 (BP Location: Left arm, Patient Position: Sitting, Cuff Size: Large)   Pulse 80   Temp 97.8 °F (36.6 °C) (Temporal)   Ht 5' 2\" (1.575 m)   Wt 81.6 kg (180 lb)   SpO2 98%   BMI 32.92 kg/m²      Physical Exam  Constitutional:       " General: She is not in acute distress.     Appearance: She is well-developed. She is not diaphoretic.   HENT:      Head: Normocephalic and atraumatic.      Right Ear: External ear normal.      Left Ear: External ear normal.      Nose: Nose normal.      Mouth/Throat:      Mouth: Mucous membranes are dry.      Pharynx: Posterior oropharyngeal erythema present. No oropharyngeal exudate.   Eyes:      General: No scleral icterus.        Right eye: No discharge.         Left eye: No discharge.      Conjunctiva/sclera: Conjunctivae normal.      Pupils: Pupils are equal, round, and reactive to light.   Neck:      Thyroid: No thyromegaly.      Vascular: No JVD.      Trachea: No tracheal deviation.   Cardiovascular:      Rate and Rhythm: Normal rate and regular rhythm.      Heart sounds: Normal heart sounds. No murmur heard.     No friction rub. No gallop.   Pulmonary:      Effort: Pulmonary effort is normal. No respiratory distress.      Breath sounds: Normal breath sounds. No wheezing or rales.   Chest:      Chest wall: No tenderness.   Abdominal:      General: Bowel sounds are normal. There is no distension.      Palpations: Abdomen is soft. There is no mass.      Tenderness: There is no abdominal tenderness. There is no guarding or rebound.   Musculoskeletal:         General: No tenderness or deformity. Normal range of motion.      Cervical back: Normal range of motion and neck supple.   Lymphadenopathy:      Cervical: No cervical adenopathy.   Skin:     General: Skin is warm and dry.      Coloration: Skin is not pale.      Findings: No erythema or rash.   Neurological:      Mental Status: She is alert and oriented to person, place, and time.      Cranial Nerves: No cranial nerve deficit.      Motor: No abnormal muscle tone.      Coordination: Coordination normal.      Deep Tendon Reflexes: Reflexes are normal and symmetric.   Psychiatric:         Mood and Affect: Mood is anxious (Anxious affect).         Behavior:  Behavior normal.

## 2025-03-20 NOTE — PATIENT INSTRUCTIONS
PLEASE STOP EVERY NSAID INCLUDING ADVIL, IBUPROFEN, ALEVE, MOTRIN, CELEBREX ABOUT 7 DAYS PRIOR TO SX

## 2025-03-20 NOTE — ASSESSMENT & PLAN NOTE
-Not optimally controlled especially with increased life stressors  -We will increase the dose of her alprazolam for just 1 month from 0.25 mg daily as needed to 0.25 mg twice daily.  -The [facility] website was queried and did not show misuse  Orders:  •  ALPRAZolam (XANAX) 0.25 mg tablet; Take 1 tablet (0.25 mg total) by mouth 2 (two) times a day as needed for anxiety or sleep  •  TSH, 3rd generation with Free T4 reflex; Future

## 2025-03-20 NOTE — ASSESSMENT & PLAN NOTE
- blood pressure is mostly well controlled in her home blood pressure log  -continue with hydrochlorothiazide, amlodipine and propranolol  -continue with a low-salt diet and with exercise    Orders:  •  TSH, 3rd generation with Free T4 reflex; Future  •  Comprehensive metabolic panel; Future

## 2025-03-20 NOTE — ASSESSMENT & PLAN NOTE
-patient is scheduled for right chest implant exchange for permanent implant and revision of right reconstructed breast, using general anesthesia for her history of breast cancer status post mastectomy, by Dr. Brando Olmedo, at [facility] OR on 04/8/25.  - she functions at 4 Mets of physical activity daily  - her revised cardiac risk index by Ed criteria shows very low risk with an estimated rate of myocardial infarction, pulmonary edema, ventricular fibrillation, cardiac arrest or complete heart block of 0.4%  -She has a history of abnormal EKG and will follow-up with cardiology for clearance today as well.  -She is yet to do her preop labs and this will be reviewed by the surgeon when they are available, prior to surgery.  -she may proceed with surgery as long as she is cleared by cardiology.  -She was counseled to avoid any NSAIDs from 7 days prior to surgery.  - she should inform her PCP and her surgeon if her clinical status should change prior to surgery  -will fax completed pre-op notes to surgeon

## 2025-03-20 NOTE — ASSESSMENT & PLAN NOTE
-Status post mastectomy with immediate right breast reconstruction with tissue expander   -Patient is scheduled   for revision and exchange for permanent implant.  Orders:  •  Ambulatory referral to Internal Medicine

## 2025-03-21 RX ORDER — AMLODIPINE BESYLATE 2.5 MG/1
2.5 TABLET ORAL DAILY
Qty: 90 TABLET | Refills: 1 | Status: SHIPPED | OUTPATIENT
Start: 2025-03-21

## 2025-03-24 ENCOUNTER — APPOINTMENT (OUTPATIENT)
Dept: LAB | Age: 55
End: 2025-03-24
Payer: COMMERCIAL

## 2025-03-24 DIAGNOSIS — C50.811 MALIGNANT NEOPLASM OF OVERLAPPING SITES OF RIGHT BREAST IN FEMALE, ESTROGEN RECEPTOR POSITIVE (HCC): ICD-10-CM

## 2025-03-24 DIAGNOSIS — Z85.3 HISTORY OF BREAST CANCER: ICD-10-CM

## 2025-03-24 DIAGNOSIS — Z00.8 HEALTH EXAMINATION IN POPULATION SURVEY: Primary | ICD-10-CM

## 2025-03-24 DIAGNOSIS — Z17.0 MALIGNANT NEOPLASM OF OVERLAPPING SITES OF RIGHT BREAST IN FEMALE, ESTROGEN RECEPTOR POSITIVE (HCC): ICD-10-CM

## 2025-03-24 DIAGNOSIS — Z00.8 HEALTH EXAMINATION IN POPULATION SURVEY: ICD-10-CM

## 2025-03-24 LAB
ANION GAP SERPL CALCULATED.3IONS-SCNC: 11 MMOL/L (ref 4–13)
BASOPHILS # BLD AUTO: 0.05 THOUSANDS/ÂΜL (ref 0–0.1)
BASOPHILS NFR BLD AUTO: 1 % (ref 0–1)
BUN SERPL-MCNC: 14 MG/DL (ref 5–25)
CALCIUM SERPL-MCNC: 9.2 MG/DL (ref 8.4–10.2)
CHLORIDE SERPL-SCNC: 98 MMOL/L (ref 96–108)
CHOLEST SERPL-MCNC: 208 MG/DL (ref ?–200)
CO2 SERPL-SCNC: 28 MMOL/L (ref 21–32)
CREAT SERPL-MCNC: 0.69 MG/DL (ref 0.6–1.3)
EOSINOPHIL # BLD AUTO: 0.1 THOUSAND/ÂΜL (ref 0–0.61)
EOSINOPHIL NFR BLD AUTO: 1 % (ref 0–6)
ERYTHROCYTE [DISTWIDTH] IN BLOOD BY AUTOMATED COUNT: 14 % (ref 11.6–15.1)
EST. AVERAGE GLUCOSE BLD GHB EST-MCNC: 128 MG/DL
GFR SERPL CREATININE-BSD FRML MDRD: 97 ML/MIN/1.73SQ M
GLUCOSE P FAST SERPL-MCNC: 131 MG/DL (ref 65–99)
HBA1C MFR BLD: 6.1 %
HCT VFR BLD AUTO: 41.2 % (ref 34.8–46.1)
HDLC SERPL-MCNC: 49 MG/DL
HGB BLD-MCNC: 13.5 G/DL (ref 11.5–15.4)
IMM GRANULOCYTES # BLD AUTO: 0.02 THOUSAND/UL (ref 0–0.2)
IMM GRANULOCYTES NFR BLD AUTO: 0 % (ref 0–2)
LDLC SERPL CALC-MCNC: 133 MG/DL (ref 0–100)
LYMPHOCYTES # BLD AUTO: 1.76 THOUSANDS/ÂΜL (ref 0.6–4.47)
LYMPHOCYTES NFR BLD AUTO: 24 % (ref 14–44)
MCH RBC QN AUTO: 30.5 PG (ref 26.8–34.3)
MCHC RBC AUTO-ENTMCNC: 32.8 G/DL (ref 31.4–37.4)
MCV RBC AUTO: 93 FL (ref 82–98)
MONOCYTES # BLD AUTO: 0.59 THOUSAND/ÂΜL (ref 0.17–1.22)
MONOCYTES NFR BLD AUTO: 8 % (ref 4–12)
NEUTROPHILS # BLD AUTO: 4.75 THOUSANDS/ÂΜL (ref 1.85–7.62)
NEUTS SEG NFR BLD AUTO: 66 % (ref 43–75)
NONHDLC SERPL-MCNC: 159 MG/DL
NRBC BLD AUTO-RTO: 0 /100 WBCS
PLATELET # BLD AUTO: 297 THOUSANDS/UL (ref 149–390)
PMV BLD AUTO: 10.9 FL (ref 8.9–12.7)
POTASSIUM SERPL-SCNC: 3.6 MMOL/L (ref 3.5–5.3)
RBC # BLD AUTO: 4.42 MILLION/UL (ref 3.81–5.12)
SODIUM SERPL-SCNC: 137 MMOL/L (ref 135–147)
TRIGL SERPL-MCNC: 129 MG/DL (ref ?–150)
WBC # BLD AUTO: 7.27 THOUSAND/UL (ref 4.31–10.16)

## 2025-03-24 PROCEDURE — 85025 COMPLETE CBC W/AUTO DIFF WBC: CPT

## 2025-03-24 PROCEDURE — 80061 LIPID PANEL: CPT

## 2025-03-24 PROCEDURE — 80048 BASIC METABOLIC PNL TOTAL CA: CPT

## 2025-03-24 PROCEDURE — 83036 HEMOGLOBIN GLYCOSYLATED A1C: CPT

## 2025-03-24 PROCEDURE — 36415 COLL VENOUS BLD VENIPUNCTURE: CPT

## 2025-03-25 ENCOUNTER — CLINICAL SUPPORT (OUTPATIENT)
Dept: OBGYN CLINIC | Facility: OTHER | Age: 55
End: 2025-03-25

## 2025-03-25 DIAGNOSIS — C50.811 MALIGNANT NEOPLASM OF OVERLAPPING SITES OF RIGHT BREAST IN FEMALE, ESTROGEN RECEPTOR POSITIVE (HCC): Primary | ICD-10-CM

## 2025-03-25 DIAGNOSIS — Z17.0 MALIGNANT NEOPLASM OF OVERLAPPING SITES OF RIGHT BREAST IN FEMALE, ESTROGEN RECEPTOR POSITIVE (HCC): Primary | ICD-10-CM

## 2025-03-25 NOTE — PROGRESS NOTES
Mastectomy Bra Fitting Order Details    Nunu Tirado  1970  [id number]    Reason For Visit  Post op bra     Precautions   History of breast cancer    Subjective  Ambreen is scheduled for an implant exchange on 4/8/25. She is here to be fit for medium compression bra to wear post operatively per MD.     Surgery Type: Mastectomy    Lymph node removal yes    Date of surgery 4/2/24    Surgical side right    Objective  Trial of Marcelo for post op wear and Marilyn bra to be worn once cleared by surgeon. Trial of May 2XL with better fit for future reference    Assessment  Band - 19 x 2 = 38 + 4 = 42  Cup - 23 x 2 = 46    Plan  Ship remainder of order to home. Reviewed wear and care of her prosthesis    Order Details   Marilyn - XL x 1 - ship to home  Marcelo - 44/46 x 1 nude - received on 3/25/25

## 2025-03-26 ENCOUNTER — TELEMEDICINE (OUTPATIENT)
Dept: ANESTHESIOLOGY | Facility: CLINIC | Age: 55
End: 2025-03-26
Payer: COMMERCIAL

## 2025-03-26 VITALS — SYSTOLIC BLOOD PRESSURE: 126 MMHG | HEART RATE: 77 BPM | DIASTOLIC BLOOD PRESSURE: 81 MMHG

## 2025-03-26 DIAGNOSIS — C50.811 MALIGNANT NEOPLASM OF OVERLAPPING SITES OF RIGHT BREAST IN FEMALE, ESTROGEN RECEPTOR POSITIVE (HCC): ICD-10-CM

## 2025-03-26 DIAGNOSIS — I10 PRIMARY HYPERTENSION: ICD-10-CM

## 2025-03-26 DIAGNOSIS — Z17.0 MALIGNANT NEOPLASM OF OVERLAPPING SITES OF RIGHT BREAST IN FEMALE, ESTROGEN RECEPTOR POSITIVE (HCC): ICD-10-CM

## 2025-03-26 DIAGNOSIS — Z85.3 HISTORY OF BREAST CANCER: ICD-10-CM

## 2025-03-26 DIAGNOSIS — R94.31 ABNORMAL EKG: Primary | ICD-10-CM

## 2025-03-26 PROCEDURE — 99212 OFFICE O/P EST SF 10 MIN: CPT | Performed by: NURSE PRACTITIONER

## 2025-03-26 NOTE — ASSESSMENT & PLAN NOTE
Seen for SO   Seen for BEST   At risk for post-op BASILIO - NO   At risk for post-op SSI - NO   BEST   Breathing- instructed to exercise lungs prior to surgery via deep breathing   Eat- discussed increasing protein intake prior to surgery   Sleep/stress- encouraged 8-10 sleep @ night, stress reduction, avoid sick contacts and handwashing   Train- encouraged to remain active    Orders:    Ambulatory referral to surgical optimization

## 2025-03-26 NOTE — ASSESSMENT & PLAN NOTE
Received CC   Denies CP/denies SOB  METS 9.25  Impression    NSR, nl axis, non-specific STT changes   Specimen Collected: 03/20/25  4:27 PM

## 2025-03-26 NOTE — PROGRESS NOTES
SUMMARY: Started BEST     Brief Visit  Name: Nunu Tirado      : 1970      MRN: [id number]  Encounter Provider: ROBERTO Christensen  Encounter Date: 3/26/2025   Encounter department: [facility]      ASSESSMENT     54  year old female referred to SOC for pre-surgery optimization & BEST program   Other consult concerns include: Dx: Malignant neoplasm of overlapping sites of right breast in female, estrogen receptor positive (HCC) [C50.811, Z17.0 (ICD-10-CM)]; History of breast cancer [Z85.3 (ICD-10-CM)]   She is scheduled on   Case: [id number] Date/Time: 25 0915   Procedures:      RIGHT CHEST IMPLANT EXPANDER EXCHANGE FOR PERMANENTIMPLANT. (Right: Breast)      REVISION OF RIGHT RECONSTRUCTED BREAST. (Right: Breast)   Anesthesia type: General   Diagnosis:      Malignant neoplasm of overlapping sites of right breast in female, estrogen receptor positive (HCC) [C50.811, Z17.0]      History of breast cancer [Z85.3]   Pre-op diagnosis:      Malignant neoplasm of overlapping sites of right breast in female, estrogen receptor positive (HCC) [C50.811, Z17.0]      History of breast cancer [Z85.3]   Location:  OR ROOM 08 / [facility]   Surgeons: Brando Olmedo MD     LAST ANESTHESIA   Reports no concerns in the past with anesthesia     Assessment & Plan  Malignant neoplasm of overlapping sites of right breast in female, estrogen receptor positive (HCC)  Seen for SO   Seen for BEST   At risk for post-op BASILIO - NO   At risk for post-op SSI - NO   BEST   Breathing- instructed to exercise lungs prior to surgery via deep breathing   Eat- discussed increasing protein intake prior to surgery   Sleep/stress- encouraged 8-10 sleep @ night, stress reduction, avoid sick contacts and handwashing   Train- encouraged to remain active    Orders:    Ambulatory referral to surgical optimization    History of breast cancer    Orders:    Ambulatory referral to  surgical optimization    Abnormal EKG  Received CC   Denies CP/denies SOB  METS 9.25  Impression    NSR, nl axis, non-specific STT changes   Specimen Collected: 03/20/25  4:27 PM     Primary hypertension    3/26/2025     9:14 AM      Blood Pressure 126/81   Pulse 77                 History of Present Illness   HPI  54 year old female referred to SOC for pre-surgery optimization.      I spoke to patient on the phone.  We did not cannot via video.  She was pleasant and a pleasure to care for.  Admits to being in well health today. Denies any new developments in her health.  She denies ever having any problems with anesthesia in the past.  PMH stable.    Live at home.  Self care patient.  Has support    Started on BEST program  She has received CC      Administrative Statements   Encounter provider ROBERTO Christensen    The Patient is located at Home and in the following state in which I hold an active license [place].    The patient was identified by name and date of birth. Nunu Tirado was informed that this is a telemedicine visit and that the visit is being conducted through Telephone.  My office door was closed. No one else was in the room.  She acknowledged consent and understanding of privacy and security of the video platform. The patient has agreed to participate and understands they can discontinue the visit at any time.    I have spent a total time of 20 minutes in caring for this patient on the day of the visit/encounter including Instructions for management, Patient and family education, Importance of tx compliance, Risk factor reductions, Impressions, and Counseling / Coordination of care, not including the time spent for establishing the audio/video connection.    THE [facility]IZATION CENTER (SOC)  CONSULT       Patient has the ability to take their vital signs at home YES  Allergies reviewed today   PMH reviewed today   Medications reviewed today     Nutrition Assessment Score: 14  METS: 9.25  DX  SLEEP APNEA; NO:SCORE 3       As always we discussed having your BEST surgery, and BEST recovery.  Surgery goals reviewed today.      Breathing exercises   Patient was encouraged to begin lung exercises today.  This could be accomplished through deep breathing and cough exercises.     Eating/nutrition   Encouraged patient to increase oral protein intake prior to surgery.  This can be accomplished by consuming chicken, fish, tuna fish, cottage cheese, cheese, eggs, Greek yogurt, and protein shakes as needed.  I encouraged use of protein shakes such ENLIVE.  I also recommended making your own protein shakes with protein powder.     Sleep/Stress management  Patient was encouraged to rest their body prior to surgery.  Encouraged attempting to get 8 hours of sleep at night.  Avoid stress.  Avoid sick contacts.  Encouraged to find a relaxing hobby such as reading, meditation, listening to music.  Training exercises  Patient was encouraged to remain active as possible.  Today bilateral lower extremity generic exercises were taught for muscle strengthening and balance.  All exercises to be done sitting down.

## 2025-03-27 NOTE — TELEPHONE ENCOUNTER
Urine culture- Mixed contaminants   Patient was called and VM was left Quality 226: Preventive Care And Screening: Tobacco Use: Screening And Cessation Intervention: Patient screened for tobacco use and is an ex/non-smoker Detail Level: Detailed

## 2025-03-28 NOTE — PRE-PROCEDURE INSTRUCTIONS
Pre-Surgery Instructions:   Medication Instructions    acetaminophen (TYLENOL) 500 mg tablet Uses PRN- OK to take day of surgery    ALPRAZolam (XANAX) 0.25 mg tablet Uses PRN- OK to take day of surgery    amLODIPine (NORVASC) 2.5 mg tablet Take day of surgery.    b complex vitamins capsule Stop taking 7 days prior to surgery.    famotidine (Pepcid) 20 mg tablet Uses PRN- OK to take day of surgery    fexofenadine (ALLEGRA) 60 MG tablet Uses PRN- OK to take day of surgery    fluticasone (FLONASE) 50 mcg/act nasal spray Uses PRN- OK to take day of surgery    hydroCHLOROthiazide 25 mg tablet Hold day of surgery.    Ibuprofen 200 MG CAPS Stop taking 7 days prior to surgery.    Magnesium 400 MG CAPS Stop taking 7 days prior to surgery.    Multiple Vitamin (multivitamin) tablet Stop taking 7 days prior to surgery.    Probiotic Product (PROBIOTIC DAILY PO) Stop taking 7 days prior to surgery.    propranolol (Inderal LA) 80 mg 24 hr capsule Take night before surgery    rizatriptan (MAXALT-MLT) 5 mg disintegrating tablet Uses PRN- OK to take day of surgery   Medication instructions for day of surgery reviewed. Please take all instructed medications with only a sip of water.       You will receive a call one business day prior to surgery with an arrival time and hospital directions. If your surgery is scheduled on a Monday, the hospital will be calling you on the Friday prior to your surgery. If you have not heard from anyone by 8pm, please call the hospital supervisor through the hospital  at [phone]. ([place] [phone] or [place] [phone]).    Do not eat or drink anything after midnight the night before your surgery, including candy, mints, lifesavers, or chewing gum. Do not drink alcohol 24hrs before your surgery. Try not to smoke at least 24hrs before your surgery.       Follow the pre surgery showering instructions as listed in the “My Surgical Experience Booklet” or otherwise provided by your  surgeon's office. Do not use a blade to shave the surgical area 1 week before surgery. It is okay to use a clean electric clippers up to 24 hours before surgery. Do not apply any lotions, creams, including makeup, cologne, deodorant, or perfumes after showering on the day of your surgery. Do not use dry shampoo, hair spray, hair gel, or any type of hair products.     No contact lenses, eye make-up, or artificial eyelashes. Remove nail polish, including gel polish, and any artificial, gel, or acrylic nails if possible. Remove all jewelry including rings and body piercing jewelry.     Wear causal clothing that is easy to take on and off. Consider your type of surgery.    Keep any valuables, jewelry, piercings at home. Please bring any specially ordered equipment (sling, braces) if indicated.    Arrange for a responsible person to drive you to and from the hospital on the day of your surgery. Please confirm the visitor policy for the day of your procedure when you receive your phone call with an arrival time.     Call the surgeon's office with any new illnesses, exposures, or additional questions prior to surgery.    Please reference your “My Surgical Experience Booklet” for additional information to prepare for your upcoming surgery.

## 2025-03-31 PROCEDURE — NC001 PR NO CHARGE

## 2025-03-31 NOTE — H&P
Plastic Surgery Consult     Reason for visit:  patient presents for further discussion for breast reconstruction     HPI from 1/15/25  Patient is a 53 y/o female who is wellknown to me with history of right breast cancer who underwent right breast mastectomy and immediate reconstruction with submuscular tissue expander. She was serially inflated to 550 cc of possible 650 cc expander. She did not require chemotherapy or radiation. She does not take tamoxifen. She was originally interested in DORIS flap but presents today for further discussion for reconstructive options.     She denies any pain or discomfort with right chest expander.     She denies tobacco, smoking, blood thinners, steroids.     Her left breast is 40D cup     ROS: 12 pt ROS negative, except as otherwise noted in HPI     PMH: HTN, Dante's syndrome  Family History             Family History   Problem Relation Age of Onset    Depression Mother      Heart disease Mother      Diabetes Mother      Anxiety disorder Mother      Skin cancer Mother      Heart disease Father      Hypertension Father      Stroke Father      Diabetes Brother      No Known Problems Brother      Depression Daughter      No Known Problems Maternal Aunt      No Known Problems Paternal Aunt      No Known Problems Maternal Grandmother      No Known Problems Maternal Grandfather      No Known Problems Paternal Grandmother      No Known Problems Paternal Grandfather      Breast cancer Other 60            SurgHx: kidney stone removal  SocHx: no tobacco, no ETOH  Meds: no blood thinners, no steroids  Allergies: PCN, cefaclor     PE:         Vitals:     01/15/25 0929   BP: 110/70   Pulse: 89   Temp: 97.7 °F (36.5 °C)   SpO2: 98%         General: NC/AT, breathing comfortably on RA  Neuro: CN II-XII grossly intact, symmetric reflexes  HEENT: PERRLA, EOMI, external ears normal, no lesions or deformities, neck supple, trachea midline  Respiratory: CTAB, normal respiratory effort  Cardio: RRR,  normal S1, S2, no murmur, rubs, gallops  GI: soft, non-tender, non-distended  Extremities/MSK: normal alignment, mobility, gait, no edema  Skin: no rashes, lesions, subcutaneous nodules     BMI 32.5     Right chest expander soft, non-tender, well healed  No signs of infection, capsular contracture  Mild lateral displacement  BW 14.5-15.5 cm     A/P: 53 y/o female who presents for discussion for next steps in breast reconstruction.  -Upon further discussion and patient's discussion with family, patient does not want to proceed with DORIS flap. She had been debating between going flat vs expander exchange for permanent implant. After indepth discussion regarding various options, patient opted for expander exchange for permanent implant. She stated she would like to be slightly larger than her currently size and slightly more projected. She is currently filled to 550 cc of possible 650 cc expander. Patient also does not want anything done to the left side at the same time. I discussed that breast reconstruction is as many or as few surgeries as the patients' desires and can be started, paused, stopped at any time. Patient acknowledged.  -I discussed the risks of the surgery which include but not limited to infection, bleeding, scarring, implant leakage/rupture/malposition/capsular contracture, abscess, seromas, need for further surgery.  -Current plan: Revision of right reconstructed breast and implant exchange for permanent implant. Will order range of implants from 500-750 cc, higher projection.  -All questions answered, concerns addressed.  -Consent obtained, photos taken.     -Spent 45 minutes in consultation with patient. Greater than 50% of the total time was spent obtaining history, evaluation, performing exam, discussion of management options including post-operative care, answering patient's questions and concerns, chart reviewing, and documentation     Brando Olmedo MD   [facility] Plastic and Reconstructive  Surgery   [address], [address]   [place] [address]   Office: [phone]

## 2025-04-04 ENCOUNTER — TELEPHONE (OUTPATIENT)
Age: 55
End: 2025-04-04

## 2025-04-04 NOTE — TELEPHONE ENCOUNTER
Received call from Patient who said she was called twice around 5 pm yesterday 4/3/25. No VM was left. Patient asked the reason for the call, but I was unable to determine reason for call in Encounters or upcoming appt notes.     Patient verbalized understanding.

## 2025-04-08 ENCOUNTER — ANESTHESIA (OUTPATIENT)
Dept: PERIOP | Facility: HOSPITAL | Age: 55
End: 2025-04-08
Payer: COMMERCIAL

## 2025-04-08 ENCOUNTER — ANESTHESIA EVENT (OUTPATIENT)
Dept: PERIOP | Facility: HOSPITAL | Age: 55
End: 2025-04-08
Payer: COMMERCIAL

## 2025-04-08 ENCOUNTER — HOSPITAL ENCOUNTER (OUTPATIENT)
Facility: HOSPITAL | Age: 55
Setting detail: OUTPATIENT SURGERY
Discharge: HOME | End: 2025-04-08
Attending: STUDENT IN AN ORGANIZED HEALTH CARE EDUCATION/TRAINING PROGRAM | Admitting: STUDENT IN AN ORGANIZED HEALTH CARE EDUCATION/TRAINING PROGRAM
Payer: COMMERCIAL

## 2025-04-08 VITALS
HEART RATE: 80 BPM | RESPIRATION RATE: 16 BRPM | OXYGEN SATURATION: 99 % | DIASTOLIC BLOOD PRESSURE: 75 MMHG | TEMPERATURE: 97.4 F | SYSTOLIC BLOOD PRESSURE: 123 MMHG

## 2025-04-08 DIAGNOSIS — N60.91 ATYPICAL DUCTAL HYPERPLASIA OF RIGHT BREAST: Primary | ICD-10-CM

## 2025-04-08 LAB
EXT PREGNANCY TEST URINE: NEGATIVE
EXT. CONTROL: NORMAL

## 2025-04-08 PROCEDURE — 15771 GRFG AUTOL FAT LIPO 50 CC/<: CPT | Performed by: STUDENT IN AN ORGANIZED HEALTH CARE EDUCATION/TRAINING PROGRAM

## 2025-04-08 PROCEDURE — 19380 REVJ RECONSTRUCTED BREAST: CPT | Performed by: PHYSICIAN ASSISTANT

## 2025-04-08 PROCEDURE — 81025 URINE PREGNANCY TEST: CPT | Performed by: STUDENT IN AN ORGANIZED HEALTH CARE EDUCATION/TRAINING PROGRAM

## 2025-04-08 PROCEDURE — 15771 GRFG AUTOL FAT LIPO 50 CC/<: CPT | Performed by: PHYSICIAN ASSISTANT

## 2025-04-08 PROCEDURE — 19380 REVJ RECONSTRUCTED BREAST: CPT | Performed by: STUDENT IN AN ORGANIZED HEALTH CARE EDUCATION/TRAINING PROGRAM

## 2025-04-08 PROCEDURE — 15772 GRFG AUTOL FAT LIPO EA ADDL: CPT | Performed by: PHYSICIAN ASSISTANT

## 2025-04-08 PROCEDURE — L8600 IMPLANT BREAST SILICONE/EQ: HCPCS | Performed by: STUDENT IN AN ORGANIZED HEALTH CARE EDUCATION/TRAINING PROGRAM

## 2025-04-08 PROCEDURE — 15772 GRFG AUTOL FAT LIPO EA ADDL: CPT | Performed by: STUDENT IN AN ORGANIZED HEALTH CARE EDUCATION/TRAINING PROGRAM

## 2025-04-08 PROCEDURE — 11970 RPLCMT TISS XPNDR PERM IMPLT: CPT | Performed by: STUDENT IN AN ORGANIZED HEALTH CARE EDUCATION/TRAINING PROGRAM

## 2025-04-08 PROCEDURE — 11970 RPLCMT TISS XPNDR PERM IMPLT: CPT | Performed by: PHYSICIAN ASSISTANT

## 2025-04-08 DEVICE — SMOOTH MODERATE HIGH PROFILE, 700CC  SMOOTH ROUND SILICONE
Type: IMPLANTABLE DEVICE | Site: CHEST | Status: FUNCTIONAL
Brand: MENTOR MEMORYGEL BOOST BREAST IMPLANT

## 2025-04-08 RX ORDER — HYDROMORPHONE HCL/PF 1 MG/ML
SYRINGE (ML) INJECTION AS NEEDED
Status: DISCONTINUED | OUTPATIENT
Start: 2025-04-08 | End: 2025-04-08

## 2025-04-08 RX ORDER — FENTANYL CITRATE/PF 50 MCG/ML
50 SYRINGE (ML) INJECTION
Status: DISCONTINUED | OUTPATIENT
Start: 2025-04-08 | End: 2025-04-08 | Stop reason: HOSPADM

## 2025-04-08 RX ORDER — MAGNESIUM HYDROXIDE 1200 MG/15ML
LIQUID ORAL AS NEEDED
Status: DISCONTINUED | OUTPATIENT
Start: 2025-04-08 | End: 2025-04-08 | Stop reason: HOSPADM

## 2025-04-08 RX ORDER — SODIUM CHLORIDE, SODIUM LACTATE, POTASSIUM CHLORIDE, CALCIUM CHLORIDE 600; 310; 30; 20 MG/100ML; MG/100ML; MG/100ML; MG/100ML
125 INJECTION, SOLUTION INTRAVENOUS CONTINUOUS
Status: DISCONTINUED | OUTPATIENT
Start: 2025-04-08 | End: 2025-04-08 | Stop reason: HOSPADM

## 2025-04-08 RX ORDER — DOCUSATE SODIUM 100 MG/1
100 CAPSULE, LIQUID FILLED ORAL 2 TIMES DAILY
Qty: 60 CAPSULE | Refills: 0 | Status: SHIPPED | OUTPATIENT
Start: 2025-04-08

## 2025-04-08 RX ORDER — MIDAZOLAM HYDROCHLORIDE 2 MG/2ML
INJECTION, SOLUTION INTRAMUSCULAR; INTRAVENOUS AS NEEDED
Status: DISCONTINUED | OUTPATIENT
Start: 2025-04-08 | End: 2025-04-08

## 2025-04-08 RX ORDER — ONDANSETRON 2 MG/ML
INJECTION INTRAMUSCULAR; INTRAVENOUS AS NEEDED
Status: DISCONTINUED | OUTPATIENT
Start: 2025-04-08 | End: 2025-04-08

## 2025-04-08 RX ORDER — SULFAMETHOXAZOLE AND TRIMETHOPRIM 800; 160 MG/1; MG/1
1 TABLET ORAL EVERY 12 HOURS SCHEDULED
Qty: 14 TABLET | Refills: 0 | Status: SHIPPED | OUTPATIENT
Start: 2025-04-08 | End: 2025-04-15

## 2025-04-08 RX ORDER — OXYCODONE HYDROCHLORIDE 5 MG/1
5 TABLET ORAL EVERY 4 HOURS PRN
Refills: 0 | Status: DISCONTINUED | OUTPATIENT
Start: 2025-04-08 | End: 2025-04-08 | Stop reason: HOSPADM

## 2025-04-08 RX ORDER — PROPOFOL 10 MG/ML
INJECTION, EMULSION INTRAVENOUS AS NEEDED
Status: DISCONTINUED | OUTPATIENT
Start: 2025-04-08 | End: 2025-04-08

## 2025-04-08 RX ORDER — ONDANSETRON 4 MG/1
4 TABLET, FILM COATED ORAL EVERY 8 HOURS PRN
Qty: 20 TABLET | Refills: 0 | Status: SHIPPED | OUTPATIENT
Start: 2025-04-08

## 2025-04-08 RX ORDER — FENTANYL CITRATE 50 UG/ML
INJECTION, SOLUTION INTRAMUSCULAR; INTRAVENOUS AS NEEDED
Status: DISCONTINUED | OUTPATIENT
Start: 2025-04-08 | End: 2025-04-08

## 2025-04-08 RX ORDER — ACETAMINOPHEN 325 MG/1
975 TABLET ORAL ONCE
Status: COMPLETED | OUTPATIENT
Start: 2025-04-08 | End: 2025-04-08

## 2025-04-08 RX ORDER — ACETAMINOPHEN 500 MG
500 TABLET ORAL EVERY 6 HOURS PRN
Qty: 60 TABLET | Refills: 0 | Status: SHIPPED | OUTPATIENT
Start: 2025-04-08

## 2025-04-08 RX ORDER — CLINDAMYCIN PHOSPHATE 900 MG/50ML
900 INJECTION, SOLUTION INTRAVENOUS ONCE
Status: COMPLETED | OUTPATIENT
Start: 2025-04-08 | End: 2025-04-08

## 2025-04-08 RX ORDER — OXYCODONE HYDROCHLORIDE 5 MG/1
5 TABLET ORAL EVERY 6 HOURS PRN
Qty: 15 TABLET | Refills: 0 | Status: SHIPPED | OUTPATIENT
Start: 2025-04-08 | End: 2025-04-18

## 2025-04-08 RX ORDER — LIDOCAINE HYDROCHLORIDE 10 MG/ML
INJECTION, SOLUTION EPIDURAL; INFILTRATION; INTRACAUDAL; PERINEURAL AS NEEDED
Status: DISCONTINUED | OUTPATIENT
Start: 2025-04-08 | End: 2025-04-08

## 2025-04-08 RX ORDER — ROCURONIUM BROMIDE 10 MG/ML
INJECTION, SOLUTION INTRAVENOUS AS NEEDED
Status: DISCONTINUED | OUTPATIENT
Start: 2025-04-08 | End: 2025-04-08

## 2025-04-08 RX ORDER — PROPOFOL 10 MG/ML
INJECTION, EMULSION INTRAVENOUS CONTINUOUS PRN
Status: DISCONTINUED | OUTPATIENT
Start: 2025-04-08 | End: 2025-04-08

## 2025-04-08 RX ORDER — HYDROMORPHONE HCL/PF 1 MG/ML
0.5 SYRINGE (ML) INJECTION
Status: DISCONTINUED | OUTPATIENT
Start: 2025-04-08 | End: 2025-04-08 | Stop reason: HOSPADM

## 2025-04-08 RX ORDER — DEXAMETHASONE SODIUM PHOSPHATE 10 MG/ML
INJECTION, SOLUTION INTRAMUSCULAR; INTRAVENOUS AS NEEDED
Status: DISCONTINUED | OUTPATIENT
Start: 2025-04-08 | End: 2025-04-08

## 2025-04-08 RX ORDER — METOCLOPRAMIDE HYDROCHLORIDE 5 MG/ML
10 INJECTION INTRAMUSCULAR; INTRAVENOUS ONCE AS NEEDED
Status: DISCONTINUED | OUTPATIENT
Start: 2025-04-08 | End: 2025-04-08 | Stop reason: HOSPADM

## 2025-04-08 RX ADMIN — FENTANYL CITRATE 50 MCG: 50 INJECTION, SOLUTION INTRAMUSCULAR; INTRAVENOUS at 10:00

## 2025-04-08 RX ADMIN — PROPOFOL 150 MG: 10 INJECTION, EMULSION INTRAVENOUS at 09:39

## 2025-04-08 RX ADMIN — SUGAMMADEX 200 MG: 100 INJECTION, SOLUTION INTRAVENOUS at 11:45

## 2025-04-08 RX ADMIN — SODIUM CHLORIDE, SODIUM LACTATE, POTASSIUM CHLORIDE, AND CALCIUM CHLORIDE 125 ML/HR: .6; .31; .03; .02 INJECTION, SOLUTION INTRAVENOUS at 08:17

## 2025-04-08 RX ADMIN — HYDROMORPHONE HYDROCHLORIDE 0.5 MG: 1 INJECTION, SOLUTION INTRAMUSCULAR; INTRAVENOUS; SUBCUTANEOUS at 11:17

## 2025-04-08 RX ADMIN — ROCURONIUM BROMIDE 20 MG: 10 INJECTION INTRAVENOUS at 10:14

## 2025-04-08 RX ADMIN — HYDROMORPHONE HYDROCHLORIDE 0.5 MG: 1 INJECTION, SOLUTION INTRAMUSCULAR; INTRAVENOUS; SUBCUTANEOUS at 12:31

## 2025-04-08 RX ADMIN — FENTANYL CITRATE 50 MCG: 50 INJECTION, SOLUTION INTRAMUSCULAR; INTRAVENOUS at 09:39

## 2025-04-08 RX ADMIN — ROCURONIUM BROMIDE 50 MG: 10 INJECTION INTRAVENOUS at 09:39

## 2025-04-08 RX ADMIN — LIDOCAINE HYDROCHLORIDE 50 MG: 10 SOLUTION INTRAVENOUS at 09:39

## 2025-04-08 RX ADMIN — CLINDAMYCIN IN 5 PERCENT DEXTROSE 900 MG: 18 INJECTION, SOLUTION INTRAVENOUS at 09:47

## 2025-04-08 RX ADMIN — ROCURONIUM BROMIDE 10 MG: 10 INJECTION INTRAVENOUS at 11:05

## 2025-04-08 RX ADMIN — ACETAMINOPHEN 975 MG: 325 TABLET, FILM COATED ORAL at 08:55

## 2025-04-08 RX ADMIN — DEXAMETHASONE SODIUM PHOSPHATE 10 MG: 10 INJECTION, SOLUTION INTRAMUSCULAR; INTRAVENOUS at 09:39

## 2025-04-08 RX ADMIN — MIDAZOLAM 2 MG: 1 INJECTION INTRAMUSCULAR; INTRAVENOUS at 09:33

## 2025-04-08 RX ADMIN — FENTANYL CITRATE 50 MCG: 50 INJECTION INTRAMUSCULAR; INTRAVENOUS at 12:27

## 2025-04-08 RX ADMIN — ONDANSETRON 4 MG: 2 INJECTION INTRAMUSCULAR; INTRAVENOUS at 09:39

## 2025-04-08 RX ADMIN — PROPOFOL 80 MCG/KG/MIN: 10 INJECTION, EMULSION INTRAVENOUS at 09:46

## 2025-04-08 NOTE — OP NOTE
OPERATIVE REPORT  PATIENT NAME: Nunu Tirado    :  1970  MRN: [id number]  Pt Location:  OR ROOM 10    SURGERY DATE: 2025    Surgeons and Role:     * Brando Olmedo MD - Primary     * Ned Hughes PA-C - Assisting     * Jordyn Murray PA-C - Assisting    Preop Diagnosis:  Malignant neoplasm of overlapping sites of right breast in female, estrogen receptor positive (HCC) [C50.811, Z17.0]  History of breast cancer [Z85.3]    Post-Op Diagnosis Codes:     * Malignant neoplasm of overlapping sites of right breast in female, estrogen receptor positive (HCC) [C50.811, Z17.0]     * History of breast cancer [Z85.3]    Procedure(s):  Right chest tissue expander exchange for permanent silicone implant  Revision of reconstructed right breast (excision of excess skin and tissue, lateral capsulorrhaphy, medial and superior capsulotomies)  Fat grafting to right chest (total 60 cc)    Specimen(s):  * No specimens in log *    Estimated Blood Loss:   Minimal    Drains:  * No LDAs found *    Anesthesia Type:   General    Operative Indications:  Malignant neoplasm of overlapping sites of right breast in female, estrogen receptor positive (HCC) [C50.811, Z17.0]  History of breast cancer [Z85.3]    Operative Findings:  Submuscular plane  Intact right chest tissue expander removed  Right breast implant: Moderate High Profile 700 cc. SMHB-700. Lot [id number]. SN [id number]  Total tumescent used 100 cc  Total fat grafted to right chest 60 cc  Total of 10 cc of exparel used for right multi-level intercostal nerve block  Liposuction incision in right lateral abdomen x1      Complications:   None    Procedure and Technique:  Patient was brought to the operating room, transferred to the operating table in supine fashion. After undergoing general anesthesia, a timeout was performed at which point all patient identifiers were deemed to be correct. The chest and abdomen were prepped and draped in the normal sterile fashion. I  first began by tumescing the right lateral abdomen with 100 cc of the normal tumescent solution. While allowing for the tumescent solution to take effect, I proceeded with excising the prior mastectomy scar and dissecting down to the pocket and entering the breast capsule. The intact tissue expander was removed. I then proceeded with medial and superior capsulotomies to enlarge the pocket in the submuscular plane. I then performed lateral suture capsulorrhaphy using 1-0 stratafix suture. I then performed right multi-level intercostal nerve block with 10 cc exparel. Sizers were placed and patient was sat upright to assess size. The excess skin and tissue were tailortacked and marked for excision. I then removed the sizer. The pocket was irrigated with antibiotic solution, dilute betadine, and iricept. Hemostasis was achieved with electrocautery. The selected implant was then irrigated with antibiotic solution. Gloves were changed and the selected implant was placed into the pocket. The capsule was closed with 3-0 stratafix suture. The dermis reapproximated with 3-0 vicryl and skin with 3-0 stratafix suture followed by exofin skin glue and tape. I then proceeded with harvesting fat from the right flank via small 0.5 cm incision in prior scar using lipografter system. After satisfactory amount of fat harvested, it was allowed to separate and aqueous layer was decanted. I then fat grafted the right breast and chest through the existing incision with total of 60 cc of fat. The abdominal donor site incision was closed with 4-0 nylon. Soft compression dressing and bra were applied.     This concluded the procedure. Patient tolerated the procedure well without complications. At the end of the case, all sponge, needle, and instrument counts were correct. Patient was awakened from anesthesia and taken to the PACU in stable condition.    I was present for the entire procedure, A qualified resident physician was not available  and A physician assistant was required during the procedure for retraction, tissue handling, dissection and suturing    Patient Disposition:  PACU     SIGNATURE: Brando Olmedo MD  DATE: April 8, 2025  TIME: 11:53 AM

## 2025-04-08 NOTE — INTERVAL H&P NOTE
H&P reviewed. Discussed fat grafting to the right chest with donor site right flank. Discussed risks and complications and need for possible serial fat grafting. Patient acknowledged. Consent updated.    Vitals:    04/08/25 0753   BP: 149/88   Pulse: 78   Resp: 18   Temp: 97.6 °F (36.4 °C)   SpO2: 100%

## 2025-04-08 NOTE — ANESTHESIA POSTPROCEDURE EVALUATION
Post-Op Assessment Note    CV Status:  Stable  Pain Score: 0    Pain management: adequate       Mental Status:  Alert and awake   Hydration Status:  Euvolemic   PONV Controlled:  Controlled   Airway Patency:  Patent     Post Op Vitals Reviewed: Yes    No anethesia notable event occurred.    Staff: Anesthesiologist, CRNA           Last Filed PACU Vitals:  Vitals Value Taken Time   Temp     Pulse 89 04/08/25 1213   BP     Resp     SpO2 95 % 04/08/25 1213   Vitals shown include unfiled device data.

## 2025-04-08 NOTE — DISCHARGE INSTR - AVS FIRST PAGE
Discharge instructions    -Take Tylenol 500 mg as scheduled for baseline mild to moderate pain control. For severe breakthrough pain, can use oxycodone.  -Do not drive or operate heavy machinery when taking narcotic pain medicine (oxycodone) as it can cause drowsiness.  -Do not get incisions wet for 3 days. After 3 days, can remove all dressings and shower. Leave clear strip over incision in place until you see us in the office.   -No submerging incisions (no baths, pools, hottubs). Pat dry incision and dress with gauze.  -Wear supportive bra at all times for compression. Sports bra is okay. No underwire. Make sure to put extra gauze on the side of the implant to ensure adequate compression.   -No strenuous activity, no heavy lifting (nothing over 5 lbs), no reaching above head, no hard pushing or pulling with arms.   -Take all medicines as prescribed.   -Take antibiotics as prescribed until completion - Bactrim x 7 days was sent to your pharmacy. Start taking tomorrow.   -Resume regular diet and home medications  -Call Plastic Surgery office to schedule post-op follow in 7-10 days.    Brando Olmedo MD   [facility] Plastic and Reconstructive Surgery   [address], [address]   [place] [address]   Office: [phone]

## 2025-04-08 NOTE — ANESTHESIA PREPROCEDURE EVALUATION
Medical History    History Comments   Hypertension    Hematuria    Proteinuria    Hyperkalemia    COVID-19 virus infection Nov. 2023   Anxiety    Allergic Seasonal/ 2 antibiotics   GERD (gastroesophageal reflux disease)    Headache(784.0)    Kidney stone    Pseudohypoaldosteronism type 2 (per patient)   Colon polyp    Migraines    UTI (urinary tract infection)    Wears glasses    Breast cancer (HCC) Right Mastectomy   Procedure:  RIGHT CHEST IMPLANT EXPANDER EXCHANGE FOR PERMANENTIMPLANT. (Right: Breast)  REVISION OF RIGHT RECONSTRUCTED BREAST. (Right: Breast)    Relevant Problems   ANESTHESIA (within normal limits)      CARDIO   (+) Hypertension   (+) Migraine without aura and without status migrainosus, not intractable      GYN   (+) Malignant neoplasm of overlapping sites of right breast in female, estrogen receptor positive (HCC)      NEURO/PSYCH   (+) Anxiety   (+) Migraine without aura and without status migrainosus, not intractable        Physical Exam    Airway    Mallampati score: II  TM Distance: >3 FB  Neck ROM: full     Dental       Cardiovascular  Rate: normal    Pulmonary  Pulmonary exam normal     Other Findings  Per pt denies anything remaining that is loose or removeablepost-pubertal.      Anesthesia Plan  ASA Score- 2     Anesthesia Type- general with ASA Monitors.         Additional Monitors:     Airway Plan: LMA.           Plan Factors-Exercise tolerance (METS): >4 METS.    Chart reviewed.    Patient summary reviewed.    Patient is not a current smoker.              Induction- intravenous.    Postoperative Plan- Plan for postoperative opioid use.         Informed Consent- Anesthetic plan and risks discussed with patient.  I personally reviewed this patient with the CRNA. Discussed and agreed on the Anesthesia Plan with the CRNA..      NPO Status:  Vitals Value Taken Time   Date of last liquid 04/07/25 04/08/25 0750   Time of last liquid 2330 04/08/25 0750   Date of last solid 04/07/25 04/08/25  0750   Time of last solid 2000 04/08/25 0757

## 2025-04-08 NOTE — NURSING NOTE
Awake and alert. Pain 5/10 right breast area. Declined pain medication at this time. Dressings clean, dry and intact. No drainage. Surgical bra in place.

## 2025-04-09 NOTE — ANESTHESIA POSTPROCEDURE EVALUATION
Post-Op Assessment Note    Last Filed PACU Vitals:  Vitals Value Taken Time   Temp 98.8 °F (37.1 °C) 04/08/25 1211   Pulse 81 04/08/25 1300   /65 04/08/25 1251   Resp 42 04/08/25 1259   SpO2 98 % 04/08/25 1300   Vitals shown include unfiled device data.    Modified Taniya:     Vitals Value Taken Time   Activity 2 04/08/25 1245   Respiration 2 04/08/25 1245   Circulation 2 04/08/25 1245   Consciousness 2 04/08/25 1245   Oxygen Saturation 2 04/08/25 1245     Modified Taniya Score: 10

## 2025-04-14 ENCOUNTER — OFFICE VISIT (OUTPATIENT)
Dept: PLASTIC SURGERY | Facility: CLINIC | Age: 55
End: 2025-04-14

## 2025-04-14 DIAGNOSIS — Z85.3 HISTORY OF BREAST CANCER: ICD-10-CM

## 2025-04-14 DIAGNOSIS — Z17.0 MALIGNANT NEOPLASM OF OVERLAPPING SITES OF RIGHT BREAST IN FEMALE, ESTROGEN RECEPTOR POSITIVE (HCC): Primary | ICD-10-CM

## 2025-04-14 DIAGNOSIS — C50.811 MALIGNANT NEOPLASM OF OVERLAPPING SITES OF RIGHT BREAST IN FEMALE, ESTROGEN RECEPTOR POSITIVE (HCC): Primary | ICD-10-CM

## 2025-04-14 PROCEDURE — 99024 POSTOP FOLLOW-UP VISIT: CPT

## 2025-04-14 NOTE — PROGRESS NOTES
[facility]  [address], [address], [place] [address]  [phone]    Patient Identification: Nunu Tirado is a 54 y.o. female     History of Present Illness: The patient is a 54 y.o. female  who presents to the office for routine post-operative evaluation. Patient is 6 days s/p Right Chest Implant Expander Exchange For Permanentimplant. - Right and Revision Of Right Reconstructed Breast. - Right  on 4/8/2025 by Dr. Olmedo.   Patient is doing well with no complaints. Reports adequate pain control. Wearing compressive bra 24/7. Denies fever, chills, nausea, vomiting, bleeding, malaise, swelling, incisional erythema, or signs of infection. Nylon suture to RLQ fat harvest site intact. Exofin fusion in place over incision.     Right breast implant: Moderate High Profile 700 cc. SMHB-700. Lot [id number]. SN [id number]         The following portions of the patient's history were reviewed: allergies, current medications, past medical history, past surgical history, and past social history    Past Medical History:   Diagnosis Date    Allergic     Seasonal/ 2 antibiotics    Anxiety 11/11/2022    Breast cancer (HCC) 1/10/24    Right Mastectomy    Colon polyp     COVID-19 virus infection 07/27/2021 Nov. 2023    GERD (gastroesophageal reflux disease)     Headache(784.0)     Hematuria     Hyperkalemia     Hypertension 1980    Kidney stone 1985    Migraines     Proteinuria     Pseudohypoaldosteronism     type 2 (per patient)    UTI (urinary tract infection)     Wears glasses         Past Surgical History:   Procedure Laterality Date    BREAST BIOPSY Right 01/08/2024    right breast    COLONOSCOPY      DENTAL SURGERY      INSERTION OF BREAST TISSUE EXPANDER Right 4/8/2025    Procedure: RIGHT CHEST IMPLANT EXPANDER EXCHANGE FOR PERMANENTIMPLANT.;  Surgeon: Brando Olmedo MD;  Location:  MAIN OR;  Service: Plastics    KIDNEY STONE SURGERY  1985    MAMMO STEREOTACTIC BREAST BIOPSY  RIGHT (ALL INC) Right 01/08/2024    MAMMO STEREOTACTIC BREAST BIOPSY RIGHT (ALL INC) Right 02/06/2024    MAMMO STEREOTACTIC BREAST BIOPSY RIGHT (ALL INC) Right 02/06/2024    MASTECTOMY Right     4/2024    FL MASTECTOMY SIMPLE COMPLETE Right 04/02/2024    Procedure: RIGHT BREAST MASTECTOMY, RIGHT LYMPHOSCINTIGRAPHY, LYMPHATIC MAPPING, SENTINEL LYMPH NODE BIOPSY;  Surgeon: Dinorah Mcqueen MD;  Location[facility];  Service: Surgical Oncology    FL REVISION OF RECONSTRUCTED BREAST Right 4/8/2025    Procedure: REVISION OF RIGHT RECONSTRUCTED BREAST.;  Surgeon: Brando Olmedo MD;  Location: [facility];  Service: Plastics    FL TISSUE EXPANDER PLACEMENT BREAST RECONSTRUCTION Right 04/02/2024    Procedure: RIGHT BREAST IMMEDIATE RECON W/ TISSUE EXPANDER AND ADM;  Surgeon: Brando Olmedo MD;  Location[facility];  Service: Plastics    US GUIDED BREAST BIOPSY LEFT COMPLETE Left 02/06/2024    US GUIDED BREAST BIOPSY RIGHT COMPLETE Right 01/08/2024    US GUIDED BREAST BIOPSY RIGHT COMPLETE Right 02/06/2024       Allergies   Allergen Reactions    Amoxicillin Hives    Cefaclor Hives          Current Outpatient Medications:     acetaminophen (TYLENOL) 500 mg tablet, Take 1 tablet (500 mg total) by mouth every 4 (four) hours as needed for mild pain or moderate pain (Patient taking differently: Take 1,000 mg by mouth every 4 (four) hours as needed for mild pain or moderate pain), Disp: 30 tablet, Rfl: 2    acetaminophen (TYLENOL) 500 mg tablet, Take 1 tablet (500 mg total) by mouth every 6 (six) hours as needed for mild pain or moderate pain, Disp: 60 tablet, Rfl: 0    ALPRAZolam (XANAX) 0.25 mg tablet, Take 1 tablet (0.25 mg total) by mouth 2 (two) times a day as needed for anxiety or sleep (Patient taking differently: Take 0.25 mg by mouth 2 (two) times a day as needed for anxiety or sleep Pt's take 1/2), Disp: 60 tablet, Rfl: 0    amLODIPine (NORVASC) 2.5 mg tablet, Take 1 tablet (2.5 mg total) by mouth daily, Disp: 90 tablet,  Rfl: 1    b complex vitamins capsule, Take 1 capsule by mouth daily, Disp: , Rfl:     docusate sodium (COLACE) 100 mg capsule, Take 1 capsule (100 mg total) by mouth 2 (two) times a day, Disp: 60 capsule, Rfl: 0    famotidine (Pepcid) 20 mg tablet, Take 20 mg by mouth 2 (two) times a day as needed for heartburn or indigestion, Disp: , Rfl:     fexofenadine (ALLEGRA) 60 MG tablet, Take 60 mg by mouth as needed, Disp: , Rfl:     fluticasone (FLONASE) 50 mcg/act nasal spray, 1 spray into each nostril daily (Patient taking differently: 1 spray into each nostril if needed for allergies), Disp: 16 g, Rfl: 0    hydroCHLOROthiazide 25 mg tablet, Take 1 tablet (25 mg total) by mouth daily, Disp: 90 tablet, Rfl: 1    Ibuprofen 200 MG CAPS, Take 200 mg by mouth as needed, Disp: , Rfl:     Magnesium 400 MG CAPS, Take 400 mg by mouth in the morning, Disp: , Rfl:     Multiple Vitamin (multivitamin) tablet, Take 1 tablet by mouth daily, Disp: , Rfl:     ondansetron (ZOFRAN) 4 mg tablet, Take 1 tablet (4 mg total) by mouth every 8 (eight) hours as needed for nausea or vomiting, Disp: 20 tablet, Rfl: 0    oxyCODONE (Roxicodone) 5 immediate release tablet, Take 1 tablet (5 mg total) by mouth every 6 (six) hours as needed for severe pain for up to 10 days Max Daily Amount: 20 mg, Disp: 15 tablet, Rfl: 0    Probiotic Product (PROBIOTIC DAILY PO), Take 1 tablet by mouth daily, Disp: , Rfl:     propranolol (Inderal LA) 80 mg 24 hr capsule, Take 1 capsule (80 mg total) by mouth daily, Disp: 90 capsule, Rfl: 1    rizatriptan (MAXALT-MLT) 5 mg disintegrating tablet, Take 1 tablet (5 mg total) by mouth once as needed for migraine for up to 20 doses may repeat in 2 hours if necessary, Disp: 10 tablet, Rfl: 1    sulfamethoxazole-trimethoprim (BACTRIM DS) 800-160 mg per tablet, Take 1 tablet by mouth every 12 (twelve) hours for 7 days, Disp: 14 tablet, Rfl: 0      Social History     Socioeconomic History    Marital status: /Civil Union      Spouse name: Not on file    Number of children: Not on file    Years of education: Not on file    Highest education level: Not on file   Occupational History    Not on file   Tobacco Use    Smoking status: Former     Current packs/day: 0.00     Average packs/day: 0.3 packs/day for 7.0 years (1.8 ttl pk-yrs)     Types: Cigarettes     Start date:      Quit date: 1993     Years since quittin.3     Passive exposure: Past    Smokeless tobacco: Never   Vaping Use    Vaping status: Never Used   Substance and Sexual Activity    Alcohol use: Yes     Alcohol/week: 3.0 standard drinks of alcohol     Types: 3 Cans of beer per week     Comment: social    Drug use: No    Sexual activity: Yes     Partners: Male     Birth control/protection: Male Sterilization   Other Topics Concern    Not on file   Social History Narrative    Not on file     Social Drivers of Health     Financial Resource Strain: Not on file   Food Insecurity: No Food Insecurity (4/3/2024)    Nursing - Inadequate Food Risk Classification     Worried About Running Out of Food in the Last Year: Never true     Ran Out of Food in the Last Year: Never true     Ran Out of Food in the Last Year: Not on file   Transportation Needs: No Transportation Needs (2024)    OASIS : Transportation     Lack of Transportation (Medical): No     Lack of Transportation (Non-Medical): No     Patient Unable or Declines to Respond: No   Physical Activity: Not on file   Stress: Not on file   Social Connections: Not on file   Intimate Partner Violence: Not on file   Housing Stability: Low Risk  (4/3/2024)    Housing Stability Vital Sign     Unable to Pay for Housing in the Last Year: No     Number of Times Moved in the Last Year: 1     Homeless in the Last Year: No          Review of Systems  Constitutional: Denies fevers, chills, nausea, vomiting, malaise, or pain.  Skin: Denies any bleeding, warmth, erythema, edema, mucopurulent drainage, or signs of infection.      Physical Exam  General: pleasant and well appearing, in no acute distress.   Skin: surgical incision C/D/I. No erythema, wound dehiscence, edema, obvious hematoma, or seroma.   Exofin fusion in place over incision. Expected post-operative bruising to fat grafting site on right breast and RLQ. No tenderness to palpation, warmth, odors, discharge, drainage, or gross signs of infection. Skin perfusion intact. Nylon suture in place over RLQ fat harvest site, removed without any issues.     SEE MEDIA       Assessment and Plan:  The patient is a 54 y.o. female who presents to the office for routine post-operative evaluation. Patient is 6 days s/p Right Chest Implant Expander Exchange For Permanentimplant. - Right and Revision Of Right Reconstructed Breast. - Right  on 4/8/2025 by Dr. Olmedo.       -At today's visit, breast/incision inspected - healing appropriately with no signs of infection   -Nylon suture over RLQ fat harvest site snipped   -Continue with compression 24/7 for 4-6 weeks  -The patient chooses to return in 1 week for Exofin removal and interval post-operative evaluation, or sooner if needed.   -Gently wash surgical sites daily with mild soap and water (please use gentle/hypoallergenic soap like Dial, no scrubbing). Pat dry. Do not let direct water pressure from shower hit your incision(s). No swimming or soaking in bathtub for 6-8 weeks post-operatively, or until wound is fully healed.   -Continue monitoring for signs of infection including increased redness, swelling, fever (temperature > 100.4 F), pus or foul-smelling drainage from your incision. Severe pain not relieved by your pain medications. Please call immediately with any of these symptoms.   -Reviewed activity restrictions. Ambulation encouraged. Avoid lifting, pushing, pulling > 10 lbs for a minimum of 4-6 weeks post-operatively. Avoid strenuous exercise.   -The patient is to call the office with any questions or concerns. All of the patient's  questions were answered at this time and they agree with the plan of care.        Jordyn Murray PA-C  [facility]astic [facility] Reconstruct[facility] Surgery

## 2025-04-21 ENCOUNTER — OFFICE VISIT (OUTPATIENT)
Dept: PLASTIC SURGERY | Facility: CLINIC | Age: 55
End: 2025-04-21

## 2025-04-21 DIAGNOSIS — Z17.0 MALIGNANT NEOPLASM OF OVERLAPPING SITES OF RIGHT BREAST IN FEMALE, ESTROGEN RECEPTOR POSITIVE (HCC): Primary | ICD-10-CM

## 2025-04-21 DIAGNOSIS — Z85.3 HISTORY OF BREAST CANCER: ICD-10-CM

## 2025-04-21 DIAGNOSIS — C50.811 MALIGNANT NEOPLASM OF OVERLAPPING SITES OF RIGHT BREAST IN FEMALE, ESTROGEN RECEPTOR POSITIVE (HCC): Primary | ICD-10-CM

## 2025-04-21 PROCEDURE — 99024 POSTOP FOLLOW-UP VISIT: CPT

## 2025-04-21 NOTE — PROGRESS NOTES
[facility]  [address], [address], [place] [address]  [phone]    Patient Identification: Nunu Tirado is a 54 y.o. female     History of Present Illness: The patient is a 54 y.o. female  who presents to the office for routine post-operative evaluation. Patient is 13 days s/p Right Chest Implant Expander Exchange For Permanentimplant. - Right and Revision Of Right Reconstructed Breast. - Right  on 4/8/2025 by Dr. Olmedo.   Patient is doing well with no complaints. Reports adequate pain control. Exofin fusion in place over incision. Denies fever, chills, nausea, vomiting, bleeding, malaise, swelling, incisional erythema, or signs of infection.         The following portions of the patient's history were reviewed: allergies, current medications, past medical history, past surgical history, and past social history    Past Medical History:   Diagnosis Date    Allergic     Seasonal/ 2 antibiotics    Anxiety 11/11/2022    Breast cancer (HCC) 1/10/24    Right Mastectomy    Colon polyp     COVID-19 virus infection 07/27/2021 Nov. 2023    GERD (gastroesophageal reflux disease)     Headache(784.0)     Hematuria     Hyperkalemia     Hypertension 1980    Kidney stone 1985    Migraines     Proteinuria     Pseudohypoaldosteronism     type 2 (per patient)    UTI (urinary tract infection)     Wears glasses         Past Surgical History:   Procedure Laterality Date    BREAST BIOPSY Right 01/08/2024    right breast    COLONOSCOPY      DENTAL SURGERY      INSERTION OF BREAST TISSUE EXPANDER Right 4/8/2025    Procedure: RIGHT CHEST IMPLANT EXPANDER EXCHANGE FOR PERMANENTIMPLANT.;  Surgeon: Brando Olmedo MD;  Location:  MAIN OR;  Service: Plastics    KIDNEY STONE SURGERY  1985    MAMMO STEREOTACTIC BREAST BIOPSY RIGHT (ALL INC) Right 01/08/2024    MAMMO STEREOTACTIC BREAST BIOPSY RIGHT (ALL INC) Right 02/06/2024    MAMMO STEREOTACTIC BREAST BIOPSY RIGHT (ALL INC) Right  02/06/2024    MASTECTOMY Right     4/2024    [place] MASTECTOMY SIMPLE COMPLETE Right 04/02/2024    Procedure: RIGHT BREAST MASTECTOMY, RIGHT LYMPHOSCINTIGRAPHY, LYMPHATIC MAPPING, SENTINEL LYMPH NODE BIOPSY;  Surgeon: Dinorah Mcqueen MD;  Location: [facility];  Service: Surgical Oncology    [place] REVISION OF RECONSTRUCTED BREAST Right 4/8/2025    Procedure: REVISION OF RIGHT RECONSTRUCTED BREAST.;  Surgeon: Brando Olmedo MD;  Location: [facility];  Service: Plastics    NY TISSUE EXPANDER PLACEMENT BREAST RECONSTRUCTION Right 04/02/2024    Procedure: RIGHT BREAST IMMEDIATE RECON W/ TISSUE EXPANDER AND ADM;  Surgeon: Brando Olmedo MD;  Location: [facility];  Service: Plastics    US GUIDED BREAST BIOPSY LEFT COMPLETE Left 02/06/2024    US GUIDED BREAST BIOPSY RIGHT COMPLETE Right 01/08/2024    US GUIDED BREAST BIOPSY RIGHT COMPLETE Right 02/06/2024       Allergies   Allergen Reactions    Amoxicillin Hives    Cefaclor Hives          Current Outpatient Medications:     acetaminophen (TYLENOL) 500 mg tablet, Take 1 tablet (500 mg total) by mouth every 4 (four) hours as needed for mild pain or moderate pain (Patient taking differently: Take 1,000 mg by mouth every 4 (four) hours as needed for mild pain or moderate pain), Disp: 30 tablet, Rfl: 2    acetaminophen (TYLENOL) 500 mg tablet, Take 1 tablet (500 mg total) by mouth every 6 (six) hours as needed for mild pain or moderate pain, Disp: 60 tablet, Rfl: 0    ALPRAZolam (XANAX) 0.25 mg tablet, Take 1 tablet (0.25 mg total) by mouth 2 (two) times a day as needed for anxiety or sleep (Patient taking differently: Take 0.25 mg by mouth 2 (two) times a day as needed for anxiety or sleep Pt's take 1/2), Disp: 60 tablet, Rfl: 0    amLODIPine (NORVASC) 2.5 mg tablet, Take 1 tablet (2.5 mg total) by mouth daily, Disp: 90 tablet, Rfl: 1    b complex vitamins capsule, Take 1 capsule by mouth daily, Disp: , Rfl:     docusate sodium (COLACE) 100 mg capsule, Take 1 capsule (100 mg total) by  mouth 2 (two) times a day, Disp: 60 capsule, Rfl: 0    famotidine (Pepcid) 20 mg tablet, Take 20 mg by mouth 2 (two) times a day as needed for heartburn or indigestion, Disp: , Rfl:     fexofenadine (ALLEGRA) 60 MG tablet, Take 60 mg by mouth as needed, Disp: , Rfl:     fluticasone (FLONASE) 50 mcg/act nasal spray, 1 spray into each nostril daily (Patient taking differently: 1 spray into each nostril if needed for allergies), Disp: 16 g, Rfl: 0    hydroCHLOROthiazide 25 mg tablet, Take 1 tablet (25 mg total) by mouth daily, Disp: 90 tablet, Rfl: 1    Ibuprofen 200 MG CAPS, Take 200 mg by mouth as needed, Disp: , Rfl:     Magnesium 400 MG CAPS, Take 400 mg by mouth in the morning, Disp: , Rfl:     Multiple Vitamin (multivitamin) tablet, Take 1 tablet by mouth daily, Disp: , Rfl:     ondansetron (ZOFRAN) 4 mg tablet, Take 1 tablet (4 mg total) by mouth every 8 (eight) hours as needed for nausea or vomiting, Disp: 20 tablet, Rfl: 0    Probiotic Product (PROBIOTIC DAILY PO), Take 1 tablet by mouth daily, Disp: , Rfl:     propranolol (Inderal LA) 80 mg 24 hr capsule, Take 1 capsule (80 mg total) by mouth daily, Disp: 90 capsule, Rfl: 1    rizatriptan (MAXALT-MLT) 5 mg disintegrating tablet, Take 1 tablet (5 mg total) by mouth once as needed for migraine for up to 20 doses may repeat in 2 hours if necessary, Disp: 10 tablet, Rfl: 1      Social History     Socioeconomic History    Marital status: /Civil Union     Spouse name: Not on file    Number of children: Not on file    Years of education: Not on file    Highest education level: Not on file   Occupational History    Not on file   Tobacco Use    Smoking status: Former     Current packs/day: 0.00     Average packs/day: 0.3 packs/day for 7.0 years (1.8 ttl pk-yrs)     Types: Cigarettes     Start date:      Quit date: 1993     Years since quittin.3     Passive exposure: Past    Smokeless tobacco: Never   Vaping Use    Vaping status: Never Used    Substance and Sexual Activity    Alcohol use: Yes     Alcohol/week: 3.0 standard drinks of alcohol     Types: 3 Cans of beer per week     Comment: social    Drug use: No    Sexual activity: Yes     Partners: Male     Birth control/protection: Male Sterilization   Other Topics Concern    Not on file   Social History Narrative    Not on file     Social Drivers of Health     Financial Resource Strain: Not on file   Food Insecurity: No Food Insecurity (4/3/2024)    Nursing - Inadequate Food Risk Classification     Worried About Running Out of Food in the Last Year: Never true     Ran Out of Food in the Last Year: Never true     Ran Out of Food in the Last Year: Not on file   Transportation Needs: No Transportation Needs (4/18/2024)    OASIS : Transportation     Lack of Transportation (Medical): No     Lack of Transportation (Non-Medical): No     Patient Unable or Declines to Respond: No   Physical Activity: Not on file   Stress: Not on file   Social Connections: Not on file   Intimate Partner Violence: Not on file   Housing Stability: Low Risk  (4/3/2024)    Housing Stability Vital Sign     Unable to Pay for Housing in the Last Year: No     Number of Times Moved in the Last Year: 1     Homeless in the Last Year: No          Review of Systems  Constitutional: Denies fevers, chills, nausea, vomiting, malaise, or pain.  Skin: Denies any bleeding, warmth, erythema, edema, mucopurulent drainage, or signs of infection.      Physical Exam  General: pleasant and well appearing, in no acute distress.   Skin: surgical incision C/D/I. Exofin fusion in place over incision, removed and incision held together well. No erythema, wound dehiscence, edema, obvious hematoma, or seroma. Resolving bruising to superior aspect of breast. Implant soft and mobile. No tenderness to palpation, warmth, odors, discharge, drainage, or gross signs of infection. Skin perfusion intact.       Assessment and Plan:  The patient is a 54 y.o. female  who presents to the office for routine post-operative evaluation. Patient is 13 days s/p Right Chest Implant Expander Exchange For Permanentimplant. - Right and Revision Of Right Reconstructed Breast. - Right  on 4/8/2025 by Dr. Olmedo.       -At today's visit, exofin removed and incision remained C/D/I  -Continue with compressive bra 24/7 for 4-6 weeks post-operatively   -Can do some light ROM exercises for her arms    -The patient chooses to return in 2 weeks for 1 month post-op - Dr. Olmedo to stop in if available   -Will be interested in left breast reduction for symmetry in the future  -May need extension on going back to work if she does not feel ready, will let us know at next visit   -Gently wash surgical sites daily with mild soap and water (please use gentle/hypoallergenic soap like Dial, no scrubbing). Pat dry. Do not let direct water pressure from shower hit your incision(s). No swimming or soaking in bathtub for 6-8 weeks post-operatively, or until wound is fully healed.   -Continue monitoring for signs of infection including increased redness, swelling, fever (temperature > 100.4 F), pus or foul-smelling drainage from your incision. Severe pain not relieved by your pain medications. Please call immediately with any of these symptoms.   -Reviewed activity restrictions. Ambulation encouraged. Avoid lifting, pushing, pulling > 10 lbs for a minimum of 4-6 weeks post-operatively. Avoid strenuous exercise.   -The patient is to call the office with any questions or concerns. All of the patient's questions were answered at this time and they agree with the plan of care.        Jordyn Murary PA-C  [facility]astic and Reconstructive Surgery

## 2025-04-22 DIAGNOSIS — G43.009 MIGRAINE WITHOUT AURA AND WITHOUT STATUS MIGRAINOSUS, NOT INTRACTABLE: ICD-10-CM

## 2025-04-23 RX ORDER — RIZATRIPTAN BENZOATE 5 MG/1
5 TABLET, ORALLY DISINTEGRATING ORAL ONCE AS NEEDED
Qty: 10 TABLET | Refills: 0 | Status: SHIPPED | OUTPATIENT
Start: 2025-04-23

## 2025-04-24 ENCOUNTER — TELEPHONE (OUTPATIENT)
Dept: SURGICAL ONCOLOGY | Facility: CLINIC | Age: 55
End: 2025-04-24

## 2025-04-24 NOTE — TELEPHONE ENCOUNTER
Left voicemail in regards to cancelling appointment with Dr. Mcqueen on 6/4 due to Dr. Mcqueen's schedule changing. Patient already has an appointment with Y[facility] on 5/8. Provided the hopeline number and out a message in patient's MyChart.

## 2025-04-26 DIAGNOSIS — I10 HYPERTENSION, ESSENTIAL: ICD-10-CM

## 2025-04-28 RX ORDER — PROPRANOLOL HYDROCHLORIDE 80 MG/1
80 CAPSULE, EXTENDED RELEASE ORAL DAILY
Qty: 90 CAPSULE | Refills: 1 | Status: SHIPPED | OUTPATIENT
Start: 2025-04-28 | End: 2025-10-25

## 2025-05-05 ENCOUNTER — PATIENT MESSAGE (OUTPATIENT)
Age: 55
End: 2025-05-05

## 2025-05-05 ENCOUNTER — OFFICE VISIT (OUTPATIENT)
Dept: PLASTIC SURGERY | Facility: CLINIC | Age: 55
End: 2025-05-05

## 2025-05-05 DIAGNOSIS — Z17.0 MALIGNANT NEOPLASM OF OVERLAPPING SITES OF RIGHT BREAST IN FEMALE, ESTROGEN RECEPTOR POSITIVE (HCC): Primary | ICD-10-CM

## 2025-05-05 DIAGNOSIS — C50.811 MALIGNANT NEOPLASM OF OVERLAPPING SITES OF RIGHT BREAST IN FEMALE, ESTROGEN RECEPTOR POSITIVE (HCC): Primary | ICD-10-CM

## 2025-05-05 PROCEDURE — 99024 POSTOP FOLLOW-UP VISIT: CPT

## 2025-05-05 NOTE — PROGRESS NOTES
[facility] Plastic and Reconstructive Surgery  [address], [address], [place] [address]  [phone]    Patient Identification: Nunu Tirado is a 54 y.o. female     History of Present Illness: The patient is a 54 y.o. female  who presents to the office for routine post-operative evaluation. Patient is 27 days s/p Right Chest Implant Expander Exchange For Permanentimplant. - Right and Revision Of Right Reconstructed Breast. - Right  on 4/8/2025 by Dr. Olmedo.   Patient is doing well. Stating she feels quite some soreness to right breast, especially after regular day-to-day activities and towards the end of the day. Asking for extension off from work. Denies fever, chills, nausea, vomiting, bleeding, malaise, swelling, incisional erythema, or signs of infection.         The following portions of the patient's history were reviewed: allergies, current medications, past medical history, past surgical history, and past social history    Past Medical History:   Diagnosis Date    Allergic     Seasonal/ 2 antibiotics    Anxiety 11/11/2022    Breast cancer (HCC) 1/10/24    Right Mastectomy    Colon polyp     COVID-19 virus infection 07/27/2021 Nov. 2023    GERD (gastroesophageal reflux disease)     Headache(784.0)     Hematuria     Hyperkalemia     Hypertension 1980    Kidney stone 1985    Migraines     Proteinuria     Pseudohypoaldosteronism     type 2 (per patient)    UTI (urinary tract infection)     Wears glasses         Past Surgical History:   Procedure Laterality Date    BREAST BIOPSY Right 01/08/2024    right breast    COLONOSCOPY      DENTAL SURGERY      INSERTION OF BREAST TISSUE EXPANDER Right 4/8/2025    Procedure: RIGHT CHEST IMPLANT EXPANDER EXCHANGE FOR PERMANENTIMPLANT.;  Surgeon: Brando Olmedo MD;  Location:  MAIN OR;  Service: Plastics    KIDNEY STONE SURGERY  1985    MAMMO STEREOTACTIC BREAST BIOPSY RIGHT (ALL INC) Right 01/08/2024    MAMMO STEREOTACTIC BREAST BIOPSY RIGHT (ALL INC)  Right 02/06/2024    MAMMO STEREOTACTIC BREAST BIOPSY RIGHT (ALL INC) Right 02/06/2024    MASTECTOMY Right     4/2024    NE MASTECTOMY SIMPLE COMPLETE Right 04/02/2024    Procedure: RIGHT BREAST MASTECTOMY, RIGHT LYMPHOSCINTIGRAPHY, LYMPHATIC MAPPING, SENTINEL LYMPH NODE BIOPSY;  Surgeon: Dinorah Mcqueen MD;  Location: [facility];  Service: Surgical Oncology    NE REVISION OF RECONSTRUCTED BREAST Right 4/8/2025    Procedure: REVISION OF RIGHT RECONSTRUCTED BREAST.;  Surgeon: Brando Olmedo MD;  Location: [facility] MA[facility] OR;  Service: Plastics    NE TISSUE EXPANDER PLACEMENT BREAST RECONSTRUCTION Right 04/02/2024    Procedure: RIGHT BREAST IMMEDIATE RECON W/ TISSUE EXPANDER AND ADM;  Surgeon: Brando Olmedo MD;  Location: [facility];  Service: Plastics    US GUIDED BREAST BIOPSY LEFT COMPLETE Left 02/06/2024    US GUIDED BREAST BIOPSY RIGHT COMPLETE Right 01/08/2024    US GUIDED BREAST BIOPSY RIGHT COMPLETE Right 02/06/2024       Allergies   Allergen Reactions    Amoxicillin Hives    Cefaclor Hives          Current Outpatient Medications:     acetaminophen (TYLENOL) 500 mg tablet, Take 1 tablet (500 mg total) by mouth every 4 (four) hours as needed for mild pain or moderate pain (Patient taking differently: Take 1,000 mg by mouth every 4 (four) hours as needed for mild pain or moderate pain), Disp: 30 tablet, Rfl: 2    acetaminophen (TYLENOL) 500 mg tablet, Take 1 tablet (500 mg total) by mouth every 6 (six) hours as needed for mild pain or moderate pain, Disp: 60 tablet, Rfl: 0    ALPRAZolam (XANAX) 0.25 mg tablet, Take 1 tablet (0.25 mg total) by mouth 2 (two) times a day as needed for anxiety or sleep (Patient taking differently: Take 0.25 mg by mouth 2 (two) times a day as needed for anxiety or sleep Pt's take 1/2), Disp: 60 tablet, Rfl: 0    amLODIPine (NORVASC) 2.5 mg tablet, Take 1 tablet (2.5 mg total) by mouth daily, Disp: 90 tablet, Rfl: 1    b complex vitamins capsule, Take 1 capsule by mouth daily, Disp: , Rfl:      docusate sodium (COLACE) 100 mg capsule, Take 1 capsule (100 mg total) by mouth 2 (two) times a day, Disp: 60 capsule, Rfl: 0    famotidine (Pepcid) 20 mg tablet, Take 20 mg by mouth 2 (two) times a day as needed for heartburn or indigestion, Disp: , Rfl:     fexofenadine (ALLEGRA) 60 MG tablet, Take 60 mg by mouth as needed, Disp: , Rfl:     fluticasone (FLONASE) 50 mcg/act nasal spray, 1 spray into each nostril daily (Patient taking differently: 1 spray into each nostril if needed for allergies), Disp: 16 g, Rfl: 0    hydroCHLOROthiazide 25 mg tablet, Take 1 tablet (25 mg total) by mouth daily, Disp: 90 tablet, Rfl: 1    Ibuprofen 200 MG CAPS, Take 200 mg by mouth as needed, Disp: , Rfl:     Magnesium 400 MG CAPS, Take 400 mg by mouth in the morning, Disp: , Rfl:     Multiple Vitamin (multivitamin) tablet, Take 1 tablet by mouth daily, Disp: , Rfl:     ondansetron (ZOFRAN) 4 mg tablet, Take 1 tablet (4 mg total) by mouth every 8 (eight) hours as needed for nausea or vomiting, Disp: 20 tablet, Rfl: 0    Probiotic Product (PROBIOTIC DAILY PO), Take 1 tablet by mouth daily, Disp: , Rfl:     propranolol (Inderal LA) 80 mg 24 hr capsule, Take 1 capsule (80 mg total) by mouth daily, Disp: 90 capsule, Rfl: 1    rizatriptan (MAXALT-MLT) 5 mg disintegrating tablet, Take 1 tablet (5 mg total) by mouth once as needed for migraine for up to 20 doses may repeat in 2 hours if necessary, Disp: 10 tablet, Rfl: 0      Social History     Socioeconomic History    Marital status: /Civil Union     Spouse name: Not on file    Number of children: Not on file    Years of education: Not on file    Highest education level: Not on file   Occupational History    Not on file   Tobacco Use    Smoking status: Former     Current packs/day: 0.00     Average packs/day: 0.3 packs/day for 7.0 years (1.8 ttl pk-yrs)     Types: Cigarettes     Start date:      Quit date: 1993     Years since quittin.3     Passive exposure: Past     Smokeless tobacco: Never   Vaping Use    Vaping status: Never Used   Substance and Sexual Activity    Alcohol use: Yes     Alcohol/week: 3.0 standard drinks of alcohol     Types: 3 Cans of beer per week     Comment: social    Drug use: No    Sexual activity: Yes     Partners: Male     Birth control/protection: Male Sterilization   Other Topics Concern    Not on file   Social History Narrative    Not on file     Social Drivers of Health     Financial Resource Strain: Not on file   Food Insecurity: No Food Insecurity (4/3/2024)    Nursing - Inadequate Food Risk Classification     Worried About Running Out of Food in the Last Year: Never true     Ran Out of Food in the Last Year: Never true     Ran Out of Food in the Last Year: Not on file   Transportation Needs: No Transportation Needs (4/18/2024)    OASIS : Transportation     Lack of Transportation (Medical): No     Lack of Transportation (Non-Medical): No     Patient Unable or Declines to Respond: No   Physical Activity: Not on file   Stress: Not on file   Social Connections: Not on file   Intimate Partner Violence: Not on file   Housing Stability: Low Risk  (4/3/2024)    Housing Stability Vital Sign     Unable to Pay for Housing in the Last Year: No     Number of Times Moved in the Last Year: 1     Homeless in the Last Year: No          Review of Systems  Constitutional: Denies fevers, chills, nausea, vomiting, malaise, or pain. +soreness to left breast  Skin: Denies any bleeding, warmth, erythema, edema, mucopurulent drainage, or signs of infection.     Physical Exam  General: pleasant and well appearing, in no acute distress.   Skin: surgical incision C/D/I, healing well with good scar formation. No erythema, wound dehiscence, edema, obvious hematoma, or seroma. Bruising to right breast has significantly improved since last visit. Mild tenderness to palpation to right axillary region, but no warmth, odors, discharge, drainage, or gross signs of infection.  Implant soft, skin perfusion intact.     SEE MEDIA       Assessment and Plan:  The patient is a 54 y.o. female who presents to the office for routine post-operative evaluation. Patient is 27 days s/p Right Chest Implant Expander Exchange For Permanentimplant. - Right and Revision Of Right Reconstructed Breast. - Right  on 4/8/2025 by Dr. Olmedo      -At today's visit, right breast/incision inspected - healing well with no signs of infection   -Discussed scar maturation with patient, which will take approximately 12 months. Discussed scar massage and using silicone-based scar products. Massage to right breast also encouraged.   -Reviewed activity restrictions. Ambulation encouraged. Avoid lifting, pushing, pulling > 10 lbs for a minimum of 4-6 weeks post-operatively. Avoid strenuous exercise.   -Provided work note to return to work on 5/19 (6 weeks post-op). 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 If still having issues before then, can be re-evaluated in the office  -[facility] paperwork will be faxed   -Continue with compressive bra for 2 more weeks  -Aquaphor to incisions for moisture  -The patient chooses to return in 2 months for her 3 month post-op, but will schedule something sooner if she is still experiencing significant soreness before returning to work.  -Gently wash surgical sites daily with mild soap and water (please use gentle/hypoallergenic soap like Dial, no scrubbing). Pat dry. Do not let direct water pressure from shower hit your incision(s). No swimming or soaking in bathtub for 6-8 weeks post-operatively, or until wound is fully healed.   -Continue monitoring for signs of infection including increased redness, swelling, fever (temperature > 100.4 F), pus or foul-smelling drainage from incision. Severe pain not relieved by your pain medications. Please call immediately with any of these symptoms.   -The patient is to call the office with any  questions or concerns. All of the patient's questions were answered at this time and they agree with the plan of care.        Jordyn Murray PA-C  [facility] Plastic and Reconstructive Surgery

## 2025-05-06 ENCOUNTER — PATIENT MESSAGE (OUTPATIENT)
Age: 55
End: 2025-05-06

## 2025-05-06 NOTE — PATIENT COMMUNICATION
"Rec'd call from patient stating that she had a missed call last night from this phone number, but no message was left. I apologized to patient. I informed her that we did receive her [employer] message and were just waiting for a response from Dr. Olmedo.    (Patient did ask that next scheduled POPV appointment be moved to a day that Dr. Olmedo could \"stop in\". Patient states that Jordyn is wonderful but she hasn't seen Dr. Olmedo since surgery. And she is contemplating having another surgery by him, as well. Rescheduled POPV.)    Regarding MyChart note - patient is just worried about going from 5 lbs to 50 lbs - she doesn't want to hurt herself. (Patient would prefer a gradual weight increase.)    Again, patient is aware that she'll receive response from team with Dr. Olmedo's recommendations/answers.    Patient verbalized understanding.  "

## 2025-05-07 ENCOUNTER — TELEPHONE (OUTPATIENT)
Age: 55
End: 2025-05-07

## 2025-05-07 NOTE — TELEPHONE ENCOUNTER
Received call from patient stating she spoke to Dr Olmedo yesterday and was told Irma would call her back to akshat her appt with him on 05/21.    Patient also stated that her short term and medical leave end this Sunday.    Please call patient at [phone].

## 2025-05-07 NOTE — TELEPHONE ENCOUNTER
Called and spoke with patient. Discussed patient returning on 5/26/2025. Informed patient that I would fax forms and email her a copy of completed forms. Patient verbalized understanding.    Scheduled patient for a follow up on 5/21/2025 with Dr. Olmedo.

## 2025-05-07 NOTE — PATIENT COMMUNICATION
Called and spoke with patient. Discussed patient returning on 5/26/2025. Informed patient that I would fax forms and email her a copy of completed forms. Patient verbalized understanding.

## 2025-05-08 ENCOUNTER — OFFICE VISIT (OUTPATIENT)
Dept: SURGICAL ONCOLOGY | Facility: CLINIC | Age: 55
End: 2025-05-08
Payer: COMMERCIAL

## 2025-05-08 ENCOUNTER — TELEPHONE (OUTPATIENT)
Dept: HEMATOLOGY ONCOLOGY | Facility: CLINIC | Age: 55
End: 2025-05-08

## 2025-05-08 VITALS
OXYGEN SATURATION: 98 % | WEIGHT: 181 LBS | BODY MASS INDEX: 33.31 KG/M2 | SYSTOLIC BLOOD PRESSURE: 142 MMHG | HEIGHT: 62 IN | HEART RATE: 78 BPM | TEMPERATURE: 98.4 F | DIASTOLIC BLOOD PRESSURE: 84 MMHG | RESPIRATION RATE: 14 BRPM

## 2025-05-08 DIAGNOSIS — Z17.0 MALIGNANT NEOPLASM OF OVERLAPPING SITES OF RIGHT BREAST IN FEMALE, ESTROGEN RECEPTOR POSITIVE (HCC): ICD-10-CM

## 2025-05-08 DIAGNOSIS — Z08 ENCOUNTER FOR FOLLOW-UP EXAMINATION AFTER COMPLETED TREATMENT FOR MALIGNANT NEOPLASM: Primary | ICD-10-CM

## 2025-05-08 DIAGNOSIS — Z12.31 VISIT FOR SCREENING MAMMOGRAM: ICD-10-CM

## 2025-05-08 DIAGNOSIS — C50.811 MALIGNANT NEOPLASM OF OVERLAPPING SITES OF RIGHT BREAST IN FEMALE, ESTROGEN RECEPTOR POSITIVE (HCC): ICD-10-CM

## 2025-05-08 PROBLEM — Z79.810 USE OF TAMOXIFEN (NOLVADEX): Status: RESOLVED | Noted: 2024-10-30 | Resolved: 2025-05-08

## 2025-05-08 PROCEDURE — 99214 OFFICE O/P EST MOD 30 MIN: CPT

## 2025-05-08 NOTE — PROGRESS NOTES
Name: Nunu Tirado      : 1970      MRN: [id number]  Encounter Provider: ROBERTO Maguire  Encounter Date: 2025   Encounter department: [facility] [place]  :  Assessment & Plan  Encounter for follow-up examination after completed treatment for malignant neoplasm  - follow up November  Malignant neoplasm of overlapping sites of right breast in female, estrogen receptor positive (HCC)  Patient is a 54-year-old female that was diagnosed with a right-sided breast cancer in 2024. Her pathology revealed invasive ductal carcinoma, ER/NC 90%, HER2 negative. She underwent genetic testing which was negative. She had multifocal disease. She underwent a right mastectomy and sentinel node biopsy with Dr. Mcqueen. She had reconstruction with Dr. Olmedo.  She took tamoxifen for a short period of time but d/c due to side effects. She had a mammo in Nov of last year of the left breast which was benign. She had a right breast expander to implant exchange with Dr. Olmedo 1 month ago. She has healed well and only notes MSK tightness on the right chest wall. I recommended continued stretching. Order is in for Nov mammo of the left breast. This will be a screening mammo. She states she might want a left breast lift but is not yet ready for another surgery. Patient denies changes on her breast exam. She denies persistent headaches, bone pain, back pain, shortness of breath, or abdominal pain. She notes chronic back pain. She has been exercises and trying to lose weight. I will see the patient back in 6 months or sooner should the need arise. She was instructed to call with any questions or concerns prior to this time. All questions were answered today.  Visit for screening mammogram  Orders:  •  Mammo screening left w 3d and cad; Future      Oncology History   Cancer Staging   Malignant neoplasm of overlapping sites of right breast in female, estrogen receptor positive  (HCC)  Staging form: Breast, AJCC 8th Edition  - Clinical stage from 1/8/2024: Stage IA (cT1b, cN0, cM0, G1, ER+, AZ+, HER2-) - Signed by Dinorah Mcqueen MD on 4/17/2024  Method of lymph node assessment: Clinical  Histologic grading system: 3 grade system  - Pathologic stage from 4/2/2024: Stage IA (pT1b, pN0(sn), cM0, G1, ER+, AZ+, HER2-) - Signed by Dinorah Mcqueen MD on 4/17/2024  Stage prefix: Initial diagnosis  Method of lymph node assessment: [place] lymph node biopsy  Histologic grading system: 3 grade system  Oncology History   Malignant neoplasm of overlapping sites of right breast in female, estrogen receptor positive (HCC)   1/8/2024 Biopsy    Right breast biopsy 300 7cmfn:  - Invasive ductal carcinoma  Grade 1  ER 90%  AZ 90%  HER2 negative     1/8/2024 -  Cancer Staged    Staging form: Breast, AJCC 8th Edition  - Clinical stage from 1/8/2024: Stage IA (cT1b, cN0, cM0, G1, ER+, AZ+, HER2-) - Signed by Dinorah Mcqueen MD on 4/17/2024  Method of lymph node assessment: Clinical  Histologic grading system: 3 grade system       1/2024 Genetic Testing    [employer] BRCAplus STAT Panel (8 genes): KENIA, BRCA1, BRCA2, CDH1, CHEK2, PALB2, PTEN, TP53 with reflex to [facility] [employer] Cancer Panel+RNA (47 genes): APC, KENIA, AXIN2, BARD1, BMPR1A, BRCA1, BRCA2, BRIP1, CDH1, CDK4, CDKN2A, CHEK2, CTNNA1, DICER1, EPCAM, GREM1, HOXB13, KIT, MEN1, MLH1, MSH2, MSH3, MSH6, MUTYH, NBN, NF1, NTHL1, PALB2, PDGFRA, PMS2, POLD1, POLE, PTEN, RAD50, RAD51C, RAD51D, SDHA, SDHB, SDHC, SDHD, SMAD4, SMARCA4, STK11, TP53, TSC1, TSC2, VHL      Result: Negative     2/6/2024 Biopsy    Right breast biopsy 11:00 9cmfn  - Invasive ductal carcinoma with tubular features  Grade 1  ER 85%  AZ 80%  HER2 FISH negative     4/2/2024 Surgery    Right mastectomy with sentinel lymph node biopsy:  - Clear margins  - 0/2 lymph nodes    Right expander placement  Dr. Mcqueen, Dr. Olmedo     4/2/2024 -  Cancer Staged    Staging form: Breast, AJCC 8th Edition  - Pathologic  "stage from 4/2/2024: Stage IA (pT1b, pN0(sn), cM0, G1, ER+, NH+, HER2-) - Signed by Dinorah Mcqueen MD on 4/17/2024  Stage prefix: Initial diagnosis  Method of lymph node assessment: [place] lymph node biopsy  Histologic grading system: 3 grade system        Genomic Testing    Oncotype Dx: 14     6/2024 -  Hormone Therapy    Tamoxifen   Dr. Ya        Review of Systems   Constitutional:  Negative for activity change, appetite change, fatigue and unexpected weight change.   Respiratory:  Negative for cough and shortness of breath.    Cardiovascular:  Negative for chest pain.   Gastrointestinal:  Negative for abdominal pain, diarrhea, nausea and vomiting.   Endocrine: Negative for heat intolerance.   Musculoskeletal:  Positive for back pain. Negative for arthralgias and myalgias.   Skin:  Negative for rash.   Neurological:  Positive for headaches (chronic migraines). Negative for weakness.   Hematological:  Negative for adenopathy.    A complete review of systems is negative other than that noted above in the HPI.     Objective   /84 (BP Location: Left arm, Patient Position: Sitting, Cuff Size: Large)   Pulse 78   Temp 98.4 °F (36.9 °C) (Temporal)   Resp 14   Ht 5' 2\" (1.575 m)   Wt 82.1 kg (181 lb)   LMP 03/12/2025 (Exact Date)   SpO2 98%   BMI 33.11 kg/m²     Pain Screening:  Pain Score: 0-No pain  ECOG    Physical Exam  Constitutional:       General: She is not in acute distress.     Appearance: Normal appearance.   Cardiovascular:      Rate and Rhythm: Normal rate and regular rhythm.      Pulses: Normal pulses.      Heart sounds: Normal heart sounds.   Pulmonary:      Effort: Pulmonary effort is normal.      Breath sounds: Normal breath sounds.   Chest:      Chest wall: No mass.   Breasts:     Right: No swelling, bleeding, mass, skin change or tenderness.      Left: No swelling, bleeding, inverted nipple, mass, nipple discharge, skin change or tenderness.          Comments: Right breast mastectomy " scar with implant  Abdominal:      General: Abdomen is flat.      Palpations: Abdomen is soft.   Lymphadenopathy:      Upper Body:      Right upper body: No supraclavicular, axillary or pectoral adenopathy.      Left upper body: No supraclavicular, axillary or pectoral adenopathy.   Skin:     General: Skin is warm.   Neurological:      General: No focal deficit present.      Mental Status: She is alert and oriented to person, place, and time.   Psychiatric:         Mood and Affect: Mood normal.         Behavior: Behavior normal.

## 2025-05-08 NOTE — ASSESSMENT & PLAN NOTE
Patient is a 54-year-old female that was diagnosed with a right-sided breast cancer in January 2024. Her pathology revealed invasive ductal carcinoma, ER/CA 90%, HER2 negative. She underwent genetic testing which was negative. She had multifocal disease. She underwent a right mastectomy and sentinel node biopsy with Dr. Mcqueen. She had reconstruction with Dr. Olmedo.  She took tamoxifen for a short period of time but d/c due to side effects. She had a mammo in Nov of last year of the left breast which was benign. She had a right breast expander to implant exchange with Dr. Olmedo 1 month ago. She has healed well and only notes MSK tightness on the right chest wall. I recommended continued stretching. Order is in for Nov mammo of the left breast. This will be a screening mammo. She states she might want a left breast lift but is not yet ready for another surgery. Patient denies changes on her breast exam. She denies persistent headaches, bone pain, back pain, shortness of breath, or abdominal pain. She notes chronic back pain. She has been exercises and trying to lose weight. I will see the patient back in 6 months or sooner should the need arise. She was instructed to call with any questions or concerns prior to this time. All questions were answered today.

## 2025-05-21 ENCOUNTER — OFFICE VISIT (OUTPATIENT)
Age: 55
End: 2025-05-21

## 2025-05-21 DIAGNOSIS — C50.811 MALIGNANT NEOPLASM OF OVERLAPPING SITES OF RIGHT BREAST IN FEMALE, ESTROGEN RECEPTOR POSITIVE (HCC): Primary | ICD-10-CM

## 2025-05-21 DIAGNOSIS — Z17.0 MALIGNANT NEOPLASM OF OVERLAPPING SITES OF RIGHT BREAST IN FEMALE, ESTROGEN RECEPTOR POSITIVE (HCC): Primary | ICD-10-CM

## 2025-05-21 PROCEDURE — 99024 POSTOP FOLLOW-UP VISIT: CPT | Performed by: STUDENT IN AN ORGANIZED HEALTH CARE EDUCATION/TRAINING PROGRAM

## 2025-05-21 RX ORDER — SULFAMETHOXAZOLE AND TRIMETHOPRIM 800; 160 MG/1; MG/1
1 TABLET ORAL EVERY 12 HOURS SCHEDULED
Qty: 6 TABLET | Refills: 0 | Status: SHIPPED | OUTPATIENT
Start: 2025-05-21 | End: 2025-05-24

## 2025-05-21 NOTE — LETTER
May 23, 2025     Patient: Nunu Tirado   YOB: 1970   Date of Visit: 5/21/2025       To Whom it May Concern:    Nunu Tirado was seen in my clinic on 5/21/2025. She is able to return to work on 06/11/2025 without restrictions.     If you have any questions or concerns, please don't hesitate to call.         Sincerely,          Brando Olmedo MD        CC: No Recipients

## 2025-05-28 ENCOUNTER — ANNUAL EXAM (OUTPATIENT)
Dept: OBGYN CLINIC | Facility: CLINIC | Age: 55
End: 2025-05-28
Payer: COMMERCIAL

## 2025-05-28 VITALS
HEIGHT: 62 IN | BODY MASS INDEX: 32.9 KG/M2 | WEIGHT: 178.8 LBS | DIASTOLIC BLOOD PRESSURE: 82 MMHG | SYSTOLIC BLOOD PRESSURE: 134 MMHG

## 2025-05-28 DIAGNOSIS — Z12.39 ENCOUNTER FOR BREAST CANCER SCREENING USING NON-MAMMOGRAM MODALITY: ICD-10-CM

## 2025-05-28 DIAGNOSIS — N95.1 PERIMENOPAUSE: ICD-10-CM

## 2025-05-28 DIAGNOSIS — Z17.0 MALIGNANT NEOPLASM OF OVERLAPPING SITES OF RIGHT BREAST IN FEMALE, ESTROGEN RECEPTOR POSITIVE (HCC): ICD-10-CM

## 2025-05-28 DIAGNOSIS — C50.811 MALIGNANT NEOPLASM OF OVERLAPPING SITES OF RIGHT BREAST IN FEMALE, ESTROGEN RECEPTOR POSITIVE (HCC): ICD-10-CM

## 2025-05-28 DIAGNOSIS — Z01.419 ENCOUNTER FOR ANNUAL ROUTINE GYNECOLOGICAL EXAMINATION: Primary | ICD-10-CM

## 2025-05-28 DIAGNOSIS — Z12.11 COLON CANCER SCREENING: ICD-10-CM

## 2025-05-28 DIAGNOSIS — D21.9 FIBROIDS: ICD-10-CM

## 2025-05-28 PROCEDURE — G0145 SCR C/V CYTO,THINLAYER,RESCR: HCPCS | Performed by: OBSTETRICS & GYNECOLOGY

## 2025-05-28 PROCEDURE — S0612 ANNUAL GYNECOLOGICAL EXAMINA: HCPCS | Performed by: OBSTETRICS & GYNECOLOGY

## 2025-05-28 NOTE — PROGRESS NOTES
Name: Nunu Tirado      : 1970      MRN: [id number]  Encounter Provider: Falguni Linda DO  Encounter Date: 2025   Encounter department: [facility]  :  Assessment & Plan  Encounter for annual routine gynecological examination         Malignant neoplasm of overlapping sites of right breast in female, estrogen receptor positive (HCC)         Fibroids         Encounter for breast cancer screening using non-mammogram modality         Colon cancer screening       Pap smear done for cytology, reflex HPV.  Recommend check FSH/estradiol.  Reviewed pelvic ultrasound from last year revealing stable fibroid uterus.  We did discuss endometrial biopsy last year.  She was reluctant to have any further testing as she was having difficulty with breast reconstructive surgery.  Encouraged self breast examination as well as calcium supplementation.  Continue follow-up with breast specialist as scheduled.  Reviewed colon cancer screening, stable.  I am recommending endometrial biopsy.  She will schedule after labs obtained.  All questions answered.  Perimenopause    Orders:    Follicle stimulating hormone; Future    Estradiol; Future      Pap done for cytology. Rec Ebx      History of Present Illness   HPI  Nunu Tirado is a 54 y.o. female who presents       This is a pleasant 54-year-old female P2 ( x 2, age 25, 23) presents for her GYN exam.  She was diagnosed with stage I breast cancer, status post right mastectomy with reconstructive surgery over the last 1 year, more recently insertion of silicone implant.  She went through menarche at age 11.  Her cycles have been irregular.  Over the course of the year, she went 3 consecutive months with no bleeding followed by bleeding February, April, May lasting 5 to 7 days.  She does get intermittent night sweats.  There were no changes in bowel or bladder function.  She has been in a monogamous relationship for over 29 years.  Pap smears have been  "normal.    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    Genetic screening negative      6/17/2024 pelvic ultrasound reveals normal anteverted uterus, 1.4 cm, 4 cm intramural/subserosal fibroids stable in size, endometrial stripe 14 mm, normal ovaries    11/2024 left mammogram    Colonoscopy 11/2023 f/u 5 yrs    Employed full-time, [employer], radiology/x-ray technician.      History obtained from: patient    Review of Systems   Constitutional:  Negative for fatigue, fever and unexpected weight change.   Respiratory:  Negative for cough, chest tightness, shortness of breath and wheezing.    Cardiovascular: Negative.  Negative for chest pain and palpitations.   Gastrointestinal: Negative.  Negative for abdominal distention, abdominal pain, blood in stool, constipation, diarrhea, nausea and vomiting.   Genitourinary: Negative.  Negative for difficulty urinating, dyspareunia, dysuria, flank pain, frequency, genital sores, hematuria, pelvic pain, urgency, vaginal bleeding, vaginal discharge and vaginal pain.   Skin:  Negative for rash.     Medications Ordered Prior to Encounter[1]      Objective   /82   Ht 5' 2\" (1.575 m)   Wt 81.1 kg (178 lb 12.8 oz)   LMP 05/08/2025 (Exact Date)   BMI 32.70 kg/m²      Physical Exam  Constitutional:       Appearance: Normal appearance. She is well-developed.   HENT:      Head: Normocephalic and atraumatic.     Cardiovascular:      Rate and Rhythm: Normal rate and regular rhythm.   Pulmonary:      Effort: Pulmonary effort is normal.      Breath sounds: Normal breath sounds.   Chest:   Breasts:     Right: Absent.      Left: No inverted nipple, mass, nipple discharge, skin change or tenderness.   Abdominal:      General: Bowel sounds are normal. There is no distension.      Palpations: Abdomen is soft.      Tenderness: There is no abdominal tenderness. There is no guarding or rebound.   Genitourinary:     Labia:         Right: No rash, tenderness or " lesion.         Left: No rash, tenderness or lesion.       Vagina: Normal. No signs of injury. No vaginal discharge or tenderness.      Cervix: No cervical motion tenderness, discharge, friability, lesion or cervical bleeding.      Uterus: Not enlarged and not tender.       Adnexa:         Right: No mass, tenderness or fullness.          Left: No mass, tenderness or fullness.       Neurological:      Mental Status: She is alert.     Psychiatric:         Behavior: Behavior normal.     External genitalia is within normal limits.  The vagina is well estrogenized.  Cervix is parous.  Uterus is globular top normal size.    Breast exam recent breast surgery right side silicone implant placed, incision healing well.    Administrative Statements   I have spent a total time of 30 minutes in caring for this patient on the day of the visit/encounter including Diagnostic results, Prognosis, Risks and benefits of tx options, Instructions for management, Impressions, Counseling / Coordination of care, Documenting in the medical record, Reviewing/placing orders in the medical record (including tests, medications, and/or procedures), and Obtaining or reviewing history  .       [1]   Current Outpatient Medications on File Prior to Visit   Medication Sig Dispense Refill    acetaminophen (TYLENOL) 500 mg tablet Take 1 tablet (500 mg total) by mouth every 6 (six) hours as needed for mild pain or moderate pain 60 tablet 0    ALPRAZolam (XANAX) 0.25 mg tablet Take 1 tablet (0.25 mg total) by mouth 2 (two) times a day as needed for anxiety or sleep 60 tablet 0    amLODIPine (NORVASC) 2.5 mg tablet Take 1 tablet (2.5 mg total) by mouth daily 90 tablet 1    b complex vitamins capsule Take 1 capsule by mouth in the morning.      famotidine (Pepcid) 20 mg tablet Take 20 mg by mouth as needed in the morning and 20 mg as needed in the evening for heartburn or indigestion.      fexofenadine (ALLEGRA) 60 MG tablet Take 60 mg by mouth as needed       fluticasone (FLONASE) 50 mcg/act nasal spray 1 spray into each nostril daily 16 g 0    hydroCHLOROthiazide 25 mg tablet Take 1 tablet (25 mg total) by mouth daily 90 tablet 1    Ibuprofen 200 MG CAPS Take 200 mg by mouth as needed      Magnesium 400 MG CAPS Take 400 mg by mouth in the morning      Multiple Vitamin (multivitamin) tablet Take 1 tablet by mouth in the morning.      ondansetron (ZOFRAN) 4 mg tablet Take 1 tablet (4 mg total) by mouth every 8 (eight) hours as needed for nausea or vomiting 20 tablet 0    Probiotic Product (PROBIOTIC DAILY PO) Take 1 tablet by mouth in the morning.      propranolol (Inderal LA) 80 mg 24 hr capsule Take 1 capsule (80 mg total) by mouth daily 90 capsule 1    rizatriptan (MAXALT-MLT) 5 mg disintegrating tablet Take 1 tablet (5 mg total) by mouth once as needed for migraine for up to 20 doses may repeat in 2 hours if necessary 10 tablet 0    Sulfamethoxazole-Trimethoprim (BACTRIM PO) Take by mouth      acetaminophen (TYLENOL) 500 mg tablet Take 1 tablet (500 mg total) by mouth every 4 (four) hours as needed for mild pain or moderate pain (Patient not taking: Reported on 5/28/2025) 30 tablet 2    docusate sodium (COLACE) 100 mg capsule Take 1 capsule (100 mg total) by mouth 2 (two) times a day (Patient not taking: Reported on 5/28/2025) 60 capsule 0     No current facility-administered medications on file prior to visit.

## 2025-06-02 LAB
LAB AP GYN PRIMARY INTERPRETATION: NORMAL
Lab: NORMAL

## 2025-06-04 ENCOUNTER — EVALUATION (OUTPATIENT)
Dept: PHYSICAL THERAPY | Facility: CLINIC | Age: 55
End: 2025-06-04
Attending: STUDENT IN AN ORGANIZED HEALTH CARE EDUCATION/TRAINING PROGRAM
Payer: COMMERCIAL

## 2025-06-04 DIAGNOSIS — G89.28 POST-MASTECTOMY PAIN SYNDROME: ICD-10-CM

## 2025-06-04 DIAGNOSIS — G89.29 CHRONIC RIGHT SHOULDER PAIN: Primary | ICD-10-CM

## 2025-06-04 DIAGNOSIS — M25.511 CHRONIC RIGHT SHOULDER PAIN: Primary | ICD-10-CM

## 2025-06-04 PROCEDURE — 97110 THERAPEUTIC EXERCISES: CPT | Performed by: PHYSICAL THERAPIST

## 2025-06-04 PROCEDURE — 97162 PT EVAL MOD COMPLEX 30 MIN: CPT | Performed by: PHYSICAL THERAPIST

## 2025-06-04 NOTE — PROGRESS NOTES
PT Evaluation     Today's date: 2025  Patient name: Nunu Tirado  : 1970  MRN: [id number]  Referring provider: Brando Olmedo MD  Dx:   Encounter Diagnosis     ICD-10-CM    1. Chronic right shoulder pain  M25.511     G89.29       2. Post-mastectomy pain syndrome  G89.28                      Assessment  Impairments: abnormal or restricted ROM, abnormal movement, activity intolerance, impaired physical strength, lacks appropriate home exercise program, pain with function, activity limitations and endurance  Symptom irritability: low    Assessment details: Nunu Tirado is a 54 y.o. female s/p R breast reconstruction exchange surgery in 2025 with history of  Post-mastectomy pain syndrome.   She presents with pain, decreased strength, decreased ROM, impaired and postural  dysfunction. Due to these impairments, Patient has difficulty performing a/iadls, recreational activities and engaging in social activities. Patient's clinical presentation is consistent with their referring diagnosis. Patient would benefit from skilled physical therapy to address their aforementioned impairments, improve their level of function and to improve their overall quality of life.  has been given a home exercise program and is in agreement with the plan of care.  Thank you for your referral.     Barriers to intervention: medical complexity  Understanding of Dx/Px/POC: excellent     Prognosis: excellent    Goals  ST Goals - 2-4 weeks  1. Patient will report decreased pain with activity by at least 2 points within 4 weeks  2. Patient will improve ROM 5-10 degrees within 4 weeks  3.  Patient will perform IADLs without pain in 2 weeks  4.  Patient will increase strength by 25% in 4 weeks    LT Goals - Discharge  1. Patient will improve FOTO score to maximum stated or greater by discharge  2. Patient will return to preferred recreational activity without significant pain increase by discharge   3.  Patient will return  to all work related activities without pain by discharge      Plan  Patient would benefit from: skilled physical therapy  Referral necessary: No    Planned therapy interventions: neuromuscular re-education, strengthening, stretching, therapeutic activities, therapeutic exercise and home exercise program    Frequency: 2x week  Duration in weeks: 12  Plan of Care beginning date: 6/4/2025  Plan of Care expiration date: 8/4/2025  Treatment plan discussed with: patient        Subjective Evaluation    History of Present Illness  Mechanism of injury: Patient is a 54-year-old female that was diagnosed with a right-sided breast cancer in January 2024. Her pathology revealed invasive ductal carcinoma, ER/DC 90%, HER2 negative.  She had multifocal disease. She underwent a right mastectomy and sentinel node biopsy with Dr. Mcqueen. She had reconstruction with Dr. Olmedo.  She took tamoxifen for a short period of time but d/c due to side effects. She had a right breast expander to implant exchange with Dr. Olmedo on 4/8/25.  Her guidance was that she could only lift 5# or lift over her head for 6 weeks.    CC:  Patient reports that she has had numbness, itching and pain at the medial scapular region. She reports having discomfort from the axilla to the middle of her breast, just superior to the incision.  She notes that the implant is under pectoral muscle.   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  She has to do a lot of lifting, pushing, pulling and feels that she does not have strength to do this.  She reports that she has been doing more activities  She walks a few miles 3-4 days a week and she has been doing some weight exercises.  She works 28-32 hours a week.            Recurrent probem    Quality of life: excellent    Patient Goals  Patient goals for therapy: decreased pain, increased strength, independence with ADLs/IADLs, return to sport/leisure activities, return to  work and increased motion    Pain  Current pain ratin  At best pain ratin  At worst pain rating: 3  Relieving factors: change in position  Aggravating factors: overhead activity    Social Support    Employment status: not working  Hand dominance: right    Treatments  Previous treatment: physical therapy        Objective     Active Range of Motion     Right Shoulder   Flexion: 155 degrees   Abduction: 158 degrees   External rotation BTH: T2   Internal rotation BTB: L5 with pain    Strength/Myotome Testing     Left Shoulder   Normal muscle strength    Right Shoulder     Planes of Motion   Flexion: 4   Abduction: 4   External rotation at 45°: 4   External rotation BTH: 4+   Internal rotation BTB: 4+              Precautions: BCA  Access Code: [email or web address]  URL: [email or web address]  Date: 2025  Prepared by: Marlen Crowley    Exercises  - Standing Shoulder Abduction Slides at Wall  - 1 x daily - 7 x weekly - 1 sets - 5 reps - 10 hold  - Supine Shoulder External Rotation with Dowel  - 2 x daily - 7 x weekly - 1 sets - 5 reps - 10 hold  - Sidelying Thoracic Rotation with Open Book  - 1 x daily - 7 x weekly - 1 sets - 5 reps - 10 hold    Manuals 6/4                                                                Neuro Re-Ed             TB                                                                                            Ther Ex             Wall climb ab             Open book             Hair cut             S/l ABD             S/l ER             Supine flexion                                       Ther Activity                                       Gait Training                                       Modalities

## 2025-06-17 ENCOUNTER — TELEPHONE (OUTPATIENT)
Age: 55
End: 2025-06-17

## 2025-06-17 NOTE — TELEPHONE ENCOUNTER
Patient would like to ask the doctor if she should do the lab work on the my chart because she just did lab in  March after her surgery. Please let the patient know you can send a message thou my chart. Thank you

## 2025-06-20 DIAGNOSIS — I10 HYPERTENSION, ESSENTIAL: ICD-10-CM

## 2025-06-20 RX ORDER — HYDROCHLOROTHIAZIDE 25 MG/1
25 TABLET ORAL DAILY
Qty: 90 TABLET | Refills: 1 | Status: SHIPPED | OUTPATIENT
Start: 2025-06-20 | End: 2025-12-17

## 2025-06-24 ENCOUNTER — APPOINTMENT (OUTPATIENT)
Dept: LAB | Facility: CLINIC | Age: 55
End: 2025-06-24
Attending: INTERNAL MEDICINE
Payer: COMMERCIAL

## 2025-06-24 DIAGNOSIS — Z00.00 ANNUAL PHYSICAL EXAM: ICD-10-CM

## 2025-06-24 DIAGNOSIS — F43.9 STRESS: ICD-10-CM

## 2025-06-24 DIAGNOSIS — N95.1 PERIMENOPAUSE: ICD-10-CM

## 2025-06-24 DIAGNOSIS — I10 PRIMARY HYPERTENSION: ICD-10-CM

## 2025-06-24 DIAGNOSIS — F41.9 ANXIETY: ICD-10-CM

## 2025-06-24 LAB
ALBUMIN SERPL BCG-MCNC: 3.9 G/DL (ref 3.5–5)
ALP SERPL-CCNC: 39 U/L (ref 34–104)
ALT SERPL W P-5'-P-CCNC: 21 U/L (ref 7–52)
ANION GAP SERPL CALCULATED.3IONS-SCNC: 9 MMOL/L (ref 4–13)
AST SERPL W P-5'-P-CCNC: 19 U/L (ref 13–39)
BASOPHILS # BLD AUTO: 0.05 THOUSANDS/ÂΜL (ref 0–0.1)
BASOPHILS NFR BLD AUTO: 1 % (ref 0–1)
BILIRUB SERPL-MCNC: 0.43 MG/DL (ref 0.2–1)
BUN SERPL-MCNC: 12 MG/DL (ref 5–25)
CALCIUM SERPL-MCNC: 8.8 MG/DL (ref 8.4–10.2)
CHLORIDE SERPL-SCNC: 97 MMOL/L (ref 96–108)
CHOLEST SERPL-MCNC: 178 MG/DL (ref ?–200)
CO2 SERPL-SCNC: 26 MMOL/L (ref 21–32)
CREAT SERPL-MCNC: 0.61 MG/DL (ref 0.6–1.3)
EOSINOPHIL # BLD AUTO: 0.11 THOUSAND/ÂΜL (ref 0–0.61)
EOSINOPHIL NFR BLD AUTO: 2 % (ref 0–6)
ERYTHROCYTE [DISTWIDTH] IN BLOOD BY AUTOMATED COUNT: 14.2 % (ref 11.6–15.1)
ESTRADIOL SERPL-MCNC: 240.5 PG/ML
FSH SERPL-ACNC: 13.8 MIU/ML
GFR SERPL CREATININE-BSD FRML MDRD: 101 ML/MIN/1.73SQ M
GLUCOSE P FAST SERPL-MCNC: 116 MG/DL (ref 65–99)
HCT VFR BLD AUTO: 38.7 % (ref 34.8–46.1)
HDLC SERPL-MCNC: 48 MG/DL
HGB BLD-MCNC: 12.2 G/DL (ref 11.5–15.4)
IMM GRANULOCYTES # BLD AUTO: 0.02 THOUSAND/UL (ref 0–0.2)
IMM GRANULOCYTES NFR BLD AUTO: 0 % (ref 0–2)
LDLC SERPL CALC-MCNC: 110 MG/DL (ref 0–100)
LYMPHOCYTES # BLD AUTO: 1.62 THOUSANDS/ÂΜL (ref 0.6–4.47)
LYMPHOCYTES NFR BLD AUTO: 24 % (ref 14–44)
MCH RBC QN AUTO: 29.5 PG (ref 26.8–34.3)
MCHC RBC AUTO-ENTMCNC: 31.5 G/DL (ref 31.4–37.4)
MCV RBC AUTO: 94 FL (ref 82–98)
MONOCYTES # BLD AUTO: 0.62 THOUSAND/ÂΜL (ref 0.17–1.22)
MONOCYTES NFR BLD AUTO: 9 % (ref 4–12)
NEUTROPHILS # BLD AUTO: 4.46 THOUSANDS/ÂΜL (ref 1.85–7.62)
NEUTS SEG NFR BLD AUTO: 64 % (ref 43–75)
NONHDLC SERPL-MCNC: 130 MG/DL
NRBC BLD AUTO-RTO: 0 /100 WBCS
PLATELET # BLD AUTO: 247 THOUSANDS/UL (ref 149–390)
PMV BLD AUTO: 10.9 FL (ref 8.9–12.7)
POTASSIUM SERPL-SCNC: 3.7 MMOL/L (ref 3.5–5.3)
PROT SERPL-MCNC: 6.9 G/DL (ref 6.4–8.4)
RBC # BLD AUTO: 4.13 MILLION/UL (ref 3.81–5.12)
SODIUM SERPL-SCNC: 132 MMOL/L (ref 135–147)
TRIGL SERPL-MCNC: 102 MG/DL (ref ?–150)
TSH SERPL DL<=0.05 MIU/L-ACNC: 2.71 UIU/ML (ref 0.45–4.5)
WBC # BLD AUTO: 6.88 THOUSAND/UL (ref 4.31–10.16)

## 2025-06-24 PROCEDURE — 80053 COMPREHEN METABOLIC PANEL: CPT

## 2025-06-24 PROCEDURE — 85025 COMPLETE CBC W/AUTO DIFF WBC: CPT

## 2025-06-24 PROCEDURE — 83001 ASSAY OF GONADOTROPIN (FSH): CPT

## 2025-06-24 PROCEDURE — 82670 ASSAY OF TOTAL ESTRADIOL: CPT

## 2025-06-24 PROCEDURE — 36415 COLL VENOUS BLD VENIPUNCTURE: CPT

## 2025-06-24 PROCEDURE — 80061 LIPID PANEL: CPT

## 2025-06-24 PROCEDURE — 84443 ASSAY THYROID STIM HORMONE: CPT

## 2025-06-26 ENCOUNTER — OFFICE VISIT (OUTPATIENT)
Dept: INTERNAL MEDICINE CLINIC | Age: 55
End: 2025-06-26
Payer: COMMERCIAL

## 2025-06-26 VITALS
SYSTOLIC BLOOD PRESSURE: 130 MMHG | TEMPERATURE: 98 F | HEART RATE: 74 BPM | WEIGHT: 181.8 LBS | DIASTOLIC BLOOD PRESSURE: 82 MMHG | BODY MASS INDEX: 33.45 KG/M2 | OXYGEN SATURATION: 99 % | HEIGHT: 62 IN

## 2025-06-26 DIAGNOSIS — I10 PRIMARY HYPERTENSION: ICD-10-CM

## 2025-06-26 DIAGNOSIS — Z00.00 ANNUAL PHYSICAL EXAM: Primary | ICD-10-CM

## 2025-06-26 DIAGNOSIS — G43.109 MIGRAINE WITH AURA AND WITHOUT STATUS MIGRAINOSUS, NOT INTRACTABLE: ICD-10-CM

## 2025-06-26 DIAGNOSIS — F41.9 ANXIETY: ICD-10-CM

## 2025-06-26 PROCEDURE — 99214 OFFICE O/P EST MOD 30 MIN: CPT | Performed by: INTERNAL MEDICINE

## 2025-06-26 PROCEDURE — 99396 PREV VISIT EST AGE 40-64: CPT | Performed by: INTERNAL MEDICINE

## 2025-06-26 NOTE — ASSESSMENT & PLAN NOTE
- blood pressure is well controlled   -continue with hydrochlorothiazide, amlodipine and propranolol  -continue with a low-salt diet and with exercise

## 2025-06-26 NOTE — PROGRESS NOTES
Name: Nunu Tirado      : 1970      MRN: [id number]  Encounter Provider: Elvie Ordoñez DO  Encounter Date: 2025   Encounter department: [facility]  :  Assessment & Plan  Annual physical exam  - History and physical examination done  - Pt was counseled to eat a heart healthy diet, to drink at least 2 L of water daily, to take a daily multivitamin and to exercise for at least 30 minutes of cardio exercise daily, for at least 5 days a week.  - CBC, CMP, TSH and lipid panel were done on 2025 and all results were reviewed with patient and all questions answered.  - She is up-to-date with her 3 COVID vaccines  - She is up-to-date with her Pap smear, mammogram and colonoscopy  - follow up in 6 months    Orders:    Ambulatory Referral to Neurology; Future    Migraine with aura and without status migrainosus, not intractable      -She has been having more frequent ocular migraine headaches, about once a month but still more frequent than she used to have it  - Continue with rizatriptan  - Will refer her to neurology  - She was encouraged to keep very well-hydrated to avoid triggering headaches.    Orders:    Ambulatory Referral to Neurology; Future    Primary hypertension   - blood pressure is well controlled   -continue with hydrochlorothiazide, amlodipine and propranolol  -continue with a low-salt diet and with exercise         Anxiety  -Well-controlled on as needed alprazolam  - Continue with alprazolam                History of Present Illness   HPI    Patient presents for an annual physical exam.    Last annual physical exam-over a year ago     Past medical history--right breast cancer, migraine headaches, hypertension, anxiety, lung nodule,, insomnia, vitamin D insufficiency, seasonal allergies, pseudohypoaldosteronism     Past surgical history-right mastectomy with right breast immediate reconstruction with tissue expander, percutaneous nephrostomy tube  placement on the right, bilateral breast biopsy, dental surgery, breast reconstruction    Medications-see list    Allergies-see list    Diet- mostly balanced diet and drinks about at least 2 l of water daily     Exercise-walking     Alcohol use- socially with a max of 2 drinks a week    Caffeine and soda use- 16- 20 oz of coffee daily    Nicotine use- smoked half a pack a day for 6 years and quit 32 years ago     Recreational drug use-never    Work- xray tech with [employer]     Sexual history, STD history and HIV testing-monogamous with , std hx - never, hiv testing - ? Done, has a 25 y old child     Gynecological history/Prostate health/testicular health history- LMP - May 2025  last pap smear- 5/28/25  Last mammogram - 11/13/2024    Colonoscopy-10/20/23 -polyps and told to return in 5 years    Immunization history-up to date with the covid shot x 3     Dental visit-every 6 months    Vision- trifocal - myopia and presbyopia     Family history-  htn - dad  Dm 1 - brother   MI - parents  cva - dad   Skin ca - mom     Today, patient states that she feels good since the surgery   Still has some anxiety and takes the xanax about 4-5 times a week  She has not been taking the Prozac but states that her OB/GYN told her that she may take half of the dose or 10 mg.  She is doing well with as needed Xanax and does not take it every day.  She states that she may take a dose if she has an anxiety attack tiffanie after dealing with her mom  She also complains that she has been having more frequent migraine headaches. has been having headaches and getting the ocular migraines more often at least once a month, compared to going for months without having them.- used to have a trigger of lights and stress, feels like a kaliedoscope - takes the rizatriptan and it helps more than the Fioricet used to   Migraines last for about 3-10 hours before they go away and she usually has to take 2 of the rizatriptan   She admits to diaphoresis,  nasal congestion and rhinorrhea secondary to seasonal allergies, occasional back pain and anxiety.  She denies fever, chills, dizziness, earache, postnasal drip, sinus pain or pressure, sore throat, cough, chest pain, shortness of breath, palpitations, nausea, vomiting, abdominal pain, diarrhea, constipation, hematochezia or hematuria.      Review of Systems   Constitutional:  Positive for diaphoresis. Negative for activity change, chills, fatigue, fever and unexpected weight change.   HENT:  Positive for congestion (2/2 allergies) and rhinorrhea (2/2 allergies). Negative for ear pain, postnasal drip, sinus pressure and sore throat.    Eyes:  Negative for pain.   Respiratory:  Negative for cough, choking, chest tightness, shortness of breath and wheezing.    Cardiovascular:  Negative for chest pain, palpitations and leg swelling.   Gastrointestinal:  Negative for abdominal pain, constipation, diarrhea, nausea and vomiting.        Occasionally heart burn   Genitourinary:  Positive for menstrual problem (irregular menstrual periods). Negative for dysuria and hematuria.   Musculoskeletal:  Positive for back pain (chronic). Negative for arthralgias, gait problem, joint swelling, myalgias and neck stiffness.   Skin:  Negative for pallor and rash.   Neurological:  Positive for headaches (has been having headaches and getting the ocular migraines more often at least once a month - used to have a trigger of lights,  feels like a kaliedoscope - takes the rizatriptan and it helps more than the Fioricet used to). Negative for dizziness, tremors, seizures, syncope and light-headedness.   Hematological:  Negative for adenopathy.   Psychiatric/Behavioral:  Negative for behavioral problems, dysphoric mood and sleep disturbance. The patient is nervous/anxious.        Objective   LMP 05/08/2025 (Exact Date)      Physical Exam  Constitutional:       General: She is not in acute distress.     Appearance: She is well-developed. She is  not diaphoretic.   HENT:      Head: Normocephalic and atraumatic.      Right Ear: External ear normal. Tympanic membrane is injected.      Left Ear: External ear normal. Tympanic membrane is injected.      Nose: Nose normal.      Mouth/Throat:      Mouth: Mucous membranes are dry.      Pharynx: Posterior oropharyngeal erythema (Oropharyngeal erythema with dry mucous membranes) present. No oropharyngeal exudate.     Eyes:      General: No scleral icterus.        Right eye: No discharge.         Left eye: No discharge.      Conjunctiva/sclera: Conjunctivae normal.      Pupils: Pupils are equal, round, and reactive to light.     Neck:      Thyroid: No thyromegaly.      Vascular: No JVD.      Trachea: No tracheal deviation.     Cardiovascular:      Rate and Rhythm: Normal rate and regular rhythm.      Heart sounds: Normal heart sounds. No murmur heard.     No friction rub. No gallop.   Pulmonary:      Effort: Pulmonary effort is normal. No respiratory distress.      Breath sounds: Normal breath sounds. No wheezing or rales.   Chest:      Chest wall: No tenderness.   Abdominal:      General: Bowel sounds are normal. There is no distension.      Palpations: Abdomen is soft. There is no mass.      Tenderness: There is no abdominal tenderness. There is no guarding or rebound.     Musculoskeletal:         General: No tenderness or deformity. Normal range of motion.      Cervical back: Normal range of motion and neck supple.   Lymphadenopathy:      Cervical: No cervical adenopathy.     Skin:     General: Skin is warm and dry.      Coloration: Skin is not pale.      Findings: No erythema or rash.     Neurological:      Mental Status: She is alert and oriented to person, place, and time.      Cranial Nerves: No cranial nerve deficit.      Motor: No abnormal muscle tone.      Coordination: Coordination normal.      Deep Tendon Reflexes: Reflexes are normal and symmetric.      Comments: Cranial nerves 2-12 are intact  bilaterally  Muscle strength is 5/5 in all extremities  Sensation is intact in bilateral face and extremities  Rapid alternating movement and finger-to-nose pointing test intact   Deep tendon reflexes are 2+ bilaterally  Gait is intact         Psychiatric:         Behavior: Behavior normal.

## 2025-06-26 NOTE — ASSESSMENT & PLAN NOTE
- History and physical examination done  - Pt was counseled to eat a heart healthy diet, to drink at least 2 L of water daily, to take a daily multivitamin and to exercise for at least 30 minutes of cardio exercise daily, for at least 5 days a week.  - CBC, CMP, TSH and lipid panel were done on 6/20/2025 and all results were reviewed with patient and all questions answered.  - She is up-to-date with her 3 COVID vaccines  - She is up-to-date with her Pap smear, mammogram and colonoscopy  - follow up in 6 months    Orders:    Ambulatory Referral to Neurology; Future

## 2025-06-26 NOTE — PATIENT INSTRUCTIONS
"Patient Education     Routine physical for adults   The Basics   Written by the doctors and editors at [facility]   What is a physical? -- A physical is a routine visit, or \"check-up,\" with your doctor. You might also hear it called a \"wellness visit\" or \"preventive visit.\"  During each visit, the doctor will:   Ask about your physical and mental health   Ask about your habits, behaviors, and lifestyle   Do an exam   Give you vaccines if needed   Talk to you about any medicines you take   Give advice about your health   Answer your questions  Getting regular check-ups is an important part of taking care of your health. It can help your doctor find and treat any problems you have. But it's also important for preventing health problems.  A routine physical is different from a \"sick visit.\" A sick visit is when you see a doctor because of a health concern or problem. Since physicals are scheduled ahead of time, you can think about what you want to ask the doctor.  How often should I get a physical? -- It depends on your age and health. In general, for people age 21 years and older:   If you are younger than 50 years, you might be able to get a physical every 3 years.   If you are 50 years or older, your doctor might recommend a physical every year.  If you have an ongoing health condition, like diabetes or high blood pressure, your doctor will probably want to see you more often.  What happens during a physical? -- In general, each visit will include:   Physical exam - The doctor or nurse will check your height, weight, heart rate, and blood pressure. They will also look at your eyes and ears. They will ask about how you are feeling and whether you have any symptoms that bother you.   Medicines - It's a good idea to bring a list of all the medicines you take to each doctor visit. Your doctor will talk to you about your medicines and answer any questions. Tell them if you are having any side effects that bother you. You " "should also tell them if you are having trouble paying for any of your medicines.   Habits and behaviors - This includes:   Your diet   Your exercise habits   Whether you smoke, drink alcohol, or use drugs   Whether you are sexually active   Whether you feel safe at home  Your doctor will talk to you about things you can do to improve your health and lower your risk of health problems. They will also offer help and support. For example, if you want to quit smoking, they can give you advice and might prescribe medicines. If you want to improve your diet or get more physical activity, they can help you with this, too.   Lab tests, if needed - The tests you get will depend on your age and situation. For example, your doctor might want to check your:   Cholesterol   Blood sugar   Iron level   Vaccines - The recommended vaccines will depend on your age, health, and what vaccines you already had. Vaccines are very important because they can prevent certain serious or deadly infections.   Discussion of screening - \"Screening\" means checking for diseases or other health problems before they cause symptoms. Your doctor can recommend screening based on your age, risk, and preferences. This might include tests to check for:   Cancer, such as breast, prostate, cervical, ovarian, colorectal, prostate, lung, or skin cancer   Sexually transmitted infections, such as chlamydia and gonorrhea   Mental health conditions like depression and anxiety  Your doctor will talk to you about the different types of screening tests. They can help you decide which screenings to have. They can also explain what the results might mean.   Answering questions - The physical is a good time to ask the doctor or nurse questions about your health. If needed, they can refer you to other doctors or specialists, too.  Adults older than 65 years often need other care, too. As you get older, your doctor will talk to you about:   How to prevent falling at " home   Hearing or vision tests   Memory testing   How to take your medicines safely   Making sure that you have the help and support you need at home  All topics are updated as new evidence becomes available and our peer review process is complete.  This topic retrieved from [employer] on: May 02, 2024.  Topic 047353 Version 1.0  Release: 32.4.3 - C32.122  © 2024 UpToDate, Inc. and/or its affiliates. All rights reserved.  Consumer Information Use and Disclaimer   Disclaimer: This generalized information is a limited summary of diagnosis, treatment, and/or medication information. It is not meant to be comprehensive and should be used as a tool to help the user understand and/or assess potential diagnostic and treatment options. It does NOT include all information about conditions, treatments, medications, side effects, or risks that may apply to a specific patient. It is not intended to be medical advice or a substitute for the medical advice, diagnosis, or treatment of a health care provider based on the health care provider's examination and assessment of a patient's specific and unique circumstances. Patients must speak with a health care provider for complete information about their health, medical questions, and treatment options, including any risks or benefits regarding use of medications. This information does not endorse any treatments or medications as safe, effective, or approved for treating a specific patient. UpToDate, Inc. and its affiliates disclaim any warranty or liability relating to this information or the use thereof.The use of this information is governed by the Terms of Use, available at https://www.[employer].com/en/know/clinical-effectiveness-terms. 2024© UpToDate, Inc. and its affiliates and/or licensors. All rights reserved.  Copyright   © 2024 UpToDate, Inc. and/or its affiliates. All rights reserved.

## 2025-06-30 ENCOUNTER — PATIENT MESSAGE (OUTPATIENT)
Dept: OBGYN CLINIC | Facility: CLINIC | Age: 55
End: 2025-06-30

## 2025-06-30 ENCOUNTER — TELEPHONE (OUTPATIENT)
Dept: OBGYN CLINIC | Facility: CLINIC | Age: 55
End: 2025-06-30

## 2025-06-30 NOTE — TELEPHONE ENCOUNTER
Left voice mail for patient she should still keep her appointment tomorrow for endometrial biopsy.  Also offered earlier appointment at 9:30 if she is able to take that appointment to please confirm.

## 2025-07-01 ENCOUNTER — PROCEDURE VISIT (OUTPATIENT)
Dept: OBGYN CLINIC | Facility: CLINIC | Age: 55
End: 2025-07-01
Payer: COMMERCIAL

## 2025-07-01 VITALS
BODY MASS INDEX: 33.9 KG/M2 | HEIGHT: 62 IN | DIASTOLIC BLOOD PRESSURE: 88 MMHG | WEIGHT: 184.2 LBS | SYSTOLIC BLOOD PRESSURE: 150 MMHG

## 2025-07-01 DIAGNOSIS — N92.6 IRREGULAR MENSES: Primary | ICD-10-CM

## 2025-07-01 PROCEDURE — 88342 IMHCHEM/IMCYTCHM 1ST ANTB: CPT | Performed by: PATHOLOGY

## 2025-07-01 PROCEDURE — 58100 BIOPSY OF UTERUS LINING: CPT | Performed by: OBSTETRICS & GYNECOLOGY

## 2025-07-01 PROCEDURE — 88305 TISSUE EXAM BY PATHOLOGIST: CPT | Performed by: PATHOLOGY

## 2025-07-01 NOTE — PROGRESS NOTES
"Name: Nunu Tirado      : 1970      MRN: [id number]  Encounter Provider: Falguni Linda DO  Encounter Date: 2025   Encounter department: [facility]  :  Assessment & Plan      Reviewed FSH/estradiol, no evidence of menopause.  Reviewed signs and symptoms of perimenopause.  Endometrial biopsy done today without difficulty, well-tolerated.  I will notify her of the above results.  She will keep menstrual diary provided doing well, return to office 2026 for annual visit.        History of Present Illness   HPI  Nunu Tirado is a 54 y.o. female who presents     This is a pleasant 54-year-old female P2 ( x 2, age 25, 23) presents complaining of irregular menses.  She went through menarche at age 11.  Her cycles have been irregular over the course of the year she skipped 3 consecutive months followed by prolonged bleeding 7 days (February, April, May, .  She had significant menstrual cramping this morning.    She does have a history of fibroid uterus, intramural.    Diagnosed with stage I breast cancer, status post right mastectomy with reconstructive surgery over the last 1 year.    History of Dante syndrome    History obtained from: patient    Review of Systems   Constitutional:  Negative for fatigue, fever and unexpected weight change.   Respiratory:  Negative for cough, chest tightness, shortness of breath and wheezing.    Cardiovascular: Negative.  Negative for chest pain and palpitations.   Gastrointestinal: Negative.  Negative for abdominal distention, abdominal pain, blood in stool, constipation, diarrhea, nausea and vomiting.   Genitourinary: Negative.  Negative for difficulty urinating, dyspareunia, dysuria, flank pain, frequency, genital sores, hematuria, pelvic pain, urgency, vaginal bleeding, vaginal discharge and vaginal pain.   Skin:  Negative for rash.     Medications Ordered Prior to Encounter[1]      Objective   /88   Ht 5' 2\" (1.575 m)   Wt 83.6 kg " (184 lb 3.2 oz)   LMP 06/29/2025 (Exact Date)   BMI 33.69 kg/m²      Physical Exam      Endometrial biopsy    Date/Time: 7/1/2025 9:30 AM    Performed by: Falguni Linda DO  Authorized by: Falguni Linda DO    Universal Protocol:  Consent: Verbal consent obtained  Risks and benefits: risks, benefits and alternatives were discussed  Consent given by: patient  Patient understanding: patient states understanding of the procedure being performed  Patient identity confirmed: verbally with patient    Indication:     Indications: Other disorder of menstruation and other abnormal bleeding from female genital tract    Pre-procedure:     Premeds:  Ibuprofen    Negative urine pregnancy test: yes    Procedure:     Procedure: endometrial biopsy with Pipelle      A bivalve speculum was placed in the vagina: yes      Cervix cleaned and prepped: yes      The cervix was dilated: yes      Uterus sounded: yes      Uterus sound depth (cm):  10    Specimen collected: specimen collected and sent to pathology      Patient tolerated procedure well with no complications: yes    Findings:     Uterus size:  9-10 weeks    Cervix: normal      Adnexa: normal    Comments:     Procedure comments:  Cervix is visualized cleansed with Betadine solution.  The anterior lip of the cervix is grasped using an Allis clamp.  Cervical os was dilated without difficulty.  Endometrial Pipelle inserted, uterus sounded to 10 cm.  3 passes were obtained with ample amount of tissue.  All instruments removed from the vagina.  Patient tolerated the procedure well.      Administrative Statements   I have spent a total time of 20 minutes in caring for this patient on the day of the visit/encounter including Diagnostic results, Prognosis, Risks and benefits of tx options, Instructions for management, Impressions, Counseling / Coordination of care, Documenting in the medical record, Reviewing/placing orders in the medical record (including tests, medications,  and/or procedures), and Obtaining or reviewing history  .       [1]   Current Outpatient Medications on File Prior to Visit   Medication Sig Dispense Refill    acetaminophen (TYLENOL) 500 mg tablet Take 1 tablet (500 mg total) by mouth every 6 (six) hours as needed for mild pain or moderate pain 60 tablet 0    ALPRAZolam (XANAX) 0.25 mg tablet Take 1 tablet (0.25 mg total) by mouth 2 (two) times a day as needed for anxiety or sleep 60 tablet 0    amLODIPine (NORVASC) 2.5 mg tablet Take 1 tablet (2.5 mg total) by mouth daily 90 tablet 1    b complex vitamins capsule Take 1 capsule by mouth in the morning.      famotidine (Pepcid) 20 mg tablet Take 20 mg by mouth as needed in the morning and 20 mg as needed in the evening for heartburn or indigestion.      fexofenadine (ALLEGRA) 60 MG tablet Take 60 mg by mouth as needed      fluticasone (FLONASE) 50 mcg/act nasal spray 1 spray into each nostril daily 16 g 0    hydroCHLOROthiazide 25 mg tablet Take 1 tablet (25 mg total) by mouth daily 90 tablet 1    Ibuprofen 200 MG CAPS Take 200 mg by mouth as needed      Magnesium 400 MG CAPS Take 400 mg by mouth in the morning      Multiple Vitamin (multivitamin) tablet Take 1 tablet by mouth in the morning.      ondansetron (ZOFRAN) 4 mg tablet Take 1 tablet (4 mg total) by mouth every 8 (eight) hours as needed for nausea or vomiting 20 tablet 0    Probiotic Product (PROBIOTIC DAILY PO) Take 1 tablet by mouth in the morning.      propranolol (Inderal LA) 80 mg 24 hr capsule Take 1 capsule (80 mg total) by mouth daily 90 capsule 1    rizatriptan (MAXALT-MLT) 5 mg disintegrating tablet Take 1 tablet (5 mg total) by mouth once as needed for migraine for up to 20 doses may repeat in 2 hours if necessary 10 tablet 0    Sulfamethoxazole-Trimethoprim (BACTRIM PO) Take by mouth (Patient not taking: Reported on 7/1/2025)       No current facility-administered medications on file prior to visit.

## 2025-07-09 ENCOUNTER — OFFICE VISIT (OUTPATIENT)
Dept: PLASTIC SURGERY | Facility: CLINIC | Age: 55
End: 2025-07-09
Payer: COMMERCIAL

## 2025-07-09 DIAGNOSIS — Z98.890 S/P BREAST RECONSTRUCTION, RIGHT: Primary | ICD-10-CM

## 2025-07-09 PROCEDURE — 99212 OFFICE O/P EST SF 10 MIN: CPT

## 2025-07-09 PROCEDURE — 88305 TISSUE EXAM BY PATHOLOGIST: CPT | Performed by: PATHOLOGY

## 2025-07-09 PROCEDURE — 88342 IMHCHEM/IMCYTCHM 1ST ANTB: CPT | Performed by: PATHOLOGY

## 2025-07-09 NOTE — PROGRESS NOTES
[facility]astic [facility]constructive Surgery  [address], [address], [place] [address]  [phone]    Patient Identification: Nunu Tirado is a 54 y.o. female     History of Present Illness: The patient is a 54 y.o. female who presents to the office for routine post-operative evaluation. Patient is 92 days s/p Right Chest Implant Expander Exchange For Permanentimplant. - Right and Revision Of Right Reconstructed Breast. - Right  on 2025 by Dr. Olmedo.   Patient is doing well with no complaints. Using her scar creams and performing massage daily. Very happy with the results of her surgery and does not want any revisions at this time. Will be interested in left breast reduction for symmetry after having her mammogram done in November         The following portions of the patient's history were reviewed: allergies, current medications, past medical history, past surgical history, and past social history    Past Medical History[1]     Past Surgical History[2]    Allergies   Allergen Reactions    Amoxicillin Hives    Cefaclor Hives        Current Medications[3]      Social History     Socioeconomic History    Marital status: /Civil Union     Spouse name: Not on file    Number of children: Not on file    Years of education: Not on file    Highest education level: Not on file   Occupational History    Not on file   Tobacco Use    Smoking status: Former     Current packs/day: 0.00     Average packs/day: 0.3 packs/day for 7.0 years (1.8 ttl pk-yrs)     Types: Cigarettes     Start date:      Quit date: 1993     Years since quittin.5     Passive exposure: Past    Smokeless tobacco: Never   Vaping Use    Vaping status: Never Used   Substance and Sexual Activity    Alcohol use: Yes     Alcohol/week: 3.0 standard drinks of alcohol     Types: 3 Cans of beer per week     Comment: social    Drug use: No    Sexual activity: Yes     Partners: Male     Birth control/protection: Male  Sterilization   Other Topics Concern    Not on file   Social History Narrative    Not on file     Social Drivers of Health     Financial Resource Strain: Not on file   Food Insecurity: No Food Insecurity (4/3/2024)    Nursing - Inadequate Food Risk Classification     Worried About Running Out of Food in the Last Year: Never true     Ran Out of Food in the Last Year: Never true     Ran Out of Food in the Last Year: Not on file   Transportation Needs: No Transportation Needs (4/18/2024)    OASIS : Transportation     Lack of Transportation (Medical): No     Lack of Transportation (Non-Medical): No     Patient Unable or Declines to Respond: No   Physical Activity: Not on file   Stress: Not on file   Social Connections: Not on file   Intimate Partner Violence: Not on file   Housing Stability: Low Risk  (4/3/2024)    Housing Stability Vital Sign     Unable to Pay for Housing in the Last Year: No     Number of Times Moved in the Last Year: 1     Homeless in the Last Year: No          Review of Systems  Constitutional: Denies fevers, chills, nausea, vomiting, malaise, or pain.  Skin: Denies any bleeding, warmth, erythema, edema, mucopurulent drainage, or signs of infection.    Physical Exam  General: pleasant and well appearing, in no acute distress.   Skin: surgical incision C/D/I without erythema, wound dehiscence, warmth, odors, discharge, drainage, or gross signs of infection. Adequate scar formation. Implant is soft. No tenderness to palpation. No obvious hematoma or seroma. Skin perfusion intact.         Assessment and Plan:  The patient is a 54 y.o. female who presents to the office for routine post-operative evaluation. Patient is 92 days s/p Right Chest Implant Expander Exchange For Permanentimplant. - Right and Revision Of Right Reconstructed Breast. - Right  on 4/8/2025 by Dr. Olmedo      -Incision has healed well with good scar formation, implant soft, no signs of infection   -Dr. Olmedo in to see patient   -She  does not need to wear her compressive bra 24/7 anymore, can just wear as needed for comfort  -Can resume all activities as tolerated without any restrictions   -Discussed scar maturation with patient, which will take approximately 12 months. Discussed scar massage and using silicone-based scar products  -Patient will call to schedule an appointment after mammogram in November for consultation with Dr. Olmedo for left breast reduction for symmetry.   -The patient is to call the office with any questions or concerns. All of the patient's questions were answered at this time and they agree with the plan of care.    Patient seen and evaluated by myself. Together we discussed an ideal assessment and plan. All questions were answered. I have spent 10 minutes with this patient today on patient evaluation and education. Patient to call with any questions or concerns.     Jordyn Murary PA-C  [facility] Plastic and Reconstructive Surgery          [1]   Past Medical History:  Diagnosis Date    Allergic     Seasonal/ 2 antibiotics    Anxiety 11/11/2022    Breast cancer (HCC) 1/10/24    Right Mastectomy    Colon polyp     COVID-19 virus infection 07/27/2021 Nov. 2023    GERD (gastroesophageal reflux disease)     Headache(784.0)     Hematuria     Hyperkalemia     Hypertension 1980    Kidney stone 1985    Migraines     Proteinuria     Pseudohypoaldosteronism     type 2 (per patient)    UTI (urinary tract infection)     Wears glasses    [2]   Past Surgical History:  Procedure Laterality Date    BREAST BIOPSY Right 01/08/2024    right breast    COLONOSCOPY      DENTAL SURGERY      INSERTION OF BREAST TISSUE EXPANDER Right 4/8/2025    Procedure: RIGHT CHEST IMPLANT EXPANDER EXCHANGE FOR PERMANENTIMPLANT.;  Surgeon: Brando Olmedo MD;  Location:  MAIN OR;  Service: Plastics    KIDNEY STONE SURGERY  1985    MAMMO STEREOTACTIC BREAST BIOPSY RIGHT (ALL INC) Right 01/08/2024    MAMMO STEREOTACTIC BREAST BIOPSY RIGHT (ALL INC) Right  02/06/2024    MAMMO STEREOTACTIC BREAST BIOPSY RIGHT (ALL INC) Right 02/06/2024    MASTECTOMY Right     4/2024    ND MASTECTOMY SIMPLE COMPLETE Right 04/02/2024    Procedure: RIGHT BREAST MASTECTOMY, RIGHT LYMPHOSCINTIGRAPHY, LYMPHATIC MAPPING, SENTINEL LYMPH NODE BIOPSY;  Surgeon: Dinorah Mcqueen MD;  Location: [facility];  Service: Surgical Oncology    ND REVISION OF RECONSTRUCTED BREAST Right 4/8/2025    Procedure: REVISION OF RIGHT RECONSTRUCTED BREAST.;  Surgeon: Brando Olmedo MD;  Location: [facility];  Service: Plastics    ND TISSUE EXPANDER PLACEMENT BREAST RECONSTRUCTION Right 04/02/2024    Procedure: RIGHT BREAST IMMEDIATE RECON W/ TISSUE EXPANDER AND ADM;  Surgeon: Brando Olmedo MD;  Location: [facility];  Service: Plastics    US GUIDED BREAST BIOPSY LEFT COMPLETE Left 02/06/2024    US GUIDED BREAST BIOPSY RIGHT COMPLETE Right 01/08/2024    US GUIDED BREAST BIOPSY RIGHT COMPLETE Right 02/06/2024   [3]   Current Outpatient Medications:     acetaminophen (TYLENOL) 500 mg tablet, Take 1 tablet (500 mg total) by mouth every 6 (six) hours as needed for mild pain or moderate pain, Disp: 60 tablet, Rfl: 0    ALPRAZolam (XANAX) 0.25 mg tablet, Take 1 tablet (0.25 mg total) by mouth 2 (two) times a day as needed for anxiety or sleep, Disp: 60 tablet, Rfl: 0    amLODIPine (NORVASC) 2.5 mg tablet, Take 1 tablet (2.5 mg total) by mouth daily, Disp: 90 tablet, Rfl: 1    b complex vitamins capsule, Take 1 capsule by mouth in the morning., Disp: , Rfl:     famotidine (Pepcid) 20 mg tablet, Take 20 mg by mouth as needed in the morning and 20 mg as needed in the evening for heartburn or indigestion., Disp: , Rfl:     fexofenadine (ALLEGRA) 60 MG tablet, Take 60 mg by mouth as needed, Disp: , Rfl:     fluticasone (FLONASE) 50 mcg/act nasal spray, 1 spray into each nostril daily, Disp: 16 g, Rfl: 0    hydroCHLOROthiazide 25 mg tablet, Take 1 tablet (25 mg total) by mouth daily, Disp: 90 tablet, Rfl: 1    Ibuprofen 200 MG  CAPS, Take 200 mg by mouth as needed, Disp: , Rfl:     Magnesium 400 MG CAPS, Take 400 mg by mouth in the morning, Disp: , Rfl:     Multiple Vitamin (multivitamin) tablet, Take 1 tablet by mouth in the morning., Disp: , Rfl:     ondansetron (ZOFRAN) 4 mg tablet, Take 1 tablet (4 mg total) by mouth every 8 (eight) hours as needed for nausea or vomiting, Disp: 20 tablet, Rfl: 0    Probiotic Product (PROBIOTIC DAILY PO), Take 1 tablet by mouth in the morning., Disp: , Rfl:     propranolol (Inderal LA) 80 mg 24 hr capsule, Take 1 capsule (80 mg total) by mouth daily, Disp: 90 capsule, Rfl: 1    rizatriptan (MAXALT-MLT) 5 mg disintegrating tablet, Take 1 tablet (5 mg total) by mouth once as needed for migraine for up to 20 doses may repeat in 2 hours if necessary, Disp: 10 tablet, Rfl: 0    Sulfamethoxazole-Trimethoprim (BACTRIM PO), Take by mouth (Patient not taking: Reported on 7/1/2025), Disp: , Rfl:

## 2025-07-10 ENCOUNTER — OFFICE VISIT (OUTPATIENT)
Dept: NEPHROLOGY | Facility: CLINIC | Age: 55
End: 2025-07-10
Payer: COMMERCIAL

## 2025-07-10 VITALS
HEART RATE: 70 BPM | HEIGHT: 62 IN | DIASTOLIC BLOOD PRESSURE: 80 MMHG | BODY MASS INDEX: 33.31 KG/M2 | WEIGHT: 181 LBS | SYSTOLIC BLOOD PRESSURE: 132 MMHG

## 2025-07-10 DIAGNOSIS — N25.89 PSEUDOHYPOALDOSTERONISM: Primary | ICD-10-CM

## 2025-07-10 PROCEDURE — 99213 OFFICE O/P EST LOW 20 MIN: CPT | Performed by: INTERNAL MEDICINE

## 2025-07-10 NOTE — PROGRESS NOTES
Name: Nunu Tirado      : 1970      MRN: [id number]  Encounter Provider: Rocky Lawton MD  Encounter Date: 7/10/2025   Encounter department: [facility] [place]  :  Assessment & Plan  Pseudohypoaldosteronism    The patient came to me as she carries the diagnosis since she was a teenager of pseudohypoaldosteronism.  Apparently she had issues with hyperkalemia and was followed by nephrology and I been seeing her now yearly.  At this point she is really on no specific medications except a thiazide diuretic that may increase urinary potassium losses and her potassium levels are normal as is her kidney function.  So at this point she really just wants to maintain care with a nephrologist there is no active issues regarding this so she will continue current medications and follow.  I told her to call if there is any problems before next visit.    Orders:    Basic metabolic panel; Future    The patient was noted to have mild hyponatremia off and on and she is on HCTZ which can cause that.  I explained to her that it is really a concentration of sodium so reflects water excess.  If it is an issue persisting then we could stop the HCTZ and titrate up the amlodipine for her blood pressure.  Her primary doctor is aware and they are can always reach out and let me know and I did explain it to the patient.    History of Present Illness   HPI  Nunu Tirado is a 54 y.o. female who presents for routine follow-up.  She says she has been doing great staying busy at work.  She did have her breast reconstruction which went well and overall she feels great and has no complaints.  She did tell me she was started on low-dose amlodipine for her blood pressure as well.  History obtained from: patient    Review of Systems   Constitutional:  Negative for chills, diaphoresis and fatigue.   HENT: Negative.     Eyes: Negative.    Respiratory:  Negative for cough, chest tightness, shortness of breath and  "wheezing.    Cardiovascular:  Negative for chest pain and leg swelling.   Gastrointestinal:  Negative for abdominal pain, diarrhea, nausea and vomiting.   Genitourinary: Negative.    Psychiatric/Behavioral:  Negative for agitation, behavioral problems and confusion.           Objective   /80 (BP Location: Left arm, Patient Position: Sitting, Cuff Size: Standard)   Pulse 70   Ht 5' 2\" (1.575 m)   Wt 82.1 kg (181 lb)   LMP 06/29/2025 (Exact Date)   BMI 33.11 kg/m²      Physical Exam  Constitutional:       General: She is not in acute distress.     Appearance: She is not toxic-appearing.   HENT:      Head: Normocephalic and atraumatic.      Nose: Nose normal.      Mouth/Throat:      Mouth: Mucous membranes are moist.     Eyes:      Extraocular Movements: Extraocular movements intact.       Cardiovascular:      Rate and Rhythm: Normal rate and regular rhythm.      Heart sounds:      No gallop.   Pulmonary:      Effort: Pulmonary effort is normal. No respiratory distress.      Breath sounds: No wheezing or rales.   Abdominal:      General: There is no distension.      Palpations: Abdomen is soft.      Tenderness: There is no abdominal tenderness.     Neurological:      Mental Status: She is alert and oriented to person, place, and time.     Psychiatric:         Mood and Affect: Mood normal.         Behavior: Behavior normal.           "

## 2025-07-10 NOTE — PATIENT INSTRUCTIONS
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    Kidney functions perfectly normal creatinines the blood test is normal.    Looking at the sodium concentration it is a little low at times and then normal at times that reflects a concentration so it may be related to the hydrochlorothiazide as we discussed we will monitor it if it is an ongoing problem we may stop that medication and raise your amlodipine for your blood pressure.    Call me if you have any problems before next visit.

## 2025-07-10 NOTE — LETTER
July 10, 2025     Elvie Ordoñez DO  [address]  [address]  [facility] [address]    Patient: Nunu Tirado   YOB: 1970   Date of Visit: 7/10/2025       Dear Dr. Elvie Ordoñez DO:    Thank you for referring Nunu Tirado to me for evaluation. Below are my notes for this consultation.    If you have questions, please do not hesitate to call me. I look forward to following your patient along with you.         Sincerely,        Rocky Lawton MD        CC: No Recipients    Rocky Lawton MD  7/10/2025  4:19 PM  Sign when Signing Visit  Name: Nunu Tirado      : 1970      MRN: [id number]  Encounter Provider: Rocky Lawton MD  Encounter Date: 7/10/2025   Encounter department: [facility]  :  Assessment & Plan  Pseudohypoaldosteronism    The patient came to me as she carries the diagnosis since she was a teenager of pseudohypoaldosteronism.  Apparently she had issues with hyperkalemia and was followed by nephrology and I been seeing her now yearly.  At this point she is really on no specific medications except a thiazide diuretic that may increase urinary potassium losses and her potassium levels are normal as is her kidney function.  So at this point she really just wants to maintain care with a nephrologist there is no active issues regarding this so she will continue current medications and follow.  I told her to call if there is any problems before next visit.    Orders:  •  Basic metabolic panel; Future    The patient was noted to have mild hyponatremia off and on and she is on HCTZ which can cause that.  I explained to her that it is really a concentration of sodium so reflects water excess.  If it is an issue persisting then we could stop the HCTZ and titrate up the amlodipine for her blood pressure.  Her primary doctor is aware and they are can always reach out and let me know and I did explain it to the patient.    History of Present  "Illness  HPI  Nunu Tirado is a 54 y.o. female who presents for routine follow-up.  She says she has been doing great staying busy at work.  She did have her breast reconstruction which went well and overall she feels great and has no complaints.  She did tell me she was started on low-dose amlodipine for her blood pressure as well.  History obtained from: patient    Review of Systems   Constitutional:  Negative for chills, diaphoresis and fatigue.   HENT: Negative.     Eyes: Negative.    Respiratory:  Negative for cough, chest tightness, shortness of breath and wheezing.    Cardiovascular:  Negative for chest pain and leg swelling.   Gastrointestinal:  Negative for abdominal pain, diarrhea, nausea and vomiting.   Genitourinary: Negative.    Psychiatric/Behavioral:  Negative for agitation, behavioral problems and confusion.           Objective  /80 (BP Location: Left arm, Patient Position: Sitting, Cuff Size: Standard)   Pulse 70   Ht 5' 2\" (1.575 m)   Wt 82.1 kg (181 lb)   LMP 06/29/2025 (Exact Date)   BMI 33.11 kg/m²      Physical Exam  Constitutional:       General: She is not in acute distress.     Appearance: She is not toxic-appearing.   HENT:      Head: Normocephalic and atraumatic.      Nose: Nose normal.      Mouth/Throat:      Mouth: Mucous membranes are moist.     Eyes:      Extraocular Movements: Extraocular movements intact.       Cardiovascular:      Rate and Rhythm: Normal rate and regular rhythm.      Heart sounds:      No gallop.   Pulmonary:      Effort: Pulmonary effort is normal. No respiratory distress.      Breath sounds: No wheezing or rales.   Abdominal:      General: There is no distension.      Palpations: Abdomen is soft.      Tenderness: There is no abdominal tenderness.     Neurological:      Mental Status: She is alert and oriented to person, place, and time.     Psychiatric:         Mood and Affect: Mood normal.         Behavior: Behavior normal.           "

## 2025-07-10 NOTE — ASSESSMENT & PLAN NOTE
The patient came to me as she carries the diagnosis since she was a teenager of pseudohypoaldosteronism.  Apparently she had issues with hyperkalemia and was followed by nephrology and I been seeing her now yearly.  At this point she is really on no specific medications except a thiazide diuretic that may increase urinary potassium losses and her potassium levels are normal as is her kidney function.  So at this point she really just wants to maintain care with a nephrologist there is no active issues regarding this so she will continue current medications and follow.  I told her to call if there is any problems before next visit.    Orders:    Basic metabolic panel; Future

## 2025-07-15 DIAGNOSIS — F43.9 STRESS: ICD-10-CM

## 2025-07-15 DIAGNOSIS — F41.9 ANXIETY: ICD-10-CM

## 2025-07-16 RX ORDER — ALPRAZOLAM 0.25 MG
0.25 TABLET ORAL 2 TIMES DAILY PRN
Qty: 60 TABLET | Refills: 0 | Status: SHIPPED | OUTPATIENT
Start: 2025-07-16

## 2025-07-24 DIAGNOSIS — G43.009 MIGRAINE WITHOUT AURA AND WITHOUT STATUS MIGRAINOSUS, NOT INTRACTABLE: ICD-10-CM

## 2025-07-28 RX ORDER — RIZATRIPTAN BENZOATE 5 MG/1
5 TABLET, ORALLY DISINTEGRATING ORAL ONCE AS NEEDED
Qty: 10 TABLET | Refills: 0 | Status: SHIPPED | OUTPATIENT
Start: 2025-07-28

## (undated) DEVICE — SPONGE LAP 18 X 18 IN STRL RFD

## (undated) DEVICE — JACKSON-PRATT 100CC BULB RESERVOIR: Brand: CARDINAL HEALTH

## (undated) DEVICE — NEEDLE 21 G X 1 1/2 SAFETY

## (undated) DEVICE — GLOVE SRG LF STRL BGL SKNSNS 6.5 PF

## (undated) DEVICE — DRAPE EQUIPMENT RF WAND

## (undated) DEVICE — INTENDED FOR TISSUE SEPARATION, AND OTHER PROCEDURES THAT REQUIRE A SHARP SURGICAL BLADE TO PUNCTURE OR CUT.: Brand: BARD-PARKER ® CARBON RIB-BACK BLADES

## (undated) DEVICE — DECANTER: Brand: UNBRANDED

## (undated) DEVICE — DISPOSABLE OR TOWEL: Brand: CARDINAL HEALTH

## (undated) DEVICE — 450 ML BOTTLE OF 0.05% CHLORHEXIDINE GLUCONATE IN 99.95% STERILE WATER FOR IRRIGATION, USP AND APPLICATOR.: Brand: IRRISEPT ANTIMICROBIAL WOUND LAVAGE

## (undated) DEVICE — BETHLEHEM UNIVERSAL MINOR GEN: Brand: CARDINAL HEALTH

## (undated) DEVICE — SCD SEQUENTIAL COMPRESSION COMFORT SLEEVE MEDIUM KNEE LENGTH: Brand: KENDALL SCD

## (undated) DEVICE — JP CHAN DRN SIL HUBLESS 15FR W/TRO: Brand: CARDINAL HEALTH

## (undated) DEVICE — 1820 FOAM BLOCK NEEDLE COUNTER: Brand: DEVON

## (undated) DEVICE — TUBING SUCTION 5MM X 12 FT

## (undated) DEVICE — BRA SURGICAL SZ LGE (36-39)

## (undated) DEVICE — SINGLE PORT MANIFOLD: Brand: NEPTUNE 2

## (undated) DEVICE — 3000CC GUARDIAN II: Brand: GUARDIAN

## (undated) DEVICE — GAUZE SPONGES,16 PLY: Brand: CURITY

## (undated) DEVICE — INTENDED FOR TISSUE SEPARATION, AND OTHER PROCEDURES THAT REQUIRE A SHARP SURGICAL BLADE TO PUNCTURE OR CUT.: Brand: BARD-PARKER ® SAFETYLOCK CARBON RIB-BACK BLADES

## (undated) DEVICE — BRA SURGICAL SZ XLGE (39-42)

## (undated) DEVICE — SYRINGE 10ML LL CONTROL TOP

## (undated) DEVICE — BULB SYRINGE,IRRIGATION WITH PROTECTIVE CAP: Brand: DOVER

## (undated) DEVICE — SUT VICRYL 0 CT-1 36 IN J946H

## (undated) DEVICE — PACK UNIVERSAL DRAPES SUB-Q ICD

## (undated) DEVICE — MODERATE HIGH PROFILE BOOST, FOR USE WITH SMHB-700: Brand: MENTOR MEMORYGEL BOOST RESTERILIZABLE GEL SIZER

## (undated) DEVICE — PROXIMATE SKIN STAPLERS (35 WIDE) CONTAINS 35 STAINLESS STEEL STAPLES (FIXED HEAD): Brand: PROXIMATE

## (undated) DEVICE — SUT STRATAFIX SPIRAL MONOCRYL PLUS 3-0 SH 20CM SXMP1B427

## (undated) DEVICE — SYRINGE 50ML LL

## (undated) DEVICE — SUT ETHILON 4-0 PS-2 18 IN 1667H

## (undated) DEVICE — NEPTUNE E-SEP SMOKE EVACUATION PENCIL, COATED, 70MM BLADE, PUSH BUTTON SWITCH: Brand: NEPTUNE E-SEP

## (undated) DEVICE — 3M™ STERI-STRIP™ REINFORCED ADHESIVE SKIN CLOSURES, R1547, 1/2 IN X 4 IN (12 MM X 100 MM), 6 STRIPS/ENVELOPE: Brand: 3M™ STERI-STRIP™

## (undated) DEVICE — ELECTRODE BLADE MOD  E-Z CLEAN 6.5IN -0014M

## (undated) DEVICE — NEEDLE BLUNT 18 G X 1 1/2IN

## (undated) DEVICE — NEEDLE 16G X 1 1/2 IN

## (undated) DEVICE — 3M™ TEGADERM™ TRANSPARENT FILM DRESSING FRAME STYLE, 1624W, 2-3/8 IN X 2-3/4 IN (6 CM X 7 CM), 100/CT 4CT/CASE: Brand: 3M™ TEGADERM™

## (undated) DEVICE — LAPAROTOMY SPONGE - RF AND X-RAY DETECTABLE PRE-WASHED: Brand: SITUATE

## (undated) DEVICE — GLOVE PI ULTRA TOUCH SZ.6.5

## (undated) DEVICE — USED IN CONJUNCTION WITH A SYRINGE AS AN ADDITIVE DEVICE FOR ASPIRATION FROM MULTI-DOSE MEDICINE VIALS OR INJECTION INTO I.V. SYSTEMS AND PRE-SLIT SEPTUMS COVERING INJECTION SITES.: Brand: SOL-M™ BLUNT FILL NEEDLE

## (undated) DEVICE — GLOVE INDICATOR PI UNDERGLOVE SZ 7 BLUE

## (undated) DEVICE — SUT SILK 2-0 SH 30 IN K833H

## (undated) DEVICE — ELECTRODE BLADE MOD E-Z CLEAN 2.5IN 6.4CM -0012M

## (undated) DEVICE — SUT STRATAFIX SPIRAL MONOCRYL PLUS  3-0 PS-2 30 CM SXMP1B106

## (undated) DEVICE — MEDI-VAC YANKAUER SUCTION HANDLE W/BULBOUS AND CONTROL VENT: Brand: CARDINAL HEALTH

## (undated) DEVICE — STERILE POLYISOPRENE POWDER-FREE SURGICAL GLOVES WITH EMOLLIENT COATING: Brand: PROTEXIS

## (undated) DEVICE — NEEDLE 25G X 1 1/2

## (undated) DEVICE — ABDOMINAL PAD: Brand: DERMACEA

## (undated) DEVICE — PROVE COVER: Brand: UNBRANDED

## (undated) DEVICE — SUT ETHILON 2-0 FS 18 IN 664H

## (undated) DEVICE — MAYO STAND COVER: Brand: CONVERTORS

## (undated) DEVICE — GLOVE SRG BIOGEL 6

## (undated) DEVICE — STANDARD SURGICAL GOWN, L: Brand: CONVERTORS

## (undated) DEVICE — PLUMEPEN PRO 10FT

## (undated) DEVICE — PENCIL SMOKE EVAC TELESCOPING W/TUBING

## (undated) DEVICE — ANTIBACTERIAL UNDYED BRAIDED (POLYGLACTIN 910), SYNTHETIC ABSORBABLE SUTURE: Brand: COATED VICRYL

## (undated) DEVICE — SUT MONOCRYL 3-0 SH 27 IN Y416H

## (undated) DEVICE — DRAPE SHEET THREE QUARTER

## (undated) DEVICE — SOLUTION BOWL: Brand: KENDALL

## (undated) DEVICE — REM POLYHESIVE ADULT PATIENT RETURN ELECTRODE: Brand: VALLEYLAB

## (undated) DEVICE — BULB SYRINGE, IRRIGATION WITH PROTECTIVE CAP, 60 CC, INDIVIDUALLY WRAPPED: Brand: DOVER

## (undated) DEVICE — SUT PDS II 2-0 CT-1 27 IN Z259H

## (undated) DEVICE — SUT STRATAFIX SPIRAL PDS PLUS 1  CT-1 18 IN SXPP1A404

## (undated) DEVICE — SYRINGE 10ML LL

## (undated) DEVICE — SKIN MARKER DUAL TIP WITH RULER CAP, FLEXIBLE RULER AND LABELS: Brand: DEVON

## (undated) DEVICE — POV-IOD SOLUTION 4OZ BT

## (undated) DEVICE — ADHESIVE SKIN HIGH VISCOSITY EXOFIN 1ML

## (undated) DEVICE — SYRINGE 30ML LL

## (undated) DEVICE — ADHESIVE SKIN CLOSURE SYS EXOFIN FUSION 22CM

## (undated) DEVICE — HARVESTER FAT LIPOGRAFTER KIT

## (undated) DEVICE — STAPLER INSORB SUBCUTICULAR 30 SINGLE USE

## (undated) DEVICE — NEEDLE SPINAL18G X 3.5 IN QUINCKE

## (undated) DEVICE — CHEST/BREAST DRAPE: Brand: CONVERTORS

## (undated) DEVICE — GLOVE PI ULTRA TOUCH SZ.7.5

## (undated) DEVICE — STERILE POLYISOPRENE POWDER-FREE SURGICAL GLOVES: Brand: PROTEXIS

## (undated) DEVICE — MODERATE HIGH PROFILE BOOST, FOR USE WITH SMHB-635: Brand: MENTOR MEMORYGEL BOOST RESTERILIZABLE GEL SIZER

## (undated) DEVICE — Device

## (undated) DEVICE — CHLORAPREP HI-LITE 26ML ORANGE

## (undated) DEVICE — INTENDED FOR TISSUE SEPARATION, AND OTHER PROCEDURES THAT REQUIRE A SHARP SURGICAL BLADE TO PUNCTURE OR CUT.: Brand: BARD-PARKER SAFETY BLADES SIZE 10, STERILE

## (undated) DEVICE — SUT MONOCRYL 4-0 PS-2 27 IN Y426H

## (undated) DEVICE — BETHLEHEM UNIVERSAL BREAST PK: Brand: CARDINAL HEALTH

## (undated) DEVICE — SMOKE EVAC FLUID SUCTION HEPA FILTER